# Patient Record
Sex: MALE | Race: WHITE | NOT HISPANIC OR LATINO | Employment: OTHER | URBAN - METROPOLITAN AREA
[De-identification: names, ages, dates, MRNs, and addresses within clinical notes are randomized per-mention and may not be internally consistent; named-entity substitution may affect disease eponyms.]

---

## 2017-01-09 ENCOUNTER — ALLSCRIPTS OFFICE VISIT (OUTPATIENT)
Dept: OTHER | Facility: OTHER | Age: 77
End: 2017-01-09

## 2017-01-09 DIAGNOSIS — E78.2 MIXED HYPERLIPIDEMIA: ICD-10-CM

## 2017-01-09 DIAGNOSIS — I77.810 THORACIC AORTIC ECTASIA (HCC): ICD-10-CM

## 2017-01-09 DIAGNOSIS — Z79.01 LONG TERM CURRENT USE OF ANTICOAGULANT: ICD-10-CM

## 2017-01-17 ENCOUNTER — GENERIC CONVERSION - ENCOUNTER (OUTPATIENT)
Dept: OTHER | Facility: OTHER | Age: 77
End: 2017-01-17

## 2017-01-17 LAB
AMBIG ABBREV CMP14 DEFAULT (HISTORICAL): NORMAL
AMBIG ABBREV LP DEFAULT (HISTORICAL): NORMAL

## 2017-01-18 ENCOUNTER — GENERIC CONVERSION - ENCOUNTER (OUTPATIENT)
Dept: OTHER | Facility: OTHER | Age: 77
End: 2017-01-18

## 2017-01-18 LAB
A/G RATIO (HISTORICAL): 1.9 (ref 1.1–2.5)
ALBUMIN SERPL BCP-MCNC: 4.4 G/DL (ref 3.5–4.8)
ALP SERPL-CCNC: 61 IU/L (ref 39–117)
ALT SERPL W P-5'-P-CCNC: 20 IU/L (ref 0–44)
AST SERPL W P-5'-P-CCNC: 27 IU/L (ref 0–40)
BASOPHILS # BLD AUTO: 0 %
BASOPHILS # BLD AUTO: 0 X10E3/UL (ref 0–0.2)
BILIRUB SERPL-MCNC: 1.4 MG/DL (ref 0–1.2)
BUN SERPL-MCNC: 13 MG/DL (ref 8–27)
BUN/CREA RATIO (HISTORICAL): 13 (ref 10–22)
CALCIUM SERPL-MCNC: 9.6 MG/DL (ref 8.6–10.2)
CHLORIDE SERPL-SCNC: 97 MMOL/L (ref 96–106)
CHOLEST SERPL-MCNC: 162 MG/DL (ref 100–199)
CO2 SERPL-SCNC: 25 MMOL/L (ref 18–29)
CREAT SERPL-MCNC: 1 MG/DL (ref 0.76–1.27)
DEPRECATED RDW RBC AUTO: 13.1 % (ref 12.3–15.4)
EGFR AFRICAN AMERICAN (HISTORICAL): 85 ML/MIN/1.73
EGFR-AMERICAN CALC (HISTORICAL): 73 ML/MIN/1.73
EOSINOPHIL # BLD AUTO: 0.2 X10E3/UL (ref 0–0.4)
EOSINOPHIL # BLD AUTO: 3 %
GLUCOSE SERPL-MCNC: 113 MG/DL (ref 65–99)
HCT VFR BLD AUTO: 44.4 % (ref 37.5–51)
HDLC SERPL-MCNC: 61 MG/DL
HGB BLD-MCNC: 14.8 G/DL (ref 12.6–17.7)
IMM.GRANULOCYTES (CD4/8) (HISTORICAL): 0 %
IMM.GRANULOCYTES (CD4/8) (HISTORICAL): 0 X10E3/UL (ref 0–0.1)
LDLC SERPL CALC-MCNC: 89 MG/DL (ref 0–99)
LYMPHOCYTES # BLD AUTO: 1.4 X10E3/UL (ref 0.7–3.1)
LYMPHOCYTES # BLD AUTO: 28 %
MCH RBC QN AUTO: 33.6 PG (ref 26.6–33)
MCHC RBC AUTO-ENTMCNC: 33.3 G/DL (ref 31.5–35.7)
MCV RBC AUTO: 101 FL (ref 79–97)
MONOCYTES # BLD AUTO: 0.3 X10E3/UL (ref 0.1–0.9)
MONOCYTES (HISTORICAL): 6 %
NEUTROPHILS # BLD AUTO: 3.2 X10E3/UL (ref 1.4–7)
NEUTROPHILS # BLD AUTO: 63 %
PLATELET # BLD AUTO: 177 X10E3/UL (ref 150–379)
POTASSIUM SERPL-SCNC: 4.9 MMOL/L (ref 3.5–5.2)
RBC (HISTORICAL): 4.41 X10E6/UL (ref 4.14–5.8)
SODIUM SERPL-SCNC: 141 MMOL/L (ref 134–144)
TOT. GLOBULIN, SERUM (HISTORICAL): 2.3 G/DL (ref 1.5–4.5)
TOTAL PROTEIN (HISTORICAL): 6.7 G/DL (ref 6–8.5)
TRIGL SERPL-MCNC: 62 MG/DL (ref 0–149)
TSH SERPL DL<=0.05 MIU/L-ACNC: 2 UIU/ML (ref 0.45–4.5)
VLDLC SERPL CALC-MCNC: 12 MG/DL (ref 5–40)
WBC # BLD AUTO: 5.1 X10E3/UL (ref 3.4–10.8)

## 2017-01-20 ENCOUNTER — ALLSCRIPTS OFFICE VISIT (OUTPATIENT)
Dept: OTHER | Facility: OTHER | Age: 77
End: 2017-01-20

## 2017-01-25 ENCOUNTER — HOSPITAL ENCOUNTER (OUTPATIENT)
Dept: CT IMAGING | Facility: HOSPITAL | Age: 77
Discharge: HOME | End: 2017-01-25
Attending: INTERNAL MEDICINE
Payer: MEDICARE

## 2017-01-25 DIAGNOSIS — I77.810 THORACIC AORTIC ECTASIA (HCC): ICD-10-CM

## 2017-01-25 PROCEDURE — 71250 CT THORAX DX C-: CPT

## 2017-01-26 ENCOUNTER — GENERIC CONVERSION - ENCOUNTER (OUTPATIENT)
Dept: OTHER | Facility: OTHER | Age: 77
End: 2017-01-26

## 2017-02-01 ENCOUNTER — GENERIC CONVERSION - ENCOUNTER (OUTPATIENT)
Dept: OTHER | Facility: OTHER | Age: 77
End: 2017-02-01

## 2017-03-13 DIAGNOSIS — I77.810 THORACIC AORTIC ECTASIA (HCC): ICD-10-CM

## 2017-03-13 DIAGNOSIS — I50.22 CHRONIC SYSTOLIC CONGESTIVE HEART FAILURE (HCC): ICD-10-CM

## 2017-03-13 DIAGNOSIS — I42.9 CARDIOMYOPATHY (HCC): ICD-10-CM

## 2017-03-21 ENCOUNTER — HOSPITAL ENCOUNTER (OUTPATIENT)
Dept: NON INVASIVE DIAGNOSTICS | Facility: CLINIC | Age: 77
Discharge: HOME | End: 2017-03-21
Payer: MEDICARE

## 2017-03-21 ENCOUNTER — GENERIC CONVERSION - ENCOUNTER (OUTPATIENT)
Dept: OTHER | Facility: OTHER | Age: 77
End: 2017-03-21

## 2017-03-21 DIAGNOSIS — I50.22 CHRONIC SYSTOLIC CONGESTIVE HEART FAILURE (HCC): ICD-10-CM

## 2017-03-21 DIAGNOSIS — I42.9 CARDIOMYOPATHY (HCC): ICD-10-CM

## 2017-03-21 DIAGNOSIS — I77.810 THORACIC AORTIC ECTASIA (HCC): ICD-10-CM

## 2017-03-21 PROCEDURE — 93306 TTE W/DOPPLER COMPLETE: CPT

## 2017-04-06 ENCOUNTER — GENERIC CONVERSION - ENCOUNTER (OUTPATIENT)
Dept: OTHER | Facility: OTHER | Age: 77
End: 2017-04-06

## 2017-04-18 ENCOUNTER — ALLSCRIPTS OFFICE VISIT (OUTPATIENT)
Dept: OTHER | Facility: OTHER | Age: 77
End: 2017-04-18

## 2017-05-18 ENCOUNTER — ALLSCRIPTS OFFICE VISIT (OUTPATIENT)
Dept: OTHER | Facility: OTHER | Age: 77
End: 2017-05-18

## 2017-06-02 RX ORDER — DOCUSATE SODIUM 100 MG/1
100 CAPSULE, LIQUID FILLED ORAL 2 TIMES DAILY
Status: ON HOLD | COMMUNITY
End: 2017-06-05

## 2017-06-02 RX ORDER — CARVEDILOL 6.25 MG/1
6.25 TABLET ORAL 2 TIMES DAILY WITH MEALS
COMMUNITY
End: 2018-09-19 | Stop reason: SDUPTHER

## 2017-06-02 RX ORDER — RANITIDINE HCL 75 MG
75 TABLET ORAL DAILY
COMMUNITY
End: 2019-10-09

## 2017-06-03 ENCOUNTER — ANESTHESIA EVENT (OUTPATIENT)
Dept: PERIOP | Facility: HOSPITAL | Age: 77
End: 2017-06-03
Payer: MEDICARE

## 2017-06-05 ENCOUNTER — HOSPITAL ENCOUNTER (OUTPATIENT)
Facility: HOSPITAL | Age: 77
Setting detail: OUTPATIENT SURGERY
Discharge: HOME | End: 2017-06-05
Attending: INTERNAL MEDICINE | Admitting: INTERNAL MEDICINE
Payer: MEDICARE

## 2017-06-05 ENCOUNTER — ANESTHESIA (OUTPATIENT)
Dept: PERIOP | Facility: HOSPITAL | Age: 77
End: 2017-06-05
Payer: MEDICARE

## 2017-06-05 ENCOUNTER — GENERIC CONVERSION - ENCOUNTER (OUTPATIENT)
Dept: OTHER | Facility: OTHER | Age: 77
End: 2017-06-05

## 2017-06-05 VITALS
DIASTOLIC BLOOD PRESSURE: 58 MMHG | HEART RATE: 69 BPM | RESPIRATION RATE: 22 BRPM | WEIGHT: 238 LBS | TEMPERATURE: 96.8 F | HEIGHT: 76 IN | BODY MASS INDEX: 28.98 KG/M2 | SYSTOLIC BLOOD PRESSURE: 99 MMHG | OXYGEN SATURATION: 97 %

## 2017-06-05 RX ORDER — SODIUM CHLORIDE, SODIUM LACTATE, POTASSIUM CHLORIDE, CALCIUM CHLORIDE 600; 310; 30; 20 MG/100ML; MG/100ML; MG/100ML; MG/100ML
50 INJECTION, SOLUTION INTRAVENOUS CONTINUOUS
Status: DISCONTINUED | OUTPATIENT
Start: 2017-06-05 | End: 2017-06-05 | Stop reason: HOSPADM

## 2017-06-05 RX ORDER — PROPOFOL 10 MG/ML
INJECTION, EMULSION INTRAVENOUS AS NEEDED
Status: DISCONTINUED | OUTPATIENT
Start: 2017-06-05 | End: 2017-06-05 | Stop reason: SURG

## 2017-06-05 RX ADMIN — PROPOFOL 20 MG: 10 INJECTION, EMULSION INTRAVENOUS at 10:46

## 2017-06-05 RX ADMIN — PROPOFOL 20 MG: 10 INJECTION, EMULSION INTRAVENOUS at 10:42

## 2017-06-05 RX ADMIN — PROPOFOL 120 MG: 10 INJECTION, EMULSION INTRAVENOUS at 10:39

## 2017-06-05 RX ADMIN — SODIUM CHLORIDE, POTASSIUM CHLORIDE, SODIUM LACTATE AND CALCIUM CHLORIDE 50 ML/HR: 600; 310; 30; 20 INJECTION, SOLUTION INTRAVENOUS at 10:20

## 2017-06-05 RX ADMIN — PROPOFOL 20 MG: 10 INJECTION, EMULSION INTRAVENOUS at 10:44

## 2017-07-18 ENCOUNTER — APPOINTMENT (OUTPATIENT)
Dept: LAB | Facility: CLINIC | Age: 77
End: 2017-07-18
Payer: MEDICARE

## 2017-07-18 ENCOUNTER — ALLSCRIPTS OFFICE VISIT (OUTPATIENT)
Dept: OTHER | Facility: OTHER | Age: 77
End: 2017-07-18

## 2017-07-18 DIAGNOSIS — E78.5 HYPERLIPIDEMIA: ICD-10-CM

## 2017-07-18 DIAGNOSIS — I10 ESSENTIAL (PRIMARY) HYPERTENSION: ICD-10-CM

## 2017-07-18 DIAGNOSIS — R31.9 HEMATURIA: ICD-10-CM

## 2017-07-18 LAB
ALBUMIN SERPL BCP-MCNC: 4.1 G/DL (ref 3.5–5)
ALP SERPL-CCNC: 62 U/L (ref 46–116)
ALT SERPL W P-5'-P-CCNC: 24 U/L (ref 12–78)
ANION GAP SERPL CALCULATED.3IONS-SCNC: 5 MMOL/L (ref 4–13)
AST SERPL W P-5'-P-CCNC: 33 U/L (ref 5–45)
BACTERIA UR QL AUTO: ABNORMAL /HPF
BILIRUB SERPL-MCNC: 2.4 MG/DL (ref 0.2–1)
BILIRUB UR QL STRIP: NEGATIVE
BUN SERPL-MCNC: 13 MG/DL (ref 5–25)
CALCIUM SERPL-MCNC: 9.7 MG/DL (ref 8.3–10.1)
CHLORIDE SERPL-SCNC: 105 MMOL/L (ref 100–108)
CLARITY UR: ABNORMAL
CO2 SERPL-SCNC: 29 MMOL/L (ref 21–32)
COLOR UR: YELLOW
CREAT SERPL-MCNC: 0.95 MG/DL (ref 0.6–1.3)
ERYTHROCYTE [DISTWIDTH] IN BLOOD BY AUTOMATED COUNT: 12.9 % (ref 11.6–15.1)
GFR SERPL CREATININE-BSD FRML MDRD: >60 ML/MIN/1.73SQ M
GLUCOSE SERPL-MCNC: 102 MG/DL (ref 65–140)
GLUCOSE UR STRIP-MCNC: NEGATIVE MG/DL
HCT VFR BLD AUTO: 43.9 % (ref 36.5–49.3)
HGB BLD-MCNC: 15.1 G/DL (ref 12–17)
HGB UR QL STRIP.AUTO: ABNORMAL
HYALINE CASTS #/AREA URNS LPF: ABNORMAL /LPF
KETONES UR STRIP-MCNC: NEGATIVE MG/DL
LEUKOCYTE ESTERASE UR QL STRIP: NEGATIVE
MCH RBC QN AUTO: 35.1 PG (ref 26.8–34.3)
MCHC RBC AUTO-ENTMCNC: 34.4 G/DL (ref 31.4–37.4)
MCV RBC AUTO: 102 FL (ref 82–98)
NITRITE UR QL STRIP: NEGATIVE
NON-SQ EPI CELLS URNS QL MICRO: ABNORMAL /HPF
PH UR STRIP.AUTO: 7.5 [PH] (ref 4.5–8)
PLATELET # BLD AUTO: 169 THOUSANDS/UL (ref 149–390)
PMV BLD AUTO: 10.5 FL (ref 8.9–12.7)
POTASSIUM SERPL-SCNC: 4.9 MMOL/L (ref 3.5–5.3)
PROT SERPL-MCNC: 7.6 G/DL (ref 6.4–8.2)
PROT UR STRIP-MCNC: ABNORMAL MG/DL
RBC # BLD AUTO: 4.3 MILLION/UL (ref 3.88–5.62)
RBC #/AREA URNS AUTO: ABNORMAL /HPF
SODIUM SERPL-SCNC: 139 MMOL/L (ref 136–145)
SP GR UR STRIP.AUTO: 1.01 (ref 1–1.03)
UROBILINOGEN UR QL STRIP.AUTO: 1 E.U./DL
WBC # BLD AUTO: 6.14 THOUSAND/UL (ref 4.31–10.16)
WBC #/AREA URNS AUTO: ABNORMAL /HPF

## 2017-07-18 PROCEDURE — 81001 URINALYSIS AUTO W/SCOPE: CPT

## 2017-07-18 PROCEDURE — 36415 COLL VENOUS BLD VENIPUNCTURE: CPT

## 2017-07-18 PROCEDURE — 85027 COMPLETE CBC AUTOMATED: CPT

## 2017-07-18 PROCEDURE — 80053 COMPREHEN METABOLIC PANEL: CPT

## 2017-07-24 ENCOUNTER — HOSPITAL ENCOUNTER (OUTPATIENT)
Dept: ULTRASOUND IMAGING | Facility: HOSPITAL | Age: 77
Discharge: HOME | End: 2017-07-24
Attending: INTERNAL MEDICINE
Payer: MEDICARE

## 2017-07-24 ENCOUNTER — TRANSCRIBE ORDERS (OUTPATIENT)
Dept: ADMINISTRATIVE | Facility: HOSPITAL | Age: 77
End: 2017-07-24

## 2017-07-24 DIAGNOSIS — R31.9 HEMATURIA: ICD-10-CM

## 2017-07-24 PROCEDURE — 76770 US EXAM ABDO BACK WALL COMP: CPT

## 2017-08-01 ENCOUNTER — GENERIC CONVERSION - ENCOUNTER (OUTPATIENT)
Dept: OTHER | Facility: OTHER | Age: 77
End: 2017-08-01

## 2017-08-21 ENCOUNTER — GENERIC CONVERSION - ENCOUNTER (OUTPATIENT)
Dept: OTHER | Facility: OTHER | Age: 77
End: 2017-08-21

## 2017-10-24 ENCOUNTER — ALLSCRIPTS OFFICE VISIT (OUTPATIENT)
Dept: OTHER | Facility: OTHER | Age: 77
End: 2017-10-24

## 2017-10-25 NOTE — PROGRESS NOTES
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)   2  Cardiomyopathy (425 4) (I42 9)   3  Anticoagulant long-term use (V58 61) (Z79 01)   4  Atrial fibrillation (427 31) (I48 91)   5  Chronic systolic heart failure (911 44) (I50 22)    Plan  Atrial fibrillation    · From  Eliquis 5 MG Oral Tablet take 1 tablet by mouth twice daily To Eliquis 5  MG Oral Tablet take 1 tablet by mouth twice a day   Rx By: Shannon Brito; Dispense: 90 Days ; #:180 Tablet; Refill: 3;For: Atrial fibrillation; SONIA = N; Print Rx  Cardiomyopathy    · ECHO COMPLETE WITH CONTRAST IF INDICATED; Status:Hold For - Scheduling;  Requested for:26Mar2018; Perform:[facility]; JPP:[id number];WJDLGJF; For:Cardiomyopathy; Ordered By:Erika Nicole;  Dilation of thoracic aorta    · * CT CHEST WO CONTRAST; Status:Hold For - Scheduling; Requested for:26Mar2018; Perform:[facility]; PMU:[id number];BGULOYH; For:Dilation of thoracic aorta; Ordered By:Erika Nicole; Discussion/Summary  Cardiology Discussion Summary Free Text Note Form [facility]:   Patient with multiple medical problems who seems to be doing reasonably well from cardiac standpoint  Previous studies reviewed with patient  Medications reviewed and possible side effects discussed  concepts of cardiovascular disease , signs and symptoms of heart disease  Dietary and risk factor modification reinforced  All questions answered  Safety measures reviewed  Patient advised to report any problems prompting medical attention  Patient understands the risks and benefits of anticoagulation to prevent thromboembolic risk from atrial fibrillation  Patient report any bleeding issues  Patient does have history of thoracic aortic aneurysm measuring 4 5 cm  Patient will need a repeat follow-up CT of the chest prior to next visit  Patient will also have an echocardiogram to reassess ejection fraction given history of cardiomyopathy prior to next visit   Symptoms to watch out from cardiac standpoint discussed with patient and family  Follow-up in 6 months or earlier as needed  Follow-up with primary care physician  Previous cardiac studies reviewed  Goals and Barriers: The patient has the current Goals: Chronic anticoagulation  The patent has the current Barriers: None  Patient's Capacity to Self-Care: Patient is able to Self-Care  Patient Education: Educational resources provided: Please see discussion summary  Medication SE Review and Pt Understands Tx: Possible side effects of new medications were reviewed with the patient/guardian today  Counseling Documentation With Imm: The patient, patient's family was counseled regarding instructions for management,-- risk factor reductions,-- impressions,-- risks and benefits of treatment options,-- importance of compliance with treatment  Chief Complaint  Chief Complaint Chronic Condition [facility]: Patient is here today for follow up of chronic conditions described in HPI  History of Present Illness  Cardiology [facility] Free Text Note Form [facility]: Patient presents for follow-up visit  Patient has history of nonischemic cardiomyopathy chronic systolic heart failure history of CRT D implantation  Patient also has chronic back pain  Patient has good and bad days  No overall change from previous visit  He states that he has been compliant with all his present medications  He denies any history of bleeding tendencies  Patient is on anticoagulation for atrial fibrillation  No history of ICD discharges  Review of Systems  Cardiology Male ROS:     Cardiac: as noted in HPI  Skin: No complaints of nonhealing sores or skin rash  Genitourinary: as noted in HPI   Psychological: No complaints of feeling depressed, anxiety, panic attacks, or difficulty concentrating  General: lack of energy/fatigue, but-- as noted in HPI,-- no fever-- and-- no frequent infections  Respiratory: shortness of breath, but-- no hemoptysis  HEENT: No complaints of serious problems, hearing problems, nose problems, throat problems, or snoring  Gastrointestinal: No complaints of liver problems, nausea, vomiting, heartburn, constipation, bloody stools, diarrhea, problems swallowing, adbominal pain, or rectal bleeding  Hematologic: No complaints of bleeding disorders, anemia, blood clots, or excessive brusing  Neurological: No complaints of numbness, tingling, dizziness, weakness, seizures, headaches, syncope or fainting, AM fatigue, daytime sleepiness, no witnessed apnea episodes  Musculoskeletal: arthritis-- and-- back pain   ROS Reviewed:   ROS reviewed  Active Problems  Problems    1  Active advance directive (V49 89) (Z78 9)   2  AICD at end of battery life (V53 32) (Z45 02)   3  Anticoagulant long-term use (V58 61) (Z79 01)   4  Atrial fibrillation (427 31) (I48 91)   5  Cardiomyopathy (425 4) (I42 9)   6  Chronic systolic heart failure (061 59) (I50 22)   7  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)   8  Hematuria (599 70) (R31 9)   9  History of colon polyps (V12 72) (Z86 010)   10  Hyperlipidemia (272 4) (E78 5)   11  Hypertension (401 9) (I10)   12  Medicare annual wellness visit, initial (V70 0) (Z00 00)   13  Need for influenza vaccination (V04 81) (Z23)   14  Screening for genitourinary condition (V81 6) (Z13 89)    Past Medical History  Problems    1  Anticoagulant long-term use (V58 61) (Z79 01)   2  History of Cardiac defibrillator in situ (V45 02) (Z95 810)   3  History of Colon polyp (211 3) (K63 5)   4  History of Congestive heart failure (428 0) (I50 9)   5  History of DJD (degenerative joint disease) (715 90) (M19 90)   6  History of atrial fibrillation (V12 59) (Z86 79)   7  Denied: History of depression   8  History of sick sinus syndrome (V12 59) (Z86 79)   9  Denied: History of substance abuse   10  History of Spinal stenosis (724 00) (M48 00)  Active Problems And Past Medical History Reviewed:    The active problems and past medical history were reviewed and updated today  Surgical History  Problems    1  History of Complete Colonoscopy   2  History of Implantable Cardioverter-Defibrillator   3  History of Pacemaker Placement - Transvenous Electrode Biventricular   4  History of Transurethral Resection Of Prostate (TURP)  Surgical History Reviewed: The surgical history was reviewed and updated today  Family History  Mother    1  Family history of CAD (coronary artery disease) (414 00) (I25 10)   2  Denied: Family history of mental disorder   3  Denied: Family history of substance abuse  Family History Reviewed: The family history was reviewed and updated today  Social History  Problems    · Active advance directive (V49 89) (Z78 9)   · Alcohol Use (History)   · Caffeine Use   · Former smoker (I65 46) (U03 965)  Social History Reviewed: The social history was reviewed and updated today  Current Meds   1  Carvedilol 6 25 MG Oral Tablet; TAKE 1 TABLET twice a day with meals; Therapy: 01LPR2470 to (Evaluate:04Sep2018)  Requested for: 35VHN0049; Last   Rx:09Sep2017 Ordered   2  Eliquis 5 MG Oral Tablet; take 1 tablet by mouth twice daily; Therapy: 21HKG2094 to (Evaluate:89Lwb0490)  Requested for: 12Jun2017; Last   Rx:12Jun2017 Ordered   3  Omega-3 Fish Oil CAPS; Therapy: (Kendra Glow) to Recorded   4  Tylenol PM Extra Strength 500-25 MG Oral Tablet; Therapy: 92FXU0145 to Recorded   5  Zantac 75 TABS; Therapy: (Recorded:20Jan2017) to Recorded  Medication List Reviewed: The medication list was reviewed and updated today  Allergies  Medication    1  Penicillins  Non-Medication    2  No Known Food Allergies   3   Seasonal    Vitals  Vital Signs    Recorded: 27JLX1667 11:18AM   Heart Rate 75   Systolic 501   Diastolic 72   Height 6 ft 3 in   Weight 233 lb 8 0 oz   BMI Calculated 29 19   BSA Calculated 2 34   O2 Saturation 98     Physical Exam    Constitutional   General appearance: No acute distress, well appearing and well nourished  -- noted in the upper part of the face  Eyes   Ophthalmoscopic examination: Normal appearing optic disc and posterior segments  Ears, Nose, Mouth, and Throat - Nasal mucosa, septum, and turbinates: Normal, no edema or discharge  -- Oropharynx: Clear, nares are clear, mucous membranes are moist    Neck   Neck and thyroid: Normal, supple, trachea midline, no thyromegaly  Pulmonary   Respiratory effort: No increased work of breathing or signs of respiratory distress  Auscultation of lungs: Clear to auscultation, no rales, no rhonchi, no wheezing, good air movement  Cardiovascular   Auscultation of heart: Normal rate and rhythm, normal S1 and S2, no murmurs  -- Irregular  Carotid pulses: Normal, 2+ bilaterally  -- Carotid bruit  Peripheral vascular exam: Normal pulses throughout, no tenderness, erythema or swelling  Pedal pulses: Normal, 2+ bilaterally  Examination of extremities for edema and/or varicosities: Normal     Chest -   Sternum: Normal     Abdomen   Abdomen: Non-tender and no distention  Liver and spleen: No hepatomegaly or splenomegaly  Musculoskeletal Gait and station: Normal gait  -- Digits and nails: Normal without clubbing or cyanosis  -- Inspection/palpation of joints, bones, and muscles: Normal, ROM normal     Skin - Skin and subcutaneous tissue: Normal without rashes or lesions  Skin is warm and well perfused, normal turgor  Neurologic - Cranial nerves: II - XII intact  -- Nonfocal -- Speech: Normal     Psychiatric - Orientation to person, place, and time: Normal -- Mood and affect: Normal       Results/Data  Diagnostic Studies Reviewed Cardio: I personally reviewed the recording/images in the office today  My interpretation follows  ICD/ Pacer Interpretation ICD interrogation data reviewed  Normal CRT D device  Underlying rhythm is atrial fibrillation  Battery status is good   Lead impedance normal  Health Management  History of colon polyps   COLONOSCOPY; every 5 years; Last 85FTG8279; Next Due: 89PCX8276;  Active    Future Appointments    Date/Time Provider Specialty Site   04/06/2018 10:40 AM [facility]     Signatures   Electronically signed by : YULIANA Vega ; Oct 24 2017  1:32PM EST                       (Author)

## 2018-01-11 ENCOUNTER — GENERIC CONVERSION - ENCOUNTER (OUTPATIENT)
Dept: OTHER | Facility: OTHER | Age: 78
End: 2018-01-11

## 2018-01-11 NOTE — RESULT NOTES
Verified Results  (1) CBC/PLT/DIFF 24QYT9214 09:29AM Erika Nicole     Test Name Result Flag Reference   WBC 5 1 x10E3/uL  3 4-10 8   RBC 4 41 x10E6/uL  4 14-5 80   Hemoglobin 14 8 g/dL  12 6-17 7   Hematocrit 44 4 %  37 5-51 0    fL H 79-97   MCH 33 6 pg H 26 6-33 0   MCHC 33 3 g/dL  31 5-35 7   RDW 13 1 %  12 3-15 4   Platelets 747 J61W6/OU  150-379   Neutrophils 63 %     Lymphs 28 %     Monocytes 6 %     Eos 3 %     Basos 0 %     Neutrophils (Absolute) 3 2 x10E3/uL  1 4-7 0   Lymphs (Absolute) 1 4 x10E3/uL  0 7-3 1   Monocytes(Absolute) 0 3 x10E3/uL  0 1-0 9   Eos (Absolute) 0 2 x10E3/uL  0 0-0 4   Baso (Absolute) 0 0 x10E3/uL  0 0-0 2   Immature Granulocytes 0 %     Immature Grans (Abs) 0 0 x10E3/uL  0 0-0 1     (1) COMPREHENSIVE METABOLIC PANEL 33LST4967 70:29MR Erika Nicole     Test Name Result Flag Reference   Glucose, Serum 113 mg/dL H 65-99   BUN 13 mg/dL  8-27   Creatinine, Serum 1 00 mg/dL  0 76-1 27   eGFR If NonAfricn Am 73 mL/min/1 73  >59   eGFR If Africn Am 85 mL/min/1 73  >59   BUN/Creatinine Ratio 13  10-22   Sodium, Serum 141 mmol/L  134-144   Potassium, Serum 4 9 mmol/L  3 5-5 2   Chloride, Serum 97 mmol/L     Carbon Dioxide, Total 25 mmol/L  18-29   Calcium, Serum 9 6 mg/dL  8 6-10 2   Protein, Total, Serum 6 7 g/dL  6 0-8 5   Albumin, Serum 4 4 g/dL  3 5-4 8   Globulin, Total 2 3 g/dL  1 5-4 5   A/G Ratio 1 9  1 1-2 5   Bilirubin, Total 1 4 mg/dL H 0 0-1 2   Alkaline Phosphatase, S 61 IU/L     AST (SGOT) 27 IU/L  0-40   ALT (SGPT) 20 IU/L  0-44     (LC) Lipid Panel 52QQW2118 09:29AM Erika Nicole     Test Name Result Flag Reference   Cholesterol, Total 162 mg/dL  100-199   Triglycerides 62 mg/dL  0-149   HDL Cholesterol 61 mg/dL  >39   VLDL Cholesterol Curtis 12 mg/dL  5-40   LDL Cholesterol Calc 89 mg/dL  0-99     (1) TSH 19KZS9309 09:29AM Erika Nicole     Test Name Result Flag Reference   TSH 2 000 uIU/mL  0 450-4 500     (LC) Juanita Thompson CMP14 Default 16BOB1772 09:29AM [place], Erika     Test Name Result Flag Reference   Evangelina Guadarrama CMP14 Default Comment     A hand-written panel/profile was received from your office  In  accordance with the [facility] Ambiguous Test Code Policy dated July 1717, we have completed your order by using the closest currently  or formerly recognized AMA panel  We have assigned Comprehensive  Metabolic Panel (14), Test Code #920572 to this request   If this  is not the testing you wished to receive on this specimen, please  contact the [address] [place] [address] Client Inquiry/Technical Services Department  to clarify the test order  We appreciate your business  [facility]) Evangelina Richtermanuela LP Default 44VBI9250 09:29AM [place], Erika     Test Name Result Flag Reference   Juanita Thompson LP Default Comment     A hand-written panel/profile was received from your office  In  accordance with the [facility] Ambiguous Test Code Policy dated July 8940, we have completed your order by using the closest currently  or formerly recognized AMA panel  We have assigned Lipid Panel,  Test Code #500962 to this request  If this is not the testing you  wished to receive on this specimen, please contact the [address] [place] [address]  Client Inquiry/Technical Services Department to clarify the test  order  We appreciate your business

## 2018-01-12 VITALS
DIASTOLIC BLOOD PRESSURE: 64 MMHG | HEIGHT: 76 IN | BODY MASS INDEX: 28.98 KG/M2 | SYSTOLIC BLOOD PRESSURE: 108 MMHG | WEIGHT: 238 LBS

## 2018-01-12 NOTE — RESULT NOTES
Verified Results  ECHO COMPLETE WITH CONTRAST IF INDICATED 2017 09:45AM Kalia Johnson Order Number: [id number]    - Patient Instructions: To schedule this appointment, please contact Central Scheduling at [phone]  For Vincent Chowdhury, please call [phone]  Test Name Result Flag Reference   ECHO COMPLETE WITH CONTRAST IF INDICATED (Report)     [facility], [address] [place] [address]   [phone]     Transthoracic Echocardiogram   2D, M-mode, Doppler, and Color Doppler     Study date: 21-Mar-2017     Patient: Lurdes Donovan   MR number: [id number]   Account number: [de-identified]   : 1940   Age: 77 years   Gender: Male   Status: Outpatient   Location: [facility]   Height: 76 in   Weight: 237 lb   BP: 132/ 84 mmHg     Indications: Cardiomyopathy  Diagnoses: I42 9 - Cardiomyopathy, unspecified     Sonographer: Muhammad RCS   Primary Physician: Sabi Puentes DO   Referring Physician: Jr Bernal MD   Group: [facility]   Report Prepared By: Jr Bernal MD   Interpreting Physician: Jr Bernal MD     SUMMARY     LEFT VENTRICLE:   Ejection fraction was estimated to be 35 %  There was severe diffuse hypokinesis  Concentric hypertrophy was present  RIGHT VENTRICLE:   Estimated peak pressure was at least 35 mmHg  ICD lead noted in RV  LEFT ATRIUM:   The atrium was mildly dilated  MITRAL VALVE:   There was mild to moderate annular calcification  There was moderate regurgitation  AORTIC VALVE:   There was mild regurgitation  TRICUSPID VALVE:   There was mild regurgitation  AORTA:   The root exhibited mild dilatation  4cm  SUMMARY MEASUREMENTS   Unspecified Scan Mode measurements:   Aortic Valve:  HR was 69 {H  B }/min  Left Ventricle:  HR was 70 {H  B }/min       HISTORY: PRIOR HISTORY: Risk factors: hypertension and a family history of coronary artery disease  PRIOR PROCEDURES: ICD implantation  PROCEDURE: The study was performed in the [address] [place] [address]  This was a routine study  The transthoracic approach was used  The study included complete 2D imaging, M-mode, complete spectral Doppler, and color Doppler  Image   quality was adequate  LEFT VENTRICLE: Size was normal  Ejection fraction was estimated to be 35 %  There was severe diffuse hypokinesis  Concentric hypertrophy was present  DOPPLER: There was an increased relative contribution of atrial contraction to   ventricular filling  RIGHT VENTRICLE: The size was normal  Systolic function was normal  Wall thickness was normal  DOPPLER: Estimated peak pressure was at least 35 mmHg  ICD lead noted in RV  LEFT ATRIUM: The atrium was mildly dilated  RIGHT ATRIUM: Size was normal      MITRAL VALVE: There was mild to moderate annular calcification  DOPPLER: There was moderate regurgitation  AORTIC VALVE: The valve was probably trileaflet  Leaflets exhibited mild calcification  DOPPLER: There was no evidence for stenosis  There was mild regurgitation  TRICUSPID VALVE: The valve structure was normal  There was normal leaflet separation  DOPPLER: The transtricuspid velocity was within the normal range  There was no evidence for stenosis  There was mild regurgitation  PULMONIC VALVE: Leaflets exhibited normal thickness, no calcification, and normal cuspal separation  DOPPLER: The transpulmonic velocity was within the normal range  There was no regurgitation  PERICARDIUM: There was no pericardial effusion  The pericardium was normal in appearance  AORTA: The root exhibited mild dilatation  4cm  SYSTEM MEASUREMENT TABLES     Apical four chamber   4 chamber Left Atrium Volume Index; Planimetry; End Systole; Apical four chamber;: 31 67 cm2   Left Ventricular End Diastolic Volume; Method of Disks, Single Plane;  Apical four chamber;: 207 1 ml   Left Ventricular End Systolic Volume; Method of Disks, Single Plane; Apical four chamber;: 107 7 ml   Right Atrium Systolic Area; Planimetry; End Systole; Apical four chamber;: 38 5 cm2   Right Ventricular Internal Diastolic Dimension; End Diastole; Apical four chamber;: 51 mm   TAPSE: 10 5 mm     Unspecified Scan Mode   Aortic Root Diameter; End Systole;: 40 4 mm   Gradient Pressure, Peak; Simplified Bernoulli; Antegrade Flow; Systole;: 3 7 mm[Hg]   Gradient pressure, average; Simplified Bernoulli; Antegrade Flow; Systole;: 2 mm[Hg]   HR: 69 {H  B }/min   Left atrial diameter; End Diastole;: 53 6 mm   Cardiac Output; Teichholz; Systole;: 2 6 L/min   HR: 70 {H  B }/min   Heart rate; Teichholz;: 80 {H  B }/min   Interventricular Septum Diastolic Thickness; Teichholz; End Diastole;: 18 3 mm   Left Ventricle Internal End Diastolic Dimension; Teichholz;: 52 4 mm   Left Ventricle Internal Systolic Dimension; Teichholz; End Systole;: 46 4 mm   Left Ventricle Mass; Mass AVCube with Teichholz; End Diastole;: 471 g   Left Ventricle Posterior Wall Diastolic Thickness; Teichholz; End Diastole;: 18 5 mm   Left Ventricular Ejection Fraction; Teichholz;: 24 7 %   Left Ventricular End Diastolic Volume; Teichholz;: 131 8 ml   Left Ventricular End Systolic Volume; Method of Disks, Biplane;: 95 7 ml   Left Ventricular End Systolic Volume; Teichholz;: 99 3 ml   Left Ventricular Fractional Shortening;: 11 5 %   Stroke volume;  Teichholz; Systole;: 32 5 ml   Mitral Valve Area; Area by Pressure Half-Time; Systole;: 2 53 cm2   Mitral Valve E to A Ratio; Systole;: 3 36   Maximum Tricuspid valve regurgitation pressure gradient; Regurgitant Flow; Systole;: 18 7 mm[Hg]     [facility]     Prepared and electronically signed by     Ramy Watts MD   Signed 21-Mar-2017 15:08:06

## 2018-01-13 VITALS
OXYGEN SATURATION: 95 % | SYSTOLIC BLOOD PRESSURE: 132 MMHG | DIASTOLIC BLOOD PRESSURE: 84 MMHG | BODY MASS INDEX: 28.91 KG/M2 | WEIGHT: 237.5 LBS | HEART RATE: 83 BPM

## 2018-01-13 NOTE — RESULT NOTES
Verified Results  * CT CHEST [address] [id number] 09:50AM Rubi Loving Order Number: [id number]    - Patient Instructions: To schedule this appointment, please contact Central Scheduling at [phone]  Test Name Result Flag Reference   CT CHEST WO CONTRAST (Report)     CT CHEST WITHOUT IV CONTRAST     INDICATION: Ectasia of the aorta     COMPARISON: June 18, 2015     TECHNIQUE: CT examination of the chest was performed without intravenous contrast  Axial, sagittal and coronal reformatted images were submitted for interpretation  Coronal thick section MIP (maximal intensity projection) images were also created  This examination, like all CT scans performed in the [facility], was performed utilizing techniques to minimize radiation dose exposure, including the use of iterative reconstruction and automated exposure control  FINDINGS:     LUNGS: Again noted is a 4 mm right middle lobe lung nodule probably representing granuloma, unchanged from the previous study of June 18, 2015   A left lower lobe lung nodule seen measuring about 3 mm, seen in image 61, was likely also seen on the previous study in image 56     PLEURA: Unremarkable  HEART/GREAT VESSELS: The tubular portion of ascending aorta at the level of the pulmonary artery measures 4 5 cm, stable   The aortic sinus measures 4 5 cm, stable   Atherosclerotic changes are seen within the arch involving the anterior and posterior wall     MEDIASTINUM AND CRUZITO: Unremarkable  CHEST WALL AND LOWER NECK: Broad-based calcification seen in relation to the posterior aspect of the right 7th rib, stable     VISUALIZED STRUCTURES IN THE UPPER ABDOMEN: Unenhanced liver, pancreas, adrenals appear unremarkable   A parapelvic cyst is seen in the left kidney, measuring about 2 1 cm     OSSEOUS STRUCTURES: No acute fracture  No destructive osseous lesion         IMPRESSION:     The ascending aorta remains unchanged with the tubular portion measuring 4 5 cm     A right middle lobe lung nodule measuring 4 mm and the left lower lobe 3 mm lung nodule, nonspecific can BE evaluated for stability with follow-up at one year       ##sigslh##sigslh     ##fuslh12##fuslh12        Workstation performed: [id number]     Signed by:   Laura Bautista MD   1/26/17

## 2018-01-14 VITALS
HEIGHT: 76 IN | OXYGEN SATURATION: 97 % | BODY MASS INDEX: 29.47 KG/M2 | WEIGHT: 242 LBS | HEART RATE: 82 BPM | SYSTOLIC BLOOD PRESSURE: 122 MMHG | DIASTOLIC BLOOD PRESSURE: 82 MMHG

## 2018-01-14 VITALS
WEIGHT: 239 LBS | HEART RATE: 86 BPM | DIASTOLIC BLOOD PRESSURE: 90 MMHG | SYSTOLIC BLOOD PRESSURE: 132 MMHG | BODY MASS INDEX: 29.09 KG/M2 | OXYGEN SATURATION: 97 %

## 2018-01-14 VITALS
WEIGHT: 233.5 LBS | SYSTOLIC BLOOD PRESSURE: 132 MMHG | DIASTOLIC BLOOD PRESSURE: 72 MMHG | HEIGHT: 75 IN | OXYGEN SATURATION: 98 % | HEART RATE: 75 BPM | BODY MASS INDEX: 29.03 KG/M2

## 2018-01-14 VITALS
SYSTOLIC BLOOD PRESSURE: 130 MMHG | OXYGEN SATURATION: 96 % | WEIGHT: 235.13 LBS | HEART RATE: 89 BPM | BODY MASS INDEX: 29.23 KG/M2 | HEIGHT: 75 IN | DIASTOLIC BLOOD PRESSURE: 68 MMHG

## 2018-01-15 NOTE — RESULT NOTES
Verified Results  ECHO COMPLETE WITH CONTRAST IF INDICATED, TTE / TRANSTHORACIC [id number] 03:49PM Olivia Ochoa Order Number: [id number]     Test Name Result Flag Reference   ECHO COMPLETE WITH CONTRAST IF INDICATED (Report)     [facility], [address] [place] [address]   [phone]     Transthoracic Echocardiogram   2D, M-mode, Doppler, and Color Doppler     Study date: 10-Mar-2016     Patient: Gilberto De Los Santos   MR number: [id number]   Account number: [de-identified]   : 1940   Age: 77 years   Gender: Male   Status: Outpatient   Location: [facility]   Height: 76 in   Weight: 241 lb   BP: 120/ 80 mmHg     Indications: Cardiomyopathy  Diagnoses: I42 9 - Cardiomyopathy, unspecified     Sonographer: Blandon RCS   Primary Physician: Margie Mcmillan MD   Referring Physician: Philippe Hernandez MD   Group: [facility] [place]   Interpreting Physician: Philippe Hernandez MD     SUMMARY     LEFT VENTRICLE:   Ejection fraction was estimated to be 35 %  There was severe diffuse hypokinesis  There was moderate to severe concentric hypertrophy  RIGHT VENTRICLE:   Estimated peak pressure was at least 30 mmHg  ICD leads noted in RA and RV  LEFT ATRIUM:   The atrium was moderately dilated  RIGHT ATRIUM:   The atrium was dilated  MITRAL VALVE:   There was mild to moderate regurgitation  TRICUSPID VALVE:   There was mild regurgitation  HISTORY: PRIOR HISTORY: A Fib  Dilated thoracic aorta  Congestive heart   failure  Cardiomyopathy  Risk factors: hypertension, oral hypoglycemic-treated   diabetes, and a family history of coronary artery disease  PRIOR PROCEDURES:   ICD implantation  PROCEDURE: The study was performed in the [address] [place] [address]  This   was a routine study  The transthoracic approach was used   The study included   complete 2D imaging, M-mode, complete spectral Doppler, and color Doppler  The   heart rate was 69 bpm, at the start of the study  Images were obtained from the   parasternal, apical, subcostal, and suprasternal notch acoustic windows  Image   quality was adequate  LEFT VENTRICLE: Size was normal  Ejection fraction was estimated to be 35 %  There was severe diffuse hypokinesis  There was moderate to severe concentric   hypertrophy  RIGHT VENTRICLE: The size was normal  Systolic function was normal  Wall   thickness was normal  DOPPLER: Estimated peak pressure was at least 30 mmHg  ICD leads noted in RA and RV  LEFT ATRIUM: The atrium was moderately dilated  RIGHT ATRIUM: The atrium was dilated  MITRAL VALVE: There was annular calcification  DOPPLER: There was mild to   moderate regurgitation  AORTIC VALVE: The valve was trileaflet  Leaflets exhibited mildly increased   thickness, mild calcification, and normal cuspal separation  DOPPLER:   Transaortic velocity was within the normal range  There was no evidence for   stenosis  There was no regurgitation  TRICUSPID VALVE: The valve structure was normal  There was normal leaflet   separation  DOPPLER: The transtricuspid velocity was within the normal range  There was no evidence for stenosis  There was mild regurgitation  PULMONIC VALVE: Leaflets exhibited normal thickness, no calcification, and   normal cuspal separation  DOPPLER: The transpulmonic velocity was within the   normal range  There was no regurgitation  PERICARDIUM: There was no pericardial effusion  The pericardium was normal in   appearance  AORTA: The root exhibited normal size  SYSTEM MEASUREMENT TABLES     Apical four chamber   4 chamber Left Atrium Volume Index; Planimetry; End Systole; Apical four   chamber;: 31 49 cm2 Left Ventricular End Diastolic Volume; Method of Disks,   Single Plane; Apical four chamber;: 198 9 ml Left Ventricular End Systolic   Volume; Method of Disks, Single Plane;  Apical four chamber;: 135 1 ml Right   Atrium Systolic Area; Planimetry; End Systole; Apical four chamber;: 30 25 cm2   Right Ventricular Internal Diastolic Dimension; End Diastole; Apical four   chamber;: 49 3 mm   TAPSE: 8 4 mm     Unspecified Scan Mode   AR Vmax; Regurgitant Flow; Diastole;: 269 9 cm/s   Aortic Root Diameter; End Systole;: 38 5 mm   Left atrial diameter; End Diastole;: 53 4 mm   Interventricular Septum Diastolic Thickness; Teichholz; End Diastole;: 17 9 mm   Interventricular Septum Systolic Thickness; Teichholz; End Systole;: 21 2 mm   Left Ventricle Internal End Diastolic Dimension; Teichholz;: 53 9 mm   Left Ventricle Internal Systolic Dimension; Teichholz; End Systole;: 48 8 mm   Left Ventricle Posterior Wall Diastolic Thickness; Teichholz; End Diastole;:   16 4 mm Left Ventricle Posterior Wall Systolic Thickness; Teichholz; End   Systole;: 21 8 mm   Left Ventricular Ejection Fraction;  Teichholz;: 20 6 %   Mitral Valve Area; Area by Pressure Half-Time; Systole;: 3 33 cm2   Maximum Tricuspid valve regurgitation pressure gradient; Regurgitant Flow;   Systole;: 29 6 mm[Hg]     [facility]     Prepared and electronically signed by     Isaac Smith MD   Signed 11-Mar-2016 13:32:25

## 2018-01-18 NOTE — MISCELLANEOUS
This is to state that this patient may stop Eliquis for a maximum of 3 days prior to lithotripsy because of concerns for hematuria  Patient to restart anticoagulation the day after the procedure if okay from urological  standpoint  If you have any specific questions, please do not hesitate to contact me in person  Electronically signed by:Erika LAZO    Apr 4 2016  9:00PM EST

## 2018-01-23 ENCOUNTER — ALLSCRIPTS OFFICE VISIT (OUTPATIENT)
Dept: OTHER | Facility: OTHER | Age: 78
End: 2018-01-23

## 2018-01-23 ENCOUNTER — TRANSCRIBE ORDERS (OUTPATIENT)
Dept: LAB | Facility: CLINIC | Age: 78
End: 2018-01-23

## 2018-01-23 ENCOUNTER — APPOINTMENT (OUTPATIENT)
Dept: LAB | Facility: CLINIC | Age: 78
End: 2018-01-23
Payer: MEDICARE

## 2018-01-23 DIAGNOSIS — I42.9 FAMILIAL CARDIOMYOPATHY (HCC): Primary | ICD-10-CM

## 2018-01-23 DIAGNOSIS — I42.9 CARDIOMYOPATHY (HCC): ICD-10-CM

## 2018-01-23 DIAGNOSIS — I42.9 FAMILIAL CARDIOMYOPATHY (HCC): ICD-10-CM

## 2018-01-23 LAB
ALBUMIN SERPL BCP-MCNC: 4 G/DL (ref 3.5–5)
ALP SERPL-CCNC: 73 U/L (ref 46–116)
ALT SERPL W P-5'-P-CCNC: 24 U/L (ref 12–78)
ANION GAP SERPL CALCULATED.3IONS-SCNC: 8 MMOL/L (ref 4–13)
AST SERPL W P-5'-P-CCNC: 28 U/L (ref 5–45)
BASOPHILS # BLD AUTO: 0.02 THOUSANDS/ΜL (ref 0–0.1)
BASOPHILS NFR BLD AUTO: 0 % (ref 0–1)
BILIRUB SERPL-MCNC: 2.56 MG/DL (ref 0.2–1)
BUN SERPL-MCNC: 15 MG/DL (ref 5–25)
CALCIUM SERPL-MCNC: 9.7 MG/DL (ref 8.3–10.1)
CHLORIDE SERPL-SCNC: 105 MMOL/L (ref 100–108)
CO2 SERPL-SCNC: 25 MMOL/L (ref 21–32)
CREAT SERPL-MCNC: 0.95 MG/DL (ref 0.6–1.3)
EOSINOPHIL # BLD AUTO: 0.1 THOUSAND/ΜL (ref 0–0.61)
EOSINOPHIL NFR BLD AUTO: 2 % (ref 0–6)
ERYTHROCYTE [DISTWIDTH] IN BLOOD BY AUTOMATED COUNT: 13.1 % (ref 11.6–15.1)
GFR SERPL CREATININE-BSD FRML MDRD: 77 ML/MIN/1.73SQ M
GLUCOSE SERPL-MCNC: 107 MG/DL (ref 65–140)
HCT VFR BLD AUTO: 43 % (ref 36.5–49.3)
HGB BLD-MCNC: 14.4 G/DL (ref 12–17)
LYMPHOCYTES # BLD AUTO: 1.65 THOUSANDS/ΜL (ref 0.6–4.47)
LYMPHOCYTES NFR BLD AUTO: 25 % (ref 14–44)
MCH RBC QN AUTO: 34.4 PG (ref 26.8–34.3)
MCHC RBC AUTO-ENTMCNC: 33.5 G/DL (ref 31.4–37.4)
MCV RBC AUTO: 103 FL (ref 82–98)
MONOCYTES # BLD AUTO: 0.57 THOUSAND/ΜL (ref 0.17–1.22)
MONOCYTES NFR BLD AUTO: 9 % (ref 4–12)
NEUTROPHILS # BLD AUTO: 4.36 THOUSANDS/ΜL (ref 1.85–7.62)
NEUTS SEG NFR BLD AUTO: 64 % (ref 43–75)
NRBC BLD AUTO-RTO: 0 /100 WBCS
NT-PROBNP SERPL-MCNC: 2276 PG/ML
PLATELET # BLD AUTO: 175 THOUSANDS/UL (ref 149–390)
PMV BLD AUTO: 10.7 FL (ref 8.9–12.7)
POTASSIUM SERPL-SCNC: 4.3 MMOL/L (ref 3.5–5.3)
PROT SERPL-MCNC: 7.6 G/DL (ref 6.4–8.2)
RBC # BLD AUTO: 4.19 MILLION/UL (ref 3.88–5.62)
SODIUM SERPL-SCNC: 138 MMOL/L (ref 136–145)
WBC # BLD AUTO: 6.71 THOUSAND/UL (ref 4.31–10.16)

## 2018-01-23 PROCEDURE — 80053 COMPREHEN METABOLIC PANEL: CPT

## 2018-01-23 PROCEDURE — 36415 COLL VENOUS BLD VENIPUNCTURE: CPT

## 2018-01-23 PROCEDURE — 85025 COMPLETE CBC W/AUTO DIFF WBC: CPT

## 2018-01-23 PROCEDURE — 83880 ASSAY OF NATRIURETIC PEPTIDE: CPT

## 2018-01-24 VITALS
HEART RATE: 90 BPM | DIASTOLIC BLOOD PRESSURE: 80 MMHG | HEIGHT: 75 IN | BODY MASS INDEX: 30.34 KG/M2 | SYSTOLIC BLOOD PRESSURE: 130 MMHG | WEIGHT: 244 LBS | OXYGEN SATURATION: 96 %

## 2018-01-24 NOTE — PROGRESS NOTES
Assessment   Assessed    1  Cardiomyopathy (425 4) (I42 9)   2  Acute on chronic systolic congestive heart failure (428 23,428 0) (I50 23)   3  Atrial fibrillation (427 31) (I48 91)   4  Anticoagulant long-term use (V58 61) (Z79 01)   5  Dyspnea on effort (786 09) (R06 09)    Plan   Cardiomyopathy    · Torsemide 10 MG Oral Tablet; take one tablet by mouth every day   Rx By: Mulugeta Burgos; Dispense: 30 Days ; #:30 Tablet; Refill: 4;For: Cardiomyopathy; SONIA = N; Verified Transmission [id number] Last Updated By: System, [employer]; 1/23/2018 3:40:05 PM   · (1) CBC/PLT/DIFF; Status:Active; Requested RNJ:[id number]; Perform:[facility]; OTC:15IYX1412;JRPWFVZ; For:Cardiomyopathy; Ordered By:Erika Nicole;   · (1) COMPREHENSIVE METABOLIC PANEL; Status:Active; Requested PII:[id number]; Perform:[facility]; QOB:82QIU4339;KSSVPIH; For:Cardiomyopathy; Ordered By:Erika Nicole;   · (Q) B TYPE NATRIURETIC PEPTIDE (BNP); Status:Active; Requested QGV:[id number]; Perform:[facility]; ZKN:66VGL1139;GMAFYTL; For:Cardiomyopathy; Ordered By:Erika Nicole; Discussion/Summary   Cardiology Discussion Summary Free Text Note Form [facility]:    Patient with likely acute on chronic systolic heart failure exacerbation  Patient in the past has been well compensated  However patient does have weight gain shortness of breath as well as lower extremity edema  Importance of salt restriction reinforced  Start torsemide 10 mg daily  We will see and assess response and increase if necessary  Patient has been instructed to check his weight on a daily basis  Check blood work including CBC CMP and BNP  Symptoms to watch out from cardiac standpoint discussed with patient and family  Follow-up in 1 week or earlier as needed  Follow-up with ICD clinic  Patient understands the risks and benefits of anticoagulation to prevent thromboembolic risk from atrial fibrillation  Patient report any bleeding issues  Chief Complaint   Chief Complaint Chronic Condition [facility]: Patient is here today for follow up of chronic conditions described in HPI  History of Present Illness   Cardiology [facility] Free Text Note Form [facility]: Patient presents for follow-up visit  Patient has shortness of breath which is more than usual  Patient also has lower extremity edema  Patient has gained weight  Patient has not been very compliant with his salt restriction  Patient does have history of nonischemic cardiomyopathy status post CRT D  Patient also appears to have an element of orthopnea  No history of ICD discharges  He states that he has been compliant with all his present medications  Review of Systems   Cardiology Male ROS:         Cardiac: as noted in HPI  Skin: No complaints of nonhealing sores or skin rash  Genitourinary: as noted in HPI      Psychological: No complaints of feeling depressed, anxiety, panic attacks, or difficulty concentrating  General: lack of energy/fatigue, but-- as noted in HPI,-- no fever-- and-- no frequent infections  Respiratory: shortness of breath, but-- no hemoptysis  HEENT: No complaints of serious problems, hearing problems, nose problems, throat problems, or snoring  Gastrointestinal: No complaints of liver problems, nausea, vomiting, heartburn, constipation, bloody stools, diarrhea, problems swallowing, adbominal pain, or rectal bleeding  Hematologic: No complaints of bleeding disorders, anemia, blood clots, or excessive brusing  Neurological: No complaints of numbness, tingling, dizziness, weakness, seizures, headaches, syncope or fainting, AM fatigue, daytime sleepiness, no witnessed apnea episodes  Musculoskeletal: arthritis-- and-- back pain    ROS Reviewed:    ROS reviewed  Active Problems   Problems    1  Active advance directive (V49 89) (Z78 9)   2  AICD at end of battery life (V53 32) (Z45 02)   3   Anticoagulant long-term use (V58 61) (Z79 01)   4  Atrial fibrillation (427 31) (I48 91)   5  Cardiomyopathy (425 4) (I42 9)   6  Chronic systolic heart failure (630 86) (I50 22)   7  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)   8  Hematuria (599 70) (R31 9)   9  History of colon polyps (V12 72) (Z86 010)   10  Hyperlipidemia (272 4) (E78 5)   11  Hypertension (401 9) (I10)   12  Medicare annual wellness visit, initial (V70 0) (Z00 00)   13  Need for influenza vaccination (V04 81) (Z23)   14  Otorrhagia of right ear (388 69) (H92 21)   15  Screening for genitourinary condition (V81 6) (Z13 89)   16  Wound, open, ear, canal (872 02) (S01 309A)    Past Medical History   Problems    1  History of Anticoagulant long-term use (V58 61) (Z79 01)   2  History of Cardiac defibrillator in situ (V45 02) (Z95 810)   3  History of Colon polyp (211 3) (K63 5)   4  History of Congestive heart failure (428 0) (I50 9)   5  History of DJD (degenerative joint disease) (715 90) (M19 90)   6  History of atrial fibrillation (V12 59) (Z86 79)   7  Denied: History of depression   8  History of sick sinus syndrome (V12 59) (Z86 79)   9  Denied: History of substance abuse   10  History of Spinal stenosis (724 00) (M48 00)  Active Problems And Past Medical History Reviewed: The active problems and past medical history were reviewed and updated today  Surgical History   Problems    1  History of Complete Colonoscopy   2  History of Implantable Cardioverter-Defibrillator   3  History of Pacemaker Placement - Transvenous Electrode Biventricular   4  History of Transurethral Resection Of Prostate (TURP)  Surgical History Reviewed: The surgical history was reviewed and updated today  Family History   Mother    1  Family history of CAD (coronary artery disease) (414 00) (I25 10)   2  Denied: Family history of mental disorder   3  Denied: Family history of substance abuse  Family History Reviewed: The family history was reviewed and updated today         Social History   Problems    · Active advance directive (V49 89) (Z78 9)   · Alcohol Use (History)   · Caffeine Use   · Former smoker (X71 59) (R08 840)  Social History Reviewed: The social history was reviewed and updated today  Current Meds    1  Carvedilol 6 25 MG Oral Tablet; TAKE 1 TABLET twice a day with meals; Therapy: 56DZN1585 to (Evaluate:04Sep2018)  Requested for: 82AIW3265; Last     Rx:03Vzx4496 Ordered   2  Eliquis 5 MG Oral Tablet; take 1 tablet by mouth twice daily; Therapy: 77VMJ1745 to (Evaluate:08Jun2018)  Requested for: 80IWL0478; Last     Rx:05Mri9413 Ordered   3  Omega-3 Fish Oil CAPS; Therapy: (Verónica Zarate) to Recorded   4  Tylenol PM Extra Strength 500-25 MG Oral Tablet; Therapy: 20WNW2240 to Recorded   5  Zantac 75 TABS; Therapy: (Recorded:20Jan2017) to Recorded  Medication List Reviewed: The medication list was reviewed and updated today  Allergies   Medication    1  Penicillins  Non-Medication    2  No Known Food Allergies   3  Seasonal    Vitals   Vital Signs    Recorded: 75RQQ0382 03:22PM   Heart Rate 85   Systolic 372   Diastolic 84   Height 6 ft 3 in   Weight 245 lb    BMI Calculated 30 62   BSA Calculated 2 4   O2 Saturation 98     Physical Exam        Constitutional      General appearance: No acute distress, well appearing and well nourished  -- noted in the upper part of the face  Eyes      Ophthalmoscopic examination: Normal appearing optic disc and posterior segments  Ears, Nose, Mouth, and Throat - Nasal mucosa, septum, and turbinates: Normal, no edema or discharge  -- Oropharynx: Clear, nares are clear, mucous membranes are moist       Neck      Neck and thyroid: Normal, supple, trachea midline, no thyromegaly  Pulmonary      Respiratory effort: No increased work of breathing or signs of respiratory distress  Auscultation of lungs: Abnormal  -- Decreased breath sounds        Cardiovascular      Auscultation of heart: Normal rate and rhythm, normal S1 and S2, no murmurs  -- Irregular  Carotid pulses: Normal, 2+ bilaterally  -- Carotid bruit  Peripheral vascular exam: Normal pulses throughout, no tenderness, erythema or swelling  Pedal pulses: Normal, 2+ bilaterally  Examination of extremities for edema and/or varicosities: Abnormal  -- 1+ edema  Chest -      Sternum: Normal        Abdomen      Abdomen: Non-tender and no distention  Liver and spleen: No hepatomegaly or splenomegaly  Musculoskeletal Gait and station: Normal gait  -- Digits and nails: Normal without clubbing or cyanosis  -- Inspection/palpation of joints, bones, and muscles: Normal, ROM normal        Skin - Skin and subcutaneous tissue: Normal without rashes or lesions  Skin is warm and well perfused, normal turgor  Neurologic - Cranial nerves: II - XII intact  -- Nonfocal -- Speech: Normal        Psychiatric - Orientation to person, place, and time: Normal -- Mood and affect: Normal       Health Management   History of colon polyps   COLONOSCOPY; every 5 years; Last 54RLG9543; Next Due: 17QJI2261;  Active    Future Appointments      Date/Time Provider Specialty Site   04/06/2018 10:40 AM Cardiology, Pacemaker Clin [facility]  [address]     Signatures    Electronically signed by : YULIANA Duff ; Jan 23 2018  4:30PM EST                       (Author)

## 2018-02-02 ENCOUNTER — OFFICE VISIT (OUTPATIENT)
Dept: CARDIOLOGY CLINIC | Facility: CLINIC | Age: 78
End: 2018-02-02
Payer: MEDICARE

## 2018-02-02 VITALS
HEIGHT: 76 IN | BODY MASS INDEX: 29.01 KG/M2 | DIASTOLIC BLOOD PRESSURE: 84 MMHG | SYSTOLIC BLOOD PRESSURE: 118 MMHG | WEIGHT: 238.2 LBS | HEART RATE: 78 BPM | OXYGEN SATURATION: 98 %

## 2018-02-02 DIAGNOSIS — I50.22 CHRONIC SYSTOLIC HF (HEART FAILURE) (HCC): ICD-10-CM

## 2018-02-02 DIAGNOSIS — I48.20 CHRONIC ATRIAL FIBRILLATION (HCC): ICD-10-CM

## 2018-02-02 DIAGNOSIS — I42.0 DILATED CARDIOMYOPATHY (HCC): Primary | ICD-10-CM

## 2018-02-02 DIAGNOSIS — Z79.01 CHRONIC ANTICOAGULATION: ICD-10-CM

## 2018-02-02 PROCEDURE — 99214 OFFICE O/P EST MOD 30 MIN: CPT | Performed by: INTERNAL MEDICINE

## 2018-02-02 RX ORDER — TORSEMIDE 10 MG/1
10 TABLET ORAL DAILY
COMMUNITY
End: 2018-02-20 | Stop reason: SDUPTHER

## 2018-02-02 NOTE — PROGRESS NOTES
Cardiology Follow Up    Sabrina Tate  1940  [id number]  [address] [place] [id number]    1  Dilated cardiomyopathy ([facility] [address] )     2  Chronic atrial fibrillation (HCC)     3  Chronic systolic HF (heart failure) ([facility])  Basic metabolic panel    NT-BNP PRO   4  Chronic anticoagulation         Interval History:  Patient presents for follow-up  Patient has history of nonischemic cardiomyopathy atrial fibrillation chronic systolic heart failure  Patient recently had acute on chronic systolic heart failure exacerbation  Patient was placed on diuretics  He is doing much better  He is down 6 lb  He states that his breathing is improved  No leg edema  No history of ICD discharges  He states that he has been compliant with all his present medications  Patient Active Problem List   Diagnosis    Dilated cardiomyopathy ([facility] )    Chronic systolic HF (heart failure) ([facility])    Chronic atrial fibrillation ([facility])    Chronic anticoagulation     Past Medical History:   Diagnosis Date    Bronchitis     Cardiomyopathy ([facility] )     DJD (degenerative joint disease)     Hyperlipidemia     Irregular heart beat     A  Fib    Shortness of breath     Sick sinus syndrome ([facility])     Spinal stenosis      Social History     Social History    Marital status: /Civil Union     Spouse name: N/A    Number of children: N/A    Years of education: N/A     Occupational History    Not on file  Social History Main Topics    Smoking status: Former Smoker    Smokeless tobacco: Never Used    Alcohol use 1 2 oz/week     2 Glasses of wine per week    Drug use: No    Sexual activity: Not on file     Other Topics Concern    Not on file     Social History Narrative    No narrative on file      No family history on file    Past Surgical History:   Procedure Laterality Date    CARDIAC DEFIBRILLATOR PLACEMENT  INSERT / REPLACE / REMOVE PACEMAKER      ND COLONOSCOPY FLX DX W/COLLJ SPEC WHEN PFRMD N/A 6/5/2017    Procedure: COLONOSCOPY;  Surgeon: Sheryl Dobbs MD;  Location: [facility];   Service: Gastroenterology    TRANSURETHRAL RESECTION OF PROSTATE         Current Outpatient Prescriptions:     apixaban (ELIQUIS) 5 mg, Take 5 mg by mouth 2 (two) times a day, Disp: , Rfl:     carvedilol (COREG) 6 25 mg tablet, Take 6 25 mg by mouth 2 (two) times a day with meals, Disp: , Rfl:     Omega-3 Fatty Acids (OMEGA-3 FISH OIL PO), Take by mouth, Disp: , Rfl:     ranitidine (ZANTAC) 75 MG tablet, Take 75 mg by mouth daily  , Disp: , Rfl:     torsemide (DEMADEX) 10 mg tablet, Take 10 mg by mouth daily, Disp: , Rfl:   Allergies   Allergen Reactions    Penicillin G Benzathine        Labs:  Appointment on 01/23/2018   Component Date Value    WBC 01/23/2018 6 71     RBC 01/23/2018 4 19     Hemoglobin 01/23/2018 14 4     Hematocrit 01/23/2018 43 0     MCV 01/23/2018 103*    MCH 01/23/2018 34 4*    MCHC 01/23/2018 33 5     RDW 01/23/2018 13 1     MPV 01/23/2018 10 7     Platelets 82/62/9230 175     nRBC 01/23/2018 0     Neutrophils Relative 01/23/2018 64     Lymphocytes Relative 01/23/2018 25     Monocytes Relative 01/23/2018 9     Eosinophils Relative 01/23/2018 2     Basophils Relative 01/23/2018 0     Neutrophils Absolute 01/23/2018 4 36     Lymphocytes Absolute 01/23/2018 1 65     Monocytes Absolute 01/23/2018 0 57     Eosinophils Absolute 01/23/2018 0 10     Basophils Absolute 01/23/2018 0 02     Sodium 01/23/2018 138     Potassium 01/23/2018 4 3     Chloride 01/23/2018 105     CO2 01/23/2018 25     Anion Gap 01/23/2018 8     BUN 01/23/2018 15     Creatinine 01/23/2018 0 95     Glucose 01/23/2018 107     Calcium 01/23/2018 9 7     AST 01/23/2018 28     ALT 01/23/2018 24     Alkaline Phosphatase 01/23/2018 73     Total Protein 01/23/2018 7 6     Albumin 01/23/2018 4 0     Total Bilirubin 01/23/2018 2 56*    eGFR 01/23/2018 77     NT-proBNP 01/23/2018 2276*     Imaging: No results found  Review of Systems:  Review of Systems   REVIEW OF SYSTEMS:  Constitutional:  Denies fever or chills   Eyes:  Denies change in visual acuity   HENT:  Denies nasal congestion or sore throat   Respiratory:  Denies cough or shortness of breath   Cardiovascular:  Denies chest pain or edema   GI:  Denies abdominal pain, nausea, vomiting, bloody stools or diarrhea   :  Denies dysuria, frequency, difficulty in micturition and nocturia  Musculoskeletal:  Chronic back pain  Neurologic:  Denies headache, focal weakness or sensory changes   Endocrine:  Denies polyuria or polydipsia   Lymphatic:  Denies swollen glands   Psychiatric:  Denies depression or anxiety     Physical Exam:  Physical Exam   PHYSICAL EXAM:  General:  Patient is not in acute distress   Head: Normocephalic, Atraumatic  HEENT:  Both pupils normal-size atraumatic, normocephalic, nonicteric  Neck:  JVP not raised  Trachea central  No carotid bruit  Respiratory:  normal breath sounds no crackles  no rhonchi  Cardiovascular:  Irregularly irregular  GI:  Abdomen soft nontender  No organomegaly  Lymphatic:  No cervical or inguinal lymphadenopathy  Neurologic:  Patient is awake alert, oriented   Grossly nonfocal  Extremities trace edema    Discussion/Summary:     Patient with multiple medical problems who seems to be doing reasonably well from cardiac standpoint  Previous studies reviewed with patient  Medications reviewed and possible side effects discussed  concepts of cardiovascular disease , signs and symptoms of heart disease  Dietary and risk factor modification reinforced  All questions answered  Safety measures reviewed  Patient advised to report any problems prompting medical attention  Patient overall feeling much better  At this time will introduce Entresto 24/26 mg twice a day, samples followed by prescription    Side effects explained  Patient's wife instructed to check blood pressures at home  Report any issues with hypotension or dizziness  Follow-up blood work including BMP and BNP  Follow-up in 1 month or earlier as needed  Continue to follow-up with ICD clinic  Patient will continue rate control on anticoagulation for atrial fibrillation  Plan of care discussed with patient and family they are agreeable with the plan

## 2018-02-12 ENCOUNTER — TELEPHONE (OUTPATIENT)
Dept: CARDIOLOGY CLINIC | Facility: CLINIC | Age: 78
End: 2018-02-12

## 2018-02-12 DIAGNOSIS — I50.22 CHRONIC SYSTOLIC HF (HEART FAILURE) (HCC): Primary | ICD-10-CM

## 2018-02-12 NOTE — TELEPHONE ENCOUNTER
2/3/18: 119/69, 116/71,96/58 Pulse 91  2/4/18: 126/81 P83, 118/78 P79, 124/76 P81  2/5/18: 120/84 P84, 137/83   2/6/18:130/74 P91, 124/80 P86  2/7/18 120/83 P83, 115/80 P85  2/8/18: 116/77 P89, 132/78 P88  2/9/18: 114/75 P75, 119/72 P90  2/10/18: 118/77 P84, 135/77 P80  2/11/18:113/76 P80, 128/76 P88  2/12/18: 115/78 P87  He was given samples of Entresto 24mg/26mg BID, send rx?

## 2018-02-14 ENCOUNTER — TELEPHONE (OUTPATIENT)
Dept: CARDIOLOGY CLINIC | Facility: CLINIC | Age: 78
End: 2018-02-14

## 2018-02-14 NOTE — TELEPHONE ENCOUNTER
Spoke with rep at [facility] [employer] to initiate the prior auth, will have a response in 24-48hrs   [facility]#[id number]

## 2018-02-14 NOTE — TELEPHONE ENCOUNTER
Spoke with pts wife to let her know we have the entresto 24/26mg tabs here in the [place] office ready to , she states she'll pick them up today   Told her i'll call to initiate the prior auth later today, she states her understanding-MS

## 2018-02-18 LAB
BUN SERPL-MCNC: 22 MG/DL (ref 8–27)
BUN/CREAT SERPL: 18 (ref 10–24)
CALCIUM SERPL-MCNC: 9.7 MG/DL (ref 8.6–10.2)
CHLORIDE SERPL-SCNC: 95 MMOL/L (ref 96–106)
CO2 SERPL-SCNC: 25 MMOL/L (ref 18–29)
CREAT SERPL-MCNC: 1.24 MG/DL (ref 0.76–1.27)
GLUCOSE SERPL-MCNC: 110 MG/DL (ref 65–99)
NT-PROBNP SERPL-MCNC: 814 PG/ML (ref 0–486)
POTASSIUM SERPL-SCNC: 5.3 MMOL/L (ref 3.5–5.2)
SL AMB EGFR AFRICAN AMERICAN: 65 ML/MIN/1.73
SL AMB EGFR NON AFRICAN AMERICAN: 56 ML/MIN/1.73
SODIUM SERPL-SCNC: 137 MMOL/L (ref 134–144)

## 2018-02-20 ENCOUNTER — TELEPHONE (OUTPATIENT)
Dept: CARDIOLOGY CLINIC | Facility: CLINIC | Age: 78
End: 2018-02-20

## 2018-02-20 DIAGNOSIS — I10 ESSENTIAL HYPERTENSION: Primary | ICD-10-CM

## 2018-02-20 RX ORDER — TORSEMIDE 10 MG/1
TABLET ORAL
Qty: 30 TABLET | Refills: 6 | Status: SHIPPED | OUTPATIENT
Start: 2018-02-20 | End: 2018-09-10 | Stop reason: SDUPTHER

## 2018-02-20 NOTE — TELEPHONE ENCOUNTER
Spoke with pts wife and pt on speaker phone they both stated their understanding  She wants to know if the entresto would have an effect on the aortic aneurysm? Also should he continue taking the torsemide?  If so he needs a refill at [facility], please [id number]

## 2018-02-20 NOTE — TELEPHONE ENCOUNTER
----- Message from Jasmeet Perry MD sent at 2/19/2018  6:57 PM EST -----  Call BW overall OK  Pro BNP level has come down which is good

## 2018-02-20 NOTE — TELEPHONE ENCOUNTER
Brooklyn Thomas will not affect the aneurysm   Decrease Torsemide to every other day and keep a close watch on weight, edema and symptoms of shortness of breath

## 2018-03-19 ENCOUNTER — OFFICE VISIT (OUTPATIENT)
Dept: CARDIOLOGY CLINIC | Facility: CLINIC | Age: 78
End: 2018-03-19
Payer: MEDICARE

## 2018-03-19 VITALS
BODY MASS INDEX: 29.4 KG/M2 | DIASTOLIC BLOOD PRESSURE: 82 MMHG | HEIGHT: 76 IN | OXYGEN SATURATION: 98 % | HEART RATE: 83 BPM | SYSTOLIC BLOOD PRESSURE: 120 MMHG | WEIGHT: 241.4 LBS

## 2018-03-19 DIAGNOSIS — Z79.01 CHRONIC ANTICOAGULATION: ICD-10-CM

## 2018-03-19 DIAGNOSIS — I48.20 CHRONIC ATRIAL FIBRILLATION (HCC): ICD-10-CM

## 2018-03-19 DIAGNOSIS — I50.22 CHRONIC SYSTOLIC HF (HEART FAILURE) (HCC): ICD-10-CM

## 2018-03-19 DIAGNOSIS — I42.0 DILATED CARDIOMYOPATHY (HCC): Primary | ICD-10-CM

## 2018-03-19 PROCEDURE — 99214 OFFICE O/P EST MOD 30 MIN: CPT | Performed by: INTERNAL MEDICINE

## 2018-03-19 NOTE — PROGRESS NOTES
[facility] [place]  [address]  [facility]  [id number]  Cardiology Follow Up    Mir Lynch  1940  [id number]      1  Dilated cardiomyopathy ([facility] [address] )     2  Chronic systolic HF (heart failure) ([facility] [address] )     3  Chronic atrial fibrillation ([facility] )     4  Chronic anticoagulation         Chief Complaint   Patient presents with    Follow-up       Interval History:  Patient presents for follow-up visit  Patient denies any chest pain  Shortness of breath has improved  No history of leg edema orthopnea PND  No history of presyncope syncope  He states that he has been compliant with all his present medications  No history of ICD discharges  Patient is bothered with back pain which limits his activity  Patient Active Problem List   Diagnosis    Dilated cardiomyopathy ([facility] [address] )    Chronic systolic HF (heart failure) (HCC)    Chronic atrial fibrillation (HCC)    Chronic anticoagulation     Past Medical History:   Diagnosis Date    Bronchitis     [facility]     DJD (degenerative joint disease)     Hyperlipidemia     Irregular heart beat     A  Fib    Shortness of breath     Sick sinus syndrome (HCC)     Spinal stenosis      Social History     Social History    Marital status: /Civil Union     Spouse name: N/A    Number of children: N/A    Years of education: N/A     Occupational History    Not on file  Social History Main Topics    Smoking status: Former Smoker    Smokeless tobacco: Never Used    Alcohol use 1 2 oz/week     2 Glasses of wine per week    Drug use: No    Sexual activity: Not on file     Other Topics Concern    Not on file     Social History Narrative    No narrative on file      No family history on file    Past Surgical History:   Procedure Laterality Date    CARDIAC DEFIBRILLATOR PLACEMENT      INSERT / REPLACE / REMOVE PACEMAKER      IA COLONOSCOPY FLX DX W/COLLJ SPEC WHEN PFRMD N/A 6/5/2017    Procedure: COLONOSCOPY;  Surgeon: Alicia Santa MD;  Location: [facility];   Service: Gastroenterology    TRANSURETHRAL RESECTION OF PROSTATE         Current Outpatient Prescriptions:     apixaban (ELIQUIS) 5 mg, Take 5 mg by mouth 2 (two) times a day, Disp: , Rfl:     carvedilol (COREG) 6 25 mg tablet, Take 6 25 mg by mouth 2 (two) times a day with meals, Disp: , Rfl:     Omega-3 Fatty Acids (OMEGA-3 FISH OIL PO), Take by mouth, Disp: , Rfl:     ranitidine (ZANTAC) 75 MG tablet, Take 75 mg by mouth daily  , Disp: , Rfl:     sacubitril-valsartan (ENTRESTO) 24-26 MG TABS, Take 1 tablet by mouth 2 (two) times a day, Disp: 180 tablet, Rfl: 3    torsemide (DEMADEX) 10 mg tablet, Take 1 tablet every other day, Disp: 30 tablet, Rfl: 6  Allergies   Allergen Reactions    Other     Penicillin G Benzathine     Penicillins        Labs:  Orders Only on 02/16/2018   Component Date Value    SL AMB GLUCOSE 02/16/2018 110*    BUN 02/16/2018 22     Creatinine, Serum 02/16/2018 1 24     eGFR Non  02/16/2018 56*    SL AMB EGFR  AMER* 02/16/2018 65     SL AMB BUN/CREATININE RA* 02/16/2018 18     SL AMB SODIUM 02/16/2018 137     SL AMB POTASSIUM 02/16/2018 5 3*    SL AMB CHLORIDE 02/16/2018 95*    SL AMB CARBON DIOXIDE 02/16/2018 25     CALCIUM 02/16/2018 9 7     proBNP 02/16/2018 814*   Appointment on 01/23/2018   Component Date Value    WBC 01/23/2018 6 71     RBC 01/23/2018 4 19     Hemoglobin 01/23/2018 14 4     Hematocrit 01/23/2018 43 0     MCV 01/23/2018 103*    MCH 01/23/2018 34 4*    MCHC 01/23/2018 33 5     RDW 01/23/2018 13 1     MPV 01/23/2018 10 7     Platelets 23/43/8093 175     nRBC 01/23/2018 0     Neutrophils Relative 01/23/2018 64     Lymphocytes Relative 01/23/2018 25     Monocytes Relative 01/23/2018 9     Eosinophils Relative 01/23/2018 2     Basophils Relative 01/23/2018 0     Neutrophils Absolute 01/23/2018 4 36     Lymphocytes Absolute 01/23/2018 1 65     Monocytes Absolute 01/23/2018 0 57     Eosinophils Absolute 01/23/2018 0 10     Basophils Absolute 01/23/2018 0 02     Sodium 01/23/2018 138     Potassium 01/23/2018 4 3     Chloride 01/23/2018 105     CO2 01/23/2018 25     Anion Gap 01/23/2018 8     BUN 01/23/2018 15     Creatinine 01/23/2018 0 95     Glucose 01/23/2018 107     Calcium 01/23/2018 9 7     AST 01/23/2018 28     ALT 01/23/2018 24     Alkaline Phosphatase 01/23/2018 73     Total Protein 01/23/2018 7 6     Albumin 01/23/2018 4 0     Total Bilirubin 01/23/2018 2 56*    eGFR 01/23/2018 77     NT-proBNP 01/23/2018 2276*     Imaging: No results found  Review of Systems:  Review of Systems   REVIEW OF SYSTEMS:  Constitutional:  Denies fever or chills   Eyes:  Denies change in visual acuity   HENT:  Denies nasal congestion or sore throat   Respiratory:  Denies cough or shortness of breath   Cardiovascular:  Denies chest pain or edema   GI:  Denies abdominal pain, nausea, vomiting, bloody stools or diarrhea   :  Denies dysuria, frequency, difficulty in micturition and nocturia  Musculoskeletal:  Chronic  back pain  Neurologic:  Denies headache, focal weakness or sensory changes   Endocrine:  Denies polyuria or polydipsia   Lymphatic:  Denies swollen glands   Psychiatric:  Denies depression or anxiety     Physical Exam:    /82   Pulse 83   Ht 6' 4" (1 93 m)   Wt 109 kg (241 lb 6 4 oz)   SpO2 98%   BMI 29 38 kg/m²     Physical Exam   PHYSICAL EXAM:  General:  Patient is not in acute distress   Head: Normocephalic, Atraumatic  HEENT:  Both pupils normal-size atraumatic, normocephalic, nonicteric  Neck:  JVP not raised  Trachea central  No carotid bruit  Respiratory:  normal breath sounds no crackles  no rhonchi  Cardiovascular:  Irregularly irregular  GI:  Abdomen soft nontender  No organomegaly  Lymphatic:  No cervical or inguinal lymphadenopathy  Neurologic:  Patient is awake alert, oriented    Grossly nonfocal    Discussion/Summary:  Patient with multiple medical problems who seems to be doing reasonably well from cardiac standpoint  Previous studies reviewed with patient  Medications reviewed and possible side effects discussed  concepts of cardiovascular disease , signs and symptoms of heart disease  Dietary and risk factor modification reinforced  All questions answered  Safety measures reviewed  Patient advised to report any problems prompting medical attention  Importance of salt restriction reinforced with the patient  Patient understands the risks and benefits of anticoagulation to prevent thromboembolic risk from atrial fibrillation  Results of recent ICD interrogation data also reviewed  Medications reviewed and refilled  Follow-up in 4 to 5 months or earlier as needed  Follow-up with primary care physician

## 2018-03-26 ENCOUNTER — HOSPITAL ENCOUNTER (OUTPATIENT)
Dept: CT IMAGING | Facility: HOSPITAL | Age: 78
Discharge: HOME | End: 2018-03-26
Attending: INTERNAL MEDICINE
Payer: MEDICARE

## 2018-03-26 DIAGNOSIS — I77.810 THORACIC AORTIC ECTASIA (HCC): ICD-10-CM

## 2018-03-26 DIAGNOSIS — I42.9 CARDIOMYOPATHY (HCC): ICD-10-CM

## 2018-03-26 PROCEDURE — 71250 CT THORAX DX C-: CPT

## 2018-04-02 ENCOUNTER — TELEPHONE (OUTPATIENT)
Dept: CARDIOLOGY CLINIC | Facility: CLINIC | Age: 78
End: 2018-04-02

## 2018-04-02 NOTE — TELEPHONE ENCOUNTER
----- Message from Sheila Cox MD sent at 4/1/2018  3:40 PM EDT -----  CT chest shows stable thoracic aortic aneurysm 4 5 cm  ( the final impression says 5 5cm  I think it is a typo  I have contacted radiology regarding this)

## 2018-04-04 ENCOUNTER — HOSPITAL ENCOUNTER (OUTPATIENT)
Dept: NON INVASIVE DIAGNOSTICS | Facility: CLINIC | Age: 78
Discharge: HOME | End: 2018-04-04
Payer: MEDICARE

## 2018-04-04 DIAGNOSIS — I42.9 CARDIOMYOPATHY (HCC): ICD-10-CM

## 2018-04-04 PROCEDURE — 93306 TTE W/DOPPLER COMPLETE: CPT

## 2018-04-05 PROCEDURE — 93306 TTE W/DOPPLER COMPLETE: CPT | Performed by: INTERNAL MEDICINE

## 2018-04-06 ENCOUNTER — TELEPHONE (OUTPATIENT)
Dept: CARDIOLOGY CLINIC | Facility: CLINIC | Age: 78
End: 2018-04-06

## 2018-04-06 NOTE — TELEPHONE ENCOUNTER
----- Message from Candido Primrose, MD sent at 4/5/2018  6:30 PM EDT -----  Echo EF 30%  Mild MR   Overall no major changes from previous echocardiogram

## 2018-04-12 RX ORDER — ACETAMINOPHEN,DIPHENHYDRAMINE HCL 500; 25 MG/1; MG/1
TABLET, FILM COATED ORAL 2 TIMES DAILY PRN
COMMUNITY
Start: 2017-07-18 | End: 2020-09-26

## 2018-04-13 ENCOUNTER — OFFICE VISIT (OUTPATIENT)
Dept: INTERNAL MEDICINE CLINIC | Facility: CLINIC | Age: 78
End: 2018-04-13
Payer: MEDICARE

## 2018-04-13 VITALS
BODY MASS INDEX: 29.06 KG/M2 | HEART RATE: 89 BPM | WEIGHT: 238.6 LBS | HEIGHT: 76 IN | OXYGEN SATURATION: 97 % | SYSTOLIC BLOOD PRESSURE: 128 MMHG | DIASTOLIC BLOOD PRESSURE: 82 MMHG

## 2018-04-13 DIAGNOSIS — L60.8 DISCOLORATION AND THICKENING OF NAILS BOTH FEET: ICD-10-CM

## 2018-04-13 DIAGNOSIS — I77.810 DILATION OF THORACIC AORTA (HCC): Chronic | ICD-10-CM

## 2018-04-13 DIAGNOSIS — I10 ESSENTIAL HYPERTENSION: Chronic | ICD-10-CM

## 2018-04-13 DIAGNOSIS — I42.0 DILATED CARDIOMYOPATHY (HCC): Chronic | ICD-10-CM

## 2018-04-13 DIAGNOSIS — I73.9 INTERMITTENT CLAUDICATION (HCC): Primary | ICD-10-CM

## 2018-04-13 PROBLEM — E78.5 HYPERLIPIDEMIA: Chronic | Status: ACTIVE | Noted: 2017-07-18

## 2018-04-13 PROBLEM — I50.22 CHRONIC SYSTOLIC HF (HEART FAILURE) (HCC): Chronic | Status: ACTIVE | Noted: 2018-02-02

## 2018-04-13 PROBLEM — E78.5 HYPERLIPIDEMIA: Status: ACTIVE | Noted: 2017-07-18

## 2018-04-13 PROBLEM — Z79.01 CHRONIC ANTICOAGULATION: Chronic | Status: ACTIVE | Noted: 2018-02-02

## 2018-04-13 PROBLEM — I48.20 CHRONIC ATRIAL FIBRILLATION (HCC): Chronic | Status: ACTIVE | Noted: 2018-02-02

## 2018-04-13 PROCEDURE — 99214 OFFICE O/P EST MOD 30 MIN: CPT | Performed by: INTERNAL MEDICINE

## 2018-04-13 NOTE — PROGRESS NOTES
INTERNAL MEDICINE FOLLOW-UP OFFICE VISIT  [facility] [address] [place] [address]    NAME: Lori Almanza  AGE: 69 y o  SEX: male  : 1940     DATE: 2018     Assessment and Plan:     1  Intermittent claudication ([address] )    Suspect patient's claudication may be more from neurogenic claudication from low back  Though he does have several vascular risk factors  Pulses were weak, but could be low flow state with underlying cardiomyopathy  Will check arterial duplex  - VAS lower limb arterial duplex, complete bilateral; Future    2  Discoloration and thickening of nails both feet    Evaluate for underlying PAD  3  Essential hypertension    Blood pressure well controlled  4  Dilation of thoracic aorta (HCC)    Stable on CT chest at 4 5 cm     5  Dilated cardiomyopathy (HCC)    ECHO showed severe diffuse hypokinesis and EF 30%  S/p ICD  Continue current medical management  Recently added entresto  Chief Complaint:     Chief Complaint   Patient presents with    discoloration     (L) foot discoloration; progessing      History of Present Illness:     Patient presents for follow-up  Recently seen by cardiology and started on Entresto  Recent ECHO showed severe diffuse hypokinesis with EF 30%  CT chest showed stable thoracic aortic dilation at 4 5 cm  His wife was concerned looking at his feet  Noted blue/purple discoloration of his toes as well as some mild red discoloration of left shin  Patient has underlying spinal stenosis  Admits to claudication after he walks a certain distance  No history of PAD  Previous lipid panel showed excellent lipid control  He takes eliquis for history of Afib  He does not take a statin  Denies any CP, SOB  Has noticed symptom improvement in regards to SOB on exertion level with entresto  Wife was concerned about his circulation       The following portions of the patient's history were reviewed and updated as appropriate: allergies, current medications, past family history, past medical history, past social history, past surgical history and problem list      Review of Systems:     Review of Systems   Constitutional: Negative for chills, diaphoresis, fatigue and fever  Respiratory: Positive for shortness of breath (with exertion)  Negative for cough and wheezing  Cardiovascular: Negative for chest pain, palpitations and leg swelling  Claudication   Gastrointestinal: Negative for abdominal distention, constipation, diarrhea, nausea, rectal pain and vomiting  Musculoskeletal: Positive for back pain and gait problem  Skin: Positive for color change  Negative for rash  Hematological: Bruises/bleeds easily  Problem List:     Patient Active Problem List   Diagnosis    Dilated cardiomyopathy ([facility] [address] )    Chronic systolic HF (heart failure) (HCC)    Chronic atrial fibrillation (HCC)    Chronic anticoagulation    Dilation of thoracic aorta (HCC)    Hyperlipidemia    Essential hypertension      Objective:     /82 (BP Location: Left arm, Patient Position: Sitting, Cuff Size: Standard)   Pulse 89   Ht 6' 3 5" (1 918 m)   Wt 108 kg (238 lb 9 6 oz)   SpO2 97%   BMI 29 43 kg/m²     Physical Exam   Constitutional: He is oriented to person, place, and time  He appears well-developed and well-nourished  No distress  Cardiovascular: Normal rate and normal heart sounds  An irregularly irregular rhythm present  Pulses:       Dorsalis pedis pulses are 1+ on the right side, and 1+ on the left side  Posterior tibial pulses are 1+ on the right side, and 1+ on the left side  Pulmonary/Chest: Effort normal and breath sounds normal  No respiratory distress  He has no wheezes  He has no rales  Abdominal: Soft  Bowel sounds are normal  He exhibits no distension  There is no tenderness  Musculoskeletal: He exhibits no edema  Neurological: He is alert and oriented to person, place, and time     Skin: Skin is warm and dry  He is not diaphoretic  Blue/purple discoloration of toes   Psychiatric: He has a normal mood and affect  His behavior is normal      Pertinent Laboratory/Diagnostic Studies:    Laboratory Results: I have personally reviewed the pertinent laboratory results/reports   Radiology/Other Diagnostic Testing Results: I have personally reviewed pertinent reports  Current Medications:     Current Outpatient Prescriptions   Medication Sig Dispense Refill    apixaban (ELIQUIS) 5 mg Take 5 mg by mouth 2 (two) times a day      carvedilol (COREG) 6 25 mg tablet Take 6 25 mg by mouth 2 (two) times a day with meals      diphenhydrAMINE-acetaminophen (TYLENOL PM EXTRA STRENGTH)  MG TABS Take by mouth 2 (two) times a day as needed        Omega-3 Fatty Acids (OMEGA-3 FISH OIL PO) Take by mouth      ranitidine (ZANTAC) 75 MG tablet Take 75 mg by mouth daily        sacubitril-valsartan (ENTRESTO) 24-26 MG TABS Take 1 tablet by mouth 2 (two) times a day 180 tablet 3    torsemide (DEMADEX) 10 mg tablet Take 1 tablet every other day 30 tablet 6     No current facility-administered medications for this visit          Wilfrido Pak DO  [facility]

## 2018-04-13 NOTE — PATIENT INSTRUCTIONS
Peripheral Artery Disease   AMBULATORY CARE:   Peripheral artery disease (PAD)  is narrow, weak, or blocked arteries  It may affect any arteries outside of your heart and brain  PAD is usually the result of a buildup of fat and cholesterol, also called plaque, along your artery walls  Inflammation, a blood clot, or abnormal cell growth could also block your arteries  PAD prevents normal blood flow to your legs and arms  You are at risk of an amputation if poor blood flow keeps wounds from healing or causes gangrene (tissue death)  Without treatment, PAD can also cause a heart attack or stroke  Common symptoms include:  Mild PAD usually does not cause symptoms  As the disease worsens over time, you may have the following:  · Pain or cramps in your leg or hip while you walk     · A numb, weak, or heavy feeling in your legs     · Dry, scaly, red, or pale skin on your legs     · Thick or brittle nails, or hair loss on your arms and legs     · Foot sores that will not heal     · Burning or aching in your feet and toes while resting (this may be worse when you lie down)  Call 911 for the following:   · You have any of the following signs of a heart attack:      ¨ Squeezing, pressure, or pain in your chest that lasts longer than 5 minutes or returns    ¨ Discomfort or pain in your back, neck, jaw, stomach, or arm     ¨ Trouble breathing    ¨ Nausea or vomiting    ¨ Lightheadedness or a sudden cold sweat, especially with chest pain or trouble breathing    · You have any of the following signs of a stroke:      ¨ Numbness or drooping on one side of your face     ¨ Weakness in an arm or leg    ¨ Confusion or difficulty speaking    ¨ Dizziness, a severe headache, or vision loss  Seek care immediately if:   · You have sores or wounds that will not heal      · You notice black or discolored skin on your arm or leg  · Your skin is cool to the touch    Contact your healthcare provider if:   · You have leg pain when you walk 1/8 mile (200 meters) or less, even with treatment  · Your legs are red, dry, or pale, even with treatment  · You have questions or concerns about your condition or care  Treatment for PAD  can help reduce your risk of a heart attack, stroke, or amputation  You may need more than one of the following:  · Medicines  may be given to prevent blood clots and reduce the risk of a heart attack or stroke  You may be given medicine to help prevent your PAD from getting worse  · A supervised exercise program  helps you stay active in normal daily activities and may prevent disability  Healthcare providers will help you safely walk or do strength training exercises 3 times a week for 30 to 60 minutes  You will do this for several months, then transition to walking on your own  · Angioplasty  is a procedure to open your artery so blood can flow through normally  A thin tube called a catheter is used to insert a small balloon into your artery  The balloon is inflated to open your blocked artery, and then removed  A tube called a stent may be placed in your artery to hold it open  · Bypass surgery  is used to make a new connection to your artery with a vein from another part of your body, or an artificial graft  The vein or graft is attached to your artery above and below your blockage  This allows blood to flow around the blocked portion of your artery  Manage and prevent PAD:   · Walk for 30 to 60 minutes at least 4 times a week  Your healthcare provider may also refer you to an supervised exercise program  The program helps increase how far you can walk without pain  It also helps you stay active in normal daily activities and may prevent disability caused by PAD  · Do not smoke  Nicotine and other chemicals in cigarettes and cigars can worsen PAD  They can also increase your risk for a heart attack or stroke  Ask your healthcare provider for information if you currently smoke and need help to quit  E-cigarettes or smokeless tobacco still contain nicotine  Talk to your healthcare provider before you use these products  · Manage other health conditions  Take your medicines as directed and follow your healthcare provider's instructions if you have high blood pressure or high cholesterol  Perform foot care and check your blood sugar levels as directed if you have diabetes  · Eat heart healthy foods  Eat whole grains, fruits, and vegetables every day  Limit salt and high-fat foods  Ask your healthcare provider for more information on a heart healthy diet  Ask if you need to lose weight  Your healthcare provider can help you create a healthy weight-loss plan  Follow up with your healthcare provider as directed:  Write down your questions so you remember to ask them during your visits  © 2017 [facility] Information is for End User's use only and may not be sold, redistributed or otherwise used for commercial purposes  All illustrations and images included in CareNotes® are the copyrighted property of [employer] M [employer]  or Jacob Krause  The above information is an  only  It is not intended as medical advice for individual conditions or treatments  Talk to your doctor, nurse or pharmacist before following any medical regimen to see if it is safe and effective for you

## 2018-04-25 ENCOUNTER — OFFICE VISIT (OUTPATIENT)
Dept: CARDIOLOGY CLINIC | Facility: CLINIC | Age: 78
End: 2018-04-25
Payer: MEDICARE

## 2018-04-25 VITALS
HEIGHT: 76 IN | BODY MASS INDEX: 29.3 KG/M2 | HEART RATE: 75 BPM | WEIGHT: 240.6 LBS | SYSTOLIC BLOOD PRESSURE: 116 MMHG | DIASTOLIC BLOOD PRESSURE: 68 MMHG | OXYGEN SATURATION: 97 %

## 2018-04-25 DIAGNOSIS — I50.22 CHRONIC SYSTOLIC HF (HEART FAILURE) (HCC): Chronic | ICD-10-CM

## 2018-04-25 DIAGNOSIS — I10 ESSENTIAL HYPERTENSION: Chronic | ICD-10-CM

## 2018-04-25 DIAGNOSIS — I42.0 DILATED CARDIOMYOPATHY (HCC): Primary | Chronic | ICD-10-CM

## 2018-04-25 DIAGNOSIS — I77.810 DILATION OF THORACIC AORTA (HCC): Chronic | ICD-10-CM

## 2018-04-25 DIAGNOSIS — I48.20 CHRONIC ATRIAL FIBRILLATION (HCC): Chronic | ICD-10-CM

## 2018-04-25 PROCEDURE — 99214 OFFICE O/P EST MOD 30 MIN: CPT | Performed by: INTERNAL MEDICINE

## 2018-04-25 NOTE — PROGRESS NOTES
[facility] [place]  [address] [facility]  [address]  Cardiology Follow Up    Betty Dupree  1940  [id number]      1  Dilated cardiomyopathy ([address] )     2  Chronic systolic HF (heart failure) ([address] )     3  Chronic atrial fibrillation ([address] )     4  Dilation of thoracic aorta ([address] )     5  Essential hypertension         Chief Complaint   Patient presents with    Follow-up     1 month       Interval History:  Patient presents for follow-up visit  Patient denies any history of chest pain  No shortness of breath out of the ordinary  No history of leg edema orthopnea PND  No history of  presyncope syncope  No history of ICD discharges  Patient's main issues his back pain which is chronic  Patient is on anticoagulation for atrial fibrillation  No bleeding issues  Patient Active Problem List   Diagnosis    Dilated cardiomyopathy (HCC)    Chronic systolic HF (heart failure) (HCC)    Chronic atrial fibrillation (HCC)    Chronic anticoagulation    Dilation of thoracic aorta (HCC)    Hyperlipidemia    Essential hypertension     Past Medical History:   Diagnosis Date    Anticoagulant long-term use     Atrial fibrillation ([address] )     Cardiac defibrillator in situ     Cardiomyopathy ([address] )     Colon polyp     Congestive heart failure (CHF) (HCC)     DJD (degenerative joint disease)     Hyperlipidemia     Shortness of breath     Sick sinus syndrome ([address] )     Spinal stenosis      Social History     Social History    Marital status: /Civil Union     Spouse name: N/A    Number of children: N/A    Years of education: N/A     Occupational History    Not on file       Social History Main Topics    Smoking status: Former Smoker     Types: Cigarettes, Cigars     Quit date: 4/13/1968    Smokeless tobacco: Never Used    Alcohol use 1 2 oz/week     2 Glasses of wine per week    Drug use: No    Sexual activity: No     Other Topics Concern    Not on file     Social History Narrative    Active advance directive    Caffeine use          Family History   Problem Relation Age of Onset    Coronary artery disease Mother      Past Surgical History:   Procedure Laterality Date    CARDIAC DEFIBRILLATOR PLACEMENT      INSERT / REPLACE / REMOVE PACEMAKER      [facility]CTOMY      in [facility] had this    AL COLONOSCOPY FLX DX W/COLLJ SPEC WHEN PFRMD N/A 6/5/2017    Procedure: COLONOSCOPY;  Surgeon: George Hinds MD;  Location: [facility]; Service: Gastroenterology    TONSILLECTOMY      TRANSURETHRAL RESECTION OF PROSTATE         Current Outpatient Prescriptions:     apixaban (ELIQUIS) 5 mg, Take 5 mg by mouth 2 (two) times a day, Disp: , Rfl:     carvedilol (COREG) 6 25 mg tablet, Take 6 25 mg by mouth 2 (two) times a day with meals, Disp: , Rfl:     diphenhydrAMINE-acetaminophen (TYLENOL PM EXTRA STRENGTH)  MG TABS, Take by mouth 2 (two) times a day as needed  , Disp: , Rfl:     Omega-3 Fatty Acids (OMEGA-3 FISH OIL PO), Take by mouth, Disp: , Rfl:     ranitidine (ZANTAC) 75 MG tablet, Take 75 mg by mouth daily  , Disp: , Rfl:     sacubitril-valsartan (ENTRESTO) 24-26 MG TABS, Take 1 tablet by mouth 2 (two) times a day, Disp: 180 tablet, Rfl: 3    torsemide (DEMADEX) 10 mg tablet, Take 1 tablet every other day, Disp: 30 tablet, Rfl: 6  Allergies   Allergen Reactions    Other Sneezing     Seasonal; pollen; reactions; congestion    Penicillin G Benzathine Rash    Penicillins Rash       Labs:  No visits with results within 2 Month(s) from this visit     Latest known visit with results is:   Orders Only on 02/16/2018   Component Date Value    SL AMB GLUCOSE 02/16/2018 110*    BUN 02/16/2018 22     Creatinine, Serum 02/16/2018 1 24     eGFR Non  02/16/2018 56*    SL AMB EGFR  AMER* 02/16/2018 65     SL AMB BUN/CREATININE RA* 02/16/2018 18     SL AMB SODIUM 02/16/2018 137     SL AMB POTASSIUM 02/16/2018 5 3*    SL AMB CHLORIDE 02/16/2018 95*    SL AMB CARBON DIOXIDE 02/16/2018 25     CALCIUM 02/16/2018 9 7     proBNP 02/16/2018 814*     Imaging: Ct Chest Wo Contrast    Addendum Date: 4/2/2018 Addendum:   ADDENDUM: The impression should read   stable ascending thoracic aortic aneurysm measuring 4 5 cm in diameter, not 4 5 cm  Result Date: 4/2/2018  Narrative: CT CHEST WITHOUT IV CONTRAST INDICATION:   I77 [id number]: Thoracic aortic ectasia  Follow-up  COMPARISON: CT chest dated January 27, 2017  CT chest dated June 18, 2015  TECHNIQUE: CT examination of the chest was performed without intravenous contrast   Axial, sagittal, and coronal 2D reformatted images were created from the source data and submitted for interpretation  Radiation dose length product (DLP) for this visit:  364 2 mGy-cm   This examination, like all CT scans performed in the [facility], was performed utilizing techniques to minimize radiation dose exposure, including the use of iterative reconstruction and automated exposure control  FINDINGS: LUNGS:  There is a stable 4 mm calcified right middle lobe pulmonary nodule (series 3, image 47)  A 3 mm left lower lobe pulmonary nodule (series 3, image 58)is stable  A 2 mm calcified left lower lobe pulmonary nodule (series 3, image 60) is stable  There is no infiltrate or pleural effusion  There is no tracheal or endobronchial lesion  PLEURA:  Unremarkable  HEART/GREAT VESSELS:  The heart is mildly enlarged and there is no pericardial effusion  Again noted is aneurysmal dilatation of the tubular portion of the ascending thoracic aorta at the level of the pulmonary arteries measuring 4 5 cm in diameter  The aortic sinus again measures 4 5 cm in diameter  There is mild atherosclerotic calcification of the thoracic aorta  MEDIASTINUM AND CRUZITO:  Unremarkable  CHEST WALL AND LOWER NECK:   There is a left anterior wall cardiac pacer with the lead tips which appear to be in appropriate position    VISUALIZED STRUCTURES IN THE UPPER ABDOMEN:  The gallbladder is surgically absent  There is a 4 mm nonobstructing calculus at the left renal upper pole  A left renal upper pole cyst measures 1 6 cm  OSSEOUS STRUCTURES:  No acute fracture or destructive osseous lesion  Impression: Stable ascending thoracic aortic aneurysm measuring 5 5 cm in diameter  Stable 4 mm right middle lobe and 3 mm left lower lobe pulmonary nodules dating back to a CT chest performed on June 18, 2015  These pulmonary nodules have been stable for greater than 2 years and therefore no further follow-up of these pulmonary nodules is required  No new pulmonary nodules  Stable cardiomegaly  Workstation performed: [id number]       Review of Systems:  Review of Systems   REVIEW OF SYSTEMS:  Constitutional:  Denies fever or chills   Eyes:  Denies change in visual acuity   HENT:  Denies nasal congestion or sore throat   Respiratory:   shortness of breath   Cardiovascular:  Denies chest pain or edema   GI:  Denies abdominal pain, nausea, vomiting, bloody stools or diarrhea   :  Denies dysuria, frequency, difficulty in micturition and nocturia  Musculoskeletal:  Chronic back pain   Neurologic:  Denies headache, focal weakness or sensory changes   Endocrine:  Denies polyuria or polydipsia   Lymphatic:  Denies swollen glands   Psychiatric:  Denies depression or anxiety     Physical Exam:    /68   Pulse 75   Ht 6' 3 5" (1 918 m)   Wt 109 kg (240 lb 9 6 oz)   SpO2 97%   BMI 29 68 kg/m²     Physical Exam   PHYSICAL EXAM:  General:  Patient is not in acute distress   Head: Normocephalic, Atraumatic  HEENT:  Both pupils normal-size atraumatic, normocephalic, nonicteric  Neck:  JVP not raised  Trachea central  No carotid bruit  Respiratory:  Decreased breath sounds bilaterally  Cardiovascular:  Irregularly irregular  GI:  Abdomen soft nontender  No organomegaly     Lymphatic:  No cervical or inguinal lymphadenopathy  Neurologic:  Patient is awake alert, oriented   Grossly nonfocal    Discussion/Summary:  Patient with multiple medical problems who seems to be doing reasonably well from cardiac standpoint  Previous studies reviewed with patient  Medications reviewed and possible side effects discussed  concepts of cardiovascular disease , signs and symptoms of heart disease  Dietary and risk factor modification reinforced  All questions answered  Safety measures reviewed  Patient advised to report any problems prompting medical attention  CT of the chest showed stable thoracic aortic aneurysm measuring 4 5 cm  Echocardiogram showed ejection fraction of 30% which is overall no major change from previous echocardiograms  Importance of salt restriction reinforced  Patient has done well with optimization of medical therapy  Continue to follow-up with ICD clinic  Patient and family had a few questions which were answered  Follow-up in a few months or earlier as needed  Follow-up with primary care physician

## 2018-05-09 ENCOUNTER — HOSPITAL ENCOUNTER (OUTPATIENT)
Dept: NON INVASIVE DIAGNOSTICS | Facility: CLINIC | Age: 78
Discharge: HOME | End: 2018-05-09

## 2018-05-09 DIAGNOSIS — I73.9 INTERMITTENT CLAUDICATION (HCC): ICD-10-CM

## 2018-05-18 ENCOUNTER — TELEPHONE (OUTPATIENT)
Dept: CARDIOLOGY CLINIC | Facility: CLINIC | Age: 78
End: 2018-05-18

## 2018-05-18 NOTE — TELEPHONE ENCOUNTER
Melinda ellis wife called and would like a call back  PT's wife thinks the medication patient is on ENTRESTO is causing patients blood pressure to go down    Please call back [phone]

## 2018-05-18 NOTE — TELEPHONE ENCOUNTER
S/w pts wife, when first starting Entresto pts Bps were in the normal range so they stopped checking his bp  The past couple of days pt has not been feeling well and wife decised to check and his bps from yesterday were   Morning- 95/70  Night-85/52  Today pt did not take Entresto and his BP from this morning was 107/67 and at [facility] it was 104/68  Pt states he is starting to feel better and does not want to come to the office  Pt just wants to know if he should stop med and any further instructions  Please advise

## 2018-06-01 ENCOUNTER — IN-CLINIC DEVICE VISIT (OUTPATIENT)
Dept: CARDIOLOGY CLINIC | Facility: CLINIC | Age: 78
End: 2018-06-01
Payer: MEDICARE

## 2018-06-01 DIAGNOSIS — I50.22 CHRONIC SYSTOLIC HEART FAILURE (HCC): ICD-10-CM

## 2018-06-01 DIAGNOSIS — I42.9 CARDIOMYOPATHY, UNSPECIFIED TYPE (HCC): ICD-10-CM

## 2018-06-01 DIAGNOSIS — Z95.810 PRESENCE OF AUTOMATIC CARDIOVERTER/DEFIBRILLATOR (AICD): Primary | ICD-10-CM

## 2018-06-01 PROCEDURE — 93290 INTERROG DEV EVAL ICPMS IP: CPT | Performed by: INTERNAL MEDICINE

## 2018-06-01 PROCEDURE — 93284 PRGRMG EVAL IMPLANTABLE DFB: CPT | Performed by: INTERNAL MEDICINE

## 2018-06-02 PROBLEM — I42.9 CARDIOMYOPATHY (HCC): Chronic | Status: ACTIVE | Noted: 2018-02-02

## 2018-06-21 DIAGNOSIS — I48.91 ATRIAL FIBRILLATION, UNSPECIFIED TYPE (HCC): Primary | ICD-10-CM

## 2018-08-08 ENCOUNTER — TELEPHONE (OUTPATIENT)
Dept: GASTROENTEROLOGY | Facility: CLINIC | Age: 78
End: 2018-08-08

## 2018-08-08 NOTE — TELEPHONE ENCOUNTER
I spoke to patient's wife who is reporting patient has abdominal  rumbling", intermittent abdominal pain (she could not classify) and difficulty passing "hard" stool despite taking fiber supplement, adding fiber to diet, taking yogurt with probiotic and miralax daily  Denies rectal bleeding  I suggested he increase his miralax to twice daily as needed to relieve symptoms of constipation but she said his abdominal pain improves when he doesn't take miralax at all although it worsens his constipation  He also has hx of spinal stenosis and she said he was told he had hemorrhoids (no documentation)  Last colonoscopy 6/17 showed moderately severe diverticulosis in descending and sigmoid  His last office visit was May 2017  She said you know him well and to let you know he probably would not be willing to schedule an office visit  She would like your input, thank you

## 2018-08-08 NOTE — TELEPHONE ENCOUNTER
Pt wife called, ptn is taking miralax everyday and ptn is getting gas, stomach rumbling, and discomfort  He also feel like hes not being completely cleaned out and cant push when making a bowl movement due to hemorrhoids   Please advise

## 2018-08-28 ENCOUNTER — TELEPHONE (OUTPATIENT)
Dept: CARDIOLOGY CLINIC | Facility: CLINIC | Age: 78
End: 2018-08-28

## 2018-08-28 ENCOUNTER — OFFICE VISIT (OUTPATIENT)
Dept: CARDIOLOGY CLINIC | Facility: CLINIC | Age: 78
End: 2018-08-28
Payer: MEDICARE

## 2018-08-28 VITALS
DIASTOLIC BLOOD PRESSURE: 84 MMHG | SYSTOLIC BLOOD PRESSURE: 128 MMHG | OXYGEN SATURATION: 95 % | WEIGHT: 237 LBS | HEIGHT: 76 IN | HEART RATE: 81 BPM | BODY MASS INDEX: 28.86 KG/M2

## 2018-08-28 DIAGNOSIS — Z79.01 CHRONIC ANTICOAGULATION: Chronic | ICD-10-CM

## 2018-08-28 DIAGNOSIS — I42.0 DILATED CARDIOMYOPATHY (HCC): Primary | Chronic | ICD-10-CM

## 2018-08-28 DIAGNOSIS — I48.20 CHRONIC ATRIAL FIBRILLATION (HCC): Chronic | ICD-10-CM

## 2018-08-28 DIAGNOSIS — E78.2 COMBINED HYPERLIPIDEMIA: ICD-10-CM

## 2018-08-28 DIAGNOSIS — I10 ESSENTIAL HYPERTENSION: Chronic | ICD-10-CM

## 2018-08-28 DIAGNOSIS — I50.22 CHRONIC SYSTOLIC HEART FAILURE (HCC): Chronic | ICD-10-CM

## 2018-08-28 DIAGNOSIS — I77.810 DILATION OF THORACIC AORTA (HCC): Chronic | ICD-10-CM

## 2018-08-28 PROCEDURE — 99214 OFFICE O/P EST MOD 30 MIN: CPT | Performed by: INTERNAL MEDICINE

## 2018-08-28 NOTE — PROGRESS NOTES
[facility] [place]  [address] [facility]  [address]  Cardiology Follow Up    [facility]  1940  [id number]      1  Dilated cardiomyopathy ([address] )     2  Chronic systolic heart failure ([address] )     3  Chronic atrial fibrillation ([address] )     4  Dilation of thoracic aorta ([facility] [address] [facility] )     5  Essential hypertension     6  Chronic anticoagulation         Chief Complaint   Patient presents with    Follow-up       Interval History:   Patient presents for follow-up visit  Patient does have history of nonischemic cardiomyopathy and chronic systolic heart failure  Patient's weight was up and he was more short of breath and patient got better with the some re-initiation of diuretics  Patient was not taking Entresto  because of low blood pressure  Patient is willing to give it another try  Patient Active Problem List   Diagnosis    Cardiomyopathy ([facility] [address] )    Chronic systolic heart failure (HCC)    Chronic atrial fibrillation (HCC)    Chronic anticoagulation    Dilation of thoracic aorta (HCC)    Hyperlipidemia    Essential hypertension    Presence of automatic cardioverter/defibrillator (AICD)     Past Medical History:   Diagnosis Date    Anticoagulant long-term use     Atrial fibrillation ([facility] )     Cardiac defibrillator in situ     Cardiomyopathy ([facility] )     Colon polyp     Congestive heart failure (CHF) ([facility] )     DJD (degenerative joint disease)     Hyperlipidemia     Shortness of breath     Sick sinus syndrome ([facility] )     Spinal stenosis      Social History     Social History    Marital status: /Civil Union     Spouse name: N/A    Number of children: N/A    Years of education: N/A     Occupational History    Not on file       Social History Main Topics    Smoking status: Former Smoker     Types: Cigarettes, Cigars     Quit date: 4/13/1968    Smokeless tobacco: Never Used    Alcohol use 1 2 oz/week     2 Glasses of wine per week    Drug use: No    Sexual activity: No Other Topics Concern    Not on file     Social History Narrative    Active advance directive    Caffeine use          Family History   Problem Relation Age of Onset    Coronary artery disease Mother      Past Surgical History:   Procedure Laterality Date    CARDIAC DEFIBRILLATOR PLACEMENT      INSERT / REPLACE / REMOVE PACEMAKER      [facility]      in [facility] had this    MS COLONOSCOPY FLX DX W/COLLJ SPEC WHEN PFRMD N/A 6/5/2017    Procedure: COLONOSCOPY;  Surgeon: Denice Gonzalez MD;  Location: [facility]; Service: Gastroenterology    TONSILLECTOMY      TRANSURETHRAL RESECTION OF PROSTATE         Current Outpatient Prescriptions:     apixaban (ELIQUIS) 5 mg, Take 1 tablet (5 mg total) by mouth 2 (two) times a day, Disp: 180 tablet, Rfl: 3    carvedilol (COREG) 6 25 mg tablet, Take 6 25 mg by mouth 2 (two) times a day with meals, Disp: , Rfl:     diphenhydrAMINE-acetaminophen (TYLENOL PM EXTRA STRENGTH)  MG TABS, Take by mouth 2 (two) times a day as needed  , Disp: , Rfl:     Omega-3 Fatty Acids (OMEGA-3 FISH OIL PO), Take by mouth, Disp: , Rfl:     ranitidine (ZANTAC) 75 MG tablet, Take 75 mg by mouth daily  , Disp: , Rfl:     torsemide (DEMADEX) 10 mg tablet, Take 1 tablet every other day, Disp: 30 tablet, Rfl: 6    sacubitril-valsartan (ENTRESTO) 24-26 MG TABS, Take 1 tablet by mouth 2 (two) times a day (Patient not taking: Reported on 8/28/2018 ), Disp: 180 tablet, Rfl: 3  Allergies   Allergen Reactions    Other Sneezing     Seasonal; pollen; reactions; congestion    Penicillin G Benzathine Rash    Penicillins Rash       Labs:  No visits with results within 2 Month(s) from this visit     Latest known visit with results is:   Orders Only on 02/16/2018   Component Date Value    Glucose 02/16/2018 110*    BUN 02/16/2018 22     Creatinine 02/16/2018 1 24     eGFR Non  02/16/2018 56*    SL AMB EGFR  AMER* 02/16/2018 65     SL AMB BUN/CREATININE RA* 02/16/2018 18     Sodium 02/16/2018 137     SL AMB POTASSIUM 02/16/2018 5 3*    Chloride 02/16/2018 95*    SL AMB CARBON DIOXIDE 02/16/2018 25     CALCIUM 02/16/2018 9 7     proBNP 02/16/2018 814*     Imaging: No results found  Review of Systems:  Review of Systems   REVIEW OF SYSTEMS:  Constitutional:  Denies fever or chills   Eyes:  Denies change in visual acuity   HENT:  Denies nasal congestion or sore throat   Respiratory:shortness of breath   Cardiovascular: edema   GI:  Denies abdominal pain, nausea, vomiting, bloody stools or diarrhea   :  Denies dysuria, frequency, difficulty in micturition and nocturia  Musculoskeletal:  Chronic back pain  Neurologic:  Denies headache, focal weakness or sensory changes   Endocrine:  Denies polyuria or polydipsia   Lymphatic:  Denies swollen glands   Psychiatric:  Denies depression or anxiety     Physical Exam:    /84   Pulse 81   Ht 6' 3 5" (1 918 m)   Wt 108 kg (237 lb)   SpO2 95%   BMI 29 23 kg/m²     Physical Exam   PHYSICAL EXAM:  General:  Patient is not in acute distress   Head: Normocephalic, Atraumatic  HEENT:  Both pupils normal-size atraumatic, normocephalic, nonicteric  Neck:  JVP not raised  Trachea central  No carotid bruit  Respiratory:   Decreased breath sounds  Cardiovascular:   Irregularly irregular  GI:  Abdomen soft nontender  No organomegaly  Lymphatic:  No cervical or inguinal lymphadenopathy  Neurologic:  Patient is awake alert, oriented   Grossly nonfocal  Extremities reveal trace to 1+ edema    Discussion/Summary:   Patient with multiple medical problems who seems to be doing reasonably well from cardiac standpoint  Previous studies reviewed with patient  Medications reviewed and possible side effects discussed  concepts of cardiovascular disease , signs and symptoms of heart disease  Dietary and risk factor modification reinforced  All questions answered  Safety measures reviewed   Patient advised to report any problems prompting medical attention  Importance of salt restriction reinforced  Diuretics as needed  Patient will give it another try with re-initiation of Entresto  Patient understands the risks and benefits of anticoagulation to prevent thromboembolic risk from atrial fibrillation  Patient report any bleeding issues  Results of ICD interrogation data reviewed  Patient and family had a few questions which were answered  Follow-up in a few months or earlier as needed

## 2018-08-28 NOTE — TELEPHONE ENCOUNTER
Patients wife called & would like a copy of all blood work orders that were given on today's appt to be mailed to patient   Thank you

## 2018-09-07 ENCOUNTER — REMOTE DEVICE CLINIC VISIT (OUTPATIENT)
Dept: CARDIOLOGY CLINIC | Facility: CLINIC | Age: 78
End: 2018-09-07
Payer: MEDICARE

## 2018-09-07 DIAGNOSIS — I50.22 CHRONIC SYSTOLIC HEART FAILURE (HCC): ICD-10-CM

## 2018-09-07 DIAGNOSIS — Z95.810 PRESENCE OF CARDIAC DEFIBRILLATOR: Primary | ICD-10-CM

## 2018-09-07 PROCEDURE — 93295 DEV INTERROG REMOTE 1/2/MLT: CPT | Performed by: INTERNAL MEDICINE

## 2018-09-07 PROCEDURE — 93296 REM INTERROG EVL PM/IDS: CPT | Performed by: INTERNAL MEDICINE

## 2018-09-07 PROCEDURE — 93297 REM INTERROG DEV EVAL ICPMS: CPT | Performed by: INTERNAL MEDICINE

## 2018-09-10 DIAGNOSIS — I10 ESSENTIAL HYPERTENSION: ICD-10-CM

## 2018-09-10 RX ORDER — TORSEMIDE 10 MG/1
TABLET ORAL
Qty: 30 TABLET | Refills: 3 | Status: SHIPPED | OUTPATIENT
Start: 2018-09-10 | End: 2019-05-03 | Stop reason: SDUPTHER

## 2018-09-10 NOTE — TELEPHONE ENCOUNTER
PT NEEDS REFILL ON TORSEMIDE 10MG (30 DAY SUPPLY) SENT TO [facility] ON [address] IN [place] [id number]

## 2018-09-19 DIAGNOSIS — I42.9 CARDIOMYOPATHY, UNSPECIFIED TYPE (HCC): Primary | ICD-10-CM

## 2018-09-19 RX ORDER — CARVEDILOL 6.25 MG/1
6.25 TABLET ORAL 2 TIMES DAILY WITH MEALS
Qty: 180 TABLET | Refills: 3 | Status: SHIPPED | OUTPATIENT
Start: 2018-09-19 | End: 2018-09-25 | Stop reason: SDUPTHER

## 2018-09-24 ENCOUNTER — TELEPHONE (OUTPATIENT)
Dept: INTERNAL MEDICINE CLINIC | Facility: CLINIC | Age: 78
End: 2018-09-24

## 2018-09-25 ENCOUNTER — TELEPHONE (OUTPATIENT)
Dept: CARDIOLOGY CLINIC | Facility: CLINIC | Age: 78
End: 2018-09-25

## 2018-09-25 DIAGNOSIS — I42.9 CARDIOMYOPATHY, UNSPECIFIED TYPE (HCC): ICD-10-CM

## 2018-09-25 LAB
ALBUMIN SERPL-MCNC: 4.4 G/DL (ref 3.5–4.8)
ALBUMIN/GLOB SERPL: 1.8 {RATIO} (ref 1.2–2.2)
ALP SERPL-CCNC: 53 IU/L (ref 39–117)
ALT SERPL-CCNC: 18 IU/L (ref 0–44)
AST SERPL-CCNC: 25 IU/L (ref 0–40)
BASOPHILS # BLD AUTO: 0 X10E3/UL (ref 0–0.2)
BASOPHILS NFR BLD AUTO: 0 %
BILIRUB SERPL-MCNC: 1.8 MG/DL (ref 0–1.2)
BUN SERPL-MCNC: 16 MG/DL (ref 8–27)
BUN/CREAT SERPL: 18 (ref 10–24)
CALCIUM SERPL-MCNC: 9.8 MG/DL (ref 8.6–10.2)
CHLORIDE SERPL-SCNC: 95 MMOL/L (ref 96–106)
CHOLEST SERPL-MCNC: 167 MG/DL (ref 100–199)
CO2 SERPL-SCNC: 26 MMOL/L (ref 20–29)
CREAT SERPL-MCNC: 0.9 MG/DL (ref 0.76–1.27)
EOSINOPHIL # BLD AUTO: 0.2 X10E3/UL (ref 0–0.4)
EOSINOPHIL NFR BLD AUTO: 3 %
ERYTHROCYTE [DISTWIDTH] IN BLOOD BY AUTOMATED COUNT: 13.3 % (ref 12.3–15.4)
GLOBULIN SER-MCNC: 2.4 G/DL (ref 1.5–4.5)
GLUCOSE SERPL-MCNC: 112 MG/DL (ref 65–99)
HCT VFR BLD AUTO: 43.8 % (ref 37.5–51)
HDLC SERPL-MCNC: 63 MG/DL
HGB BLD-MCNC: 14.7 G/DL (ref 13–17.7)
IMM GRANULOCYTES # BLD: 0 X10E3/UL (ref 0–0.1)
IMM GRANULOCYTES NFR BLD: 0 %
LDLC SERPL CALC-MCNC: 86 MG/DL (ref 0–99)
LYMPHOCYTES # BLD AUTO: 1.5 X10E3/UL (ref 0.7–3.1)
LYMPHOCYTES NFR BLD AUTO: 29 %
MCH RBC QN AUTO: 34.6 PG (ref 26.6–33)
MCHC RBC AUTO-ENTMCNC: 33.6 G/DL (ref 31.5–35.7)
MCV RBC AUTO: 103 FL (ref 79–97)
MONOCYTES # BLD AUTO: 0.3 X10E3/UL (ref 0.1–0.9)
MONOCYTES NFR BLD AUTO: 7 %
NEUTROPHILS # BLD AUTO: 3.2 X10E3/UL (ref 1.4–7)
NEUTROPHILS NFR BLD AUTO: 61 %
PLATELET # BLD AUTO: 162 X10E3/UL (ref 150–379)
POTASSIUM SERPL-SCNC: 4.9 MMOL/L (ref 3.5–5.2)
PROT SERPL-MCNC: 6.8 G/DL (ref 6–8.5)
RBC # BLD AUTO: 4.25 X10E6/UL (ref 4.14–5.8)
SL AMB EGFR AFRICAN AMERICAN: 95 ML/MIN/1.73
SL AMB EGFR NON AFRICAN AMERICAN: 82 ML/MIN/1.73
SL AMB VLDL CHOLESTEROL CALC: 18 MG/DL (ref 5–40)
SODIUM SERPL-SCNC: 136 MMOL/L (ref 134–144)
TRIGL SERPL-MCNC: 91 MG/DL (ref 0–149)
WBC # BLD AUTO: 5.2 X10E3/UL (ref 3.4–10.8)

## 2018-09-25 RX ORDER — CARVEDILOL 6.25 MG/1
6.25 TABLET ORAL 2 TIMES DAILY WITH MEALS
Qty: 180 TABLET | Refills: 3 | Status: SHIPPED | OUTPATIENT
Start: 2018-09-25 | End: 2019-12-18 | Stop reason: SDUPTHER

## 2018-09-26 ENCOUNTER — TELEPHONE (OUTPATIENT)
Dept: CARDIOLOGY CLINIC | Facility: CLINIC | Age: 78
End: 2018-09-26

## 2018-09-26 NOTE — TELEPHONE ENCOUNTER
Patients wife called & said she would like to know if patient is benefiting anything from being on Entresto  Patients wife also said  pt's bp medications keep decreasing & increasing  Can patient go on and off with this medication or does patient have to continue taking this medication once a day  Patient's wife would like a call back

## 2018-10-02 ENCOUNTER — CLINICAL SUPPORT (OUTPATIENT)
Dept: INTERNAL MEDICINE CLINIC | Facility: CLINIC | Age: 78
End: 2018-10-02
Payer: MEDICARE

## 2018-10-02 DIAGNOSIS — Z23 NEED FOR INFLUENZA VACCINATION: Primary | ICD-10-CM

## 2018-10-02 PROCEDURE — 90662 IIV NO PRSV INCREASED AG IM: CPT

## 2018-10-02 PROCEDURE — G0008 ADMIN INFLUENZA VIRUS VAC: HCPCS

## 2018-11-14 ENCOUNTER — TELEPHONE (OUTPATIENT)
Dept: CARDIOLOGY CLINIC | Facility: CLINIC | Age: 78
End: 2018-11-14

## 2018-11-14 NOTE — TELEPHONE ENCOUNTER
Pts wife called and said that for the past 2-3 weeks pts pulse has been in the 80s & today it went into the 90's  Before pt started taking Entresto it was always in 70's  Pt takes Entresto every morning at 7:30am  Pts wife would like to know if she should continue to monitor it & let us know or if pts needs appt to have meds adjusted   Please advise

## 2018-11-14 NOTE — TELEPHONE ENCOUNTER
Entresto should not affect the pulse rate  As long as the pulse is less than 90, it should be fine  Coreg does have an affect on the pulse  Also to monitor blood pressures  To let us know if the pulse is goes more than 100 persistently

## 2018-12-05 ENCOUNTER — REMOTE DEVICE CLINIC VISIT (OUTPATIENT)
Dept: CARDIOLOGY CLINIC | Facility: CLINIC | Age: 78
End: 2018-12-05
Payer: MEDICARE

## 2018-12-05 DIAGNOSIS — I42.0 DILATED CARDIOMYOPATHY (HCC): ICD-10-CM

## 2018-12-05 DIAGNOSIS — Z95.810 PRESENCE OF IMPLANTABLE CARDIOVERTER-DEFIBRILLATOR (ICD): ICD-10-CM

## 2018-12-05 DIAGNOSIS — I48.21 PERMANENT ATRIAL FIBRILLATION (HCC): ICD-10-CM

## 2018-12-05 DIAGNOSIS — I50.22 CHRONIC SYSTOLIC CONGESTIVE HEART FAILURE (HCC): Primary | ICD-10-CM

## 2018-12-05 PROCEDURE — 93295 DEV INTERROG REMOTE 1/2/MLT: CPT | Performed by: INTERNAL MEDICINE

## 2018-12-05 PROCEDURE — 93296 REM INTERROG EVL PM/IDS: CPT | Performed by: INTERNAL MEDICINE

## 2018-12-05 NOTE — PROGRESS NOTES
MDT CRT-D (VVIR 70)  CARELINK TRANSMISSION:  BATTERY VOLTAGE ADEQUATE (2 4 YR)   BP 98 3% (VSRP 1 7%)   ALL AVAILABLE LEAD PARAMETERS WITHIN NORMAL LIMITS   4 VT-NS EPISODES WITH EGMS SHOWING PROBABLE NSVT (8 @ 194 BPM, 6 @ 207 BPM, 8 @ 212 BPM, 6 @ 194 BPM)    18 V SENSE EPISODES (6 SEC /D) WITH MARKERS ONLY SHOWING NSVT VS RVR WITH V -140 BPM   OPTI-VOL WITHIN NORMAL LIMITS   NORMAL DEVICE FUNCTION   RG

## 2019-02-22 ENCOUNTER — OFFICE VISIT (OUTPATIENT)
Dept: CARDIOLOGY CLINIC | Facility: CLINIC | Age: 79
End: 2019-02-22
Payer: MEDICARE

## 2019-02-22 VITALS
DIASTOLIC BLOOD PRESSURE: 78 MMHG | HEIGHT: 76 IN | WEIGHT: 241 LBS | OXYGEN SATURATION: 97 % | SYSTOLIC BLOOD PRESSURE: 126 MMHG | HEART RATE: 74 BPM | BODY MASS INDEX: 29.35 KG/M2

## 2019-02-22 DIAGNOSIS — I42.9 CARDIOMYOPATHY, UNSPECIFIED TYPE (HCC): Primary | ICD-10-CM

## 2019-02-22 DIAGNOSIS — I77.810 DILATION OF THORACIC AORTA (HCC): ICD-10-CM

## 2019-02-22 DIAGNOSIS — I50.22 CHRONIC SYSTOLIC CONGESTIVE HEART FAILURE (HCC): ICD-10-CM

## 2019-02-22 DIAGNOSIS — Z79.01 CHRONIC ANTICOAGULATION: ICD-10-CM

## 2019-02-22 DIAGNOSIS — I50.22 CHRONIC SYSTOLIC HF (HEART FAILURE) (HCC): ICD-10-CM

## 2019-02-22 DIAGNOSIS — I48.20 CHRONIC ATRIAL FIBRILLATION (HCC): ICD-10-CM

## 2019-02-22 PROCEDURE — 99214 OFFICE O/P EST MOD 30 MIN: CPT | Performed by: INTERNAL MEDICINE

## 2019-02-22 NOTE — PROGRESS NOTES
[facility] [place]  [address]  [address]  Cardiology Follow Up    Aneudy Puckett  1940  [id number]      1  Cardiomyopathy, unspecified type ([address] )  Echo complete with contrast if indicated   2  Chronic systolic congestive heart failure ([address] )     3  Chronic atrial fibrillation ([address] )     4  Chronic anticoagulation     5  Dilation of thoracic aorta (HCC)  CT chest without contrast   6  Chronic systolic HF (heart failure) (HCC)             Interval History:  Patient presents for routine follow-up visit  Patient does have history of nonischemic cardiomyopathy and chronic systolic heart failure as well as atrial fibrillation status post CRT D  Patient is on anticoagulation  Patient also has history of thoracic aortic aneurysm measuring 4 5 cm  Patient does have shortness of breath with exertion which is stable  No weight gain  Patient is bothered with chronic back pain  No history of bleeding issues  No history of ICD discharges  He states that he has been compliant with all his present medications        Patient Active Problem List   Diagnosis    Cardiomyopathy ([address] )    Chronic systolic heart failure (HCC)    Chronic atrial fibrillation (HCC)    Chronic anticoagulation    Dilation of thoracic aorta (HCC)    Hyperlipidemia    Essential hypertension    Presence of automatic cardioverter/defibrillator (AICD)     Past Medical History:   Diagnosis Date    Anticoagulant long-term use     Atrial fibrillation ([address] )     Cardiac defibrillator in situ     Cardiomyopathy ([address] )     Colon polyp     Congestive heart failure (CHF) ([address] )     DJD (degenerative joint disease)     Hyperlipidemia     Shortness of breath     Sick sinus syndrome ([address] )     Spinal stenosis      Social History     Socioeconomic History    Marital status: /Civil Union     Spouse name: Not on file    Number of children: Not on file    Years of education: Not on file   [facility] Highest education level: Not on file   Occupational History    Not on file   Social Needs    Financial resource strain: Not on file    Food insecurity:     Worry: Not on file     Inability: Not on file    Transportation needs:     Medical: Not on file     Non-medical: Not on file   Tobacco Use    Smoking status: Former Smoker     Types: Cigarettes, Cigars     Last attempt to quit: 1968     Years since quittin 8    Smokeless tobacco: Never Used   Substance and Sexual Activity    Alcohol use: Yes     Alcohol/week: 1 2 oz     Types: 2 Glasses of wine per week    Drug use: No    Sexual activity: Never   Lifestyle    Physical activity:     Days per week: Not on file     Minutes per session: Not on file    Stress: Not on file   Relationships    Social connections:     Talks on phone: Not on file     Gets together: Not on file     Attends [employer] service: Not on file     Active member of club or organization: Not on file     Attends meetings of clubs or organizations: Not on file     Relationship status: Not on file    Intimate partner violence:     Fear of current or ex partner: Not on file     Emotionally abused: Not on file     Physically abused: Not on file     Forced sexual activity: Not on file   Other Topics Concern    Not on file   Social History Narrative    Active advance directive    Caffeine use      Family History   Problem Relation Age of Onset    Coronary artery disease Mother      Past Surgical History:   Procedure Laterality Date    CARDIAC DEFIBRILLATOR PLACEMENT      Leo Young / Jaylen Haus / Ru Host      in highschool had this    ID COLONOSCOPY FLX DX W/COLLJ SPEC WHEN PFRMD N/A 2017    Procedure: COLONOSCOPY;  Surgeon: Destiny Jimenez MD;  Location: [facility];   Service: Gastroenterology    TONSILLECTOMY      TRANSURETHRAL RESECTION OF PROSTATE         Current Outpatient Medications:     apixaban (ELIQUIS) 5 mg, Take 1 tablet (5 mg total) by mouth 2 (two) times a day, Disp: 180 tablet, Rfl: 3    carvedilol (COREG) 6 25 mg tablet, Take 1 tablet (6 25 mg total) by mouth 2 (two) times a day with meals, Disp: 180 tablet, Rfl: 3    diphenhydrAMINE-acetaminophen (TYLENOL PM EXTRA STRENGTH)  MG TABS, Take by mouth 2 (two) times a day as needed  , Disp: , Rfl:     Omega-3 Fatty Acids (OMEGA-3 FISH OIL PO), Take by mouth daily , Disp: , Rfl:     ranitidine (ZANTAC) 75 MG tablet, Take 75 mg by mouth daily  , Disp: , Rfl:     sacubitril-valsartan (ENTRESTO) 24-26 MG TABS, Take 1 tablet by mouth 2 (two) times a day (Patient taking differently: Take 1 tablet by mouth daily ), Disp: 180 tablet, Rfl: 3    torsemide (DEMADEX) 10 mg tablet, Take 1 tablet every other day, Disp: 30 tablet, Rfl: 3  Allergies   Allergen Reactions    Other Sneezing     Seasonal; pollen; reactions; congestion    Penicillin G Benzathine Rash    Penicillins Rash       Labs:  No visits with results within 2 Month(s) from this visit     Latest known visit with results is:   Orders Only on 09/24/2018   Component Date Value    White Blood Cell Count 09/24/2018 5 2     Red Blood Cell Count 09/24/2018 4 25     Hemoglobin 09/24/2018 14 7     HCT 09/24/2018 43 8     MCV 09/24/2018 103*    MCH 09/24/2018 34 6*    MCHC 09/24/2018 33 6     RDW 09/24/2018 13 3     Platelet Count 91/95/3643 162     Neutrophils 09/24/2018 61     Lymphocytes 09/24/2018 29     Monocytes 09/24/2018 7     Eosinophils 09/24/2018 3     Basophils PCT 09/24/2018 0     Neutrophils (Absolute) 09/24/2018 3 2     Lymphocytes (Absolute) 09/24/2018 1 5     Monocytes (Absolute) 09/24/2018 0 3     Eosinophils (Absolute) 09/24/2018 0 2     Basophils ABS 09/24/2018 0 0     Immature Granulocytes 09/24/2018 0     Immature Granulocytes (A* 09/24/2018 0 0     Glucose, Random 09/24/2018 112*    BUN 09/24/2018 16     Creatinine 09/24/2018 0 90     eGFR Non  09/24/2018 82     eGFR  American 09/24/2018 95     SL AMB BUN/CREATININE RA* 09/24/2018 18     Sodium 09/24/2018 136     Potassium 09/24/2018 4 9     Chloride 09/24/2018 95*    CO2 09/24/2018 26     CALCIUM 09/24/2018 9 8     Protein, Total 09/24/2018 6 8     Albumin 09/24/2018 4 4     Globulin, Total 09/24/2018 2 4     Albumin/Globulin Ratio 09/24/2018 1 8     TOTAL BILIRUBIN 09/24/2018 1 8*    Alk Phos Isoenzymes 09/24/2018 53     AST 09/24/2018 25     ALT 09/24/2018 18     Cholesterol, Total 09/24/2018 167     Triglycerides 09/24/2018 91     HDL 09/24/2018 63     VLDL Cholesterol Calcula* 09/24/2018 18     LDL Direct 09/24/2018 86      Imaging: No results found  Review of Systems:  Review of Systems   REVIEW OF SYSTEMS:  Constitutional:  Denies fever or chills   Eyes:  Denies change in visual acuity   HENT:  Denies nasal congestion or sore throat   Respiratory:  Denies cough or shortness of breath   Cardiovascular:  Denies chest pain or edema   GI:  Denies abdominal pain, nausea, vomiting, bloody stools or diarrhea   :  Denies dysuria, frequency, difficulty in micturition and nocturia  Musculoskeletal:  Back pain  Neurological: denies headache, focal weakness or sensory changes   Endocrine:  Denies polyuria or polydipsia   Lymphatic:  Denies swollen glands   Psychiatric:  Denies depression or anxiety     Physical Exam:    /78   Pulse 74   Ht 6' 3 5" (1 918 m)   Wt 109 kg (241 lb)   SpO2 97%   BMI 29 73 kg/m²     Physical Exam   PHYSICAL EXAM:  General:  Patient is not in acute distress   Head: Normocephalic, Atraumatic  HEENT:  Both pupils normal-size atraumatic, normocephalic, nonicteric  Neck:  JVP not raised  Trachea central  No carotid bruit  Respiratory:  normal breath sounds no crackles  no rhonchi  Cardiovascular:  Irregularly irregular  GI:  Abdomen soft nontender  No organomegaly  Lymphatic:  No cervical or inguinal lymphadenopathy  Neurologic:  Patient is awake alert, oriented    Grossly nonfocal    Discussion/Summary:  Patient with multiple medical problems who seems to be doing reasonably well from cardiac standpoint  Previous studies reviewed with patient  Medications reviewed and possible side effects discussed  concepts of cardiovascular disease , signs and symptoms of heart disease  Dietary and risk factor modification reinforced  All questions answered  Safety measures reviewed  Patient advised to report any problems prompting medical attention  Patient will be scheduled for CT of the chest to reassess thoracic aortic aneurysm  Previous measurement was 4 5 cm  Echocardiogram will be done to assess ejection fraction and look for evidence of aortic regurgitation with history of thoracic aortic aneurysm  Patient understands the risks and benefits of anticoagulation to prevent thromboembolic risk from atrial fibrillation  Patient does understand instructions to avoid NSAIDs  Medications reviewed and refilled  Follow-up in a few months or earlier as needed  Follow-up with primary care physician  Follow up with device clinic

## 2019-03-06 ENCOUNTER — REMOTE DEVICE CLINIC VISIT (OUTPATIENT)
Dept: CARDIOLOGY CLINIC | Facility: CLINIC | Age: 79
End: 2019-03-06
Payer: MEDICARE

## 2019-03-06 DIAGNOSIS — I48.21 PERMANENT ATRIAL FIBRILLATION (HCC): Primary | ICD-10-CM

## 2019-03-06 DIAGNOSIS — I42.0 DILATED CARDIOMYOPATHY (HCC): ICD-10-CM

## 2019-03-06 DIAGNOSIS — I50.22 CHRONIC SYSTOLIC CONGESTIVE HEART FAILURE (HCC): ICD-10-CM

## 2019-03-06 DIAGNOSIS — Z95.810 PRESENCE OF IMPLANTABLE CARDIOVERTER-DEFIBRILLATOR (ICD): ICD-10-CM

## 2019-03-06 PROCEDURE — 93295 DEV INTERROG REMOTE 1/2/MLT: CPT | Performed by: INTERNAL MEDICINE

## 2019-03-06 PROCEDURE — 93296 REM INTERROG EVL PM/IDS: CPT | Performed by: INTERNAL MEDICINE

## 2019-03-06 NOTE — PROGRESS NOTES
MDT CRT-D ([address])  CARELINK TRANSMISSION:  BATTERY VOLTAGE ADEQUATE (2 2 YR)    BP 97 9% (VSRP 2 0%)   ALL AVAILABLE LEAD PARAMETERS WITHIN NORMAL LIMITS   7 VT-NS EPISODES SINCE 12/5/18 WITH EGMS SHOWING RVR VS NSVT (12 @ 194 BPM, 6 @ 179 BPM, 10 @ 188 BPM, 19 @ 176 BPM, 18 @ 182 BPM + 9 @ 170 BPM)   HX OF PAF & ON ELIQUIS   119 V SENSE EPISODES WITH MARKERS SHOWING PROBABLE RVR   OPTI-VOL WITHIN NORMAL LIMITS   NORMAL DEVICE FUNCTION   RG

## 2019-03-24 DIAGNOSIS — I50.22 CHRONIC SYSTOLIC HF (HEART FAILURE) (HCC): ICD-10-CM

## 2019-03-24 RX ORDER — SACUBITRIL AND VALSARTAN 24; 26 MG/1; MG/1
TABLET, FILM COATED ORAL
Qty: 180 TABLET | Refills: 2 | Status: SHIPPED | OUTPATIENT
Start: 2019-03-24 | End: 2019-03-25 | Stop reason: SDUPTHER

## 2019-03-25 DIAGNOSIS — I50.22 CHRONIC SYSTOLIC HF (HEART FAILURE) (HCC): ICD-10-CM

## 2019-04-08 ENCOUNTER — HOSPITAL ENCOUNTER (OUTPATIENT)
Dept: NON INVASIVE DIAGNOSTICS | Facility: CLINIC | Age: 79
Discharge: HOME | End: 2019-04-08
Payer: MEDICARE

## 2019-04-08 DIAGNOSIS — I42.9 CARDIOMYOPATHY, UNSPECIFIED TYPE (HCC): ICD-10-CM

## 2019-04-08 PROCEDURE — 93306 TTE W/DOPPLER COMPLETE: CPT

## 2019-04-10 PROCEDURE — 93306 TTE W/DOPPLER COMPLETE: CPT | Performed by: INTERNAL MEDICINE

## 2019-04-16 ENCOUNTER — HOSPITAL ENCOUNTER (OUTPATIENT)
Dept: CT IMAGING | Facility: CLINIC | Age: 79
Discharge: HOME | End: 2019-04-16
Payer: MEDICARE

## 2019-04-16 DIAGNOSIS — I77.810 DILATION OF THORACIC AORTA (HCC): ICD-10-CM

## 2019-04-16 PROCEDURE — 71250 CT THORAX DX C-: CPT

## 2019-05-03 DIAGNOSIS — I10 ESSENTIAL HYPERTENSION: ICD-10-CM

## 2019-05-03 RX ORDER — TORSEMIDE 10 MG/1
10 TABLET ORAL EVERY OTHER DAY
Qty: 45 TABLET | Refills: 3 | Status: SHIPPED | OUTPATIENT
Start: 2019-05-03 | End: 2020-03-23

## 2019-05-08 ENCOUNTER — TELEPHONE (OUTPATIENT)
Dept: INTERNAL MEDICINE CLINIC | Facility: CLINIC | Age: 79
End: 2019-05-08

## 2019-05-08 DIAGNOSIS — M48.062 SPINAL STENOSIS OF LUMBAR REGION WITH NEUROGENIC CLAUDICATION: Primary | ICD-10-CM

## 2019-05-08 DIAGNOSIS — G89.4 CHRONIC PAIN SYNDROME: ICD-10-CM

## 2019-05-20 ENCOUNTER — TELEPHONE (OUTPATIENT)
Dept: CARDIOLOGY CLINIC | Facility: CLINIC | Age: 79
End: 2019-05-20

## 2019-05-20 DIAGNOSIS — M54.50 ACUTE BILATERAL LOW BACK PAIN WITHOUT SCIATICA: Primary | ICD-10-CM

## 2019-05-21 RX ORDER — PREDNISONE 10 MG/1
TABLET ORAL
Qty: 30 TABLET | Refills: 0 | Status: SHIPPED | OUTPATIENT
Start: 2019-05-21 | End: 2019-05-31

## 2019-05-27 ENCOUNTER — TELEPHONE (OUTPATIENT)
Dept: OTHER | Facility: OTHER | Age: 79
End: 2019-05-27

## 2019-05-28 ENCOUNTER — TELEPHONE (OUTPATIENT)
Dept: PAIN MEDICINE | Facility: CLINIC | Age: 79
End: 2019-05-28

## 2019-05-28 ENCOUNTER — OFFICE VISIT (OUTPATIENT)
Dept: INTERNAL MEDICINE CLINIC | Facility: CLINIC | Age: 79
End: 2019-05-28
Payer: MEDICARE

## 2019-05-28 VITALS
DIASTOLIC BLOOD PRESSURE: 80 MMHG | WEIGHT: 230.4 LBS | HEART RATE: 80 BPM | SYSTOLIC BLOOD PRESSURE: 122 MMHG | TEMPERATURE: 96.2 F | BODY MASS INDEX: 28.42 KG/M2 | OXYGEN SATURATION: 95 %

## 2019-05-28 DIAGNOSIS — I42.0 DILATED CARDIOMYOPATHY (HCC): Chronic | ICD-10-CM

## 2019-05-28 DIAGNOSIS — I50.22 CHRONIC SYSTOLIC HEART FAILURE (HCC): Chronic | ICD-10-CM

## 2019-05-28 DIAGNOSIS — M17.0 PRIMARY OSTEOARTHRITIS OF BOTH KNEES: ICD-10-CM

## 2019-05-28 DIAGNOSIS — M48.062 SPINAL STENOSIS OF LUMBAR REGION WITH NEUROGENIC CLAUDICATION: Primary | ICD-10-CM

## 2019-05-28 DIAGNOSIS — I10 ESSENTIAL HYPERTENSION: Chronic | ICD-10-CM

## 2019-05-28 PROCEDURE — 99214 OFFICE O/P EST MOD 30 MIN: CPT | Performed by: INTERNAL MEDICINE

## 2019-05-28 RX ORDER — ACETAMINOPHEN AND CODEINE PHOSPHATE 300; 30 MG/1; MG/1
1 TABLET ORAL EVERY 8 HOURS PRN
Qty: 45 TABLET | Refills: 0 | Status: SHIPPED | OUTPATIENT
Start: 2019-05-28 | End: 2020-09-26

## 2019-05-31 DIAGNOSIS — I50.22 CHRONIC SYSTOLIC HF (HEART FAILURE) (HCC): ICD-10-CM

## 2019-06-18 ENCOUNTER — TELEPHONE (OUTPATIENT)
Dept: INTERNAL MEDICINE CLINIC | Facility: CLINIC | Age: 79
End: 2019-06-18

## 2019-06-28 ENCOUNTER — IN-CLINIC DEVICE VISIT (OUTPATIENT)
Dept: CARDIOLOGY CLINIC | Facility: CLINIC | Age: 79
End: 2019-06-28
Payer: MEDICARE

## 2019-06-28 DIAGNOSIS — Z95.810 PRESENCE OF IMPLANTABLE CARDIOVERTER-DEFIBRILLATOR (ICD): ICD-10-CM

## 2019-06-28 DIAGNOSIS — I50.22 CHRONIC SYSTOLIC CONGESTIVE HEART FAILURE (HCC): ICD-10-CM

## 2019-06-28 DIAGNOSIS — I42.0 DILATED CARDIOMYOPATHY (HCC): ICD-10-CM

## 2019-06-28 DIAGNOSIS — I48.20 CHRONIC ATRIAL FIBRILLATION (HCC): Primary | ICD-10-CM

## 2019-06-28 PROCEDURE — 93284 PRGRMG EVAL IMPLANTABLE DFB: CPT | Performed by: INTERNAL MEDICINE

## 2019-07-08 ENCOUNTER — TELEPHONE (OUTPATIENT)
Dept: CARDIOLOGY CLINIC | Facility: CLINIC | Age: 79
End: 2019-07-08

## 2019-07-08 DIAGNOSIS — I48.91 ATRIAL FIBRILLATION, UNSPECIFIED TYPE (HCC): ICD-10-CM

## 2019-07-08 NOTE — TELEPHONE ENCOUNTER
Pt's wife called and stated that if pt's medication does not reach the pharmacy today  Pt's wife would like to come in and  a box of samples  APIXABAN 5 MG  Please give pt a call back

## 2019-07-08 NOTE — TELEPHONE ENCOUNTER
PT NEEDS A NEW SCRIPT FOR APIXABAN 5MG (30 DAY SUPPLY) TO [facility] [address] [facility] [place] [address]

## 2019-07-08 NOTE — TELEPHONE ENCOUNTER
Lmom  If med does not reach the pharmacy pt can call our office and I will have samples ready for them

## 2019-08-28 ENCOUNTER — TELEPHONE (OUTPATIENT)
Dept: CARDIOLOGY CLINIC | Facility: CLINIC | Age: 79
End: 2019-08-28

## 2019-08-28 DIAGNOSIS — E78.2 COMBINED HYPERLIPIDEMIA: Primary | ICD-10-CM

## 2019-08-28 DIAGNOSIS — I50.22 CHRONIC SYSTOLIC CONGESTIVE HEART FAILURE (HCC): ICD-10-CM

## 2019-08-28 NOTE — TELEPHONE ENCOUNTER
Patient started complaining of left ear pain yesterday-symptoms improved. Did put drops that were prescribed before. Yellow drainage noted this morning. Patient has tubes. Patient went to  and when was picked up, whole outside of ear was red.  No fev Please order CBC CMP and lipid profile    Thank you

## 2019-08-30 ENCOUNTER — TELEPHONE (OUTPATIENT)
Dept: CARDIOLOGY CLINIC | Facility: CLINIC | Age: 79
End: 2019-08-30

## 2019-08-30 DIAGNOSIS — I50.22 CHRONIC SYSTOLIC CONGESTIVE HEART FAILURE (HCC): Primary | ICD-10-CM

## 2019-08-30 DIAGNOSIS — I48.21 PERMANENT ATRIAL FIBRILLATION (HCC): ICD-10-CM

## 2019-08-30 LAB
ALBUMIN SERPL-MCNC: 4.4 G/DL (ref 3.5–4.8)
ALBUMIN/GLOB SERPL: 1.8 {RATIO} (ref 1.2–2.2)
ALP SERPL-CCNC: 61 IU/L (ref 39–117)
ALT SERPL-CCNC: 41 IU/L (ref 0–44)
AST SERPL-CCNC: 39 IU/L (ref 0–40)
BASOPHILS # BLD AUTO: 0 X10E3/UL (ref 0–0.2)
BASOPHILS NFR BLD AUTO: 0 %
BILIRUB SERPL-MCNC: 1.3 MG/DL (ref 0–1.2)
BUN SERPL-MCNC: 15 MG/DL (ref 8–27)
BUN/CREAT SERPL: 12 (ref 10–24)
CALCIUM SERPL-MCNC: 10 MG/DL (ref 8.6–10.2)
CHLORIDE SERPL-SCNC: 100 MMOL/L (ref 96–106)
CHOLEST SERPL-MCNC: 147 MG/DL (ref 100–199)
CO2 SERPL-SCNC: 27 MMOL/L (ref 20–29)
CREAT SERPL-MCNC: 1.26 MG/DL (ref 0.76–1.27)
EOSINOPHIL # BLD AUTO: 0.1 X10E3/UL (ref 0–0.4)
EOSINOPHIL NFR BLD AUTO: 2 %
ERYTHROCYTE [DISTWIDTH] IN BLOOD BY AUTOMATED COUNT: 12.9 % (ref 12.3–15.4)
GLOBULIN SER-MCNC: 2.5 G/DL (ref 1.5–4.5)
GLUCOSE SERPL-MCNC: 105 MG/DL (ref 65–99)
HCT VFR BLD AUTO: 43.8 % (ref 37.5–51)
HDLC SERPL-MCNC: 59 MG/DL
HGB BLD-MCNC: 14.6 G/DL (ref 13–17.7)
IMM GRANULOCYTES # BLD: 0 X10E3/UL (ref 0–0.1)
IMM GRANULOCYTES NFR BLD: 0 %
LDLC SERPL CALC-MCNC: 74 MG/DL (ref 0–99)
LYMPHOCYTES # BLD AUTO: 1.5 X10E3/UL (ref 0.7–3.1)
LYMPHOCYTES NFR BLD AUTO: 26 %
MCH RBC QN AUTO: 34.4 PG (ref 26.6–33)
MCHC RBC AUTO-ENTMCNC: 33.3 G/DL (ref 31.5–35.7)
MCV RBC AUTO: 103 FL (ref 79–97)
MONOCYTES # BLD AUTO: 0.4 X10E3/UL (ref 0.1–0.9)
MONOCYTES NFR BLD AUTO: 7 %
NEUTROPHILS # BLD AUTO: 3.6 X10E3/UL (ref 1.4–7)
NEUTROPHILS NFR BLD AUTO: 65 %
PLATELET # BLD AUTO: 179 X10E3/UL (ref 150–450)
POTASSIUM SERPL-SCNC: 5.4 MMOL/L (ref 3.5–5.2)
PROT SERPL-MCNC: 6.9 G/DL (ref 6–8.5)
RBC # BLD AUTO: 4.25 X10E6/UL (ref 4.14–5.8)
SL AMB EGFR AFRICAN AMERICAN: 63 ML/MIN/1.73
SL AMB EGFR NON AFRICAN AMERICAN: 54 ML/MIN/1.73
SL AMB VLDL CHOLESTEROL CALC: 14 MG/DL (ref 5–40)
SODIUM SERPL-SCNC: 141 MMOL/L (ref 134–144)
TRIGL SERPL-MCNC: 72 MG/DL (ref 0–149)
WBC # BLD AUTO: 5.6 X10E3/UL (ref 3.4–10.8)

## 2019-08-30 NOTE — TELEPHONE ENCOUNTER
----- Message from Lizette Russo MD sent at 8/30/2019  2:36 PM EDT -----  Please call blood work overall good except mildly elevated potassium at 5 4  Low-potassium diet  Repeat serum potassium in 2 weeks

## 2019-09-06 ENCOUNTER — TELEPHONE (OUTPATIENT)
Dept: CARDIOLOGY CLINIC | Facility: CLINIC | Age: 79
End: 2019-09-06

## 2019-09-06 NOTE — TELEPHONE ENCOUNTER
Please send order for potassium to [facility]  Pt wife said pt is going on Friday 09/13/19   Thank you

## 2019-09-12 NOTE — TELEPHONE ENCOUNTER
Pt's wife called and stated pt needs an order faxed to [facility] for pt to have his potassium checked   Please fax order to [phone]

## 2019-09-14 LAB — POTASSIUM SERPL-SCNC: 4.8 MMOL/L (ref 3.5–5.2)

## 2019-09-16 ENCOUNTER — TELEPHONE (OUTPATIENT)
Dept: CARDIOLOGY CLINIC | Facility: CLINIC | Age: 79
End: 2019-09-16

## 2019-09-16 NOTE — TELEPHONE ENCOUNTER
Last blood work was overall okay except minimally elevated potassium at 5 4  To continue low-potassium diet  Repeat BMP in 3 weeks

## 2019-09-16 NOTE — TELEPHONE ENCOUNTER
Spoke with patients wife, advised that repeat potassium from 9/13 was 4 8  Advised per Dr Demetrius Pierce now in normal range     Verbally understands

## 2019-09-17 ENCOUNTER — TRANSCRIBE ORDERS (OUTPATIENT)
Dept: LAB | Facility: CLINIC | Age: 79
End: 2019-09-17

## 2019-09-17 ENCOUNTER — APPOINTMENT (OUTPATIENT)
Dept: LAB | Facility: CLINIC | Age: 79
End: 2019-09-17
Payer: MEDICARE

## 2019-09-17 ENCOUNTER — CLINICAL SUPPORT (OUTPATIENT)
Dept: INTERNAL MEDICINE CLINIC | Facility: CLINIC | Age: 79
End: 2019-09-17
Payer: MEDICARE

## 2019-09-17 DIAGNOSIS — R39.82 CHRONIC BLADDER PAIN: ICD-10-CM

## 2019-09-17 DIAGNOSIS — Z23 NEED FOR INFLUENZA VACCINATION: Primary | ICD-10-CM

## 2019-09-17 DIAGNOSIS — R39.82 CHRONIC BLADDER PAIN: Primary | ICD-10-CM

## 2019-09-17 LAB
BACTERIA UR QL AUTO: ABNORMAL /HPF
BILIRUB UR QL STRIP: NEGATIVE
CLARITY UR: CLEAR
COLOR UR: YELLOW
GLUCOSE UR STRIP-MCNC: NEGATIVE MG/DL
HGB UR QL STRIP.AUTO: NEGATIVE
HYALINE CASTS #/AREA URNS LPF: ABNORMAL /LPF
KETONES UR STRIP-MCNC: NEGATIVE MG/DL
LEUKOCYTE ESTERASE UR QL STRIP: ABNORMAL
NITRITE UR QL STRIP: NEGATIVE
NON-SQ EPI CELLS URNS QL MICRO: ABNORMAL /HPF
PH UR STRIP.AUTO: 7 [PH]
PROT UR STRIP-MCNC: NEGATIVE MG/DL
RBC #/AREA URNS AUTO: ABNORMAL /HPF
SP GR UR STRIP.AUTO: 1.01 (ref 1–1.03)
UROBILINOGEN UR QL STRIP.AUTO: 0.2 E.U./DL
WBC #/AREA URNS AUTO: ABNORMAL /HPF

## 2019-09-17 PROCEDURE — G0008 ADMIN INFLUENZA VIRUS VAC: HCPCS

## 2019-09-17 PROCEDURE — 90662 IIV NO PRSV INCREASED AG IM: CPT

## 2019-09-17 PROCEDURE — 87186 SC STD MICRODIL/AGAR DIL: CPT

## 2019-09-17 PROCEDURE — 87086 URINE CULTURE/COLONY COUNT: CPT

## 2019-09-17 PROCEDURE — 81001 URINALYSIS AUTO W/SCOPE: CPT | Performed by: UROLOGY

## 2019-09-17 PROCEDURE — 87077 CULTURE AEROBIC IDENTIFY: CPT

## 2019-09-19 LAB — BACTERIA UR CULT: ABNORMAL

## 2019-10-02 ENCOUNTER — REMOTE DEVICE CLINIC VISIT (OUTPATIENT)
Dept: CARDIOLOGY CLINIC | Facility: CLINIC | Age: 79
End: 2019-10-02
Payer: MEDICARE

## 2019-10-02 DIAGNOSIS — Z95.810 PRESENCE OF IMPLANTABLE CARDIOVERTER-DEFIBRILLATOR (ICD): Primary | ICD-10-CM

## 2019-10-02 PROCEDURE — 93295 DEV INTERROG REMOTE 1/2/MLT: CPT | Performed by: INTERNAL MEDICINE

## 2019-10-02 PROCEDURE — 93296 REM INTERROG EVL PM/IDS: CPT | Performed by: INTERNAL MEDICINE

## 2019-10-02 NOTE — PROGRESS NOTES
MDT CRT-D ([address])  CARELINK TRANSMISSION:  BATTERY VOLTAGE ADEQUATE (1 8 YR)    AP 0% BP 97 7% (VSRP 2 2%)   ALL AVAILABLE LEAD PARAMETERS WITHIN NORMAL LIMITS   4 VT-NS EPISODES WITH EGMS SHOWING NSVT VS RVR (9 @ 188 BPM, 12 @ 178 BPM, 10 @ 192 BPM, 10 @ 190 BPM)   129 V SENSE EPISODES (11 S/D) WITH MARKERS SHOWING FUSION/RVR   OPTI-VOL WITHIN NORMAL LIMITS   NORMAL DEVICE FUNCTION   RG

## 2019-10-09 ENCOUNTER — OFFICE VISIT (OUTPATIENT)
Dept: CARDIOLOGY CLINIC | Facility: CLINIC | Age: 79
End: 2019-10-09
Payer: MEDICARE

## 2019-10-09 VITALS
WEIGHT: 228 LBS | HEIGHT: 77 IN | HEART RATE: 82 BPM | SYSTOLIC BLOOD PRESSURE: 126 MMHG | DIASTOLIC BLOOD PRESSURE: 76 MMHG | BODY MASS INDEX: 26.92 KG/M2 | OXYGEN SATURATION: 97 %

## 2019-10-09 DIAGNOSIS — I50.22 CHRONIC SYSTOLIC CONGESTIVE HEART FAILURE (HCC): ICD-10-CM

## 2019-10-09 DIAGNOSIS — I42.0 DILATED CARDIOMYOPATHY (HCC): Primary | ICD-10-CM

## 2019-10-09 DIAGNOSIS — Z79.01 CHRONIC ANTICOAGULATION: ICD-10-CM

## 2019-10-09 DIAGNOSIS — I48.20 CHRONIC ATRIAL FIBRILLATION (HCC): ICD-10-CM

## 2019-10-09 DIAGNOSIS — M85.80 OSTEOPENIA, UNSPECIFIED LOCATION: ICD-10-CM

## 2019-10-09 PROCEDURE — 99214 OFFICE O/P EST MOD 30 MIN: CPT | Performed by: INTERNAL MEDICINE

## 2019-10-09 NOTE — PROGRESS NOTES
[facility] [address] [address]  Cardiology Follow Up    Alex Nugent  1940  [id number]      1  Dilated cardiomyopathy ([address] )     2  Chronic atrial fibrillation     3  Chronic systolic congestive heart failure ([address] )     4  Chronic anticoagulation         Chief Complaint   Patient presents with    Follow-up     6 month     Cardiomyopathy       Interval History:   Patient presents for follow-up visit  Patient denies any chest pain  Shortness of breath is stable  No history of leg edema orthopnea PND  No history of ICD discharges  Chronic back pain still bothers him  No bleeding issues  He states that he has been compliant with all his present medications      Patient Active Problem List   Diagnosis    Cardiomyopathy ([address] )    Chronic systolic heart failure (HCC)    Chronic atrial fibrillation    Chronic anticoagulation    Dilation of thoracic aorta (HCC)    Hyperlipidemia    Essential hypertension    Presence of automatic cardioverter/defibrillator (AICD)    Spinal stenosis of lumbar region with neurogenic claudication     Past Medical History:   Diagnosis Date    Anticoagulant long-term use     Atrial fibrillation ([address] )     Cardiac defibrillator in situ     Cardiomy[facility]     Colon polyp     Congestive heart failure (CHF) (HCC)     DJD (degenerative joint disease)     HL (hearing loss)     Hyperlipidemia     Shortness of breath     Sick sinus syndrome ([address] )     Spinal stenosis      Social History     Socioeconomic History    Marital status: /Civil Union     Spouse name: Not on file    Number of children: Not on file    Years of education: Not on file    Highest education level: Not on file   Occupational History    Not on file   Social Needs    Financial resource strain: Not on file    Food insecurity:     Worry: Not on file     Inability: Not on file    Transportation needs:     Medical: Not on file Non-medical: Not on file   Tobacco Use    Smoking status: Former Smoker     Packs/day: 0 50     Years: 3 00     Pack years: 1 50     Types: Cigarettes, Cigars     Last attempt to quit: 1968     Years since quittin 5    Smokeless tobacco: Never Used   Substance and Sexual Activity    Alcohol use: Yes     Alcohol/week: 2 0 standard drinks     Types: 2 Glasses of wine per week     Frequency: 4 or more times a week     Drinks per session: 1 or 2     Binge frequency: Never    Drug use: No    Sexual activity: Never   Lifestyle    Physical activity:     Days per week: 0 days     Minutes per session: 0 min    Stress: To some extent   Relationships    Social connections:     Talks on phone: Not on file     Gets together: Not on file     Attends [employer] service: Not on file     Active member of club or organization: Not on file     Attends meetings of clubs or organizations: Not on file     Relationship status: Not on file    Intimate partner violence:     Fear of current or ex partner: Not on file     Emotionally abused: Not on file     Physically abused: Not on file     Forced sexual activity: Not on file   Other Topics Concern    Not on file   Social History Narrative    Active advance directive    Caffeine use      Family History   Problem Relation Age of Onset    Coronary artery disease Mother     Hypertension Son      Past Surgical History:   Procedure Laterality Date    CARDIAC DEFIBRILLATOR PLACEMENT      Delorisoschaparrita Lomax / Jossy Christensen / Eduardo Christopher      in highschool had this    PA COLONOSCOPY FLX DX W/COLLJ SPEC WHEN PFRMD N/A 2017    Procedure: COLONOSCOPY;  Surgeon: Lino Daugherty MD;  Location: [facility];   Service: Gastroenterology    TONSILLECTOMY      TRANSURETHRAL RESECTION OF PROSTATE         Current Outpatient Medications:     acetaminophen-codeine (TYLENOL #3) 300-30 mg per tablet, Take 1 tablet by mouth every 8 (eight) hours as needed for moderate pain, Disp: 45 tablet, Rfl: 0    apixaban (ELIQUIS) 5 mg, Take 1 tablet (5 mg total) by mouth 2 (two) times a day, Disp: 180 tablet, Rfl: 3    carvedilol (COREG) 6 25 mg tablet, Take 1 tablet (6 25 mg total) by mouth 2 (two) times a day with meals, Disp: 180 tablet, Rfl: 3    diphenhydrAMINE-acetaminophen (TYLENOL PM EXTRA STRENGTH)  MG TABS, Take by mouth 2 (two) times a day as needed  , Disp: , Rfl:     sacubitril-valsartan (ENTRESTO) 24-26 MG TABS, Take 1 tablet by mouth daily, Disp: 90 tablet, Rfl: 3    torsemide (DEMADEX) 10 mg tablet, Take 1 tablet (10 mg total) by mouth every other day, Disp: 45 tablet, Rfl: 3  Allergies   Allergen Reactions    Other Sneezing     Seasonal; pollen; reactions; congestion    Penicillin G Benzathine Rash    Penicillins Rash       Labs:  Appointment on 09/17/2019   Component Date Value    Urine Culture 09/17/2019 50,000-59,000 cfu/ml Enterococcus faecalis*   Transcribe Orders on 09/17/2019   Component Date Value    Clarity, UA 09/17/2019 Clear     Color, UA 09/17/2019 Yellow     Specific Gravity, UA 09/17/2019 1 006     pH, UA 09/17/2019 7 0     Glucose, UA 09/17/2019 Negative     Ketones, UA 09/17/2019 Negative     Blood, UA 09/17/2019 Negative     Protein, UA 09/17/2019 Negative     Nitrite, UA 09/17/2019 Negative     Bilirubin, UA 09/17/2019 Negative     Urobilinogen, UA 09/17/2019 0 2     Leukocytes, UA 09/17/2019 Moderate*    WBC, UA 09/17/2019 10-20*    RBC, UA 09/17/2019 None Seen     Hyaline Casts, UA 09/17/2019 None Seen     Bacteria, UA 09/17/2019 None Seen     Epithelial Cells 09/17/2019 None Seen    Orders Only on 09/13/2019   Component Date Value    Potassium 09/13/2019 4 8    Orders Only on 08/29/2019   Component Date Value    White Blood Cell Count 08/29/2019 5 6     Red Blood Cell Count 08/29/2019 4 25     Hemoglobin 08/29/2019 14 6     HCT 08/29/2019 43 8     MCV 08/29/2019 103*    MCH 08/29/2019 34 4*    MCHC 08/29/2019 33 3     RDW 08/29/2019 12 9     Platelet Count 48/53/3207 179     Neutrophils 08/29/2019 65     Lymphocytes 08/29/2019 26     Monocytes 08/29/2019 7     Eosinophils 08/29/2019 2     Basophils PCT 08/29/2019 0     Neutrophils (Absolute) 08/29/2019 3 6     Lymphocytes (Absolute) 08/29/2019 1 5     Monocytes (Absolute) 08/29/2019 0 4     Eosinophils (Absolute) 08/29/2019 0 1     Basophils ABS 08/29/2019 0 0     Immature Granulocytes 08/29/2019 0     Immature Granulocytes (A* 08/29/2019 0 0     Glucose, Random 08/29/2019 105*    BUN 08/29/2019 15     Creatinine 08/29/2019 1 26     eGFR Non African American 08/29/2019 54*    eGFR  08/29/2019 63     SL AMB BUN/CREATININE RA* 08/29/2019 12     Sodium 08/29/2019 141     Potassium 08/29/2019 5 4*    Chloride 08/29/2019 100     CO2 08/29/2019 27     CALCIUM 08/29/2019 10 0     Protein, Total 08/29/2019 6 9     Albumin 08/29/2019 4 4     Globulin, Total 08/29/2019 2 5     Albumin/Globulin Ratio 08/29/2019 1 8     TOTAL BILIRUBIN 08/29/2019 1 3*    Alk Phos Isoenzymes 08/29/2019 61     AST 08/29/2019 39     ALT 08/29/2019 41     Cholesterol, Total 08/29/2019 147     Triglycerides 08/29/2019 72     HDL 08/29/2019 59     VLDL Cholesterol Calcula* 08/29/2019 14     LDL Direct 08/29/2019 74      Imaging: No results found      Review of Systems:  Review of Systems     REVIEW OF SYSTEMS:  Constitutional:  Denies fever or chills   Eyes:  Denies change in visual acuity   HENT:  Denies nasal congestion or sore throat   Respiratory: shortness of breath   Cardiovascular:  Denies chest pain or edema   GI:  Denies abdominal pain, nausea, vomiting, bloody stools or diarrhea   :  Denies dysuria, frequency, difficulty in micturition and nocturia  Musculoskeletal:    Chronic back pain   Neurologic:  Denies headache, focal weakness or sensory changes   Endocrine:  Denies polyuria or polydipsia   Lymphatic:  Denies swollen glands   Psychiatric:  Denies depression or anxiety     Physical Exam:    /76   Pulse 82   Ht 6' 5" (1 956 m)   Wt 103 kg (228 lb)   SpO2 97%   BMI 27 04 kg/m²     Physical Exam    PHYSICAL EXAM:  General:  Patient is not in acute distress   Head: Normocephalic, Atraumatic  HEENT:  Both pupils normal-size atraumatic, normocephalic, nonicteric  Neck:  JVP not raised  Trachea central  No carotid bruit  Respiratory:  normal breath sounds no crackles  no rhonchi  Cardiovascular:   Irregularly irregular  GI:  Abdomen soft nontender  No organomegaly  Lymphatic:  No cervical or inguinal lymphadenopathy  Neurologic:  Patient is awake alert, oriented   Grossly nonfocal   extremities reveal no edema    Discussion/Summary:  Patient with multiple medical problems who seems to be doing reasonably well from cardiac standpoint  Previous studies reviewed with patient  Medications reviewed and possible side effects discussed  concepts of cardiovascular disease , signs and symptoms of heart disease  Dietary and risk factor modification reinforced  All questions answered  Safety measures reviewed  Patient advised to report any problems prompting medical attention  Recent ICD interrogation data reviewed  Approximate battery life 2 years  Patient understands the risks and benefits of anticoagulation to prevent thromboembolic risk  Patient to report any bleeding issues  Importance of salt restriction and compliance with medications reviewed  Patient does have history of thoracic aortic aneurysm and had recent imaging studies which were stable  Follow-up with primary care physician  Patient and family had a few questions which were answered

## 2019-10-10 LAB — 25(OH)D3+25(OH)D2 SERPL-MCNC: 15 NG/ML (ref 30–100)

## 2019-10-14 ENCOUNTER — TELEPHONE (OUTPATIENT)
Dept: CARDIOLOGY CLINIC | Facility: CLINIC | Age: 79
End: 2019-10-14

## 2019-10-14 NOTE — TELEPHONE ENCOUNTER
Pt's wife called and stated that Dr Ama Pickett wanted pt to start taking vitamin D  Pt's wife would like to know how much vitiamn D should pt take  Please CB with instructions

## 2019-12-18 DIAGNOSIS — I42.9 CARDIOMYOPATHY, UNSPECIFIED TYPE (HCC): ICD-10-CM

## 2019-12-18 RX ORDER — CARVEDILOL 6.25 MG/1
6.25 TABLET ORAL 2 TIMES DAILY WITH MEALS
Qty: 180 TABLET | Refills: 3 | Status: SHIPPED | OUTPATIENT
Start: 2019-12-18 | End: 2020-04-10 | Stop reason: SDUPTHER

## 2019-12-30 ENCOUNTER — TELEPHONE (OUTPATIENT)
Dept: CARDIOLOGY CLINIC | Facility: CLINIC | Age: 79
End: 2019-12-30

## 2019-12-30 NOTE — TELEPHONE ENCOUNTER
Pt is having some issues with her medication she is asking for eliquis 5mg   Please call when ready pt needs 2 weeks supply call when ready

## 2020-01-08 ENCOUNTER — REMOTE DEVICE CLINIC VISIT (OUTPATIENT)
Dept: CARDIOLOGY CLINIC | Facility: CLINIC | Age: 80
End: 2020-01-08
Payer: MEDICARE

## 2020-01-08 DIAGNOSIS — Z95.810 PRESENCE OF IMPLANTABLE CARDIOVERTER-DEFIBRILLATOR (ICD): Primary | ICD-10-CM

## 2020-01-08 PROCEDURE — 93296 REM INTERROG EVL PM/IDS: CPT | Performed by: INTERNAL MEDICINE

## 2020-01-08 PROCEDURE — 93295 DEV INTERROG REMOTE 1/2/MLT: CPT | Performed by: INTERNAL MEDICINE

## 2020-01-08 NOTE — PROGRESS NOTES
MDT CRT-D ([address])  CARELINK TRANSMISSION:  BATTERY VOLTAGE ADEQUATE (21 M)   BP 96 9% (VSRP 3 0%)   ALL AVAILABLE LEAD PARAMETERS WITHIN NORMAL LIMITS   5 VT-NS EPISODES FOR NSVT VS RVR (10 @ 185 BPM, 8 @ 188 BPM, 11 @ 203 BPM, 5 @ 171 BPM, 13 @ 186 BPM)    HX CHRONIC AF & ON ELIQUIS AND CARVEDILOL   TASK TO DR HAYWOOD FOR HR > 200 BPM   OPTI-VOL WITHIN NORMAL LIMITS   NORMAL DEVICE FUNCTION   RG

## 2020-01-13 ENCOUNTER — TELEPHONE (OUTPATIENT)
Dept: CARDIOLOGY CLINIC | Facility: CLINIC | Age: 80
End: 2020-01-13

## 2020-01-13 DIAGNOSIS — I50.22 CHRONIC SYSTOLIC HF (HEART FAILURE) (HCC): ICD-10-CM

## 2020-01-13 NOTE — TELEPHONE ENCOUNTER
Pt spouse called requesting a written rx for entresto due to cost  Caller stating she will go to different pharmacies to see who have the cheaper cost

## 2020-01-13 NOTE — TELEPHONE ENCOUNTER
Spoke with wife, advised Dr No Longoria will be in the office Friday to sign the paper script for her and samples have been set at the front office for her to

## 2020-01-14 ENCOUNTER — OFFICE VISIT (OUTPATIENT)
Dept: INTERNAL MEDICINE CLINIC | Facility: CLINIC | Age: 80
End: 2020-01-14
Payer: MEDICARE

## 2020-01-14 VITALS
WEIGHT: 232 LBS | HEART RATE: 80 BPM | SYSTOLIC BLOOD PRESSURE: 114 MMHG | BODY MASS INDEX: 27.51 KG/M2 | DIASTOLIC BLOOD PRESSURE: 78 MMHG | TEMPERATURE: 96.2 F

## 2020-01-14 DIAGNOSIS — Z23 ENCOUNTER FOR IMMUNIZATION: ICD-10-CM

## 2020-01-14 DIAGNOSIS — M48.062 SPINAL STENOSIS OF LUMBAR REGION WITH NEUROGENIC CLAUDICATION: Primary | ICD-10-CM

## 2020-01-14 DIAGNOSIS — I73.9 INTERMITTENT CLAUDICATION (HCC): ICD-10-CM

## 2020-01-14 DIAGNOSIS — I48.20 CHRONIC ATRIAL FIBRILLATION (HCC): Chronic | ICD-10-CM

## 2020-01-14 PROCEDURE — 90670 PCV13 VACCINE IM: CPT | Performed by: INTERNAL MEDICINE

## 2020-01-14 PROCEDURE — 99214 OFFICE O/P EST MOD 30 MIN: CPT | Performed by: INTERNAL MEDICINE

## 2020-01-14 PROCEDURE — G0009 ADMIN PNEUMOCOCCAL VACCINE: HCPCS | Performed by: INTERNAL MEDICINE

## 2020-01-14 RX ORDER — MELATONIN
1000 DAILY
COMMUNITY
End: 2020-09-26

## 2020-01-14 RX ORDER — GABAPENTIN 300 MG/1
300 CAPSULE ORAL
Qty: 30 CAPSULE | Refills: 0 | Status: SHIPPED | OUTPATIENT
Start: 2020-01-14 | End: 2020-01-20 | Stop reason: SDUPTHER

## 2020-01-14 NOTE — PROGRESS NOTES
INTERNAL MEDICINE FOLLOW-UP OFFICE VISIT  [facility]    NAME: Fiordaliza Salter  AGE: 67 y o  SEX: male  : 1940     DATE: 2020     Assessment and Plan:     1  Spinal stenosis of lumbar region with neurogenic claudication    Discussed medication options - risks/benefits  Will start patient on gabapentin  Start at bedtime and can titrate up as tolerated  Follow-up with orthopedics  - gabapentin (NEURONTIN) 300 mg capsule; Take 1 capsule (300 mg total) by mouth daily at bedtime  Dispense: 30 capsule; Refill: 0    2  Intermittent claudication (HCC)    Further evaluation recommend to evaluate for peripheral artery disease     - VAS lower limb arterial duplex, complete bilateral; Future    3  Chronic atrial fibrillation    [id number] score is 3 which corresponds to 3 2% annual risk of stroke  Continue anticoagulation as prescribed  4  Encounter for immunization    Prevnar vaccine given in office today  - PNEUMOCOCCAL CONJUGATE VACCINE 13-VALENT GREATER THAN 6 MONTHS    BMI Counseling: Body mass index is 27 51 kg/m²  The BMI is above normal  Nutrition recommendations include encouraging healthy choices of fruits and vegetables, moderation in carbohydrate intake and increasing intake of lean protein  Chief Complaint:     Chief Complaint   Patient presents with    Follow-up     leg issue        History of Present Illness:     Has chronic lower back problems - spinal stenosis with claudication  Pains in his legs go through periods where they are good and then other times pain is really bad and hasn't been taking any medication for it  Worried that he also might have circulation problems  Legs are always cold  Gets cramps in the back of the legs at times  Follows with cardiology due to Afib/cardiomyopathy/chronic systolic CHF  No recent falls  No increased shortness of breath  Sees [facility] for his back       Review of Systems: Review of Systems   Constitutional: Negative for activity change, appetite change and fatigue  Respiratory: Negative for apnea, cough, chest tightness, shortness of breath and wheezing  Cardiovascular: Negative for chest pain, palpitations and leg swelling  Gastrointestinal: Negative for abdominal distention, abdominal pain, blood in stool, constipation, diarrhea, nausea and vomiting  Musculoskeletal: Positive for back pain  Negative for arthralgias, gait problem, joint swelling and myalgias  Skin: Positive for color change  Negative for rash and wound  Neurological: Positive for numbness  Negative for dizziness, weakness, light-headedness and headaches  Psychiatric/Behavioral: Negative for behavioral problems, confusion, hallucinations, sleep disturbance and suicidal ideas  The patient is not nervous/anxious  Problem List:     Patient Active Problem List   Diagnosis    Cardiomyopathy ([facility] [address] )    Chronic systolic heart failure (HCC)    Chronic atrial fibrillation    Chronic anticoagulation    Dilation of thoracic aorta (HCC)    Hyperlipidemia    Essential hypertension    Presence of automatic cardioverter/defibrillator (AICD)    Spinal stenosis of lumbar region with neurogenic claudication      Objective:     /78 (BP Location: Left arm, Patient Position: Sitting, Cuff Size: Standard)   Pulse 80   Temp (!) 96 2 °F (35 7 °C) (Tympanic) Comment: NO NSAIDS  Wt 105 kg (232 lb) Comment: w/ shoes denied off  BMI 27 51 kg/m²     Physical Exam   Constitutional: He is oriented to person, place, and time  He appears well-developed and well-nourished  No distress  Eyes: Conjunctivae are normal  Right eye exhibits no discharge  Left eye exhibits no discharge  No scleral icterus  Cardiovascular: Normal rate, regular rhythm and normal heart sounds  No murmur heard  Decreased pedal pulses   Pulmonary/Chest: Effort normal and breath sounds normal  No respiratory distress   He has no wheezes  He has no rales  He exhibits no tenderness  Abdominal: Soft  Bowel sounds are normal  He exhibits no distension  There is no tenderness  Musculoskeletal: He exhibits no edema  Neurological: He is alert and oriented to person, place, and time  Skin: Skin is warm and dry  He is not diaphoretic  Psychiatric: He has a normal mood and affect  His behavior is normal    Vitals reviewed      Mike Guallpa DO  [facility] [address] [place] [address]

## 2020-01-17 ENCOUNTER — TELEPHONE (OUTPATIENT)
Dept: CARDIOLOGY CLINIC | Facility: CLINIC | Age: 80
End: 2020-01-17

## 2020-01-17 NOTE — TELEPHONE ENCOUNTER
Spoke with the pt wife she verbally understood slow step up dosages for the pt gabapentin medication due to his cardiac conditions

## 2020-01-17 NOTE — TELEPHONE ENCOUNTER
pts wife called and stated that pt just saw Dr Susan Lin and would like pt to start taking Gabapentin because he has spinal stenosis and has painful nerves  pts wife just wants to check with Dr Chico Lopes that this is ok to take with cardiac issues/meds   Bowers advise

## 2020-01-17 NOTE — TELEPHONE ENCOUNTER
It is fine to try gabapentin  However, given his sensitivity to medications in general and his cardiac status with relatively low blood pressures, would start with 100 mg at night for a week, increase to 200 mg after 1 week, and subsequently to 300 mg after another week as slow step-up dosages

## 2020-01-20 DIAGNOSIS — M48.062 SPINAL STENOSIS OF LUMBAR REGION WITH NEUROGENIC CLAUDICATION: ICD-10-CM

## 2020-01-20 RX ORDER — GABAPENTIN 100 MG/1
CAPSULE ORAL
Qty: 30 CAPSULE | Refills: 3 | Status: SHIPPED | OUTPATIENT
Start: 2020-01-20 | End: 2020-03-13 | Stop reason: SDUPTHER

## 2020-01-20 NOTE — TELEPHONE ENCOUNTER
Patient wife called the patient saw the cardiologist Dr Brittney Espinoza he stated the medication gabapentin (NEURONTIN) 300 mg capsule he should not be taking the 300 mg  He wanted the patient to take 100 mg for 1 week and see how he feels on it and if it goes well the next week raise it to 200 mg  Guille Osborne wanted to know could Dr Jonathan Thomson send gabapentin (NEURONTIN) 100 mg capsule to his pharmacy [facility] on [phone]      Guille Osborne [phone]

## 2020-02-05 NOTE — TELEPHONE ENCOUNTER
Pt's wife dropped off pt's bp readings & is concerned about pt being on the Gabapentin & she would like a call back; gave readings to Erika olivo she is on in-basket

## 2020-02-05 NOTE — TELEPHONE ENCOUNTER
I reviewed the blood pressure and pulse readings  Patient should stay on gabapentin 200 mg at night as long as he is doing fine  I would not recommend a further increase at this time  Also give it at least another 3 to 4 weeks to know whether gabapentin is helping or not

## 2020-02-20 ENCOUNTER — TELEPHONE (OUTPATIENT)
Dept: CARDIOLOGY CLINIC | Facility: CLINIC | Age: 80
End: 2020-02-20

## 2020-02-20 NOTE — TELEPHONE ENCOUNTER
Patient to continue present medications except to skip Entresto of the blood pressure systolic is less than 95 mm Hg

## 2020-02-27 ENCOUNTER — TELEPHONE (OUTPATIENT)
Dept: CARDIOLOGY CLINIC | Facility: CLINIC | Age: 80
End: 2020-02-27

## 2020-02-27 NOTE — TELEPHONE ENCOUNTER
Pt's wife called and would like to know if pt is suppose to continue with 200 MG of Gabapentin or is it suppose to be increased   Elvia Ashley would like a cb at [phone]

## 2020-03-10 ENCOUNTER — TELEPHONE (OUTPATIENT)
Dept: CARDIOLOGY CLINIC | Facility: CLINIC | Age: 80
End: 2020-03-10

## 2020-03-10 NOTE — TELEPHONE ENCOUNTER
Pt spouse called requesting advise for 5lbs weight gain (pt spouse states probably from eat lots of desserts), slight edema, persistent cough and itchy nose and eyes   xfer to Branden

## 2020-03-10 NOTE — TELEPHONE ENCOUNTER
S/w wife, states that pt had a 5 lb weight gain over a couple of weeks (they have been eating a lot of deserts lately), pt has had a non productive cough for 3 days, itchy eyes, afebrile, bilateral trace pedal edema and pt is on Torsemide 10 mg every other day   Please advise    [phone]

## 2020-03-11 ENCOUNTER — TELEPHONE (OUTPATIENT)
Dept: INTERNAL MEDICINE CLINIC | Facility: CLINIC | Age: 80
End: 2020-03-11

## 2020-03-11 ENCOUNTER — TELEPHONE (OUTPATIENT)
Dept: GASTROENTEROLOGY | Facility: CLINIC | Age: 80
End: 2020-03-11

## 2020-03-11 DIAGNOSIS — R05.9 COUGH: Primary | ICD-10-CM

## 2020-03-11 DIAGNOSIS — I50.22 CHRONIC SYSTOLIC HEART FAILURE (HCC): Chronic | ICD-10-CM

## 2020-03-11 NOTE — TELEPHONE ENCOUNTER
PT'S WIFE (RICKY)  CALLED STATING THAT HER  IS HAVING TROUBLE WITH HIS ACID REFLUX AND WOULD LIKE TO SPEAK WITH YOU DIRECTLY  BEST CALL BACK NUMBER -[phone]

## 2020-03-11 NOTE — TELEPHONE ENCOUNTER
Patient was coughing for several days and then had heartburn  I told her that this may be cardiac or pulmonary and that should be ruled out 1st   I advised strongly that he go to the emergency room  He could just take some Pepcid the for the reflux cardiovascular and pulmonary issues need to be ruled out 1st and he needs to go to the ER  Cane

## 2020-03-11 NOTE — TELEPHONE ENCOUNTER
Please call the patient and ask him to increase his torsemide to 20 mg daily for 3 days, then drop down to 10 mg QOD      [address]

## 2020-03-11 NOTE — TELEPHONE ENCOUNTER
[facility] AND ORDER FAXED -[phone] AND WIFE NOTIFIED

## 2020-03-11 NOTE — TELEPHONE ENCOUNTER
Jr Mantilla is asking if they mobile x-ray service can be used for her  Lina Pale  He has a lot of coughing and has heart problems  Dr Naveen Deleon is not in the office and it was suggested to ask the PCP to order an x-ray of the chest so that this may not go into CHF  Please call Guille Osborne back at [phone] to advise what to do

## 2020-03-13 DIAGNOSIS — M48.062 SPINAL STENOSIS OF LUMBAR REGION WITH NEUROGENIC CLAUDICATION: ICD-10-CM

## 2020-03-13 RX ORDER — GABAPENTIN 100 MG/1
200 CAPSULE ORAL DAILY
Qty: 60 CAPSULE | Refills: 5 | Status: SHIPPED | OUTPATIENT
Start: 2020-03-13 | End: 2020-09-21 | Stop reason: SDUPTHER

## 2020-03-13 NOTE — TELEPHONE ENCOUNTER
S/w pt's wife, she is upset that Dr Lonnie Fajardo is not in the office and confused about chest x ray results  Advised to call PCP since they ordered it  Wife stated that pt usually weighs around 238 lbs, he was down to 236 lbs and is now 230 lbs with the increase of the diuretic(Torsemide 20 mg daily for 3 days, today is Day 3) and wife states that pt has not been eating much and he is still coughing    Please advise

## 2020-03-13 NOTE — TELEPHONE ENCOUNTER
Patient's wife states Burke Alarcon needs a script sent in for Gabapetin 200 mg to [facility]  She states she did not get the results from the mobile x-ray but Burke Alarcon still has burning in his chest and is very weak

## 2020-03-15 DIAGNOSIS — R05.9 COUGH: ICD-10-CM

## 2020-03-15 DIAGNOSIS — I50.22 CHRONIC SYSTOLIC HF (HEART FAILURE) (HCC): Primary | ICD-10-CM

## 2020-03-15 NOTE — TELEPHONE ENCOUNTER
I talked to patient and wife  Discussed the results of chest x-ray which was done outside  Patient should have blood work which has been ordered in the system, CBC CMP and BNP  Thank you  Patient's wife may want the lab orders faxed to a specific lab

## 2020-03-16 ENCOUNTER — TELEPHONE (OUTPATIENT)
Dept: INTERNAL MEDICINE CLINIC | Facility: CLINIC | Age: 80
End: 2020-03-16

## 2020-03-16 ENCOUNTER — TELEPHONE (OUTPATIENT)
Dept: CARDIOLOGY CLINIC | Facility: CLINIC | Age: 80
End: 2020-03-16

## 2020-03-16 NOTE — TELEPHONE ENCOUNTER
Pt's wife called & said that there is some bw that Dr Ama Pickett ordered for pt to get done & pt's wife wants a VitaminD check to also be inc; fax the bw orders to [facility] at [phone]

## 2020-03-17 LAB
ALBUMIN SERPL-MCNC: 4.3 G/DL (ref 3.7–4.7)
ALBUMIN/GLOB SERPL: 1.7 {RATIO} (ref 1.2–2.2)
ALP SERPL-CCNC: 60 IU/L (ref 39–117)
ALT SERPL-CCNC: 19 IU/L (ref 0–44)
AST SERPL-CCNC: 30 IU/L (ref 0–40)
BASOPHILS # BLD AUTO: 0 X10E3/UL (ref 0–0.2)
BASOPHILS NFR BLD AUTO: 0 %
BILIRUB SERPL-MCNC: 1.5 MG/DL (ref 0–1.2)
BUN SERPL-MCNC: 17 MG/DL (ref 8–27)
BUN/CREAT SERPL: 18 (ref 10–24)
CALCIUM SERPL-MCNC: 9.5 MG/DL (ref 8.6–10.2)
CHLORIDE SERPL-SCNC: 98 MMOL/L (ref 96–106)
CO2 SERPL-SCNC: 22 MMOL/L (ref 20–29)
CREAT SERPL-MCNC: 0.92 MG/DL (ref 0.76–1.27)
EOSINOPHIL # BLD AUTO: 0.2 X10E3/UL (ref 0–0.4)
EOSINOPHIL NFR BLD AUTO: 3 %
ERYTHROCYTE [DISTWIDTH] IN BLOOD BY AUTOMATED COUNT: 12.6 % (ref 11.6–15.4)
GLOBULIN SER-MCNC: 2.6 G/DL (ref 1.5–4.5)
GLUCOSE SERPL-MCNC: 116 MG/DL (ref 65–99)
HCT VFR BLD AUTO: 43.4 % (ref 37.5–51)
HGB BLD-MCNC: 14.7 G/DL (ref 13–17.7)
IMM GRANULOCYTES # BLD: 0 X10E3/UL (ref 0–0.1)
IMM GRANULOCYTES NFR BLD: 0 %
LYMPHOCYTES # BLD AUTO: 1.9 X10E3/UL (ref 0.7–3.1)
LYMPHOCYTES NFR BLD AUTO: 34 %
MCH RBC QN AUTO: 32.7 PG (ref 26.6–33)
MCHC RBC AUTO-ENTMCNC: 33.9 G/DL (ref 31.5–35.7)
MCV RBC AUTO: 97 FL (ref 79–97)
MONOCYTES # BLD AUTO: 0.6 X10E3/UL (ref 0.1–0.9)
MONOCYTES NFR BLD AUTO: 10 %
NEUTROPHILS # BLD AUTO: 2.8 X10E3/UL (ref 1.4–7)
NEUTROPHILS NFR BLD AUTO: 53 %
NT-PROBNP SERPL-MCNC: 2838 PG/ML (ref 0–486)
PLATELET # BLD AUTO: 176 X10E3/UL (ref 150–450)
POTASSIUM SERPL-SCNC: 4.5 MMOL/L (ref 3.5–5.2)
PROT SERPL-MCNC: 6.9 G/DL (ref 6–8.5)
RBC # BLD AUTO: 4.49 X10E6/UL (ref 4.14–5.8)
SL AMB EGFR AFRICAN AMERICAN: 92 ML/MIN/1.73
SL AMB EGFR NON AFRICAN AMERICAN: 79 ML/MIN/1.73
SODIUM SERPL-SCNC: 136 MMOL/L (ref 134–144)
WBC # BLD AUTO: 5.5 X10E3/UL (ref 3.4–10.8)

## 2020-03-18 ENCOUNTER — TELEPHONE (OUTPATIENT)
Dept: CARDIOLOGY CLINIC | Facility: CLINIC | Age: 80
End: 2020-03-18

## 2020-03-18 NOTE — TELEPHONE ENCOUNTER
----- Message from Michelle Suazo MD sent at 3/18/2020 11:00 AM EDT -----  Please call BW overall good except BNP elevated ( but as expected for the patient)

## 2020-03-19 ENCOUNTER — TELEPHONE (OUTPATIENT)
Dept: INTERNAL MEDICINE CLINIC | Facility: CLINIC | Age: 80
End: 2020-03-19

## 2020-03-19 DIAGNOSIS — R05.9 COUGH: Primary | ICD-10-CM

## 2020-03-19 RX ORDER — BENZONATATE 100 MG/1
100 CAPSULE ORAL 3 TIMES DAILY PRN
Qty: 60 CAPSULE | Refills: 1 | Status: SHIPPED | OUTPATIENT
Start: 2020-03-19 | End: 2020-09-26

## 2020-03-19 RX ORDER — AZITHROMYCIN 250 MG/1
TABLET, FILM COATED ORAL
Qty: 6 TABLET | Refills: 0 | Status: SHIPPED | OUTPATIENT
Start: 2020-03-19 | End: 2020-03-24

## 2020-03-19 NOTE — TELEPHONE ENCOUNTER
CALLED WIFE AND WAS NOTIFIED AND STATED HE IS EATING LESS AND WEIGHT TODAY  AND BACK TO TORSEMIDE M,W,F REG   DOSE AND WOULD LIKE SOMETHING FOR  THE COUGH AT NIGHT

## 2020-03-19 NOTE — TELEPHONE ENCOUNTER
Pt has had a productive cough for a week, now coughing up pink sputum  No fever/muscle aches  Spouse concerned because he is a cardiac pt and on eliquis  States he does have some shortness of breath but mostly because of his CHF  He had a cxr on 3/11, should he have another? Should he be on meds?     Spouse says they use [facility] [phone]    [phone]

## 2020-03-23 DIAGNOSIS — I10 ESSENTIAL HYPERTENSION: ICD-10-CM

## 2020-03-23 RX ORDER — TORSEMIDE 10 MG/1
TABLET ORAL
Qty: 45 TABLET | Refills: 0 | Status: SHIPPED | OUTPATIENT
Start: 2020-03-23 | End: 2020-07-05

## 2020-04-07 ENCOUNTER — TELEMEDICINE (OUTPATIENT)
Dept: CARDIOLOGY CLINIC | Facility: CLINIC | Age: 80
End: 2020-04-07
Payer: MEDICARE

## 2020-04-07 VITALS
HEART RATE: 79 BPM | SYSTOLIC BLOOD PRESSURE: 103 MMHG | WEIGHT: 224 LBS | BODY MASS INDEX: 26.45 KG/M2 | DIASTOLIC BLOOD PRESSURE: 54 MMHG | HEIGHT: 77 IN

## 2020-04-07 DIAGNOSIS — I48.91 ATRIAL FIBRILLATION, UNSPECIFIED TYPE (HCC): ICD-10-CM

## 2020-04-07 DIAGNOSIS — I50.22 CHRONIC SYSTOLIC HF (HEART FAILURE) (HCC): ICD-10-CM

## 2020-04-07 DIAGNOSIS — Z79.01 CHRONIC ANTICOAGULATION: ICD-10-CM

## 2020-04-07 DIAGNOSIS — I48.20 CHRONIC ATRIAL FIBRILLATION (HCC): ICD-10-CM

## 2020-04-07 DIAGNOSIS — I42.0 DILATED CARDIOMYOPATHY (HCC): Primary | ICD-10-CM

## 2020-04-07 PROCEDURE — 99214 OFFICE O/P EST MOD 30 MIN: CPT | Performed by: INTERNAL MEDICINE

## 2020-04-08 ENCOUNTER — REMOTE DEVICE CLINIC VISIT (OUTPATIENT)
Dept: CARDIOLOGY CLINIC | Facility: CLINIC | Age: 80
End: 2020-04-08
Payer: MEDICARE

## 2020-04-08 DIAGNOSIS — Z95.810 PRESENCE OF IMPLANTABLE CARDIOVERTER-DEFIBRILLATOR (ICD): Primary | ICD-10-CM

## 2020-04-08 PROCEDURE — 93295 DEV INTERROG REMOTE 1/2/MLT: CPT | Performed by: INTERNAL MEDICINE

## 2020-04-08 PROCEDURE — 93296 REM INTERROG EVL PM/IDS: CPT | Performed by: INTERNAL MEDICINE

## 2020-04-10 DIAGNOSIS — I42.9 CARDIOMYOPATHY, UNSPECIFIED TYPE (HCC): ICD-10-CM

## 2020-04-10 RX ORDER — CARVEDILOL 6.25 MG/1
9.37 TABLET ORAL 2 TIMES DAILY WITH MEALS
Qty: 270 TABLET | Refills: 3 | Status: SHIPPED | OUTPATIENT
Start: 2020-04-10 | End: 2020-04-13 | Stop reason: SDUPTHER

## 2020-04-13 DIAGNOSIS — I42.9 CARDIOMYOPATHY, UNSPECIFIED TYPE (HCC): ICD-10-CM

## 2020-04-13 DIAGNOSIS — I42.0 DILATED CARDIOMYOPATHY (HCC): Primary | ICD-10-CM

## 2020-04-13 RX ORDER — CARVEDILOL 6.25 MG/1
TABLET ORAL
Qty: 90 TABLET | Refills: 3 | Status: CANCELLED
Start: 2020-04-13

## 2020-04-13 RX ORDER — CARVEDILOL 6.25 MG/1
6.25 TABLET ORAL 2 TIMES DAILY WITH MEALS
Qty: 180 TABLET | Refills: 3
Start: 2020-04-13 | End: 2020-09-26

## 2020-04-13 RX ORDER — CARVEDILOL 3.12 MG/1
3.12 TABLET ORAL 2 TIMES DAILY WITH MEALS
Qty: 180 TABLET | Refills: 3 | Status: SHIPPED | OUTPATIENT
Start: 2020-04-13 | End: 2020-11-18 | Stop reason: HOSPADM

## 2020-04-13 RX ORDER — CARVEDILOL 3.12 MG/1
TABLET ORAL
Qty: 90 TABLET | Refills: 3 | Status: SHIPPED | OUTPATIENT
Start: 2020-04-13 | End: 2020-10-05

## 2020-05-27 ENCOUNTER — TRANSCRIBE ORDERS (OUTPATIENT)
Dept: ADMINISTRATIVE | Facility: HOSPITAL | Age: 80
End: 2020-05-27

## 2020-05-27 DIAGNOSIS — G62.9 PERIPHERAL NERVE DISORDER: Primary | ICD-10-CM

## 2020-06-01 ENCOUNTER — TELEPHONE (OUTPATIENT)
Dept: CARDIOLOGY CLINIC | Facility: CLINIC | Age: 80
End: 2020-06-01

## 2020-06-09 ENCOUNTER — HOSPITAL ENCOUNTER (OUTPATIENT)
Dept: CT IMAGING | Facility: HOSPITAL | Age: 80
Discharge: HOME | End: 2020-06-09
Payer: MEDICARE

## 2020-06-09 ENCOUNTER — HOSPITAL ENCOUNTER (OUTPATIENT)
Dept: VASCULAR ULTRASOUND | Facility: HOSPITAL | Age: 80
Discharge: HOME | End: 2020-06-09
Payer: MEDICARE

## 2020-06-09 DIAGNOSIS — G62.9 PERIPHERAL NERVE DISORDER: ICD-10-CM

## 2020-06-09 PROCEDURE — 93923 UPR/LXTR ART STDY 3+ LVLS: CPT | Performed by: SURGERY

## 2020-06-09 PROCEDURE — 93923 UPR/LXTR ART STDY 3+ LVLS: CPT

## 2020-06-09 PROCEDURE — 73700 CT LOWER EXTREMITY W/O DYE: CPT

## 2020-07-02 ENCOUNTER — IN-CLINIC DEVICE VISIT (OUTPATIENT)
Dept: CARDIOLOGY CLINIC | Facility: CLINIC | Age: 80
End: 2020-07-02
Payer: MEDICARE

## 2020-07-02 DIAGNOSIS — Z95.810 PRESENCE OF IMPLANTABLE CARDIOVERTER-DEFIBRILLATOR (ICD): Primary | ICD-10-CM

## 2020-07-02 PROCEDURE — 93284 PRGRMG EVAL IMPLANTABLE DFB: CPT | Performed by: INTERNAL MEDICINE

## 2020-07-02 NOTE — PROGRESS NOTES
MDT CRT-D (VVIR 70)  DEVICE INTERROGATED IN THE [place] OFFICE:  BATTERY VOLTAGE ADEQUATE (1 8 YR)   BP 97 3% (VSRP 2 6%)   ALL LEAD PARAMETERS WITHIN NORMAL LIMITS   SINCE 4/8/20; 4 VT-NS EPISODES WITH EGMS SHOWING RVR VS NSVT (9 @ 180 BPM+8 @ 190 BPM, 8 @ 174 BPM, 6 @ 188 BPM, 12 @ 207 BPM)    TASK TO     740 V SENSE EPISODES WITH MARKERS SHOWING PROBABLE RVR VS NSVT WITH -171 BPM   OPTI-VOL WITHIN NORMAL LIMITS   NO PROGRAMMING CHANGES MADE TO DEVICE PARAMETERS   NORMAL DEVICE FUNCTION   RG

## 2020-07-05 DIAGNOSIS — I10 ESSENTIAL HYPERTENSION: ICD-10-CM

## 2020-07-05 RX ORDER — TORSEMIDE 10 MG/1
TABLET ORAL
Qty: 45 TABLET | Refills: 0 | Status: SHIPPED | OUTPATIENT
Start: 2020-07-05 | End: 2020-10-05

## 2020-07-10 ENCOUNTER — TELEPHONE (OUTPATIENT)
Dept: CARDIOLOGY CLINIC | Facility: CLINIC | Age: 80
End: 2020-07-10

## 2020-07-10 NOTE — TELEPHONE ENCOUNTER
Patient wife called   Was wondering if a report came through b/c last night patient woke up between 3-4am He was SOB, had a headache, felt dizzy, had diahrea  She said then he quieted down and went back to sleep  Was wondering if it was the Melatonin he takes before bed  Checked with Lizabeth Morris   No report    Call transfer to [facility]

## 2020-07-10 NOTE — TELEPHONE ENCOUNTER
S/w patient's wife, between 3 am & 4 am patient woke up feeling very SOB and stated he could barely breathe  Patient also experienced a headache, dizziness, nausea, and excessive sweating  Per wife patient also wet his pants during episode and episode lasted for about 5-10 minutes, patient stated that it lasted for 15 minutes  Wife also stated that patient's vitals were normal for him  10 minutes after episode, patient had diarrhea and after was able to go back to sleep after sitting for 10-15 minutes  Patient woke up again and stated he felt out of focus then went back to resting, which he was still doing while on the phone with wife  Denied any other symptoms  Advised wife that if patient's symptoms come back and or worsen patient should report to the ED  Wife verbally understood  Wife thought a pacemaker report was sent in during episode and was informed no report was received and verbally understood

## 2020-07-10 NOTE — TELEPHONE ENCOUNTER
I do not see any ICD transmission in the last 48 hours  If the symptoms what patient had is unrelated to  arrhythmia, we do not necessarily get the ICD transmission  Agree that if he is not feeling good, he should be seen in the emergency room

## 2020-07-31 ENCOUNTER — TELEPHONE (OUTPATIENT)
Dept: CARDIOLOGY CLINIC | Facility: CLINIC | Age: 80
End: 2020-07-31

## 2020-07-31 NOTE — TELEPHONE ENCOUNTER
Spoke with wife and advised her of the likely cause of the bruising  Wife verbally understood  She is going to try to exchange the BP machine for one that doesn't have metal in the cuff

## 2020-07-31 NOTE — TELEPHONE ENCOUNTER
The marks are bruising likely secondary to the metal in the BP cuff  They should resolve over a period of time  May be they  can get a blood pressure machine without the metal cuff

## 2020-07-31 NOTE — TELEPHONE ENCOUNTER
Patient's wife called  They have anew  home blood pressure machine  The cuff has a piece of metal in it  and when he took his BP, it left a black and blue adan on his arm in the area of the cuff  Wife has noticed he has a few of these marks recently that aren't from the BP cuff  Pt takes Eliquis 5 mg, two times a day      Wife would like to know what she can do about the marks

## 2020-08-20 ENCOUNTER — TELEPHONE (OUTPATIENT)
Dept: CARDIOLOGY CLINIC | Facility: CLINIC | Age: 80
End: 2020-08-20

## 2020-08-21 NOTE — TELEPHONE ENCOUNTER
Left message on identified voicemail advising patient that his blood pressure & pulse recordings log were reviewed by Dr Buzz Yusuf and are in range  Pt verbally understood

## 2020-09-21 DIAGNOSIS — M48.062 SPINAL STENOSIS OF LUMBAR REGION WITH NEUROGENIC CLAUDICATION: ICD-10-CM

## 2020-09-21 RX ORDER — GABAPENTIN 100 MG/1
200 CAPSULE ORAL DAILY
Qty: 60 CAPSULE | Refills: 5 | Status: ON HOLD | OUTPATIENT
Start: 2020-09-21 | End: 2020-11-18 | Stop reason: SDUPTHER

## 2020-09-21 NOTE — TELEPHONE ENCOUNTER
Patient needs a refill for  gabapentin (NEURONTIN) 100 mg capsule     Send to [facility] [address]

## 2020-09-24 ENCOUNTER — TELEPHONE (OUTPATIENT)
Dept: GASTROENTEROLOGY | Facility: CLINIC | Age: 80
End: 2020-09-24

## 2020-09-24 NOTE — TELEPHONE ENCOUNTER
Advised glycerin suppository to be followed by [facility] enemas  If the patient has significant pain he has to go to the ER  She will call me with his progress

## 2020-09-25 ENCOUNTER — TELEPHONE (OUTPATIENT)
Dept: CARDIOLOGY CLINIC | Facility: CLINIC | Age: 80
End: 2020-09-25

## 2020-09-26 ENCOUNTER — APPOINTMENT (EMERGENCY)
Dept: RADIOLOGY | Facility: HOSPITAL | Age: 80
DRG: 854 | End: 2020-09-26
Payer: MEDICARE

## 2020-09-26 ENCOUNTER — HOSPITAL ENCOUNTER (INPATIENT)
Facility: HOSPITAL | Age: 80
LOS: 5 days | Discharge: HOME HEALTH | DRG: 854 | End: 2020-10-01
Attending: EMERGENCY MEDICINE | Admitting: INTERNAL MEDICINE
Payer: MEDICARE

## 2020-09-26 DIAGNOSIS — R65.20 SEVERE SEPSIS (HCC): ICD-10-CM

## 2020-09-26 DIAGNOSIS — A41.9 SEVERE SEPSIS (HCC): ICD-10-CM

## 2020-09-26 DIAGNOSIS — N13.30 HYDRONEPHROSIS OF LEFT KIDNEY: ICD-10-CM

## 2020-09-26 DIAGNOSIS — I50.22 CHRONIC SYSTOLIC HEART FAILURE (HCC): Chronic | ICD-10-CM

## 2020-09-26 DIAGNOSIS — A41.9 SEPSIS (HCC): Primary | ICD-10-CM

## 2020-09-26 DIAGNOSIS — R77.8 ELEVATED TROPONIN: ICD-10-CM

## 2020-09-26 DIAGNOSIS — N13.2 HYDRONEPHROSIS WITH URINARY OBSTRUCTION DUE TO URETERAL CALCULUS: ICD-10-CM

## 2020-09-26 DIAGNOSIS — N17.9 AKI (ACUTE KIDNEY INJURY) (HCC): ICD-10-CM

## 2020-09-26 LAB
ANION GAP SERPL CALCULATED.3IONS-SCNC: 11 MMOL/L (ref 4–13)
BACTERIA UR QL AUTO: ABNORMAL /HPF
BASOPHILS # BLD AUTO: 0.01 THOUSANDS/ΜL (ref 0–0.1)
BASOPHILS NFR BLD AUTO: 0 % (ref 0–1)
BILIRUB UR QL STRIP: ABNORMAL
BUN SERPL-MCNC: 37 MG/DL (ref 5–25)
CALCIUM SERPL-MCNC: 9.6 MG/DL (ref 8.3–10.1)
CHLORIDE SERPL-SCNC: 97 MMOL/L (ref 100–108)
CLARITY UR: ABNORMAL
CO2 SERPL-SCNC: 24 MMOL/L (ref 21–32)
COLOR UR: YELLOW
CREAT SERPL-MCNC: 2.52 MG/DL (ref 0.6–1.3)
EOSINOPHIL # BLD AUTO: 0 THOUSAND/ΜL (ref 0–0.61)
EOSINOPHIL NFR BLD AUTO: 0 % (ref 0–6)
ERYTHROCYTE [DISTWIDTH] IN BLOOD BY AUTOMATED COUNT: 12.9 % (ref 11.6–15.1)
GFR SERPL CREATININE-BSD FRML MDRD: 23 ML/MIN/1.73SQ M
GLUCOSE SERPL-MCNC: 151 MG/DL (ref 65–140)
GLUCOSE UR STRIP-MCNC: NEGATIVE MG/DL
HCT VFR BLD AUTO: 37.8 % (ref 36.5–49.3)
HGB BLD-MCNC: 12.5 G/DL (ref 12–17)
HGB UR QL STRIP.AUTO: ABNORMAL
IMM GRANULOCYTES # BLD AUTO: 0.1 THOUSAND/UL (ref 0–0.2)
IMM GRANULOCYTES NFR BLD AUTO: 1 % (ref 0–2)
KETONES UR STRIP-MCNC: ABNORMAL MG/DL
LACTATE SERPL-SCNC: 2.5 MMOL/L (ref 0.5–2)
LEUKOCYTE ESTERASE UR QL STRIP: ABNORMAL
LYMPHOCYTES # BLD AUTO: 0.55 THOUSANDS/ΜL (ref 0.6–4.47)
LYMPHOCYTES NFR BLD AUTO: 4 % (ref 14–44)
MCH RBC QN AUTO: 32.5 PG (ref 26.8–34.3)
MCHC RBC AUTO-ENTMCNC: 33.1 G/DL (ref 31.4–37.4)
MCV RBC AUTO: 98 FL (ref 82–98)
MONOCYTES # BLD AUTO: 1.17 THOUSAND/ΜL (ref 0.17–1.22)
MONOCYTES NFR BLD AUTO: 9 % (ref 4–12)
NEUTROPHILS # BLD AUTO: 11.09 THOUSANDS/ΜL (ref 1.85–7.62)
NEUTS SEG NFR BLD AUTO: 86 % (ref 43–75)
NITRITE UR QL STRIP: NEGATIVE
NON-SQ EPI CELLS URNS QL MICRO: ABNORMAL /HPF
NRBC BLD AUTO-RTO: 0 /100 WBCS
PH UR STRIP.AUTO: 5.5 [PH]
PLATELET # BLD AUTO: 155 THOUSANDS/UL (ref 149–390)
PMV BLD AUTO: 10.3 FL (ref 8.9–12.7)
POTASSIUM SERPL-SCNC: 4.6 MMOL/L (ref 3.5–5.3)
PROT UR STRIP-MCNC: ABNORMAL MG/DL
RBC # BLD AUTO: 3.85 MILLION/UL (ref 3.88–5.62)
RBC #/AREA URNS AUTO: ABNORMAL /HPF
SARS-COV-2 RNA RESP QL NAA+PROBE: NEGATIVE
SODIUM SERPL-SCNC: 132 MMOL/L (ref 136–145)
SP GR UR STRIP.AUTO: 1.02 (ref 1–1.03)
TROPONIN I SERPL-MCNC: 0.28 NG/ML
UROBILINOGEN UR QL STRIP.AUTO: 1 E.U./DL
WBC # BLD AUTO: 12.92 THOUSAND/UL (ref 4.31–10.16)
WBC #/AREA URNS AUTO: ABNORMAL /HPF

## 2020-09-26 PROCEDURE — 87635 SARS-COV-2 COVID-19 AMP PRB: CPT | Performed by: PHYSICIAN ASSISTANT

## 2020-09-26 PROCEDURE — 87086 URINE CULTURE/COLONY COUNT: CPT | Performed by: PHYSICIAN ASSISTANT

## 2020-09-26 PROCEDURE — 71045 X-RAY EXAM CHEST 1 VIEW: CPT

## 2020-09-26 PROCEDURE — 84484 ASSAY OF TROPONIN QUANT: CPT | Performed by: PHYSICIAN ASSISTANT

## 2020-09-26 PROCEDURE — 87147 CULTURE TYPE IMMUNOLOGIC: CPT | Performed by: PHYSICIAN ASSISTANT

## 2020-09-26 PROCEDURE — 87040 BLOOD CULTURE FOR BACTERIA: CPT | Performed by: PHYSICIAN ASSISTANT

## 2020-09-26 PROCEDURE — 85025 COMPLETE CBC W/AUTO DIFF WBC: CPT | Performed by: PHYSICIAN ASSISTANT

## 2020-09-26 PROCEDURE — 81001 URINALYSIS AUTO W/SCOPE: CPT | Performed by: PHYSICIAN ASSISTANT

## 2020-09-26 PROCEDURE — 87186 SC STD MICRODIL/AGAR DIL: CPT | Performed by: PHYSICIAN ASSISTANT

## 2020-09-26 PROCEDURE — 96360 HYDRATION IV INFUSION INIT: CPT

## 2020-09-26 PROCEDURE — 93005 ELECTROCARDIOGRAM TRACING: CPT

## 2020-09-26 PROCEDURE — 36415 COLL VENOUS BLD VENIPUNCTURE: CPT | Performed by: PHYSICIAN ASSISTANT

## 2020-09-26 PROCEDURE — 1123F ACP DISCUSS/DSCN MKR DOCD: CPT | Performed by: PHYSICIAN ASSISTANT

## 2020-09-26 PROCEDURE — 80048 BASIC METABOLIC PNL TOTAL CA: CPT | Performed by: PHYSICIAN ASSISTANT

## 2020-09-26 PROCEDURE — 83605 ASSAY OF LACTIC ACID: CPT | Performed by: PHYSICIAN ASSISTANT

## 2020-09-26 PROCEDURE — 99285 EMERGENCY DEPT VISIT HI MDM: CPT

## 2020-09-26 PROCEDURE — 87077 CULTURE AEROBIC IDENTIFY: CPT | Performed by: PHYSICIAN ASSISTANT

## 2020-09-26 PROCEDURE — 99285 EMERGENCY DEPT VISIT HI MDM: CPT | Performed by: PHYSICIAN ASSISTANT

## 2020-09-26 RX ORDER — ACETAMINOPHEN 325 MG/1
650 TABLET ORAL ONCE
Status: COMPLETED | OUTPATIENT
Start: 2020-09-26 | End: 2020-09-26

## 2020-09-26 RX ORDER — GABAPENTIN 100 MG/1
200 CAPSULE ORAL ONCE
Status: COMPLETED | OUTPATIENT
Start: 2020-09-26 | End: 2020-09-26

## 2020-09-26 RX ORDER — LANOLIN ALCOHOL/MO/W.PET/CERES
3 CREAM (GRAM) TOPICAL
COMMUNITY
End: 2022-01-01 | Stop reason: ALTCHOICE

## 2020-09-26 RX ORDER — LEVOFLOXACIN 5 MG/ML
750 INJECTION, SOLUTION INTRAVENOUS ONCE
Status: COMPLETED | OUTPATIENT
Start: 2020-09-26 | End: 2020-09-27

## 2020-09-26 RX ADMIN — GABAPENTIN 200 MG: 100 CAPSULE ORAL at 23:02

## 2020-09-26 RX ADMIN — ACETAMINOPHEN 650 MG: 325 TABLET, FILM COATED ORAL at 22:59

## 2020-09-26 RX ADMIN — SODIUM CHLORIDE 500 ML: 0.9 INJECTION, SOLUTION INTRAVENOUS at 23:04

## 2020-09-27 ENCOUNTER — APPOINTMENT (INPATIENT)
Dept: NON INVASIVE DIAGNOSTICS | Facility: HOSPITAL | Age: 80
DRG: 854 | End: 2020-09-27
Payer: MEDICARE

## 2020-09-27 PROBLEM — N17.9 AKI (ACUTE KIDNEY INJURY) (HCC): Status: ACTIVE | Noted: 2020-09-27

## 2020-09-27 PROBLEM — A41.9 SEVERE SEPSIS (HCC): Status: ACTIVE | Noted: 2020-09-27

## 2020-09-27 PROBLEM — N30.00 ACUTE CYSTITIS WITHOUT HEMATURIA: Status: ACTIVE | Noted: 2020-09-27

## 2020-09-27 PROBLEM — R65.20 SEVERE SEPSIS (HCC): Status: ACTIVE | Noted: 2020-09-27

## 2020-09-27 PROBLEM — I21.4 NSTEMI (NON-ST ELEVATED MYOCARDIAL INFARCTION) (HCC): Status: ACTIVE | Noted: 2020-09-27

## 2020-09-27 LAB
ANION GAP SERPL CALCULATED.3IONS-SCNC: 12 MMOL/L (ref 4–13)
BASOPHILS # BLD AUTO: 0.02 THOUSANDS/ΜL (ref 0–0.1)
BASOPHILS NFR BLD AUTO: 0 % (ref 0–1)
BUN SERPL-MCNC: 41 MG/DL (ref 5–25)
CALCIUM SERPL-MCNC: 9.2 MG/DL (ref 8.3–10.1)
CHLORIDE SERPL-SCNC: 98 MMOL/L (ref 100–108)
CO2 SERPL-SCNC: 22 MMOL/L (ref 21–32)
CREAT SERPL-MCNC: 2.66 MG/DL (ref 0.6–1.3)
EOSINOPHIL # BLD AUTO: 0 THOUSAND/ΜL (ref 0–0.61)
EOSINOPHIL NFR BLD AUTO: 0 % (ref 0–6)
ERYTHROCYTE [DISTWIDTH] IN BLOOD BY AUTOMATED COUNT: 12.8 % (ref 11.6–15.1)
GFR SERPL CREATININE-BSD FRML MDRD: 22 ML/MIN/1.73SQ M
GLUCOSE SERPL-MCNC: 112 MG/DL (ref 65–140)
HCT VFR BLD AUTO: 39.2 % (ref 36.5–49.3)
HGB BLD-MCNC: 12.7 G/DL (ref 12–17)
IMM GRANULOCYTES # BLD AUTO: 0.13 THOUSAND/UL (ref 0–0.2)
IMM GRANULOCYTES NFR BLD AUTO: 1 % (ref 0–2)
LACTATE SERPL-SCNC: 1.3 MMOL/L (ref 0.5–2)
LYMPHOCYTES # BLD AUTO: 1.01 THOUSANDS/ΜL (ref 0.6–4.47)
LYMPHOCYTES NFR BLD AUTO: 7 % (ref 14–44)
MCH RBC QN AUTO: 33.2 PG (ref 26.8–34.3)
MCHC RBC AUTO-ENTMCNC: 32.4 G/DL (ref 31.4–37.4)
MCV RBC AUTO: 103 FL (ref 82–98)
MONOCYTES # BLD AUTO: 1.21 THOUSAND/ΜL (ref 0.17–1.22)
MONOCYTES NFR BLD AUTO: 8 % (ref 4–12)
NEUTROPHILS # BLD AUTO: 13.11 THOUSANDS/ΜL (ref 1.85–7.62)
NEUTS SEG NFR BLD AUTO: 84 % (ref 43–75)
NRBC BLD AUTO-RTO: 0 /100 WBCS
PLATELET # BLD AUTO: 161 THOUSANDS/UL (ref 149–390)
PMV BLD AUTO: 10.4 FL (ref 8.9–12.7)
POTASSIUM SERPL-SCNC: 4.8 MMOL/L (ref 3.5–5.3)
RBC # BLD AUTO: 3.82 MILLION/UL (ref 3.88–5.62)
SODIUM SERPL-SCNC: 132 MMOL/L (ref 136–145)
TROPONIN I SERPL-MCNC: 0.4 NG/ML
TROPONIN I SERPL-MCNC: 0.41 NG/ML
WBC # BLD AUTO: 15.48 THOUSAND/UL (ref 4.31–10.16)

## 2020-09-27 PROCEDURE — 99222 1ST HOSP IP/OBS MODERATE 55: CPT | Performed by: INTERNAL MEDICINE

## 2020-09-27 PROCEDURE — 36415 COLL VENOUS BLD VENIPUNCTURE: CPT | Performed by: PHYSICIAN ASSISTANT

## 2020-09-27 PROCEDURE — 84484 ASSAY OF TROPONIN QUANT: CPT | Performed by: PHYSICIAN ASSISTANT

## 2020-09-27 PROCEDURE — 99223 1ST HOSP IP/OBS HIGH 75: CPT | Performed by: INTERNAL MEDICINE

## 2020-09-27 PROCEDURE — 93306 TTE W/DOPPLER COMPLETE: CPT | Performed by: INTERNAL MEDICINE

## 2020-09-27 PROCEDURE — 80048 BASIC METABOLIC PNL TOTAL CA: CPT | Performed by: PHYSICIAN ASSISTANT

## 2020-09-27 PROCEDURE — 93005 ELECTROCARDIOGRAM TRACING: CPT

## 2020-09-27 PROCEDURE — 93306 TTE W/DOPPLER COMPLETE: CPT

## 2020-09-27 PROCEDURE — 83605 ASSAY OF LACTIC ACID: CPT | Performed by: PHYSICIAN ASSISTANT

## 2020-09-27 PROCEDURE — 85025 COMPLETE CBC W/AUTO DIFF WBC: CPT | Performed by: PHYSICIAN ASSISTANT

## 2020-09-27 RX ORDER — ONDANSETRON 2 MG/ML
4 INJECTION INTRAMUSCULAR; INTRAVENOUS EVERY 6 HOURS PRN
Status: DISCONTINUED | OUTPATIENT
Start: 2020-09-27 | End: 2020-10-01 | Stop reason: HOSPADM

## 2020-09-27 RX ORDER — ACETAMINOPHEN 325 MG/1
975 TABLET ORAL EVERY 6 HOURS PRN
Status: DISCONTINUED | OUTPATIENT
Start: 2020-09-27 | End: 2020-10-01 | Stop reason: HOSPADM

## 2020-09-27 RX ORDER — MAGNESIUM HYDROXIDE/ALUMINUM HYDROXICE/SIMETHICONE 120; 1200; 1200 MG/30ML; MG/30ML; MG/30ML
30 SUSPENSION ORAL EVERY 6 HOURS PRN
Status: DISCONTINUED | OUTPATIENT
Start: 2020-09-27 | End: 2020-10-01 | Stop reason: HOSPADM

## 2020-09-27 RX ORDER — POLYETHYLENE GLYCOL 3350 17 G/17G
17 POWDER, FOR SOLUTION ORAL DAILY
Status: DISCONTINUED | OUTPATIENT
Start: 2020-09-27 | End: 2020-10-01 | Stop reason: HOSPADM

## 2020-09-27 RX ORDER — SODIUM CHLORIDE 9 MG/ML
50 INJECTION, SOLUTION INTRAVENOUS CONTINUOUS
Status: DISCONTINUED | OUTPATIENT
Start: 2020-09-27 | End: 2020-09-28

## 2020-09-27 RX ORDER — LANOLIN ALCOHOL/MO/W.PET/CERES
3 CREAM (GRAM) TOPICAL
Status: DISCONTINUED | OUTPATIENT
Start: 2020-09-27 | End: 2020-10-01 | Stop reason: HOSPADM

## 2020-09-27 RX ORDER — SODIUM CHLORIDE 9 MG/ML
100 INJECTION, SOLUTION INTRAVENOUS CONTINUOUS
Status: DISCONTINUED | OUTPATIENT
Start: 2020-09-27 | End: 2020-09-27

## 2020-09-27 RX ORDER — LEVOFLOXACIN 5 MG/ML
250 INJECTION, SOLUTION INTRAVENOUS EVERY 24 HOURS
Status: DISCONTINUED | OUTPATIENT
Start: 2020-09-28 | End: 2020-09-30

## 2020-09-27 RX ORDER — GABAPENTIN 100 MG/1
200 CAPSULE ORAL
Status: DISCONTINUED | OUTPATIENT
Start: 2020-09-27 | End: 2020-10-01 | Stop reason: HOSPADM

## 2020-09-27 RX ORDER — LIDOCAINE 50 MG/G
1 PATCH TOPICAL DAILY
Status: DISCONTINUED | OUTPATIENT
Start: 2020-09-27 | End: 2020-10-01 | Stop reason: HOSPADM

## 2020-09-27 RX ADMIN — SODIUM CHLORIDE 100 ML/HR: 0.9 INJECTION, SOLUTION INTRAVENOUS at 02:24

## 2020-09-27 RX ADMIN — APIXABAN 5 MG: 5 TABLET, FILM COATED ORAL at 17:26

## 2020-09-27 RX ADMIN — LEVOFLOXACIN 750 MG: 5 INJECTION, SOLUTION INTRAVENOUS at 00:15

## 2020-09-27 RX ADMIN — SODIUM CHLORIDE 50 ML/HR: 0.9 INJECTION, SOLUTION INTRAVENOUS at 15:00

## 2020-09-27 RX ADMIN — POLYETHYLENE GLYCOL 3350 17 G: 17 POWDER, FOR SOLUTION ORAL at 08:30

## 2020-09-27 RX ADMIN — GABAPENTIN 200 MG: 100 CAPSULE ORAL at 21:27

## 2020-09-27 RX ADMIN — MELATONIN 3 MG: 3 TAB ORAL at 21:27

## 2020-09-27 RX ADMIN — APIXABAN 5 MG: 5 TABLET, FILM COATED ORAL at 08:30

## 2020-09-27 RX ADMIN — ACETAMINOPHEN 975 MG: 325 TABLET, FILM COATED ORAL at 17:26

## 2020-09-27 RX ADMIN — ACETAMINOPHEN 975 MG: 325 TABLET, FILM COATED ORAL at 08:38

## 2020-09-27 RX ADMIN — LIDOCAINE 5% 1 PATCH: 700 PATCH TOPICAL at 06:02

## 2020-09-28 ENCOUNTER — APPOINTMENT (INPATIENT)
Dept: ULTRASOUND IMAGING | Facility: HOSPITAL | Age: 80
DRG: 854 | End: 2020-09-28
Payer: MEDICARE

## 2020-09-28 ENCOUNTER — APPOINTMENT (INPATIENT)
Dept: CT IMAGING | Facility: HOSPITAL | Age: 80
DRG: 854 | End: 2020-09-28
Payer: MEDICARE

## 2020-09-28 PROBLEM — N13.30 HYDRONEPHROSIS OF LEFT KIDNEY: Status: ACTIVE | Noted: 2020-09-28

## 2020-09-28 PROBLEM — E87.1 HYPONATREMIA: Status: ACTIVE | Noted: 2020-09-28

## 2020-09-28 LAB
ANION GAP SERPL CALCULATED.3IONS-SCNC: 12 MMOL/L (ref 4–13)
ATRIAL RATE: 60 BPM
ATRIAL RATE: 63 BPM
ATRIAL RATE: 85 BPM
BASOPHILS # BLD AUTO: 0.01 THOUSANDS/ΜL (ref 0–0.1)
BASOPHILS NFR BLD AUTO: 0 % (ref 0–1)
BUN SERPL-MCNC: 49 MG/DL (ref 5–25)
CALCIUM SERPL-MCNC: 9.3 MG/DL (ref 8.3–10.1)
CHLORIDE SERPL-SCNC: 94 MMOL/L (ref 100–108)
CO2 SERPL-SCNC: 22 MMOL/L (ref 21–32)
CREAT SERPL-MCNC: 2.83 MG/DL (ref 0.6–1.3)
CREAT UR-MCNC: 171 MG/DL
EOSINOPHIL # BLD AUTO: 0.01 THOUSAND/ΜL (ref 0–0.61)
EOSINOPHIL NFR BLD AUTO: 0 % (ref 0–6)
ERYTHROCYTE [DISTWIDTH] IN BLOOD BY AUTOMATED COUNT: 12.6 % (ref 11.6–15.1)
GFR SERPL CREATININE-BSD FRML MDRD: 20 ML/MIN/1.73SQ M
GLUCOSE SERPL-MCNC: 104 MG/DL (ref 65–140)
HCT VFR BLD AUTO: 36.3 % (ref 36.5–49.3)
HGB BLD-MCNC: 12.3 G/DL (ref 12–17)
IMM GRANULOCYTES # BLD AUTO: 0.08 THOUSAND/UL (ref 0–0.2)
IMM GRANULOCYTES NFR BLD AUTO: 1 % (ref 0–2)
LYMPHOCYTES # BLD AUTO: 0.59 THOUSANDS/ΜL (ref 0.6–4.47)
LYMPHOCYTES NFR BLD AUTO: 5 % (ref 14–44)
MCH RBC QN AUTO: 33.3 PG (ref 26.8–34.3)
MCHC RBC AUTO-ENTMCNC: 33.9 G/DL (ref 31.4–37.4)
MCV RBC AUTO: 98 FL (ref 82–98)
MICROALBUMIN UR-MCNC: 54.7 MG/L (ref 0–20)
MICROALBUMIN/CREAT 24H UR: 32 MG/G CREATININE (ref 0–30)
MONOCYTES # BLD AUTO: 1.07 THOUSAND/ΜL (ref 0.17–1.22)
MONOCYTES NFR BLD AUTO: 9 % (ref 4–12)
NEUTROPHILS # BLD AUTO: 10.1 THOUSANDS/ΜL (ref 1.85–7.62)
NEUTS SEG NFR BLD AUTO: 85 % (ref 43–75)
NRBC BLD AUTO-RTO: 0 /100 WBCS
OSMOLALITY UR: 387 MMOL/KG
PLATELET # BLD AUTO: 145 THOUSANDS/UL (ref 149–390)
PMV BLD AUTO: 10.1 FL (ref 8.9–12.7)
POTASSIUM SERPL-SCNC: 4.5 MMOL/L (ref 3.5–5.3)
QRS AXIS: 106 DEGREES
QRS AXIS: 52 DEGREES
QRS AXIS: 93 DEGREES
QRSD INTERVAL: 152 MS
QRSD INTERVAL: 156 MS
QRSD INTERVAL: 160 MS
QT INTERVAL: 428 MS
QT INTERVAL: 430 MS
QT INTERVAL: 452 MS
QTC INTERVAL: 462 MS
QTC INTERVAL: 467 MS
QTC INTERVAL: 494 MS
RBC # BLD AUTO: 3.69 MILLION/UL (ref 3.88–5.62)
SODIUM 24H UR-SCNC: 5 MOL/L
SODIUM SERPL-SCNC: 128 MMOL/L (ref 136–145)
T WAVE AXIS: -38 DEGREES
T WAVE AXIS: -84 DEGREES
T WAVE AXIS: 222 DEGREES
UUN 24H UR-MCNC: 673 MG/DL
VENTRICULAR RATE: 70 BPM
VENTRICULAR RATE: 71 BPM
VENTRICULAR RATE: 72 BPM
WBC # BLD AUTO: 11.86 THOUSAND/UL (ref 4.31–10.16)

## 2020-09-28 PROCEDURE — 99232 SBSQ HOSP IP/OBS MODERATE 35: CPT | Performed by: INTERNAL MEDICINE

## 2020-09-28 PROCEDURE — 93010 ELECTROCARDIOGRAM REPORT: CPT | Performed by: INTERNAL MEDICINE

## 2020-09-28 PROCEDURE — 83935 ASSAY OF URINE OSMOLALITY: CPT | Performed by: INTERNAL MEDICINE

## 2020-09-28 PROCEDURE — 80048 BASIC METABOLIC PNL TOTAL CA: CPT | Performed by: INTERNAL MEDICINE

## 2020-09-28 PROCEDURE — 76770 US EXAM ABDO BACK WALL COMP: CPT

## 2020-09-28 PROCEDURE — 99223 1ST HOSP IP/OBS HIGH 75: CPT | Performed by: INTERNAL MEDICINE

## 2020-09-28 PROCEDURE — 82043 UR ALBUMIN QUANTITATIVE: CPT | Performed by: INTERNAL MEDICINE

## 2020-09-28 PROCEDURE — 82570 ASSAY OF URINE CREATININE: CPT | Performed by: INTERNAL MEDICINE

## 2020-09-28 PROCEDURE — G1004 CDSM NDSC: HCPCS

## 2020-09-28 PROCEDURE — 74176 CT ABD & PELVIS W/O CONTRAST: CPT

## 2020-09-28 PROCEDURE — 85025 COMPLETE CBC W/AUTO DIFF WBC: CPT | Performed by: INTERNAL MEDICINE

## 2020-09-28 PROCEDURE — 84300 ASSAY OF URINE SODIUM: CPT | Performed by: INTERNAL MEDICINE

## 2020-09-28 PROCEDURE — 84540 ASSAY OF URINE/UREA-N: CPT | Performed by: INTERNAL MEDICINE

## 2020-09-28 RX ORDER — ALBUMIN (HUMAN) 12.5 G/50ML
25 SOLUTION INTRAVENOUS EVERY 6 HOURS
Status: COMPLETED | OUTPATIENT
Start: 2020-09-28 | End: 2020-09-28

## 2020-09-28 RX ADMIN — ALBUMIN (HUMAN) 25 G: 0.25 INJECTION, SOLUTION INTRAVENOUS at 17:37

## 2020-09-28 RX ADMIN — GABAPENTIN 200 MG: 100 CAPSULE ORAL at 22:59

## 2020-09-28 RX ADMIN — APIXABAN 2.5 MG: 2.5 TABLET, FILM COATED ORAL at 17:37

## 2020-09-28 RX ADMIN — LEVOFLOXACIN 250 MG: 5 INJECTION, SOLUTION INTRAVENOUS at 02:19

## 2020-09-28 RX ADMIN — ACETAMINOPHEN 975 MG: 325 TABLET, FILM COATED ORAL at 08:24

## 2020-09-28 RX ADMIN — ALBUMIN (HUMAN) 25 G: 0.25 INJECTION, SOLUTION INTRAVENOUS at 23:07

## 2020-09-28 RX ADMIN — ALBUMIN (HUMAN) 25 G: 0.25 INJECTION, SOLUTION INTRAVENOUS at 11:18

## 2020-09-28 RX ADMIN — POLYETHYLENE GLYCOL 3350 17 G: 17 POWDER, FOR SOLUTION ORAL at 08:19

## 2020-09-28 RX ADMIN — ACETAMINOPHEN 975 MG: 325 TABLET, FILM COATED ORAL at 19:27

## 2020-09-28 RX ADMIN — ONDANSETRON 4 MG: 2 INJECTION INTRAMUSCULAR; INTRAVENOUS at 18:57

## 2020-09-28 RX ADMIN — APIXABAN 5 MG: 5 TABLET, FILM COATED ORAL at 08:19

## 2020-09-28 RX ADMIN — MELATONIN 3 MG: 3 TAB ORAL at 22:59

## 2020-09-29 ENCOUNTER — APPOINTMENT (INPATIENT)
Dept: RADIOLOGY | Facility: HOSPITAL | Age: 80
DRG: 854 | End: 2020-09-29
Payer: MEDICARE

## 2020-09-29 ENCOUNTER — ANESTHESIA (INPATIENT)
Dept: PERIOP | Facility: HOSPITAL | Age: 80
DRG: 854 | End: 2020-09-29
Payer: MEDICARE

## 2020-09-29 ENCOUNTER — ANESTHESIA EVENT (INPATIENT)
Dept: PERIOP | Facility: HOSPITAL | Age: 80
DRG: 854 | End: 2020-09-29
Payer: MEDICARE

## 2020-09-29 ENCOUNTER — TELEPHONE (OUTPATIENT)
Dept: CARDIOLOGY CLINIC | Facility: CLINIC | Age: 80
End: 2020-09-29

## 2020-09-29 VITALS — HEART RATE: 70 BPM

## 2020-09-29 PROBLEM — I50.82 BIVENTRICULAR CONGESTIVE HEART FAILURE (HCC): Status: ACTIVE | Noted: 2020-09-29

## 2020-09-29 LAB
ANION GAP SERPL CALCULATED.3IONS-SCNC: 10 MMOL/L (ref 4–13)
BACTERIA UR CULT: ABNORMAL
BASOPHILS # BLD AUTO: 0.01 THOUSANDS/ΜL (ref 0–0.1)
BASOPHILS NFR BLD AUTO: 0 % (ref 0–1)
BUN SERPL-MCNC: 54 MG/DL (ref 5–25)
CALCIUM SERPL-MCNC: 9.3 MG/DL (ref 8.3–10.1)
CHLORIDE SERPL-SCNC: 94 MMOL/L (ref 100–108)
CK SERPL-CCNC: 67 U/L (ref 39–308)
CO2 SERPL-SCNC: 24 MMOL/L (ref 21–32)
CREAT SERPL-MCNC: 2.93 MG/DL (ref 0.6–1.3)
EOSINOPHIL # BLD AUTO: 0.06 THOUSAND/ΜL (ref 0–0.61)
EOSINOPHIL NFR BLD AUTO: 1 % (ref 0–6)
ERYTHROCYTE [DISTWIDTH] IN BLOOD BY AUTOMATED COUNT: 12.6 % (ref 11.6–15.1)
GFR SERPL CREATININE-BSD FRML MDRD: 19 ML/MIN/1.73SQ M
GLUCOSE SERPL-MCNC: 112 MG/DL (ref 65–140)
HCT VFR BLD AUTO: 33 % (ref 36.5–49.3)
HGB BLD-MCNC: 11.1 G/DL (ref 12–17)
IMM GRANULOCYTES # BLD AUTO: 0.09 THOUSAND/UL (ref 0–0.2)
IMM GRANULOCYTES NFR BLD AUTO: 1 % (ref 0–2)
LYMPHOCYTES # BLD AUTO: 0.55 THOUSANDS/ΜL (ref 0.6–4.47)
LYMPHOCYTES NFR BLD AUTO: 6 % (ref 14–44)
MCH RBC QN AUTO: 33 PG (ref 26.8–34.3)
MCHC RBC AUTO-ENTMCNC: 33.6 G/DL (ref 31.4–37.4)
MCV RBC AUTO: 98 FL (ref 82–98)
MONOCYTES # BLD AUTO: 0.72 THOUSAND/ΜL (ref 0.17–1.22)
MONOCYTES NFR BLD AUTO: 8 % (ref 4–12)
NEUTROPHILS # BLD AUTO: 7.53 THOUSANDS/ΜL (ref 1.85–7.62)
NEUTS SEG NFR BLD AUTO: 84 % (ref 43–75)
NRBC BLD AUTO-RTO: 0 /100 WBCS
NT-PROBNP SERPL-MCNC: ABNORMAL PG/ML
PHOSPHATE SERPL-MCNC: 3.9 MG/DL (ref 2.3–4.1)
PLATELET # BLD AUTO: 137 THOUSANDS/UL (ref 149–390)
PMV BLD AUTO: 10.4 FL (ref 8.9–12.7)
POTASSIUM SERPL-SCNC: 4.4 MMOL/L (ref 3.5–5.3)
RBC # BLD AUTO: 3.36 MILLION/UL (ref 3.88–5.62)
SODIUM SERPL-SCNC: 128 MMOL/L (ref 136–145)
WBC # BLD AUTO: 8.96 THOUSAND/UL (ref 4.31–10.16)

## 2020-09-29 PROCEDURE — 0T778DZ DILATION OF LEFT URETER WITH INTRALUMINAL DEVICE, VIA NATURAL OR ARTIFICIAL OPENING ENDOSCOPIC: ICD-10-PCS | Performed by: UROLOGY

## 2020-09-29 PROCEDURE — C1769 GUIDE WIRE: HCPCS | Performed by: UROLOGY

## 2020-09-29 PROCEDURE — 83880 ASSAY OF NATRIURETIC PEPTIDE: CPT | Performed by: INTERNAL MEDICINE

## 2020-09-29 PROCEDURE — 99232 SBSQ HOSP IP/OBS MODERATE 35: CPT | Performed by: INTERNAL MEDICINE

## 2020-09-29 PROCEDURE — 99233 SBSQ HOSP IP/OBS HIGH 50: CPT | Performed by: INTERNAL MEDICINE

## 2020-09-29 PROCEDURE — 80048 BASIC METABOLIC PNL TOTAL CA: CPT | Performed by: INTERNAL MEDICINE

## 2020-09-29 PROCEDURE — 84100 ASSAY OF PHOSPHORUS: CPT | Performed by: INTERNAL MEDICINE

## 2020-09-29 PROCEDURE — 99232 SBSQ HOSP IP/OBS MODERATE 35: CPT | Performed by: GENERAL PRACTICE

## 2020-09-29 PROCEDURE — 74018 RADEX ABDOMEN 1 VIEW: CPT

## 2020-09-29 PROCEDURE — 52332 CYSTOSCOPY AND TREATMENT: CPT | Performed by: UROLOGY

## 2020-09-29 PROCEDURE — 97163 PT EVAL HIGH COMPLEX 45 MIN: CPT

## 2020-09-29 PROCEDURE — 85025 COMPLETE CBC W/AUTO DIFF WBC: CPT | Performed by: INTERNAL MEDICINE

## 2020-09-29 PROCEDURE — C2617 STENT, NON-COR, TEM W/O DEL: HCPCS | Performed by: UROLOGY

## 2020-09-29 PROCEDURE — 82550 ASSAY OF CK (CPK): CPT | Performed by: INTERNAL MEDICINE

## 2020-09-29 PROCEDURE — 99223 1ST HOSP IP/OBS HIGH 75: CPT | Performed by: UROLOGY

## 2020-09-29 DEVICE — INLAY OPTIMA URETERAL STENT W/O GUIDEWIRE
Type: IMPLANTABLE DEVICE | Site: URETER | Status: FUNCTIONAL
Brand: BARD® INLAY OPTIMA® URETERAL STENT

## 2020-09-29 RX ORDER — PROPOFOL 10 MG/ML
INJECTION, EMULSION INTRAVENOUS AS NEEDED
Status: DISCONTINUED | OUTPATIENT
Start: 2020-09-29 | End: 2020-09-29

## 2020-09-29 RX ORDER — ONDANSETRON 2 MG/ML
4 INJECTION INTRAMUSCULAR; INTRAVENOUS ONCE AS NEEDED
Status: DISCONTINUED | OUTPATIENT
Start: 2020-09-29 | End: 2020-09-29 | Stop reason: HOSPADM

## 2020-09-29 RX ORDER — FENTANYL CITRATE 50 UG/ML
INJECTION, SOLUTION INTRAMUSCULAR; INTRAVENOUS AS NEEDED
Status: DISCONTINUED | OUTPATIENT
Start: 2020-09-29 | End: 2020-09-29

## 2020-09-29 RX ORDER — MAGNESIUM HYDROXIDE 1200 MG/15ML
LIQUID ORAL AS NEEDED
Status: DISCONTINUED | OUTPATIENT
Start: 2020-09-29 | End: 2020-09-29 | Stop reason: HOSPADM

## 2020-09-29 RX ORDER — LIDOCAINE HYDROCHLORIDE 20 MG/ML
INJECTION, SOLUTION EPIDURAL; INFILTRATION; INTRACAUDAL; PERINEURAL AS NEEDED
Status: DISCONTINUED | OUTPATIENT
Start: 2020-09-29 | End: 2020-09-29

## 2020-09-29 RX ORDER — SODIUM CHLORIDE, SODIUM LACTATE, POTASSIUM CHLORIDE, CALCIUM CHLORIDE 600; 310; 30; 20 MG/100ML; MG/100ML; MG/100ML; MG/100ML
INJECTION, SOLUTION INTRAVENOUS CONTINUOUS PRN
Status: DISCONTINUED | OUTPATIENT
Start: 2020-09-29 | End: 2020-09-29

## 2020-09-29 RX ORDER — ONDANSETRON 2 MG/ML
INJECTION INTRAMUSCULAR; INTRAVENOUS AS NEEDED
Status: DISCONTINUED | OUTPATIENT
Start: 2020-09-29 | End: 2020-09-29

## 2020-09-29 RX ORDER — SODIUM CHLORIDE, SODIUM LACTATE, POTASSIUM CHLORIDE, CALCIUM CHLORIDE 600; 310; 30; 20 MG/100ML; MG/100ML; MG/100ML; MG/100ML
20 INJECTION, SOLUTION INTRAVENOUS CONTINUOUS
Status: DISCONTINUED | OUTPATIENT
Start: 2020-09-29 | End: 2020-10-01

## 2020-09-29 RX ORDER — FENTANYL CITRATE/PF 50 MCG/ML
50 SYRINGE (ML) INJECTION
Status: DISCONTINUED | OUTPATIENT
Start: 2020-09-29 | End: 2020-09-29 | Stop reason: HOSPADM

## 2020-09-29 RX ADMIN — PHENYLEPHRINE HYDROCHLORIDE 100 MCG: 10 INJECTION INTRAVENOUS at 13:30

## 2020-09-29 RX ADMIN — PHENYLEPHRINE HYDROCHLORIDE 100 MCG: 10 INJECTION INTRAVENOUS at 13:32

## 2020-09-29 RX ADMIN — ONDANSETRON 4 MG: 2 INJECTION INTRAMUSCULAR; INTRAVENOUS at 13:48

## 2020-09-29 RX ADMIN — PHENYLEPHRINE HYDROCHLORIDE 100 MCG: 10 INJECTION INTRAVENOUS at 13:48

## 2020-09-29 RX ADMIN — SODIUM CHLORIDE, SODIUM LACTATE, POTASSIUM CHLORIDE, AND CALCIUM CHLORIDE 20 ML/HR: .6; .31; .03; .02 INJECTION, SOLUTION INTRAVENOUS at 16:14

## 2020-09-29 RX ADMIN — PHENYLEPHRINE HYDROCHLORIDE 100 MCG: 10 INJECTION INTRAVENOUS at 13:37

## 2020-09-29 RX ADMIN — APIXABAN 2.5 MG: 2.5 TABLET, FILM COATED ORAL at 18:14

## 2020-09-29 RX ADMIN — PHENYLEPHRINE HYDROCHLORIDE 100 MCG: 10 INJECTION INTRAVENOUS at 13:43

## 2020-09-29 RX ADMIN — LIDOCAINE HYDROCHLORIDE 100 MG: 20 INJECTION, SOLUTION EPIDURAL; INFILTRATION; INTRACAUDAL; PERINEURAL at 13:29

## 2020-09-29 RX ADMIN — PHENYLEPHRINE HYDROCHLORIDE 100 MCG: 10 INJECTION INTRAVENOUS at 13:46

## 2020-09-29 RX ADMIN — PHENYLEPHRINE HYDROCHLORIDE 100 MCG: 10 INJECTION INTRAVENOUS at 13:39

## 2020-09-29 RX ADMIN — PHENYLEPHRINE HYDROCHLORIDE 100 MCG: 10 INJECTION INTRAVENOUS at 13:35

## 2020-09-29 RX ADMIN — MELATONIN 3 MG: 3 TAB ORAL at 21:24

## 2020-09-29 RX ADMIN — PROPOFOL 50 MG: 10 INJECTION, EMULSION INTRAVENOUS at 13:30

## 2020-09-29 RX ADMIN — LEVOFLOXACIN 250 MG: 5 INJECTION, SOLUTION INTRAVENOUS at 00:01

## 2020-09-29 RX ADMIN — SODIUM CHLORIDE, SODIUM LACTATE, POTASSIUM CHLORIDE, AND CALCIUM CHLORIDE: .6; .31; .03; .02 INJECTION, SOLUTION INTRAVENOUS at 10:00

## 2020-09-29 RX ADMIN — PROPOFOL 100 MG: 10 INJECTION, EMULSION INTRAVENOUS at 13:29

## 2020-09-29 RX ADMIN — ACETAMINOPHEN 975 MG: 325 TABLET, FILM COATED ORAL at 21:24

## 2020-09-29 RX ADMIN — FENTANYL CITRATE 50 MCG: 50 INJECTION INTRAMUSCULAR; INTRAVENOUS at 14:05

## 2020-09-29 RX ADMIN — PHENYLEPHRINE HYDROCHLORIDE 100 MCG: 10 INJECTION INTRAVENOUS at 13:50

## 2020-09-29 RX ADMIN — PHENYLEPHRINE HYDROCHLORIDE 100 MCG: 10 INJECTION INTRAVENOUS at 13:29

## 2020-09-29 RX ADMIN — FENTANYL CITRATE 50 MCG: 50 INJECTION, SOLUTION INTRAMUSCULAR; INTRAVENOUS at 13:29

## 2020-09-29 RX ADMIN — GABAPENTIN 200 MG: 100 CAPSULE ORAL at 21:24

## 2020-09-29 NOTE — ANESTHESIA POSTPROCEDURE EVALUATION
Post-Op Assessment Note    CV Status:  Stable  Pain Score: 0    Pain management: adequate     Mental Status:  Awake and alert   Hydration Status:  Stable   PONV Controlled:  Controlled   Airway Patency:  Patent      Post Op Vitals Reviewed: Yes      Staff: CRNA         No complications documented      /55 (09/29/20 1358)    Temp 97 6 °F (36 4 °C) (09/29/20 1358)    Pulse 72 (09/29/20 1358)   Resp 18 (09/29/20 1358)    SpO2 98 % (09/29/20 1358)

## 2020-09-29 NOTE — ANESTHESIA PREPROCEDURE EVALUATION
Procedure:  CYSTOSCOPY RETROGRADE PYELOGRAM WITH INSERTION STENT URETERAL (Left Ureter)    Relevant Problems   ANESTHESIA   (-) History of anesthesia complications      CARDIO   (+) Biventricular congestive heart failure (HCC)   (+) Chronic atrial fibrillation (HCC)   (+) Dilation of thoracic aorta (HCC)   (+) Essential hypertension   (+) Hyperlipidemia   (+) NSTEMI (non-ST elevated myocardial infarction) (HCC)      /RENAL   (+) YORDAN (acute kidney injury) (HCC)   (+) Hydronephrosis of left kidney      Other   (+) Acute cystitis without hematuria   (+) Cardiomyopathy (HCC)   (+) Chronic anticoagulation   (+) Chronic systolic heart failure (HCC)   (+) Hyponatremia   (+) Presence of automatic cardioverter/defibrillator (AICD)   (+) Severe sepsis ([address] )   (+) Spinal stenosis of lumbar region with neurogenic claudication      TTE 9/27/20:  LEFT VENTRICLE:  Systolic function was moderately to markedly reduced  LVEF around 35%  Wall thickness was moderately increased  There was severe assymetrical hypertrophy of the septum  Apical hypertrophyic cannot be ruled out completely  Features were consistent with a pseudonormal left ventricular filling pattern, with concomitant abnormal relaxation and increased filling pressure (grade 2 diastolic dysfunction)      RIGHT VENTRICLE:  The ventricle was mildly dilated  Systolic function was mildly reduced      LEFT ATRIUM:  The atrium was moderately dilated      RIGHT ATRIUM:  The atrium was moderately dilated      MITRAL VALVE:  There was moderate annular calcification  There was moderate regurgitation      AORTIC VALVE:  There was trace regurgitation      TRICUSPID VALVE:  There was mild regurgitation      PULMONIC VALVE:  There was trace regurgitation      AORTA:  The root exhibited mild dilatation   4 2 cm(pt known to have aortic root dilation on previous echo)    Physical Exam    Airway    Mallampati score: II  TM Distance: >3 FB  Neck ROM: full     Dental Cardiovascular      Pulmonary      Other Findings        Anesthesia Plan  ASA Score- 3 Emergent    Anesthesia Type- general with ASA Monitors  Additional Monitors:   Airway Plan: LMA  Plan Factors-Exercise tolerance (METS): <4 METS  Chart reviewed  EKG reviewed  Imaging results reviewed  Existing labs reviewed  Patient is not a current smoker  Patient did not smoke on day of surgery  Obstructive sleep apnea risk education given perioperatively  Induction- intravenous  Postoperative Plan- Plan for postoperative opioid use  Planned trial extubation    Informed Consent- Anesthetic plan and risks discussed with patient  I personally reviewed this patient with the CRNA  Discussed and agreed on the Anesthesia Plan with the CRNA  Yemi Jett Patient continued on Eliquis for chronic Afib  Levaquin q24h for urosepsis, last dose 9 29 20     Cr 2 8 up fron 0 92

## 2020-09-30 LAB
ANION GAP SERPL CALCULATED.3IONS-SCNC: 9 MMOL/L (ref 4–13)
BASOPHILS # BLD AUTO: 0.01 THOUSANDS/ΜL (ref 0–0.1)
BASOPHILS NFR BLD AUTO: 0 % (ref 0–1)
BUN SERPL-MCNC: 65 MG/DL (ref 5–25)
CALCIUM SERPL-MCNC: 8.8 MG/DL (ref 8.3–10.1)
CHLORIDE SERPL-SCNC: 94 MMOL/L (ref 100–108)
CO2 SERPL-SCNC: 24 MMOL/L (ref 21–32)
CREAT SERPL-MCNC: 2.72 MG/DL (ref 0.6–1.3)
EOSINOPHIL # BLD AUTO: 0.05 THOUSAND/ΜL (ref 0–0.61)
EOSINOPHIL NFR BLD AUTO: 1 % (ref 0–6)
ERYTHROCYTE [DISTWIDTH] IN BLOOD BY AUTOMATED COUNT: 12.5 % (ref 11.6–15.1)
GFR SERPL CREATININE-BSD FRML MDRD: 21 ML/MIN/1.73SQ M
GLUCOSE SERPL-MCNC: 116 MG/DL (ref 65–140)
HCT VFR BLD AUTO: 31.7 % (ref 36.5–49.3)
HGB BLD-MCNC: 10.6 G/DL (ref 12–17)
IMM GRANULOCYTES # BLD AUTO: 0.07 THOUSAND/UL (ref 0–0.2)
IMM GRANULOCYTES NFR BLD AUTO: 1 % (ref 0–2)
LYMPHOCYTES # BLD AUTO: 0.62 THOUSANDS/ΜL (ref 0.6–4.47)
LYMPHOCYTES NFR BLD AUTO: 7 % (ref 14–44)
MCH RBC QN AUTO: 32.6 PG (ref 26.8–34.3)
MCHC RBC AUTO-ENTMCNC: 33.4 G/DL (ref 31.4–37.4)
MCV RBC AUTO: 98 FL (ref 82–98)
MONOCYTES # BLD AUTO: 0.68 THOUSAND/ΜL (ref 0.17–1.22)
MONOCYTES NFR BLD AUTO: 7 % (ref 4–12)
NEUTROPHILS # BLD AUTO: 7.92 THOUSANDS/ΜL (ref 1.85–7.62)
NEUTS SEG NFR BLD AUTO: 84 % (ref 43–75)
NRBC BLD AUTO-RTO: 0 /100 WBCS
PLATELET # BLD AUTO: 145 THOUSANDS/UL (ref 149–390)
PMV BLD AUTO: 10.2 FL (ref 8.9–12.7)
POTASSIUM SERPL-SCNC: 4.2 MMOL/L (ref 3.5–5.3)
RBC # BLD AUTO: 3.25 MILLION/UL (ref 3.88–5.62)
SODIUM SERPL-SCNC: 127 MMOL/L (ref 136–145)
WBC # BLD AUTO: 9.35 THOUSAND/UL (ref 4.31–10.16)

## 2020-09-30 PROCEDURE — 97167 OT EVAL HIGH COMPLEX 60 MIN: CPT

## 2020-09-30 PROCEDURE — 99233 SBSQ HOSP IP/OBS HIGH 50: CPT | Performed by: INTERNAL MEDICINE

## 2020-09-30 PROCEDURE — 99232 SBSQ HOSP IP/OBS MODERATE 35: CPT | Performed by: INTERNAL MEDICINE

## 2020-09-30 PROCEDURE — 85025 COMPLETE CBC W/AUTO DIFF WBC: CPT | Performed by: UROLOGY

## 2020-09-30 PROCEDURE — 80048 BASIC METABOLIC PNL TOTAL CA: CPT | Performed by: UROLOGY

## 2020-09-30 PROCEDURE — 99232 SBSQ HOSP IP/OBS MODERATE 35: CPT | Performed by: GENERAL PRACTICE

## 2020-09-30 PROCEDURE — 99232 SBSQ HOSP IP/OBS MODERATE 35: CPT | Performed by: PHYSICIAN ASSISTANT

## 2020-09-30 RX ORDER — LEVOFLOXACIN 250 MG/1
250 TABLET ORAL EVERY 24 HOURS
Status: DISCONTINUED | OUTPATIENT
Start: 2020-09-30 | End: 2020-10-01 | Stop reason: HOSPADM

## 2020-09-30 RX ADMIN — SODIUM CHLORIDE, SODIUM LACTATE, POTASSIUM CHLORIDE, AND CALCIUM CHLORIDE 20 ML/HR: .6; .31; .03; .02 INJECTION, SOLUTION INTRAVENOUS at 22:22

## 2020-09-30 RX ADMIN — APIXABAN 2.5 MG: 2.5 TABLET, FILM COATED ORAL at 17:37

## 2020-09-30 RX ADMIN — LEVOFLOXACIN 250 MG: 250 TABLET, FILM COATED ORAL at 22:17

## 2020-09-30 RX ADMIN — LEVOFLOXACIN 250 MG: 5 INJECTION, SOLUTION INTRAVENOUS at 00:23

## 2020-09-30 RX ADMIN — ACETAMINOPHEN 975 MG: 325 TABLET, FILM COATED ORAL at 20:19

## 2020-09-30 RX ADMIN — GABAPENTIN 200 MG: 100 CAPSULE ORAL at 22:17

## 2020-09-30 RX ADMIN — Medication 1 SPRAY: at 00:53

## 2020-09-30 RX ADMIN — POLYETHYLENE GLYCOL 3350 17 G: 17 POWDER, FOR SOLUTION ORAL at 08:47

## 2020-09-30 RX ADMIN — MELATONIN 3 MG: 3 TAB ORAL at 22:18

## 2020-09-30 RX ADMIN — APIXABAN 2.5 MG: 2.5 TABLET, FILM COATED ORAL at 08:47

## 2020-09-30 RX ADMIN — Medication 1 SPRAY: at 08:48

## 2020-10-01 ENCOUNTER — TELEPHONE (OUTPATIENT)
Dept: INTERNAL MEDICINE CLINIC | Facility: CLINIC | Age: 80
End: 2020-10-01

## 2020-10-01 ENCOUNTER — TRANSITIONAL CARE MANAGEMENT (OUTPATIENT)
Dept: INTERNAL MEDICINE CLINIC | Facility: CLINIC | Age: 80
End: 2020-10-01

## 2020-10-01 VITALS
SYSTOLIC BLOOD PRESSURE: 116 MMHG | DIASTOLIC BLOOD PRESSURE: 68 MMHG | HEIGHT: 77 IN | RESPIRATION RATE: 18 BRPM | TEMPERATURE: 97.9 F | WEIGHT: 260.14 LBS | HEART RATE: 70 BPM | BODY MASS INDEX: 30.72 KG/M2 | OXYGEN SATURATION: 96 %

## 2020-10-01 DIAGNOSIS — A41.9 SEPSIS (HCC): ICD-10-CM

## 2020-10-01 PROBLEM — R65.20 SEVERE SEPSIS (HCC): Status: RESOLVED | Noted: 2020-09-27 | Resolved: 2020-10-01

## 2020-10-01 LAB
ANION GAP SERPL CALCULATED.3IONS-SCNC: 10 MMOL/L (ref 4–13)
BASOPHILS # BLD AUTO: 0.01 THOUSANDS/ΜL (ref 0–0.1)
BASOPHILS NFR BLD AUTO: 0 % (ref 0–1)
BUN SERPL-MCNC: 51 MG/DL (ref 5–25)
CALCIUM SERPL-MCNC: 9.4 MG/DL (ref 8.3–10.1)
CHLORIDE SERPL-SCNC: 100 MMOL/L (ref 100–108)
CO2 SERPL-SCNC: 26 MMOL/L (ref 21–32)
CREAT SERPL-MCNC: 1.76 MG/DL (ref 0.6–1.3)
EOSINOPHIL # BLD AUTO: 0.07 THOUSAND/ΜL (ref 0–0.61)
EOSINOPHIL NFR BLD AUTO: 1 % (ref 0–6)
ERYTHROCYTE [DISTWIDTH] IN BLOOD BY AUTOMATED COUNT: 12.5 % (ref 11.6–15.1)
GFR SERPL CREATININE-BSD FRML MDRD: 36 ML/MIN/1.73SQ M
GLUCOSE SERPL-MCNC: 113 MG/DL (ref 65–140)
HCT VFR BLD AUTO: 33.6 % (ref 36.5–49.3)
HGB BLD-MCNC: 11.2 G/DL (ref 12–17)
IMM GRANULOCYTES # BLD AUTO: 0.05 THOUSAND/UL (ref 0–0.2)
IMM GRANULOCYTES NFR BLD AUTO: 1 % (ref 0–2)
LYMPHOCYTES # BLD AUTO: 0.77 THOUSANDS/ΜL (ref 0.6–4.47)
LYMPHOCYTES NFR BLD AUTO: 8 % (ref 14–44)
MCH RBC QN AUTO: 32.4 PG (ref 26.8–34.3)
MCHC RBC AUTO-ENTMCNC: 33.3 G/DL (ref 31.4–37.4)
MCV RBC AUTO: 97 FL (ref 82–98)
MONOCYTES # BLD AUTO: 0.85 THOUSAND/ΜL (ref 0.17–1.22)
MONOCYTES NFR BLD AUTO: 8 % (ref 4–12)
NEUTROPHILS # BLD AUTO: 8.37 THOUSANDS/ΜL (ref 1.85–7.62)
NEUTS SEG NFR BLD AUTO: 82 % (ref 43–75)
NRBC BLD AUTO-RTO: 0 /100 WBCS
PLATELET # BLD AUTO: 178 THOUSANDS/UL (ref 149–390)
PMV BLD AUTO: 10.2 FL (ref 8.9–12.7)
POTASSIUM SERPL-SCNC: 4.2 MMOL/L (ref 3.5–5.3)
RBC # BLD AUTO: 3.46 MILLION/UL (ref 3.88–5.62)
SODIUM SERPL-SCNC: 136 MMOL/L (ref 136–145)
WBC # BLD AUTO: 10.12 THOUSAND/UL (ref 4.31–10.16)

## 2020-10-01 PROCEDURE — 99232 SBSQ HOSP IP/OBS MODERATE 35: CPT | Performed by: NURSE PRACTITIONER

## 2020-10-01 PROCEDURE — 80048 BASIC METABOLIC PNL TOTAL CA: CPT | Performed by: UROLOGY

## 2020-10-01 PROCEDURE — 99233 SBSQ HOSP IP/OBS HIGH 50: CPT | Performed by: INTERNAL MEDICINE

## 2020-10-01 PROCEDURE — 85025 COMPLETE CBC W/AUTO DIFF WBC: CPT | Performed by: UROLOGY

## 2020-10-01 PROCEDURE — 99239 HOSP IP/OBS DSCHRG MGMT >30: CPT | Performed by: GENERAL PRACTICE

## 2020-10-01 RX ORDER — LIDOCAINE 50 MG/G
1 PATCH TOPICAL DAILY
Qty: 10 PATCH | Refills: 0 | Status: SHIPPED | OUTPATIENT
Start: 2020-10-02 | End: 2020-10-05

## 2020-10-01 RX ORDER — LEVOFLOXACIN 250 MG/1
250 TABLET ORAL EVERY 24 HOURS
Qty: 5 TABLET | Refills: 0 | Status: SHIPPED | OUTPATIENT
Start: 2020-10-01 | End: 2020-10-06

## 2020-10-01 RX ORDER — POLYETHYLENE GLYCOL 3350 17 G/17G
17 POWDER, FOR SOLUTION ORAL DAILY
Qty: 30 EACH | Refills: 0 | Status: SHIPPED | OUTPATIENT
Start: 2020-10-02 | End: 2022-01-01

## 2020-10-01 RX ORDER — CARVEDILOL 3.12 MG/1
3.12 TABLET ORAL 2 TIMES DAILY WITH MEALS
Status: DISCONTINUED | OUTPATIENT
Start: 2020-10-01 | End: 2020-10-01 | Stop reason: HOSPADM

## 2020-10-01 RX ADMIN — LIDOCAINE 5% 1 PATCH: 700 PATCH TOPICAL at 10:03

## 2020-10-01 RX ADMIN — APIXABAN 2.5 MG: 2.5 TABLET, FILM COATED ORAL at 10:02

## 2020-10-01 RX ADMIN — POLYETHYLENE GLYCOL 3350 17 G: 17 POWDER, FOR SOLUTION ORAL at 10:03

## 2020-10-01 RX ADMIN — CARVEDILOL 3.12 MG: 3.12 TABLET, FILM COATED ORAL at 13:02

## 2020-10-02 ENCOUNTER — OFFICE VISIT (OUTPATIENT)
Dept: INTERNAL MEDICINE CLINIC | Facility: CLINIC | Age: 80
End: 2020-10-02
Payer: MEDICARE

## 2020-10-02 ENCOUNTER — TELEPHONE (OUTPATIENT)
Dept: UROLOGY | Facility: MEDICAL CENTER | Age: 80
End: 2020-10-02

## 2020-10-02 VITALS
HEART RATE: 70 BPM | OXYGEN SATURATION: 97 % | DIASTOLIC BLOOD PRESSURE: 66 MMHG | SYSTOLIC BLOOD PRESSURE: 122 MMHG | TEMPERATURE: 97.6 F

## 2020-10-02 DIAGNOSIS — N30.00 ACUTE CYSTITIS WITHOUT HEMATURIA: Primary | ICD-10-CM

## 2020-10-02 DIAGNOSIS — N20.1 LEFT URETERAL CALCULUS: Primary | ICD-10-CM

## 2020-10-02 DIAGNOSIS — N13.30 HYDRONEPHROSIS OF LEFT KIDNEY: ICD-10-CM

## 2020-10-02 DIAGNOSIS — N17.9 AKI (ACUTE KIDNEY INJURY) (HCC): ICD-10-CM

## 2020-10-02 PROBLEM — I21.4 NSTEMI (NON-ST ELEVATED MYOCARDIAL INFARCTION) (HCC): Status: RESOLVED | Noted: 2020-09-27 | Resolved: 2020-10-02

## 2020-10-02 LAB
BACTERIA BLD CULT: NORMAL
BACTERIA BLD CULT: NORMAL

## 2020-10-02 PROCEDURE — 99495 TRANSJ CARE MGMT MOD F2F 14D: CPT | Performed by: INTERNAL MEDICINE

## 2020-10-02 RX ORDER — LEVOFLOXACIN 5 MG/ML
750 INJECTION, SOLUTION INTRAVENOUS ONCE
Status: CANCELLED | OUTPATIENT
Start: 2020-10-02 | End: 2020-10-02

## 2020-10-05 ENCOUNTER — APPOINTMENT (OUTPATIENT)
Dept: LAB | Facility: CLINIC | Age: 80
End: 2020-10-05
Payer: MEDICARE

## 2020-10-05 ENCOUNTER — TELEPHONE (OUTPATIENT)
Dept: CARDIOLOGY CLINIC | Facility: CLINIC | Age: 80
End: 2020-10-05

## 2020-10-05 ENCOUNTER — TELEPHONE (OUTPATIENT)
Dept: NEPHROLOGY | Facility: CLINIC | Age: 80
End: 2020-10-05

## 2020-10-05 ENCOUNTER — OFFICE VISIT (OUTPATIENT)
Dept: CARDIOLOGY CLINIC | Facility: CLINIC | Age: 80
End: 2020-10-05
Payer: MEDICARE

## 2020-10-05 VITALS
OXYGEN SATURATION: 97 % | HEIGHT: 76 IN | WEIGHT: 245 LBS | DIASTOLIC BLOOD PRESSURE: 64 MMHG | SYSTOLIC BLOOD PRESSURE: 118 MMHG | HEART RATE: 75 BPM | BODY MASS INDEX: 29.83 KG/M2

## 2020-10-05 DIAGNOSIS — N18.31 STAGE 3A CHRONIC KIDNEY DISEASE (HCC): ICD-10-CM

## 2020-10-05 DIAGNOSIS — I50.22 CHRONIC SYSTOLIC HF (HEART FAILURE) (HCC): ICD-10-CM

## 2020-10-05 DIAGNOSIS — I42.0 DILATED CARDIOMYOPATHY (HCC): Primary | ICD-10-CM

## 2020-10-05 DIAGNOSIS — A41.9 SEPSIS (HCC): ICD-10-CM

## 2020-10-05 DIAGNOSIS — N17.9 AKI (ACUTE KIDNEY INJURY) (HCC): Primary | ICD-10-CM

## 2020-10-05 DIAGNOSIS — I48.20 CHRONIC ATRIAL FIBRILLATION (HCC): ICD-10-CM

## 2020-10-05 DIAGNOSIS — Z79.01 CHRONIC ANTICOAGULATION: ICD-10-CM

## 2020-10-05 LAB
25(OH)D3 SERPL-MCNC: 33.1 NG/ML (ref 30–100)
ALBUMIN SERPL BCP-MCNC: 3.7 G/DL (ref 3.5–5)
ALP SERPL-CCNC: 82 U/L (ref 46–116)
ALT SERPL W P-5'-P-CCNC: 22 U/L (ref 12–78)
ANION GAP SERPL CALCULATED.3IONS-SCNC: 3 MMOL/L (ref 4–13)
AST SERPL W P-5'-P-CCNC: 15 U/L (ref 5–45)
BASOPHILS # BLD AUTO: 0.03 THOUSANDS/ΜL (ref 0–0.1)
BASOPHILS NFR BLD AUTO: 0 % (ref 0–1)
BILIRUB SERPL-MCNC: 2.22 MG/DL (ref 0.2–1)
BUN SERPL-MCNC: 24 MG/DL (ref 5–25)
CALCIUM SERPL-MCNC: 10.2 MG/DL (ref 8.3–10.1)
CHLORIDE SERPL-SCNC: 106 MMOL/L (ref 100–108)
CHOLEST SERPL-MCNC: 95 MG/DL (ref 50–200)
CO2 SERPL-SCNC: 32 MMOL/L (ref 21–32)
CREAT SERPL-MCNC: 1.3 MG/DL (ref 0.6–1.3)
EOSINOPHIL # BLD AUTO: 0.07 THOUSAND/ΜL (ref 0–0.61)
EOSINOPHIL NFR BLD AUTO: 1 % (ref 0–6)
ERYTHROCYTE [DISTWIDTH] IN BLOOD BY AUTOMATED COUNT: 12.9 % (ref 11.6–15.1)
GFR SERPL CREATININE-BSD FRML MDRD: 52 ML/MIN/1.73SQ M
GLUCOSE P FAST SERPL-MCNC: 99 MG/DL (ref 65–99)
HCT VFR BLD AUTO: 37.1 % (ref 36.5–49.3)
HDLC SERPL-MCNC: 31 MG/DL
HGB BLD-MCNC: 12.3 G/DL (ref 12–17)
IMM GRANULOCYTES # BLD AUTO: 0.11 THOUSAND/UL (ref 0–0.2)
IMM GRANULOCYTES NFR BLD AUTO: 1 % (ref 0–2)
LDLC SERPL CALC-MCNC: 46 MG/DL (ref 0–100)
LYMPHOCYTES # BLD AUTO: 1.08 THOUSANDS/ΜL (ref 0.6–4.47)
LYMPHOCYTES NFR BLD AUTO: 11 % (ref 14–44)
MCH RBC QN AUTO: 33.9 PG (ref 26.8–34.3)
MCHC RBC AUTO-ENTMCNC: 33.2 G/DL (ref 31.4–37.4)
MCV RBC AUTO: 102 FL (ref 82–98)
MONOCYTES # BLD AUTO: 0.59 THOUSAND/ΜL (ref 0.17–1.22)
MONOCYTES NFR BLD AUTO: 6 % (ref 4–12)
NEUTROPHILS # BLD AUTO: 7.75 THOUSANDS/ΜL (ref 1.85–7.62)
NEUTS SEG NFR BLD AUTO: 81 % (ref 43–75)
NONHDLC SERPL-MCNC: 64 MG/DL
NRBC BLD AUTO-RTO: 0 /100 WBCS
PLATELET # BLD AUTO: 285 THOUSANDS/UL (ref 149–390)
PMV BLD AUTO: 9.7 FL (ref 8.9–12.7)
POTASSIUM SERPL-SCNC: 5.5 MMOL/L (ref 3.5–5.3)
PROT SERPL-MCNC: 7.6 G/DL (ref 6.4–8.2)
PSA SERPL-MCNC: 5.7 NG/ML (ref 0–4)
RBC # BLD AUTO: 3.63 MILLION/UL (ref 3.88–5.62)
SODIUM SERPL-SCNC: 141 MMOL/L (ref 136–145)
TRIGL SERPL-MCNC: 89 MG/DL
WBC # BLD AUTO: 9.63 THOUSAND/UL (ref 4.31–10.16)

## 2020-10-05 PROCEDURE — 82306 VITAMIN D 25 HYDROXY: CPT | Performed by: INTERNAL MEDICINE

## 2020-10-05 PROCEDURE — 36415 COLL VENOUS BLD VENIPUNCTURE: CPT

## 2020-10-05 PROCEDURE — 99214 OFFICE O/P EST MOD 30 MIN: CPT | Performed by: INTERNAL MEDICINE

## 2020-10-05 PROCEDURE — 84153 ASSAY OF PSA TOTAL: CPT

## 2020-10-05 PROCEDURE — 80053 COMPREHEN METABOLIC PANEL: CPT

## 2020-10-05 PROCEDURE — 80061 LIPID PANEL: CPT

## 2020-10-05 PROCEDURE — 85025 COMPLETE CBC W/AUTO DIFF WBC: CPT

## 2020-10-06 ENCOUNTER — REMOTE DEVICE CLINIC VISIT (OUTPATIENT)
Dept: CARDIOLOGY CLINIC | Facility: CLINIC | Age: 80
End: 2020-10-06
Payer: MEDICARE

## 2020-10-06 DIAGNOSIS — Z95.810 PRESENCE OF IMPLANTABLE CARDIOVERTER-DEFIBRILLATOR (ICD): Primary | ICD-10-CM

## 2020-10-06 PROCEDURE — 93295 DEV INTERROG REMOTE 1/2/MLT: CPT | Performed by: INTERNAL MEDICINE

## 2020-10-06 PROCEDURE — 93296 REM INTERROG EVL PM/IDS: CPT | Performed by: INTERNAL MEDICINE

## 2020-10-08 ENCOUNTER — TELEPHONE (OUTPATIENT)
Dept: NEPHROLOGY | Facility: CLINIC | Age: 80
End: 2020-10-08

## 2020-10-08 DIAGNOSIS — R60.9 EDEMA, UNSPECIFIED TYPE: Primary | ICD-10-CM

## 2020-10-12 RX ORDER — TORSEMIDE 10 MG/1
10 TABLET ORAL EVERY OTHER DAY
Qty: 45 TABLET | Refills: 3 | Status: SHIPPED | OUTPATIENT
Start: 2020-10-12 | End: 2020-11-18 | Stop reason: HOSPADM

## 2020-10-13 ENCOUNTER — TELEPHONE (OUTPATIENT)
Dept: CARDIOLOGY CLINIC | Facility: CLINIC | Age: 80
End: 2020-10-13

## 2020-10-13 ENCOUNTER — LAB (OUTPATIENT)
Dept: LAB | Facility: CLINIC | Age: 80
End: 2020-10-13
Payer: MEDICARE

## 2020-10-13 DIAGNOSIS — N20.1 LEFT URETERAL CALCULUS: ICD-10-CM

## 2020-10-13 DIAGNOSIS — I50.9 CONGESTIVE HEART FAILURE, UNSPECIFIED HF CHRONICITY, UNSPECIFIED HEART FAILURE TYPE (HCC): Primary | ICD-10-CM

## 2020-10-13 DIAGNOSIS — N17.9 AKI (ACUTE KIDNEY INJURY) (HCC): ICD-10-CM

## 2020-10-13 LAB
ANION GAP SERPL CALCULATED.3IONS-SCNC: 3 MMOL/L (ref 4–13)
BUN SERPL-MCNC: 19 MG/DL (ref 5–25)
CALCIUM SERPL-MCNC: 9.8 MG/DL (ref 8.3–10.1)
CHLORIDE SERPL-SCNC: 107 MMOL/L (ref 100–108)
CO2 SERPL-SCNC: 29 MMOL/L (ref 21–32)
CREAT SERPL-MCNC: 1.16 MG/DL (ref 0.6–1.3)
GFR SERPL CREATININE-BSD FRML MDRD: 60 ML/MIN/1.73SQ M
GLUCOSE P FAST SERPL-MCNC: 101 MG/DL (ref 65–99)
POTASSIUM SERPL-SCNC: 4.8 MMOL/L (ref 3.5–5.3)
SODIUM SERPL-SCNC: 139 MMOL/L (ref 136–145)

## 2020-10-13 PROCEDURE — 87086 URINE CULTURE/COLONY COUNT: CPT

## 2020-10-13 PROCEDURE — 36415 COLL VENOUS BLD VENIPUNCTURE: CPT

## 2020-10-13 PROCEDURE — 80048 BASIC METABOLIC PNL TOTAL CA: CPT

## 2020-10-14 ENCOUNTER — TELEPHONE (OUTPATIENT)
Dept: CARDIOLOGY CLINIC | Facility: CLINIC | Age: 80
End: 2020-10-14

## 2020-10-14 LAB — BACTERIA UR CULT: NORMAL

## 2020-10-15 ENCOUNTER — OFFICE VISIT (OUTPATIENT)
Dept: NEPHROLOGY | Facility: CLINIC | Age: 80
End: 2020-10-15
Payer: MEDICARE

## 2020-10-15 VITALS
BODY MASS INDEX: 27.65 KG/M2 | HEART RATE: 76 BPM | SYSTOLIC BLOOD PRESSURE: 120 MMHG | WEIGHT: 222.4 LBS | DIASTOLIC BLOOD PRESSURE: 80 MMHG | HEIGHT: 75 IN | TEMPERATURE: 96 F | RESPIRATION RATE: 16 BRPM

## 2020-10-15 DIAGNOSIS — E87.1 HYPONATREMIA: ICD-10-CM

## 2020-10-15 DIAGNOSIS — N17.9 AKI (ACUTE KIDNEY INJURY) (HCC): Primary | ICD-10-CM

## 2020-10-15 PROCEDURE — 99213 OFFICE O/P EST LOW 20 MIN: CPT | Performed by: INTERNAL MEDICINE

## 2020-10-20 ENCOUNTER — TRANSCRIBE ORDERS (OUTPATIENT)
Dept: LAB | Facility: CLINIC | Age: 80
End: 2020-10-20

## 2020-10-20 ENCOUNTER — APPOINTMENT (OUTPATIENT)
Dept: LAB | Facility: CLINIC | Age: 80
End: 2020-10-20
Payer: MEDICARE

## 2020-10-20 DIAGNOSIS — N13.6 PYONEPHROSIS: ICD-10-CM

## 2020-10-20 DIAGNOSIS — N17.9 AKI (ACUTE KIDNEY INJURY) (HCC): ICD-10-CM

## 2020-10-20 DIAGNOSIS — N13.6 PYONEPHROSIS: Primary | ICD-10-CM

## 2020-10-20 LAB
ANION GAP SERPL CALCULATED.3IONS-SCNC: 6 MMOL/L (ref 4–13)
BACTERIA UR QL AUTO: ABNORMAL /HPF
BASOPHILS # BLD AUTO: 0.02 THOUSANDS/ΜL (ref 0–0.1)
BASOPHILS NFR BLD AUTO: 0 % (ref 0–1)
BILIRUB UR QL STRIP: NEGATIVE
BUN SERPL-MCNC: 19 MG/DL (ref 5–25)
CALCIUM SERPL-MCNC: 9.2 MG/DL (ref 8.3–10.1)
CHLORIDE SERPL-SCNC: 105 MMOL/L (ref 100–108)
CLARITY UR: ABNORMAL
CO2 SERPL-SCNC: 30 MMOL/L (ref 21–32)
COLOR UR: YELLOW
CREAT SERPL-MCNC: 1.13 MG/DL (ref 0.6–1.3)
CREAT UR-MCNC: 63.3 MG/DL
EOSINOPHIL # BLD AUTO: 0.06 THOUSAND/ΜL (ref 0–0.61)
EOSINOPHIL NFR BLD AUTO: 1 % (ref 0–6)
ERYTHROCYTE [DISTWIDTH] IN BLOOD BY AUTOMATED COUNT: 13.2 % (ref 11.6–15.1)
GFR SERPL CREATININE-BSD FRML MDRD: 61 ML/MIN/1.73SQ M
GLUCOSE SERPL-MCNC: 113 MG/DL (ref 65–140)
GLUCOSE UR STRIP-MCNC: NEGATIVE MG/DL
HCT VFR BLD AUTO: 34.5 % (ref 36.5–49.3)
HGB BLD-MCNC: 10.8 G/DL (ref 12–17)
HGB UR QL STRIP.AUTO: ABNORMAL
IMM GRANULOCYTES # BLD AUTO: 0.02 THOUSAND/UL (ref 0–0.2)
IMM GRANULOCYTES NFR BLD AUTO: 0 % (ref 0–2)
KETONES UR STRIP-MCNC: NEGATIVE MG/DL
LEUKOCYTE ESTERASE UR QL STRIP: ABNORMAL
LYMPHOCYTES # BLD AUTO: 1.04 THOUSANDS/ΜL (ref 0.6–4.47)
LYMPHOCYTES NFR BLD AUTO: 19 % (ref 14–44)
MCH RBC QN AUTO: 32.5 PG (ref 26.8–34.3)
MCHC RBC AUTO-ENTMCNC: 31.3 G/DL (ref 31.4–37.4)
MCV RBC AUTO: 104 FL (ref 82–98)
MICROALBUMIN UR-MCNC: 61.3 MG/L (ref 0–20)
MICROALBUMIN/CREAT 24H UR: 97 MG/G CREATININE (ref 0–30)
MONOCYTES # BLD AUTO: 0.48 THOUSAND/ΜL (ref 0.17–1.22)
MONOCYTES NFR BLD AUTO: 9 % (ref 4–12)
NEUTROPHILS # BLD AUTO: 3.84 THOUSANDS/ΜL (ref 1.85–7.62)
NEUTS SEG NFR BLD AUTO: 71 % (ref 43–75)
NITRITE UR QL STRIP: NEGATIVE
NON-SQ EPI CELLS URNS QL MICRO: ABNORMAL /HPF
NRBC BLD AUTO-RTO: 0 /100 WBCS
PH UR STRIP.AUTO: 7 [PH]
PLATELET # BLD AUTO: 177 THOUSANDS/UL (ref 149–390)
PMV BLD AUTO: 10.7 FL (ref 8.9–12.7)
POTASSIUM SERPL-SCNC: 4.2 MMOL/L (ref 3.5–5.3)
PROT UR STRIP-MCNC: ABNORMAL MG/DL
RBC # BLD AUTO: 3.32 MILLION/UL (ref 3.88–5.62)
RBC #/AREA URNS AUTO: ABNORMAL /HPF
SODIUM SERPL-SCNC: 141 MMOL/L (ref 136–145)
SP GR UR STRIP.AUTO: 1.01 (ref 1–1.03)
UROBILINOGEN UR QL STRIP.AUTO: 1 E.U./DL
WBC # BLD AUTO: 5.46 THOUSAND/UL (ref 4.31–10.16)
WBC #/AREA URNS AUTO: ABNORMAL /HPF

## 2020-10-20 PROCEDURE — 36415 COLL VENOUS BLD VENIPUNCTURE: CPT

## 2020-10-20 PROCEDURE — 82043 UR ALBUMIN QUANTITATIVE: CPT | Performed by: INTERNAL MEDICINE

## 2020-10-20 PROCEDURE — 80048 BASIC METABOLIC PNL TOTAL CA: CPT

## 2020-10-20 PROCEDURE — 82570 ASSAY OF URINE CREATININE: CPT | Performed by: INTERNAL MEDICINE

## 2020-10-20 PROCEDURE — 85025 COMPLETE CBC W/AUTO DIFF WBC: CPT

## 2020-10-20 PROCEDURE — 81001 URINALYSIS AUTO W/SCOPE: CPT | Performed by: INTERNAL MEDICINE

## 2020-10-23 ENCOUNTER — TELEPHONE (OUTPATIENT)
Dept: INTERNAL MEDICINE CLINIC | Facility: CLINIC | Age: 80
End: 2020-10-23

## 2020-10-23 ENCOUNTER — TELEPHONE (OUTPATIENT)
Dept: NEPHROLOGY | Facility: CLINIC | Age: 80
End: 2020-10-23

## 2020-10-26 ENCOUNTER — TELEPHONE (OUTPATIENT)
Dept: INTERNAL MEDICINE CLINIC | Facility: CLINIC | Age: 80
End: 2020-10-26

## 2020-10-27 ENCOUNTER — LAB (OUTPATIENT)
Dept: LAB | Facility: CLINIC | Age: 80
End: 2020-10-27
Payer: MEDICARE

## 2020-10-27 ENCOUNTER — TELEPHONE (OUTPATIENT)
Dept: NEPHROLOGY | Facility: CLINIC | Age: 80
End: 2020-10-27

## 2020-10-27 DIAGNOSIS — N20.1 LEFT URETERAL CALCULUS: ICD-10-CM

## 2020-10-27 PROCEDURE — 87086 URINE CULTURE/COLONY COUNT: CPT

## 2020-10-27 PROCEDURE — 87077 CULTURE AEROBIC IDENTIFY: CPT

## 2020-10-27 PROCEDURE — 87186 SC STD MICRODIL/AGAR DIL: CPT

## 2020-10-27 PROCEDURE — 87147 CULTURE TYPE IMMUNOLOGIC: CPT

## 2020-10-28 ENCOUNTER — TELEPHONE (OUTPATIENT)
Dept: CARDIOLOGY CLINIC | Facility: CLINIC | Age: 80
End: 2020-10-28

## 2020-10-29 ENCOUNTER — TELEPHONE (OUTPATIENT)
Dept: UROLOGY | Facility: CLINIC | Age: 80
End: 2020-10-29

## 2020-10-29 DIAGNOSIS — N30.00 ACUTE CYSTITIS WITHOUT HEMATURIA: Primary | ICD-10-CM

## 2020-10-29 LAB — BACTERIA UR CULT: ABNORMAL

## 2020-10-29 RX ORDER — NITROFURANTOIN 25; 75 MG/1; MG/1
100 CAPSULE ORAL 2 TIMES DAILY
Qty: 10 CAPSULE | Refills: 0 | Status: SHIPPED | OUTPATIENT
Start: 2020-10-29 | End: 2020-11-03

## 2020-10-30 ENCOUNTER — TELEPHONE (OUTPATIENT)
Dept: INTERNAL MEDICINE CLINIC | Facility: CLINIC | Age: 80
End: 2020-10-30

## 2020-10-30 ENCOUNTER — TELEPHONE (OUTPATIENT)
Dept: CARDIOLOGY CLINIC | Facility: CLINIC | Age: 80
End: 2020-10-30

## 2020-11-02 ENCOUNTER — TELEPHONE (OUTPATIENT)
Dept: CARDIOLOGY CLINIC | Facility: CLINIC | Age: 80
End: 2020-11-02

## 2020-11-02 ENCOUNTER — ANESTHESIA EVENT (OUTPATIENT)
Dept: PERIOP | Facility: HOSPITAL | Age: 80
End: 2020-11-02
Payer: MEDICARE

## 2020-11-03 ENCOUNTER — TELEPHONE (OUTPATIENT)
Dept: UROLOGY | Facility: CLINIC | Age: 80
End: 2020-11-03

## 2020-11-03 ENCOUNTER — APPOINTMENT (OUTPATIENT)
Dept: RADIOLOGY | Facility: HOSPITAL | Age: 80
End: 2020-11-03
Payer: MEDICARE

## 2020-11-03 ENCOUNTER — HOSPITAL ENCOUNTER (OUTPATIENT)
Facility: HOSPITAL | Age: 80
Setting detail: OUTPATIENT SURGERY
Discharge: HOME | End: 2020-11-03
Attending: UROLOGY | Admitting: UROLOGY
Payer: MEDICARE

## 2020-11-03 ENCOUNTER — ANESTHESIA (OUTPATIENT)
Dept: PERIOP | Facility: HOSPITAL | Age: 80
End: 2020-11-03
Payer: MEDICARE

## 2020-11-03 VITALS
BODY MASS INDEX: 27.6 KG/M2 | TEMPERATURE: 97.9 F | RESPIRATION RATE: 18 BRPM | OXYGEN SATURATION: 98 % | DIASTOLIC BLOOD PRESSURE: 75 MMHG | WEIGHT: 222 LBS | HEART RATE: 69 BPM | HEIGHT: 75 IN | SYSTOLIC BLOOD PRESSURE: 115 MMHG

## 2020-11-03 VITALS — HEART RATE: 69 BPM

## 2020-11-03 DIAGNOSIS — N20.1 LEFT URETERAL CALCULUS: Primary | ICD-10-CM

## 2020-11-03 DIAGNOSIS — N20.1 LEFT URETERAL CALCULUS: ICD-10-CM

## 2020-11-03 PROCEDURE — 52356 CYSTO/URETERO W/LITHOTRIPSY: CPT | Performed by: UROLOGY

## 2020-11-03 PROCEDURE — C2617 STENT, NON-COR, TEM W/O DEL: HCPCS | Performed by: UROLOGY

## 2020-11-03 PROCEDURE — NC001 PR NO CHARGE: Performed by: UROLOGY

## 2020-11-03 PROCEDURE — C1769 GUIDE WIRE: HCPCS | Performed by: UROLOGY

## 2020-11-03 PROCEDURE — 74420 UROGRAPHY RTRGR +-KUB: CPT

## 2020-11-03 PROCEDURE — 82360 CALCULUS ASSAY QUANT: CPT | Performed by: UROLOGY

## 2020-11-03 PROCEDURE — C1758 CATHETER, URETERAL: HCPCS | Performed by: UROLOGY

## 2020-11-03 RX ORDER — LIDOCAINE HYDROCHLORIDE 10 MG/ML
0.5 INJECTION, SOLUTION EPIDURAL; INFILTRATION; INTRACAUDAL; PERINEURAL ONCE AS NEEDED
Status: DISCONTINUED | OUTPATIENT
Start: 2020-11-03 | End: 2020-11-03 | Stop reason: HOSPADM

## 2020-11-03 RX ORDER — TRAMADOL HYDROCHLORIDE 50 MG/1
50 TABLET ORAL EVERY 6 HOURS PRN
Status: DISCONTINUED | OUTPATIENT
Start: 2020-11-03 | End: 2020-11-03 | Stop reason: HOSPADM

## 2020-11-03 RX ORDER — DEXAMETHASONE SODIUM PHOSPHATE 10 MG/ML
INJECTION, SOLUTION INTRAMUSCULAR; INTRAVENOUS AS NEEDED
Status: DISCONTINUED | OUTPATIENT
Start: 2020-11-03 | End: 2020-11-03

## 2020-11-03 RX ORDER — PROPOFOL 10 MG/ML
INJECTION, EMULSION INTRAVENOUS AS NEEDED
Status: DISCONTINUED | OUTPATIENT
Start: 2020-11-03 | End: 2020-11-03

## 2020-11-03 RX ORDER — ONDANSETRON 2 MG/ML
4 INJECTION INTRAMUSCULAR; INTRAVENOUS ONCE AS NEEDED
Status: DISCONTINUED | OUTPATIENT
Start: 2020-11-03 | End: 2020-11-03 | Stop reason: HOSPADM

## 2020-11-03 RX ORDER — PHENAZOPYRIDINE HYDROCHLORIDE 100 MG/1
100 TABLET, FILM COATED ORAL 3 TIMES DAILY PRN
Qty: 20 TABLET | Refills: 0 | Status: SHIPPED | OUTPATIENT
Start: 2020-11-03 | End: 2020-11-18 | Stop reason: HOSPADM

## 2020-11-03 RX ORDER — SODIUM CHLORIDE, SODIUM LACTATE, POTASSIUM CHLORIDE, CALCIUM CHLORIDE 600; 310; 30; 20 MG/100ML; MG/100ML; MG/100ML; MG/100ML
20 INJECTION, SOLUTION INTRAVENOUS CONTINUOUS
Status: CANCELLED | OUTPATIENT
Start: 2020-11-03

## 2020-11-03 RX ORDER — MAGNESIUM HYDROXIDE 1200 MG/15ML
LIQUID ORAL AS NEEDED
Status: DISCONTINUED | OUTPATIENT
Start: 2020-11-03 | End: 2020-11-03 | Stop reason: HOSPADM

## 2020-11-03 RX ORDER — LIDOCAINE HYDROCHLORIDE 20 MG/ML
JELLY TOPICAL AS NEEDED
Status: DISCONTINUED | OUTPATIENT
Start: 2020-11-03 | End: 2020-11-03 | Stop reason: HOSPADM

## 2020-11-03 RX ORDER — TRAMADOL HYDROCHLORIDE 50 MG/1
50 TABLET ORAL EVERY 6 HOURS PRN
Qty: 5 TABLET | Refills: 0 | Status: SHIPPED | OUTPATIENT
Start: 2020-11-03 | End: 2020-12-03 | Stop reason: ALTCHOICE

## 2020-11-03 RX ORDER — FENTANYL CITRATE/PF 50 MCG/ML
25 SYRINGE (ML) INJECTION
Status: DISCONTINUED | OUTPATIENT
Start: 2020-11-03 | End: 2020-11-03 | Stop reason: HOSPADM

## 2020-11-03 RX ORDER — LIDOCAINE HYDROCHLORIDE 10 MG/ML
INJECTION, SOLUTION EPIDURAL; INFILTRATION; INTRACAUDAL; PERINEURAL AS NEEDED
Status: DISCONTINUED | OUTPATIENT
Start: 2020-11-03 | End: 2020-11-03

## 2020-11-03 RX ORDER — LEVOFLOXACIN 5 MG/ML
750 INJECTION, SOLUTION INTRAVENOUS ONCE
Status: COMPLETED | OUTPATIENT
Start: 2020-11-03 | End: 2020-11-03

## 2020-11-03 RX ORDER — PHENAZOPYRIDINE HYDROCHLORIDE 100 MG/1
100 TABLET, FILM COATED ORAL ONCE
Status: COMPLETED | OUTPATIENT
Start: 2020-11-03 | End: 2020-11-03

## 2020-11-03 RX ORDER — SODIUM CHLORIDE, SODIUM LACTATE, POTASSIUM CHLORIDE, CALCIUM CHLORIDE 600; 310; 30; 20 MG/100ML; MG/100ML; MG/100ML; MG/100ML
50 INJECTION, SOLUTION INTRAVENOUS CONTINUOUS
Status: DISCONTINUED | OUTPATIENT
Start: 2020-11-03 | End: 2020-11-03 | Stop reason: HOSPADM

## 2020-11-03 RX ORDER — ONDANSETRON 2 MG/ML
INJECTION INTRAMUSCULAR; INTRAVENOUS AS NEEDED
Status: DISCONTINUED | OUTPATIENT
Start: 2020-11-03 | End: 2020-11-03

## 2020-11-03 RX ORDER — FENTANYL CITRATE 50 UG/ML
INJECTION, SOLUTION INTRAMUSCULAR; INTRAVENOUS AS NEEDED
Status: DISCONTINUED | OUTPATIENT
Start: 2020-11-03 | End: 2020-11-03

## 2020-11-03 RX ADMIN — PHENAZOPYRIDINE 100 MG: 100 TABLET ORAL at 12:58

## 2020-11-03 RX ADMIN — LIDOCAINE HYDROCHLORIDE 50 MG: 10 INJECTION, SOLUTION EPIDURAL; INFILTRATION; INTRACAUDAL; PERINEURAL at 11:24

## 2020-11-03 RX ADMIN — DEXAMETHASONE SODIUM PHOSPHATE 10 MG: 10 INJECTION, SOLUTION INTRAMUSCULAR; INTRAVENOUS at 11:30

## 2020-11-03 RX ADMIN — PROPOFOL 100 MG: 10 INJECTION, EMULSION INTRAVENOUS at 11:24

## 2020-11-03 RX ADMIN — NOREPINEPHRINE BITARTRATE 2 MCG/MIN: 1 INJECTION, SOLUTION, CONCENTRATE INTRAVENOUS at 11:31

## 2020-11-03 RX ADMIN — PROPOFOL 50 MG: 10 INJECTION, EMULSION INTRAVENOUS at 11:26

## 2020-11-03 RX ADMIN — ONDANSETRON 4 MG: 2 INJECTION INTRAMUSCULAR; INTRAVENOUS at 11:30

## 2020-11-03 RX ADMIN — LEVOFLOXACIN 750 MG: 5 INJECTION, SOLUTION INTRAVENOUS at 10:57

## 2020-11-03 RX ADMIN — SODIUM CHLORIDE, SODIUM LACTATE, POTASSIUM CHLORIDE, AND CALCIUM CHLORIDE 50 ML/HR: .6; .31; .03; .02 INJECTION, SOLUTION INTRAVENOUS at 09:32

## 2020-11-03 RX ADMIN — FENTANYL CITRATE 50 MCG: 50 INJECTION, SOLUTION INTRAMUSCULAR; INTRAVENOUS at 11:20

## 2020-11-04 ENCOUNTER — TELEPHONE (OUTPATIENT)
Dept: CARDIOLOGY CLINIC | Facility: CLINIC | Age: 80
End: 2020-11-04

## 2020-11-06 ENCOUNTER — REMOTE DEVICE CLINIC VISIT (OUTPATIENT)
Dept: CARDIOLOGY CLINIC | Facility: CLINIC | Age: 80
End: 2020-11-06
Payer: MEDICARE

## 2020-11-06 ENCOUNTER — PROCEDURE VISIT (OUTPATIENT)
Dept: UROLOGY | Facility: CLINIC | Age: 80
End: 2020-11-06
Payer: MEDICARE

## 2020-11-06 ENCOUNTER — TELEPHONE (OUTPATIENT)
Dept: CARDIOLOGY CLINIC | Facility: CLINIC | Age: 80
End: 2020-11-06

## 2020-11-06 ENCOUNTER — TELEPHONE (OUTPATIENT)
Dept: UROLOGY | Facility: CLINIC | Age: 80
End: 2020-11-06

## 2020-11-06 VITALS
DIASTOLIC BLOOD PRESSURE: 72 MMHG | HEART RATE: 76 BPM | TEMPERATURE: 97.4 F | BODY MASS INDEX: 27.02 KG/M2 | SYSTOLIC BLOOD PRESSURE: 122 MMHG | HEIGHT: 76 IN

## 2020-11-06 DIAGNOSIS — I50.9 CONGESTIVE HEART FAILURE, UNSPECIFIED HF CHRONICITY, UNSPECIFIED HEART FAILURE TYPE (HCC): Primary | ICD-10-CM

## 2020-11-06 DIAGNOSIS — N20.0 NEPHROLITHIASIS: ICD-10-CM

## 2020-11-06 DIAGNOSIS — I10 ESSENTIAL HYPERTENSION: ICD-10-CM

## 2020-11-06 DIAGNOSIS — N13.30 HYDRONEPHROSIS OF LEFT KIDNEY: ICD-10-CM

## 2020-11-06 DIAGNOSIS — Z95.810 PRESENCE OF AUTOMATIC CARDIOVERTER/DEFIBRILLATOR (AICD): Primary | ICD-10-CM

## 2020-11-06 PROCEDURE — 99211 OFF/OP EST MAY X REQ PHY/QHP: CPT

## 2020-11-06 PROCEDURE — 93297 REM INTERROG DEV EVAL ICPMS: CPT | Performed by: INTERNAL MEDICINE

## 2020-11-06 PROCEDURE — G2066 INTER DEVC REMOTE 30D: HCPCS | Performed by: INTERNAL MEDICINE

## 2020-11-07 ENCOUNTER — TELEPHONE (OUTPATIENT)
Dept: OTHER | Facility: OTHER | Age: 80
End: 2020-11-07

## 2020-11-09 ENCOUNTER — APPOINTMENT (EMERGENCY)
Dept: RADIOLOGY | Facility: HOSPITAL | Age: 80
DRG: 308 | End: 2020-11-09
Payer: MEDICARE

## 2020-11-09 ENCOUNTER — HOSPITAL ENCOUNTER (INPATIENT)
Facility: HOSPITAL | Age: 80
LOS: 1 days | DRG: 308 | End: 2020-11-10
Attending: EMERGENCY MEDICINE | Admitting: ANESTHESIOLOGY
Payer: MEDICARE

## 2020-11-09 DIAGNOSIS — I47.2 V-TACH (HCC): Primary | ICD-10-CM

## 2020-11-09 DIAGNOSIS — Z45.02 AICD DISCHARGE: ICD-10-CM

## 2020-11-09 PROBLEM — I47.29 VENTRICULAR TACHYCARDIA (PAROXYSMAL): Status: ACTIVE | Noted: 2020-11-09

## 2020-11-09 PROBLEM — N18.30 STAGE 3 CHRONIC KIDNEY DISEASE (HCC): Status: ACTIVE | Noted: 2020-11-09

## 2020-11-09 LAB
ALBUMIN SERPL BCP-MCNC: 3.3 G/DL (ref 3.5–5)
ALP SERPL-CCNC: 83 U/L (ref 46–116)
ALT SERPL W P-5'-P-CCNC: 24 U/L (ref 12–78)
ANION GAP SERPL CALCULATED.3IONS-SCNC: 12 MMOL/L (ref 4–13)
ANION GAP SERPL CALCULATED.3IONS-SCNC: 6 MMOL/L (ref 4–13)
ANION GAP SERPL CALCULATED.3IONS-SCNC: 7 MMOL/L (ref 4–13)
ANION GAP SERPL CALCULATED.3IONS-SCNC: 8 MMOL/L (ref 4–13)
ANION GAP SERPL CALCULATED.3IONS-SCNC: 9 MMOL/L (ref 4–13)
AST SERPL W P-5'-P-CCNC: 19 U/L (ref 5–45)
ATRIAL RATE: 184 BPM
ATRIAL RATE: 202 BPM
ATRIAL RATE: 60 BPM
ATRIAL RATE: 66 BPM
ATRIAL RATE: 72 BPM
BASE EXCESS BLDA CALC-SCNC: 1 MMOL/L (ref -2–3)
BASOPHILS # BLD AUTO: 0.02 THOUSANDS/ΜL (ref 0–0.1)
BASOPHILS NFR BLD AUTO: 0 % (ref 0–1)
BILIRUB SERPL-MCNC: 2.7 MG/DL (ref 0.2–1)
BUN SERPL-MCNC: 18 MG/DL (ref 5–25)
BUN SERPL-MCNC: 21 MG/DL (ref 5–25)
BUN SERPL-MCNC: 21 MG/DL (ref 5–25)
BUN SERPL-MCNC: 22 MG/DL (ref 5–25)
BUN SERPL-MCNC: 25 MG/DL (ref 5–25)
CA-I BLD-SCNC: 1.14 MMOL/L (ref 1.12–1.32)
CALCIUM ALBUM COR SERPL-MCNC: 10 MG/DL (ref 8.3–10.1)
CALCIUM SERPL-MCNC: 9.3 MG/DL (ref 8.3–10.1)
CALCIUM SERPL-MCNC: 9.3 MG/DL (ref 8.3–10.1)
CALCIUM SERPL-MCNC: 9.4 MG/DL (ref 8.3–10.1)
CALCIUM SERPL-MCNC: 9.5 MG/DL (ref 8.3–10.1)
CALCIUM SERPL-MCNC: 9.6 MG/DL (ref 8.3–10.1)
CHLORIDE SERPL-SCNC: 103 MMOL/L (ref 100–108)
CHLORIDE SERPL-SCNC: 103 MMOL/L (ref 100–108)
CHLORIDE SERPL-SCNC: 104 MMOL/L (ref 100–108)
CO2 SERPL-SCNC: 26 MMOL/L (ref 21–32)
CO2 SERPL-SCNC: 27 MMOL/L (ref 21–32)
CO2 SERPL-SCNC: 28 MMOL/L (ref 21–32)
CO2 SERPL-SCNC: 28 MMOL/L (ref 21–32)
CO2 SERPL-SCNC: 29 MMOL/L (ref 21–32)
CREAT SERPL-MCNC: 1.22 MG/DL (ref 0.6–1.3)
CREAT SERPL-MCNC: 1.27 MG/DL (ref 0.6–1.3)
CREAT SERPL-MCNC: 1.27 MG/DL (ref 0.6–1.3)
CREAT SERPL-MCNC: 1.35 MG/DL (ref 0.6–1.3)
CREAT SERPL-MCNC: 1.57 MG/DL (ref 0.6–1.3)
EOSINOPHIL # BLD AUTO: 0.06 THOUSAND/ΜL (ref 0–0.61)
EOSINOPHIL NFR BLD AUTO: 1 % (ref 0–6)
ERYTHROCYTE [DISTWIDTH] IN BLOOD BY AUTOMATED COUNT: 13.6 % (ref 11.6–15.1)
GFR SERPL CREATININE-BSD FRML MDRD: 41 ML/MIN/1.73SQ M
GFR SERPL CREATININE-BSD FRML MDRD: 50 ML/MIN/1.73SQ M
GFR SERPL CREATININE-BSD FRML MDRD: 53 ML/MIN/1.73SQ M
GFR SERPL CREATININE-BSD FRML MDRD: 53 ML/MIN/1.73SQ M
GFR SERPL CREATININE-BSD FRML MDRD: 56 ML/MIN/1.73SQ M
GLUCOSE SERPL-MCNC: 104 MG/DL (ref 65–140)
GLUCOSE SERPL-MCNC: 127 MG/DL (ref 65–140)
GLUCOSE SERPL-MCNC: 147 MG/DL (ref 65–140)
GLUCOSE SERPL-MCNC: 151 MG/DL (ref 65–140)
GLUCOSE SERPL-MCNC: 157 MG/DL (ref 65–140)
GLUCOSE SERPL-MCNC: 159 MG/DL (ref 65–140)
HCO3 BLDA-SCNC: 25 MMOL/L (ref 24–30)
HCT VFR BLD AUTO: 37.6 % (ref 36.5–49.3)
HCT VFR BLD CALC: 36 % (ref 36.5–49.3)
HGB BLD-MCNC: 11.8 G/DL (ref 12–17)
HGB BLDA-MCNC: 12.2 G/DL (ref 12–17)
IMM GRANULOCYTES # BLD AUTO: 0.03 THOUSAND/UL (ref 0–0.2)
IMM GRANULOCYTES NFR BLD AUTO: 0 % (ref 0–2)
INR PPP: 1.41 (ref 0.84–1.19)
LYMPHOCYTES # BLD AUTO: 2.05 THOUSANDS/ΜL (ref 0.6–4.47)
LYMPHOCYTES NFR BLD AUTO: 25 % (ref 14–44)
MAGNESIUM SERPL-MCNC: 2.3 MG/DL (ref 1.6–2.6)
MAGNESIUM SERPL-MCNC: 2.3 MG/DL (ref 1.6–2.6)
MAGNESIUM SERPL-MCNC: 2.4 MG/DL (ref 1.6–2.6)
MAGNESIUM SERPL-MCNC: 2.5 MG/DL (ref 1.6–2.6)
MCH RBC QN AUTO: 32.2 PG (ref 26.8–34.3)
MCHC RBC AUTO-ENTMCNC: 31.4 G/DL (ref 31.4–37.4)
MCV RBC AUTO: 103 FL (ref 82–98)
MONOCYTES # BLD AUTO: 0.59 THOUSAND/ΜL (ref 0.17–1.22)
MONOCYTES NFR BLD AUTO: 7 % (ref 4–12)
NEUTROPHILS # BLD AUTO: 5.48 THOUSANDS/ΜL (ref 1.85–7.62)
NEUTS SEG NFR BLD AUTO: 67 % (ref 43–75)
NRBC BLD AUTO-RTO: 0 /100 WBCS
NT-PROBNP SERPL-MCNC: 6129 PG/ML
P AXIS: 43 DEGREES
PCO2 BLD: 26 MMOL/L (ref 21–32)
PCO2 BLD: 36.3 MM HG (ref 42–50)
PH BLD: 7.45 [PH] (ref 7.3–7.4)
PHOSPHATE SERPL-MCNC: 3.1 MG/DL (ref 2.3–4.1)
PLATELET # BLD AUTO: 180 THOUSANDS/UL (ref 149–390)
PMV BLD AUTO: 10.2 FL (ref 8.9–12.7)
PO2 BLD: 37 MM HG (ref 35–45)
POTASSIUM BLD-SCNC: 4.2 MMOL/L (ref 3.5–5.3)
POTASSIUM SERPL-SCNC: 4.1 MMOL/L (ref 3.5–5.3)
POTASSIUM SERPL-SCNC: 4.2 MMOL/L (ref 3.5–5.3)
POTASSIUM SERPL-SCNC: 4.3 MMOL/L (ref 3.5–5.3)
POTASSIUM SERPL-SCNC: 4.5 MMOL/L (ref 3.5–5.3)
POTASSIUM SERPL-SCNC: 4.7 MMOL/L (ref 3.5–5.3)
PR INTERVAL: 88 MS
PROT SERPL-MCNC: 6.7 G/DL (ref 6.4–8.2)
PROTHROMBIN TIME: 17.5 SECONDS (ref 11.6–14.5)
QRS AXIS: 105 DEGREES
QRS AXIS: 109 DEGREES
QRS AXIS: 112 DEGREES
QRS AXIS: 131 DEGREES
QRS AXIS: 141 DEGREES
QRSD INTERVAL: 160 MS
QRSD INTERVAL: 160 MS
QRSD INTERVAL: 162 MS
QRSD INTERVAL: 166 MS
QRSD INTERVAL: 168 MS
QT INTERVAL: 278 MS
QT INTERVAL: 388 MS
QT INTERVAL: 394 MS
QT INTERVAL: 448 MS
QT INTERVAL: 480 MS
QTC INTERVAL: 424 MS
QTC INTERVAL: 439 MS
QTC INTERVAL: 483 MS
QTC INTERVAL: 486 MS
QTC INTERVAL: 504 MS
RBC # BLD AUTO: 3.66 MILLION/UL (ref 3.88–5.62)
SAO2 % BLD FROM PO2: 73 % (ref 60–85)
SODIUM BLD-SCNC: 139 MMOL/L (ref 136–145)
SODIUM SERPL-SCNC: 139 MMOL/L (ref 136–145)
SODIUM SERPL-SCNC: 140 MMOL/L (ref 136–145)
SODIUM SERPL-SCNC: 141 MMOL/L (ref 136–145)
SPECIMEN SOURCE: ABNORMAL
T WAVE AXIS: -3 DEGREES
T WAVE AXIS: -85 DEGREES
T WAVE AXIS: 21 DEGREES
T WAVE AXIS: 21 DEGREES
T WAVE AXIS: 6 DEGREES
TROPONIN I SERPL-MCNC: 0.18 NG/ML
TROPONIN I SERPL-MCNC: 0.19 NG/ML
VENTRICULAR RATE: 184 BPM
VENTRICULAR RATE: 61 BPM
VENTRICULAR RATE: 72 BPM
VENTRICULAR RATE: 75 BPM
VENTRICULAR RATE: 76 BPM
WBC # BLD AUTO: 8.23 THOUSAND/UL (ref 4.31–10.16)

## 2020-11-09 PROCEDURE — 96365 THER/PROPH/DIAG IV INF INIT: CPT

## 2020-11-09 PROCEDURE — 93010 ELECTROCARDIOGRAM REPORT: CPT | Performed by: INTERNAL MEDICINE

## 2020-11-09 PROCEDURE — 83880 ASSAY OF NATRIURETIC PEPTIDE: CPT | Performed by: NURSE PRACTITIONER

## 2020-11-09 PROCEDURE — 84295 ASSAY OF SERUM SODIUM: CPT

## 2020-11-09 PROCEDURE — 99285 EMERGENCY DEPT VISIT HI MDM: CPT | Performed by: EMERGENCY MEDICINE

## 2020-11-09 PROCEDURE — 85014 HEMATOCRIT: CPT

## 2020-11-09 PROCEDURE — 84484 ASSAY OF TROPONIN QUANT: CPT | Performed by: PHYSICIAN ASSISTANT

## 2020-11-09 PROCEDURE — 85025 COMPLETE CBC W/AUTO DIFF WBC: CPT | Performed by: EMERGENCY MEDICINE

## 2020-11-09 PROCEDURE — 84484 ASSAY OF TROPONIN QUANT: CPT | Performed by: EMERGENCY MEDICINE

## 2020-11-09 PROCEDURE — 82330 ASSAY OF CALCIUM: CPT

## 2020-11-09 PROCEDURE — 80048 BASIC METABOLIC PNL TOTAL CA: CPT | Performed by: NURSE PRACTITIONER

## 2020-11-09 PROCEDURE — 82803 BLOOD GASES ANY COMBINATION: CPT

## 2020-11-09 PROCEDURE — 99292 CRITICAL CARE ADDL 30 MIN: CPT | Performed by: ANESTHESIOLOGY

## 2020-11-09 PROCEDURE — 36415 COLL VENOUS BLD VENIPUNCTURE: CPT | Performed by: EMERGENCY MEDICINE

## 2020-11-09 PROCEDURE — 93005 ELECTROCARDIOGRAM TRACING: CPT

## 2020-11-09 PROCEDURE — 71045 X-RAY EXAM CHEST 1 VIEW: CPT

## 2020-11-09 PROCEDURE — 84132 ASSAY OF SERUM POTASSIUM: CPT

## 2020-11-09 PROCEDURE — 83735 ASSAY OF MAGNESIUM: CPT | Performed by: NURSE PRACTITIONER

## 2020-11-09 PROCEDURE — 80048 BASIC METABOLIC PNL TOTAL CA: CPT | Performed by: EMERGENCY MEDICINE

## 2020-11-09 PROCEDURE — 82947 ASSAY GLUCOSE BLOOD QUANT: CPT

## 2020-11-09 PROCEDURE — 99223 1ST HOSP IP/OBS HIGH 75: CPT | Performed by: INTERNAL MEDICINE

## 2020-11-09 PROCEDURE — 96374 THER/PROPH/DIAG INJ IV PUSH: CPT

## 2020-11-09 PROCEDURE — 99285 EMERGENCY DEPT VISIT HI MDM: CPT

## 2020-11-09 PROCEDURE — 80053 COMPREHEN METABOLIC PANEL: CPT | Performed by: NURSE PRACTITIONER

## 2020-11-09 PROCEDURE — G0008 ADMIN INFLUENZA VIRUS VAC: HCPCS | Performed by: ANESTHESIOLOGY

## 2020-11-09 PROCEDURE — 99291 CRITICAL CARE FIRST HOUR: CPT | Performed by: NURSE PRACTITIONER

## 2020-11-09 PROCEDURE — 85610 PROTHROMBIN TIME: CPT | Performed by: NURSE PRACTITIONER

## 2020-11-09 PROCEDURE — 84100 ASSAY OF PHOSPHORUS: CPT | Performed by: NURSE PRACTITIONER

## 2020-11-09 PROCEDURE — 84484 ASSAY OF TROPONIN QUANT: CPT | Performed by: NURSE PRACTITIONER

## 2020-11-09 PROCEDURE — 90662 IIV NO PRSV INCREASED AG IM: CPT | Performed by: ANESTHESIOLOGY

## 2020-11-09 RX ORDER — ACETYLCYSTEINE 200 MG/ML
1200 SOLUTION ORAL; RESPIRATORY (INHALATION) 2 TIMES DAILY
Status: DISCONTINUED | OUTPATIENT
Start: 2020-11-09 | End: 2020-11-10

## 2020-11-09 RX ORDER — ONDANSETRON 2 MG/ML
4 INJECTION INTRAMUSCULAR; INTRAVENOUS EVERY 6 HOURS PRN
Status: DISCONTINUED | OUTPATIENT
Start: 2020-11-09 | End: 2020-11-10 | Stop reason: HOSPADM

## 2020-11-09 RX ORDER — POTASSIUM CHLORIDE 20 MEQ/1
40 TABLET, EXTENDED RELEASE ORAL ONCE
Status: COMPLETED | OUTPATIENT
Start: 2020-11-09 | End: 2020-11-09

## 2020-11-09 RX ORDER — MAGNESIUM SULFATE HEPTAHYDRATE 40 MG/ML
2 INJECTION, SOLUTION INTRAVENOUS ONCE
Status: COMPLETED | OUTPATIENT
Start: 2020-11-09 | End: 2020-11-09

## 2020-11-09 RX ORDER — CARVEDILOL 3.12 MG/1
3.12 TABLET ORAL 2 TIMES DAILY WITH MEALS
Status: DISCONTINUED | OUTPATIENT
Start: 2020-11-09 | End: 2020-11-10

## 2020-11-09 RX ORDER — ACETYLCYSTEINE 200 MG/ML
1200 SOLUTION ORAL; RESPIRATORY (INHALATION) 2 TIMES DAILY
Status: DISCONTINUED | OUTPATIENT
Start: 2020-11-10 | End: 2020-11-09

## 2020-11-09 RX ORDER — POLYETHYLENE GLYCOL 3350 17 G/17G
17 POWDER, FOR SOLUTION ORAL DAILY PRN
Status: DISCONTINUED | OUTPATIENT
Start: 2020-11-09 | End: 2020-11-10 | Stop reason: HOSPADM

## 2020-11-09 RX ORDER — TORSEMIDE 10 MG/1
10 TABLET ORAL DAILY
Status: DISCONTINUED | OUTPATIENT
Start: 2020-11-09 | End: 2020-11-09

## 2020-11-09 RX ORDER — GABAPENTIN 100 MG/1
200 CAPSULE ORAL
Status: DISCONTINUED | OUTPATIENT
Start: 2020-11-09 | End: 2020-11-10 | Stop reason: HOSPADM

## 2020-11-09 RX ORDER — METOPROLOL TARTRATE 5 MG/5ML
5 INJECTION INTRAVENOUS ONCE
Status: COMPLETED | OUTPATIENT
Start: 2020-11-09 | End: 2020-11-09

## 2020-11-09 RX ORDER — ONDANSETRON 2 MG/ML
INJECTION INTRAMUSCULAR; INTRAVENOUS
Status: COMPLETED
Start: 2020-11-09 | End: 2020-11-09

## 2020-11-09 RX ORDER — HEPARIN SODIUM 5000 [USP'U]/ML
5000 INJECTION, SOLUTION INTRAVENOUS; SUBCUTANEOUS EVERY 8 HOURS SCHEDULED
Status: DISCONTINUED | OUTPATIENT
Start: 2020-11-09 | End: 2020-11-09

## 2020-11-09 RX ORDER — FUROSEMIDE 10 MG/ML
40 INJECTION INTRAMUSCULAR; INTRAVENOUS ONCE
Status: COMPLETED | OUTPATIENT
Start: 2020-11-09 | End: 2020-11-09

## 2020-11-09 RX ORDER — SODIUM CHLORIDE 9 MG/ML
100 INJECTION, SOLUTION INTRAVENOUS CONTINUOUS
Status: DISCONTINUED | OUTPATIENT
Start: 2020-11-09 | End: 2020-11-10

## 2020-11-09 RX ORDER — SODIUM CHLORIDE 9 MG/ML
3 INJECTION INTRAVENOUS
Status: DISCONTINUED | OUTPATIENT
Start: 2020-11-09 | End: 2020-11-10 | Stop reason: HOSPADM

## 2020-11-09 RX ORDER — TRAMADOL HYDROCHLORIDE 50 MG/1
50 TABLET ORAL EVERY 6 HOURS PRN
Status: DISCONTINUED | OUTPATIENT
Start: 2020-11-09 | End: 2020-11-10 | Stop reason: HOSPADM

## 2020-11-09 RX ORDER — LANOLIN ALCOHOL/MO/W.PET/CERES
3 CREAM (GRAM) TOPICAL
Status: DISCONTINUED | OUTPATIENT
Start: 2020-11-09 | End: 2020-11-10 | Stop reason: HOSPADM

## 2020-11-09 RX ORDER — LIDOCAINE HYDROCHLORIDE 10 MG/ML
INJECTION, SOLUTION EPIDURAL; INFILTRATION; INTRACAUDAL; PERINEURAL
Status: DISCONTINUED
Start: 2020-11-09 | End: 2020-11-09 | Stop reason: WASHOUT

## 2020-11-09 RX ORDER — POTASSIUM CHLORIDE 20 MEQ/1
20 TABLET, EXTENDED RELEASE ORAL ONCE
Status: COMPLETED | OUTPATIENT
Start: 2020-11-09 | End: 2020-11-09

## 2020-11-09 RX ADMIN — ONDANSETRON 4 MG: 2 INJECTION INTRAMUSCULAR; INTRAVENOUS at 23:15

## 2020-11-09 RX ADMIN — AMIODARONE HYDROCHLORIDE 0.5 MG/MIN: 50 INJECTION, SOLUTION INTRAVENOUS at 23:19

## 2020-11-09 RX ADMIN — ENOXAPARIN SODIUM 105 MG: 120 INJECTION SUBCUTANEOUS at 22:06

## 2020-11-09 RX ADMIN — POTASSIUM CHLORIDE 40 MEQ: 1500 TABLET, EXTENDED RELEASE ORAL at 07:17

## 2020-11-09 RX ADMIN — LIDOCAINE HYDROCHLORIDE 100 MG: 20 INJECTION, SOLUTION INTRAVENOUS at 07:08

## 2020-11-09 RX ADMIN — LIDOCAINE HYDROCHLORIDE 100 MG: 20 INJECTION, SOLUTION INTRAVENOUS at 05:06

## 2020-11-09 RX ADMIN — METOROPROLOL TARTRATE 5 MG: 5 INJECTION, SOLUTION INTRAVENOUS at 07:00

## 2020-11-09 RX ADMIN — MAGNESIUM SULFATE HEPTAHYDRATE 2 G: 40 INJECTION, SOLUTION INTRAVENOUS at 05:30

## 2020-11-09 RX ADMIN — SODIUM CHLORIDE 100 ML/HR: 0.9 INJECTION, SOLUTION INTRAVENOUS at 23:22

## 2020-11-09 RX ADMIN — AMIODARONE HYDROCHLORIDE 1 MG/MIN: 50 INJECTION, SOLUTION INTRAVENOUS at 02:57

## 2020-11-09 RX ADMIN — Medication 150 MG: at 04:05

## 2020-11-09 RX ADMIN — LIDOCAINE HYDROCHLORIDE 100 MG: 20 INJECTION INTRAVENOUS at 18:18

## 2020-11-09 RX ADMIN — DEXTROSE 150 MG: 50 INJECTION, SOLUTION INTRAVENOUS at 02:51

## 2020-11-09 RX ADMIN — FUROSEMIDE 40 MG: 10 INJECTION, SOLUTION INTRAMUSCULAR; INTRAVENOUS at 09:24

## 2020-11-09 RX ADMIN — INFLUENZA A VIRUS A/MICHIGAN/45/2015 X-275 (H1N1) ANTIGEN (FORMALDEHYDE INACTIVATED), INFLUENZA A VIRUS A/SINGAPORE/INFIMH-16-0019/2016 IVR-186 (H3N2) ANTIGEN (FORMALDEHYDE INACTIVATED), INFLUENZA B VIRUS B/PHUKET/3073/2013 ANTIGEN (FORMALDEHYDE INACTIVATED), AND INFLUENZA B VIRUS B/MARYLAND/15/2016 BX-69A ANTIGEN (FORMALDEHYDE INACTIVATED) 0.7 ML: 60; 60; 60; 60 INJECTION, SUSPENSION INTRAMUSCULAR at 05:30

## 2020-11-09 RX ADMIN — MELATONIN 3 MG: 3 TAB ORAL at 22:07

## 2020-11-09 RX ADMIN — TRAMADOL HYDROCHLORIDE 50 MG: 50 TABLET, FILM COATED ORAL at 22:07

## 2020-11-09 RX ADMIN — CARVEDILOL 3.12 MG: 3.12 TABLET, FILM COATED ORAL at 17:54

## 2020-11-09 RX ADMIN — APIXABAN 2.5 MG: 2.5 TABLET, FILM COATED ORAL at 08:19

## 2020-11-09 RX ADMIN — MAGNESIUM SULFATE HEPTAHYDRATE 2 G: 40 INJECTION, SOLUTION INTRAVENOUS at 03:53

## 2020-11-09 RX ADMIN — GABAPENTIN 200 MG: 100 CAPSULE ORAL at 22:06

## 2020-11-09 RX ADMIN — ACETYLCYSTEINE 1200 MG: 200 SOLUTION ORAL; RESPIRATORY (INHALATION) at 17:54

## 2020-11-09 RX ADMIN — CARVEDILOL 3.12 MG: 3.12 TABLET, FILM COATED ORAL at 08:19

## 2020-11-09 RX ADMIN — POTASSIUM CHLORIDE 20 MEQ: 1500 TABLET, EXTENDED RELEASE ORAL at 12:42

## 2020-11-10 ENCOUNTER — HOSPITAL ENCOUNTER (INPATIENT)
Facility: HOSPITAL | Age: 80
LOS: 8 days | Discharge: HOME HEALTH | DRG: 286 | End: 2020-11-18
Attending: HOSPITALIST | Admitting: FAMILY MEDICINE
Payer: MEDICARE

## 2020-11-10 ENCOUNTER — TRANSITIONAL CARE MANAGEMENT (OUTPATIENT)
Dept: INTERNAL MEDICINE CLINIC | Facility: CLINIC | Age: 80
End: 2020-11-10

## 2020-11-10 VITALS
OXYGEN SATURATION: 95 % | DIASTOLIC BLOOD PRESSURE: 79 MMHG | RESPIRATION RATE: 20 BRPM | HEIGHT: 77 IN | TEMPERATURE: 97.7 F | HEART RATE: 69 BPM | WEIGHT: 234.35 LBS | BODY MASS INDEX: 27.67 KG/M2 | SYSTOLIC BLOOD PRESSURE: 118 MMHG

## 2020-11-10 DIAGNOSIS — N17.9 ACUTE KIDNEY INJURY SUPERIMPOSED ON CHRONIC KIDNEY DISEASE (HCC): ICD-10-CM

## 2020-11-10 DIAGNOSIS — N18.9 ACUTE KIDNEY INJURY SUPERIMPOSED ON CHRONIC KIDNEY DISEASE (HCC): ICD-10-CM

## 2020-11-10 DIAGNOSIS — I47.2 VENTRICULAR TACHYCARDIA (PAROXYSMAL) (HCC): ICD-10-CM

## 2020-11-10 DIAGNOSIS — M48.062 SPINAL STENOSIS OF LUMBAR REGION WITH NEUROGENIC CLAUDICATION: ICD-10-CM

## 2020-11-10 DIAGNOSIS — I50.22 CHRONIC SYSTOLIC HEART FAILURE (HCC): Chronic | ICD-10-CM

## 2020-11-10 DIAGNOSIS — I48.20 CHRONIC ATRIAL FIBRILLATION (HCC): ICD-10-CM

## 2020-11-10 DIAGNOSIS — I50.23 ACUTE ON CHRONIC SYSTOLIC HEART FAILURE (HCC): ICD-10-CM

## 2020-11-10 DIAGNOSIS — I42.0 DILATED CARDIOMYOPATHY (HCC): Chronic | ICD-10-CM

## 2020-11-10 DIAGNOSIS — Z45.02 ICD (IMPLANTABLE CARDIOVERTER-DEFIBRILLATOR) DISCHARGE: ICD-10-CM

## 2020-11-10 DIAGNOSIS — E85.9 AMYLOIDOSIS, UNSPECIFIED TYPE (HCC): Primary | ICD-10-CM

## 2020-11-10 LAB
ALBUMIN SERPL BCP-MCNC: 3.2 G/DL (ref 3.5–5)
ALP SERPL-CCNC: 79 U/L (ref 46–116)
ALT SERPL W P-5'-P-CCNC: 25 U/L (ref 12–78)
ANION GAP SERPL CALCULATED.3IONS-SCNC: 7 MMOL/L (ref 4–13)
AST SERPL W P-5'-P-CCNC: 22 U/L (ref 5–45)
ATRIAL RATE: 39 BPM
ATRIAL RATE: 80 BPM
BILIRUB DIRECT SERPL-MCNC: 0.43 MG/DL (ref 0–0.2)
BILIRUB SERPL-MCNC: 3 MG/DL (ref 0.2–1)
BUN SERPL-MCNC: 24 MG/DL (ref 5–25)
CALCIUM SERPL-MCNC: 9.3 MG/DL (ref 8.3–10.1)
CHLORIDE SERPL-SCNC: 103 MMOL/L (ref 100–108)
CO2 SERPL-SCNC: 28 MMOL/L (ref 21–32)
CREAT SERPL-MCNC: 1.49 MG/DL (ref 0.6–1.3)
ERYTHROCYTE [DISTWIDTH] IN BLOOD BY AUTOMATED COUNT: 13.8 % (ref 11.6–15.1)
GFR SERPL CREATININE-BSD FRML MDRD: 44 ML/MIN/1.73SQ M
GLUCOSE SERPL-MCNC: 134 MG/DL (ref 65–140)
HCT VFR BLD AUTO: 34.5 % (ref 36.5–49.3)
HGB BLD-MCNC: 11 G/DL (ref 12–17)
MAGNESIUM SERPL-MCNC: 2.8 MG/DL (ref 1.6–2.6)
MCH RBC QN AUTO: 32.1 PG (ref 26.8–34.3)
MCHC RBC AUTO-ENTMCNC: 31.9 G/DL (ref 31.4–37.4)
MCV RBC AUTO: 101 FL (ref 82–98)
PLATELET # BLD AUTO: 163 THOUSANDS/UL (ref 149–390)
PMV BLD AUTO: 10.5 FL (ref 8.9–12.7)
POTASSIUM SERPL-SCNC: 4.6 MMOL/L (ref 3.5–5.3)
PROT SERPL-MCNC: 6.6 G/DL (ref 6.4–8.2)
QRS AXIS: 118 DEGREES
QRS AXIS: 119 DEGREES
QRSD INTERVAL: 160 MS
QRSD INTERVAL: 166 MS
QT INTERVAL: 440 MS
QT INTERVAL: 474 MS
QTC INTERVAL: 475 MS
QTC INTERVAL: 511 MS
RBC # BLD AUTO: 3.43 MILLION/UL (ref 3.88–5.62)
SODIUM SERPL-SCNC: 138 MMOL/L (ref 136–145)
T WAVE AXIS: -41 DEGREES
T WAVE AXIS: -42 DEGREES
TSH SERPL DL<=0.05 MIU/L-ACNC: 1.24 UIU/ML (ref 0.36–3.74)
VENTRICULAR RATE: 70 BPM
VENTRICULAR RATE: 70 BPM
WBC # BLD AUTO: 7.54 THOUSAND/UL (ref 4.31–10.16)

## 2020-11-10 PROCEDURE — 80048 BASIC METABOLIC PNL TOTAL CA: CPT | Performed by: NURSE PRACTITIONER

## 2020-11-10 PROCEDURE — 99232 SBSQ HOSP IP/OBS MODERATE 35: CPT | Performed by: INTERNAL MEDICINE

## 2020-11-10 PROCEDURE — 80076 HEPATIC FUNCTION PANEL: CPT | Performed by: PHYSICIAN ASSISTANT

## 2020-11-10 PROCEDURE — 83735 ASSAY OF MAGNESIUM: CPT | Performed by: NURSE PRACTITIONER

## 2020-11-10 PROCEDURE — 93010 ELECTROCARDIOGRAM REPORT: CPT | Performed by: INTERNAL MEDICINE

## 2020-11-10 PROCEDURE — 93005 ELECTROCARDIOGRAM TRACING: CPT

## 2020-11-10 PROCEDURE — 99223 1ST HOSP IP/OBS HIGH 75: CPT | Performed by: INTERNAL MEDICINE

## 2020-11-10 PROCEDURE — 85027 COMPLETE CBC AUTOMATED: CPT | Performed by: NURSE PRACTITIONER

## 2020-11-10 PROCEDURE — 99233 SBSQ HOSP IP/OBS HIGH 50: CPT | Performed by: INTERNAL MEDICINE

## 2020-11-10 PROCEDURE — 99222 1ST HOSP IP/OBS MODERATE 55: CPT | Performed by: PHYSICIAN ASSISTANT

## 2020-11-10 PROCEDURE — 99291 CRITICAL CARE FIRST HOUR: CPT | Performed by: PHYSICIAN ASSISTANT

## 2020-11-10 PROCEDURE — 84443 ASSAY THYROID STIM HORMONE: CPT | Performed by: NURSE PRACTITIONER

## 2020-11-10 PROCEDURE — NC001 PR NO CHARGE: Performed by: NURSE PRACTITIONER

## 2020-11-10 RX ORDER — LANOLIN ALCOHOL/MO/W.PET/CERES
3 CREAM (GRAM) TOPICAL
Status: CANCELLED | OUTPATIENT
Start: 2020-11-10

## 2020-11-10 RX ORDER — GABAPENTIN 100 MG/1
200 CAPSULE ORAL
Status: CANCELLED | OUTPATIENT
Start: 2020-11-10

## 2020-11-10 RX ORDER — POLYETHYLENE GLYCOL 3350 17 G/17G
17 POWDER, FOR SOLUTION ORAL DAILY PRN
Status: CANCELLED | OUTPATIENT
Start: 2020-11-10

## 2020-11-10 RX ORDER — CARVEDILOL 6.25 MG/1
6.25 TABLET ORAL 2 TIMES DAILY WITH MEALS
Status: DISCONTINUED | OUTPATIENT
Start: 2020-11-10 | End: 2020-11-10

## 2020-11-10 RX ORDER — FUROSEMIDE 10 MG/ML
20 INJECTION INTRAMUSCULAR; INTRAVENOUS ONCE
Status: COMPLETED | OUTPATIENT
Start: 2020-11-10 | End: 2020-11-10

## 2020-11-10 RX ORDER — SODIUM CHLORIDE 9 MG/ML
3 INJECTION INTRAVENOUS
Status: CANCELLED | OUTPATIENT
Start: 2020-11-10

## 2020-11-10 RX ORDER — LIDOCAINE HYDROCHLORIDE ANHYDROUS AND DEXTROSE MONOHYDRATE .8; 5 G/100ML; G/100ML
1 INJECTION, SOLUTION INTRAVENOUS CONTINUOUS
Status: DISCONTINUED | OUTPATIENT
Start: 2020-11-10 | End: 2020-11-10 | Stop reason: HOSPADM

## 2020-11-10 RX ORDER — LIDOCAINE HYDROCHLORIDE ANHYDROUS AND DEXTROSE MONOHYDRATE .8; 5 G/100ML; G/100ML
1 INJECTION, SOLUTION INTRAVENOUS CONTINUOUS
Status: DISCONTINUED | OUTPATIENT
Start: 2020-11-10 | End: 2020-11-10

## 2020-11-10 RX ORDER — LIDOCAINE HYDROCHLORIDE ANHYDROUS AND DEXTROSE MONOHYDRATE .8; 5 G/100ML; G/100ML
1 INJECTION, SOLUTION INTRAVENOUS CONTINUOUS
Status: CANCELLED | OUTPATIENT
Start: 2020-11-10

## 2020-11-10 RX ORDER — TRAMADOL HYDROCHLORIDE 50 MG/1
50 TABLET ORAL EVERY 6 HOURS PRN
Status: DISCONTINUED | OUTPATIENT
Start: 2020-11-10 | End: 2020-11-18 | Stop reason: HOSPADM

## 2020-11-10 RX ORDER — TRAMADOL HYDROCHLORIDE 50 MG/1
50 TABLET ORAL EVERY 6 HOURS PRN
Status: CANCELLED | OUTPATIENT
Start: 2020-11-10

## 2020-11-10 RX ORDER — POLYETHYLENE GLYCOL 3350 17 G/17G
17 POWDER, FOR SOLUTION ORAL DAILY PRN
Status: DISCONTINUED | OUTPATIENT
Start: 2020-11-10 | End: 2020-11-18 | Stop reason: HOSPADM

## 2020-11-10 RX ORDER — SODIUM CHLORIDE 9 MG/ML
3 INJECTION INTRAVENOUS
Status: DISCONTINUED | OUTPATIENT
Start: 2020-11-10 | End: 2020-11-18 | Stop reason: HOSPADM

## 2020-11-10 RX ORDER — GABAPENTIN 100 MG/1
200 CAPSULE ORAL
Status: DISCONTINUED | OUTPATIENT
Start: 2020-11-10 | End: 2020-11-18 | Stop reason: HOSPADM

## 2020-11-10 RX ORDER — ONDANSETRON 2 MG/ML
4 INJECTION INTRAMUSCULAR; INTRAVENOUS EVERY 6 HOURS PRN
Status: DISCONTINUED | OUTPATIENT
Start: 2020-11-10 | End: 2020-11-10

## 2020-11-10 RX ORDER — LIDOCAINE HYDROCHLORIDE ANHYDROUS AND DEXTROSE MONOHYDRATE .8; 5 G/100ML; G/100ML
1 INJECTION, SOLUTION INTRAVENOUS CONTINUOUS
Status: DISCONTINUED | OUTPATIENT
Start: 2020-11-10 | End: 2020-11-11

## 2020-11-10 RX ORDER — MAGNESIUM SULFATE HEPTAHYDRATE 40 MG/ML
2 INJECTION, SOLUTION INTRAVENOUS ONCE
Status: COMPLETED | OUTPATIENT
Start: 2020-11-10 | End: 2020-11-10

## 2020-11-10 RX ORDER — ONDANSETRON 2 MG/ML
4 INJECTION INTRAMUSCULAR; INTRAVENOUS EVERY 6 HOURS PRN
Status: CANCELLED | OUTPATIENT
Start: 2020-11-10

## 2020-11-10 RX ORDER — FUROSEMIDE 10 MG/ML
80 INJECTION INTRAMUSCULAR; INTRAVENOUS ONCE
Status: DISCONTINUED | OUTPATIENT
Start: 2020-11-10 | End: 2020-11-10

## 2020-11-10 RX ORDER — CARVEDILOL 3.12 MG/1
3.12 TABLET ORAL ONCE
Status: COMPLETED | OUTPATIENT
Start: 2020-11-10 | End: 2020-11-10

## 2020-11-10 RX ORDER — CARVEDILOL 6.25 MG/1
6.25 TABLET ORAL 2 TIMES DAILY WITH MEALS
Status: CANCELLED | OUTPATIENT
Start: 2020-11-10

## 2020-11-10 RX ORDER — LANOLIN ALCOHOL/MO/W.PET/CERES
3 CREAM (GRAM) TOPICAL
Status: DISCONTINUED | OUTPATIENT
Start: 2020-11-10 | End: 2020-11-18 | Stop reason: HOSPADM

## 2020-11-10 RX ORDER — METOPROLOL SUCCINATE 50 MG/1
50 TABLET, EXTENDED RELEASE ORAL 2 TIMES DAILY
Status: DISCONTINUED | OUTPATIENT
Start: 2020-11-10 | End: 2020-11-18 | Stop reason: HOSPADM

## 2020-11-10 RX ORDER — CARVEDILOL 6.25 MG/1
6.25 TABLET ORAL 2 TIMES DAILY WITH MEALS
Status: DISCONTINUED | OUTPATIENT
Start: 2020-11-10 | End: 2020-11-10 | Stop reason: HOSPADM

## 2020-11-10 RX ORDER — POTASSIUM CHLORIDE 20 MEQ/1
40 TABLET, EXTENDED RELEASE ORAL ONCE
Status: COMPLETED | OUTPATIENT
Start: 2020-11-10 | End: 2020-11-10

## 2020-11-10 RX ADMIN — LIDOCAINE HYDROCHLORIDE 1 MG/MIN: 8 INJECTION, SOLUTION INTRAVENOUS at 03:13

## 2020-11-10 RX ADMIN — LIDOCAINE HYDROCHLORIDE ANHYDROUS AND DEXTROSE MONOHYDRATE 1 MG/MIN: .8; 5 INJECTION, SOLUTION INTRAVENOUS at 16:17

## 2020-11-10 RX ADMIN — ACETYLCYSTEINE 1200 MG: 200 SOLUTION ORAL; RESPIRATORY (INHALATION) at 08:29

## 2020-11-10 RX ADMIN — CARVEDILOL 6.25 MG: 6.25 TABLET, FILM COATED ORAL at 16:20

## 2020-11-10 RX ADMIN — POTASSIUM CHLORIDE 40 MEQ: 1500 TABLET, EXTENDED RELEASE ORAL at 17:27

## 2020-11-10 RX ADMIN — METOPROLOL SUCCINATE 50 MG: 50 TABLET, EXTENDED RELEASE ORAL at 22:18

## 2020-11-10 RX ADMIN — FUROSEMIDE 20 MG: 10 INJECTION, SOLUTION INTRAMUSCULAR; INTRAVENOUS at 17:27

## 2020-11-10 RX ADMIN — SODIUM CHLORIDE 100 ML/HR: 0.9 INJECTION, SOLUTION INTRAVENOUS at 08:30

## 2020-11-10 RX ADMIN — MELATONIN 3 MG: at 22:18

## 2020-11-10 RX ADMIN — CARVEDILOL 3.12 MG: 3.12 TABLET, FILM COATED ORAL at 00:29

## 2020-11-10 RX ADMIN — MAGNESIUM SULFATE HEPTAHYDRATE 2 G: 40 INJECTION, SOLUTION INTRAVENOUS at 01:47

## 2020-11-10 RX ADMIN — FUROSEMIDE 20 MG: 10 INJECTION, SOLUTION INTRAMUSCULAR; INTRAVENOUS at 11:26

## 2020-11-10 RX ADMIN — AMIODARONE HYDROCHLORIDE 0.5 MG/MIN: 50 INJECTION, SOLUTION INTRAVENOUS at 16:17

## 2020-11-10 RX ADMIN — CARVEDILOL 6.25 MG: 6.25 TABLET, FILM COATED ORAL at 08:28

## 2020-11-10 RX ADMIN — GABAPENTIN 200 MG: 100 CAPSULE ORAL at 22:18

## 2020-11-11 ENCOUNTER — APPOINTMENT (INPATIENT)
Dept: RADIOLOGY | Facility: HOSPITAL | Age: 80
DRG: 286 | End: 2020-11-11
Payer: MEDICARE

## 2020-11-11 PROBLEM — R25.2: Status: ACTIVE | Noted: 2020-11-11

## 2020-11-11 LAB
ANION GAP SERPL CALCULATED.3IONS-SCNC: 7 MMOL/L (ref 4–13)
ATRIAL RATE: 375 BPM
ATRIAL RATE: 77 BPM
BACTERIA UR QL AUTO: ABNORMAL /HPF
BASOPHILS # BLD AUTO: 0.02 THOUSANDS/ΜL (ref 0–0.1)
BASOPHILS NFR BLD AUTO: 0 % (ref 0–1)
BILIRUB UR QL STRIP: ABNORMAL
BUN SERPL-MCNC: 31 MG/DL (ref 5–25)
CALCIUM SERPL-MCNC: 9.5 MG/DL (ref 8.3–10.1)
CHLORIDE SERPL-SCNC: 104 MMOL/L (ref 100–108)
CLARITY UR: CLEAR
CO2 SERPL-SCNC: 25 MMOL/L (ref 21–32)
COLOR UR: ABNORMAL
CREAT SERPL-MCNC: 1.62 MG/DL (ref 0.6–1.3)
EOSINOPHIL # BLD AUTO: 0.01 THOUSAND/ΜL (ref 0–0.61)
EOSINOPHIL NFR BLD AUTO: 0 % (ref 0–6)
ERYTHROCYTE [DISTWIDTH] IN BLOOD BY AUTOMATED COUNT: 13.8 % (ref 11.6–15.1)
FERRITIN SERPL-MCNC: 334 NG/ML (ref 8–388)
GFR SERPL CREATININE-BSD FRML MDRD: 40 ML/MIN/1.73SQ M
GLUCOSE SERPL-MCNC: 98 MG/DL (ref 65–140)
GLUCOSE UR STRIP-MCNC: NEGATIVE MG/DL
HCT VFR BLD AUTO: 36.5 % (ref 36.5–49.3)
HGB BLD-MCNC: 11.5 G/DL (ref 12–17)
HGB UR QL STRIP.AUTO: ABNORMAL
HYALINE CASTS #/AREA URNS LPF: ABNORMAL /LPF
IMM GRANULOCYTES # BLD AUTO: 0.02 THOUSAND/UL (ref 0–0.2)
IMM GRANULOCYTES NFR BLD AUTO: 0 % (ref 0–2)
IRON SATN MFR SERPL: 24 %
IRON SERPL-MCNC: 68 UG/DL (ref 65–175)
KETONES UR STRIP-MCNC: NEGATIVE MG/DL
LEUKOCYTE ESTERASE UR QL STRIP: ABNORMAL
LYMPHOCYTES # BLD AUTO: 1.4 THOUSANDS/ΜL (ref 0.6–4.47)
LYMPHOCYTES NFR BLD AUTO: 19 % (ref 14–44)
MAGNESIUM SERPL-MCNC: 2.6 MG/DL (ref 1.6–2.6)
MCH RBC QN AUTO: 32.4 PG (ref 26.8–34.3)
MCHC RBC AUTO-ENTMCNC: 31.5 G/DL (ref 31.4–37.4)
MCV RBC AUTO: 103 FL (ref 82–98)
MONOCYTES # BLD AUTO: 0.64 THOUSAND/ΜL (ref 0.17–1.22)
MONOCYTES NFR BLD AUTO: 9 % (ref 4–12)
NEUTROPHILS # BLD AUTO: 5.26 THOUSANDS/ΜL (ref 1.85–7.62)
NEUTS SEG NFR BLD AUTO: 72 % (ref 43–75)
NITRITE UR QL STRIP: NEGATIVE
NON-SQ EPI CELLS URNS QL MICRO: ABNORMAL /HPF
NRBC BLD AUTO-RTO: 0 /100 WBCS
PH UR STRIP.AUTO: 5 [PH]
PHOSPHATE SERPL-MCNC: 3.7 MG/DL (ref 2.3–4.1)
PLATELET # BLD AUTO: 161 THOUSANDS/UL (ref 149–390)
PMV BLD AUTO: 11.1 FL (ref 8.9–12.7)
POTASSIUM SERPL-SCNC: 4.6 MMOL/L (ref 3.5–5.3)
PROT UR STRIP-MCNC: NEGATIVE MG/DL
QRS AXIS: 109 DEGREES
QRS AXIS: 98 DEGREES
QRSD INTERVAL: 168 MS
QRSD INTERVAL: 170 MS
QT INTERVAL: 488 MS
QT INTERVAL: 490 MS
QTC INTERVAL: 527 MS
QTC INTERVAL: 529 MS
RBC # BLD AUTO: 3.55 MILLION/UL (ref 3.88–5.62)
RBC #/AREA URNS AUTO: ABNORMAL /HPF
SODIUM SERPL-SCNC: 136 MMOL/L (ref 136–145)
SP GR UR STRIP.AUTO: 1.02 (ref 1–1.03)
T WAVE AXIS: -31 DEGREES
T WAVE AXIS: -33 DEGREES
TIBC SERPL-MCNC: 284 UG/DL (ref 250–450)
UROBILINOGEN UR QL STRIP.AUTO: 1 E.U./DL
VENTRICULAR RATE: 70 BPM
VENTRICULAR RATE: 70 BPM
WBC # BLD AUTO: 7.35 THOUSAND/UL (ref 4.31–10.16)
WBC #/AREA URNS AUTO: ABNORMAL /HPF

## 2020-11-11 PROCEDURE — 83735 ASSAY OF MAGNESIUM: CPT | Performed by: INTERNAL MEDICINE

## 2020-11-11 PROCEDURE — 84100 ASSAY OF PHOSPHORUS: CPT | Performed by: INTERNAL MEDICINE

## 2020-11-11 PROCEDURE — 84165 PROTEIN E-PHORESIS SERUM: CPT | Performed by: NURSE PRACTITIONER

## 2020-11-11 PROCEDURE — 83550 IRON BINDING TEST: CPT | Performed by: NURSE PRACTITIONER

## 2020-11-11 PROCEDURE — 99232 SBSQ HOSP IP/OBS MODERATE 35: CPT | Performed by: PHYSICIAN ASSISTANT

## 2020-11-11 PROCEDURE — NC001 PR NO CHARGE: Performed by: INTERNAL MEDICINE

## 2020-11-11 PROCEDURE — 80048 BASIC METABOLIC PNL TOTAL CA: CPT | Performed by: INTERNAL MEDICINE

## 2020-11-11 PROCEDURE — 87522 HEPATITIS C REVRS TRNSCRPJ: CPT | Performed by: NURSE PRACTITIONER

## 2020-11-11 PROCEDURE — 92610 EVALUATE SWALLOWING FUNCTION: CPT

## 2020-11-11 PROCEDURE — 81001 URINALYSIS AUTO W/SCOPE: CPT | Performed by: FAMILY MEDICINE

## 2020-11-11 PROCEDURE — 83540 ASSAY OF IRON: CPT | Performed by: NURSE PRACTITIONER

## 2020-11-11 PROCEDURE — 93005 ELECTROCARDIOGRAM TRACING: CPT

## 2020-11-11 PROCEDURE — 84165 PROTEIN E-PHORESIS SERUM: CPT | Performed by: PATHOLOGY

## 2020-11-11 PROCEDURE — 99233 SBSQ HOSP IP/OBS HIGH 50: CPT | Performed by: INTERNAL MEDICINE

## 2020-11-11 PROCEDURE — 86430 RHEUMATOID FACTOR TEST QUAL: CPT | Performed by: NURSE PRACTITIONER

## 2020-11-11 PROCEDURE — 84166 PROTEIN E-PHORESIS/URINE/CSF: CPT | Performed by: PATHOLOGY

## 2020-11-11 PROCEDURE — 99233 SBSQ HOSP IP/OBS HIGH 50: CPT | Performed by: FAMILY MEDICINE

## 2020-11-11 PROCEDURE — 93010 ELECTROCARDIOGRAM REPORT: CPT | Performed by: INTERNAL MEDICINE

## 2020-11-11 PROCEDURE — 85025 COMPLETE CBC W/AUTO DIFF WBC: CPT | Performed by: INTERNAL MEDICINE

## 2020-11-11 PROCEDURE — 86431 RHEUMATOID FACTOR QUANT: CPT | Performed by: NURSE PRACTITIONER

## 2020-11-11 PROCEDURE — 76770 US EXAM ABDO BACK WALL COMP: CPT

## 2020-11-11 PROCEDURE — 82728 ASSAY OF FERRITIN: CPT | Performed by: NURSE PRACTITIONER

## 2020-11-11 PROCEDURE — 83883 ASSAY NEPHELOMETRY NOT SPEC: CPT | Performed by: NURSE PRACTITIONER

## 2020-11-11 PROCEDURE — 84166 PROTEIN E-PHORESIS/URINE/CSF: CPT | Performed by: NURSE PRACTITIONER

## 2020-11-11 RX ORDER — AMIODARONE HYDROCHLORIDE 200 MG/1
200 TABLET ORAL
Status: DISCONTINUED | OUTPATIENT
Start: 2020-11-11 | End: 2020-11-18

## 2020-11-11 RX ORDER — SODIUM CHLORIDE 9 MG/ML
50 INJECTION, SOLUTION INTRAVENOUS CONTINUOUS
Status: DISCONTINUED | OUTPATIENT
Start: 2020-11-11 | End: 2020-11-11

## 2020-11-11 RX ORDER — ACETYLCYSTEINE 200 MG/ML
1200 SOLUTION ORAL; RESPIRATORY (INHALATION) 2 TIMES DAILY
Status: DISCONTINUED | OUTPATIENT
Start: 2020-11-11 | End: 2020-11-12

## 2020-11-11 RX ORDER — METOCLOPRAMIDE HYDROCHLORIDE 5 MG/ML
10 INJECTION INTRAMUSCULAR; INTRAVENOUS EVERY 6 HOURS PRN
Status: DISCONTINUED | OUTPATIENT
Start: 2020-11-11 | End: 2020-11-18 | Stop reason: HOSPADM

## 2020-11-11 RX ORDER — FUROSEMIDE 10 MG/ML
20 INJECTION INTRAMUSCULAR; INTRAVENOUS
Status: DISCONTINUED | OUTPATIENT
Start: 2020-11-11 | End: 2020-11-12

## 2020-11-11 RX ORDER — ALBUMIN (HUMAN) 12.5 G/50ML
25 SOLUTION INTRAVENOUS ONCE
Status: COMPLETED | OUTPATIENT
Start: 2020-11-11 | End: 2020-11-11

## 2020-11-11 RX ORDER — ACETYLCYSTEINE 200 MG/ML
600 SOLUTION ORAL; RESPIRATORY (INHALATION) 2 TIMES DAILY
Status: DISCONTINUED | OUTPATIENT
Start: 2020-11-11 | End: 2020-11-11

## 2020-11-11 RX ORDER — SODIUM CHLORIDE 9 MG/ML
75 INJECTION, SOLUTION INTRAVENOUS CONTINUOUS
Status: DISCONTINUED | OUTPATIENT
Start: 2020-11-11 | End: 2020-11-11

## 2020-11-11 RX ADMIN — ALBUMIN (HUMAN) 25 G: 0.25 INJECTION, SOLUTION INTRAVENOUS at 12:51

## 2020-11-11 RX ADMIN — ACETYLCYSTEINE 1200 MG: 200 SOLUTION ORAL; RESPIRATORY (INHALATION) at 17:28

## 2020-11-11 RX ADMIN — MELATONIN 3 MG: at 22:03

## 2020-11-11 RX ADMIN — GABAPENTIN 200 MG: 100 CAPSULE ORAL at 22:03

## 2020-11-11 RX ADMIN — METOCLOPRAMIDE 10 MG: 5 INJECTION, SOLUTION INTRAMUSCULAR; INTRAVENOUS at 22:03

## 2020-11-11 RX ADMIN — AMIODARONE HYDROCHLORIDE 200 MG: 200 TABLET ORAL at 17:27

## 2020-11-11 RX ADMIN — APIXABAN 2.5 MG: 2.5 TABLET, FILM COATED ORAL at 17:27

## 2020-11-11 RX ADMIN — APIXABAN 2.5 MG: 2.5 TABLET, FILM COATED ORAL at 09:49

## 2020-11-11 RX ADMIN — METOPROLOL SUCCINATE 50 MG: 50 TABLET, EXTENDED RELEASE ORAL at 21:45

## 2020-11-11 RX ADMIN — METOCLOPRAMIDE 10 MG: 5 INJECTION, SOLUTION INTRAMUSCULAR; INTRAVENOUS at 05:56

## 2020-11-11 RX ADMIN — FUROSEMIDE 20 MG: 10 INJECTION, SOLUTION INTRAMUSCULAR; INTRAVENOUS at 12:46

## 2020-11-11 RX ADMIN — AMIODARONE HYDROCHLORIDE 200 MG: 200 TABLET ORAL at 14:57

## 2020-11-11 RX ADMIN — POLYETHYLENE GLYCOL 3350 17 G: 17 POWDER, FOR SOLUTION ORAL at 08:56

## 2020-11-11 RX ADMIN — AMIODARONE HYDROCHLORIDE 0.5 MG/MIN: 50 INJECTION, SOLUTION INTRAVENOUS at 06:48

## 2020-11-12 LAB
ALBUMIN SERPL ELPH-MCNC: 3.88 G/DL (ref 3.5–5)
ALBUMIN SERPL ELPH-MCNC: 59.7 % (ref 52–65)
ALBUMIN UR ELPH-MCNC: 100 %
ALPHA1 GLOB MFR UR ELPH: 0 %
ALPHA1 GLOB SERPL ELPH-MCNC: 0.33 G/DL (ref 0.1–0.4)
ALPHA1 GLOB SERPL ELPH-MCNC: 5 % (ref 2.5–5)
ALPHA2 GLOB MFR UR ELPH: 0 %
ALPHA2 GLOB SERPL ELPH-MCNC: 0.59 G/DL (ref 0.4–1.2)
ALPHA2 GLOB SERPL ELPH-MCNC: 9.1 % (ref 7–13)
ANION GAP SERPL CALCULATED.3IONS-SCNC: 5 MMOL/L (ref 4–13)
B-GLOBULIN MFR UR ELPH: 0 %
BASOPHILS # BLD AUTO: 0.02 THOUSANDS/ΜL (ref 0–0.1)
BASOPHILS NFR BLD AUTO: 0 % (ref 0–1)
BETA GLOB ABNORMAL SERPL ELPH-MCNC: 0.79 G/DL (ref 0.4–0.8)
BETA1 GLOB SERPL ELPH-MCNC: 12.2 % (ref 5–13)
BETA2 GLOB SERPL ELPH-MCNC: 3.1 % (ref 2–8)
BETA2+GAMMA GLOB SERPL ELPH-MCNC: 0.2 G/DL (ref 0.2–0.5)
BUN SERPL-MCNC: 36 MG/DL (ref 5–25)
CALCIUM OXALATE DIHYDRATE MFR STONE IR: 50 %
CALCIUM SERPL-MCNC: 9.1 MG/DL (ref 8.3–10.1)
CHLORIDE SERPL-SCNC: 103 MMOL/L (ref 100–108)
CO2 SERPL-SCNC: 27 MMOL/L (ref 21–32)
COLOR STONE: NORMAL
COM MFR STONE: 50 %
COMMENT-STONE3: NORMAL
COMPOSITION: NORMAL
CREAT SERPL-MCNC: 1.6 MG/DL (ref 0.6–1.3)
CRYOGLOB RF SER-ACNC: ABNORMAL [IU]/ML
EOSINOPHIL # BLD AUTO: 0.01 THOUSAND/ΜL (ref 0–0.61)
EOSINOPHIL NFR BLD AUTO: 0 % (ref 0–6)
ERYTHROCYTE [DISTWIDTH] IN BLOOD BY AUTOMATED COUNT: 13.6 % (ref 11.6–15.1)
GAMMA GLOB ABNORMAL SERPL ELPH-MCNC: 0.71 G/DL (ref 0.5–1.6)
GAMMA GLOB MFR UR ELPH: 0 %
GAMMA GLOB SERPL ELPH-MCNC: 10.9 % (ref 12–22)
GFR SERPL CREATININE-BSD FRML MDRD: 40 ML/MIN/1.73SQ M
GLUCOSE SERPL-MCNC: 104 MG/DL (ref 65–140)
HCT VFR BLD AUTO: 36.3 % (ref 36.5–49.3)
HGB BLD-MCNC: 11.8 G/DL (ref 12–17)
IGG/ALB SER: 1.48 {RATIO} (ref 1.1–1.8)
IMM GRANULOCYTES # BLD AUTO: 0.02 THOUSAND/UL (ref 0–0.2)
IMM GRANULOCYTES NFR BLD AUTO: 0 % (ref 0–2)
KAPPA LC FREE SER-MCNC: 85.1 MG/L (ref 3.3–19.4)
KAPPA LC FREE/LAMBDA FREE SER: 5.46 {RATIO} (ref 0.26–1.65)
LABORATORY COMMENT REPORT: NORMAL
LAMBDA LC FREE SERPL-MCNC: 15.6 MG/L (ref 5.7–26.3)
LYMPHOCYTES # BLD AUTO: 1.54 THOUSANDS/ΜL (ref 0.6–4.47)
LYMPHOCYTES NFR BLD AUTO: 17 % (ref 14–44)
MAGNESIUM SERPL-MCNC: 2.3 MG/DL (ref 1.6–2.6)
MCH RBC QN AUTO: 32.7 PG (ref 26.8–34.3)
MCHC RBC AUTO-ENTMCNC: 32.5 G/DL (ref 31.4–37.4)
MCV RBC AUTO: 101 FL (ref 82–98)
MONOCYTES # BLD AUTO: 0.86 THOUSAND/ΜL (ref 0.17–1.22)
MONOCYTES NFR BLD AUTO: 10 % (ref 4–12)
NEUTROPHILS # BLD AUTO: 6.62 THOUSANDS/ΜL (ref 1.85–7.62)
NEUTS SEG NFR BLD AUTO: 73 % (ref 43–75)
NRBC BLD AUTO-RTO: 0 /100 WBCS
PHOSPHATE SERPL-MCNC: 3.7 MG/DL (ref 2.3–4.1)
PHOTO: NORMAL
PLATELET # BLD AUTO: 179 THOUSANDS/UL (ref 149–390)
PMV BLD AUTO: 10.8 FL (ref 8.9–12.7)
POTASSIUM SERPL-SCNC: 4.3 MMOL/L (ref 3.5–5.3)
PROT PATTERN SERPL ELPH-IMP: ABNORMAL
PROT PATTERN UR ELPH-IMP: ABNORMAL
PROT SERPL-MCNC: 6.5 G/DL (ref 6.4–8.2)
PROT UR-MCNC: 20 MG/DL
RBC # BLD AUTO: 3.61 MILLION/UL (ref 3.88–5.62)
RHEUMATOID FACT SER QL LA: POSITIVE
SIZE STONE: NORMAL MM
SODIUM SERPL-SCNC: 135 MMOL/L (ref 136–145)
SPEC SOURCE SUBJ: NORMAL
STONE ANALYSIS-IMP: NORMAL
WBC # BLD AUTO: 9.07 THOUSAND/UL (ref 4.31–10.16)
WT STONE: 22 MG

## 2020-11-12 PROCEDURE — 99232 SBSQ HOSP IP/OBS MODERATE 35: CPT | Performed by: INTERNAL MEDICINE

## 2020-11-12 PROCEDURE — 85025 COMPLETE CBC W/AUTO DIFF WBC: CPT | Performed by: INTERNAL MEDICINE

## 2020-11-12 PROCEDURE — 84100 ASSAY OF PHOSPHORUS: CPT | Performed by: INTERNAL MEDICINE

## 2020-11-12 PROCEDURE — 83735 ASSAY OF MAGNESIUM: CPT | Performed by: INTERNAL MEDICINE

## 2020-11-12 PROCEDURE — 80048 BASIC METABOLIC PNL TOTAL CA: CPT | Performed by: INTERNAL MEDICINE

## 2020-11-12 PROCEDURE — 97163 PT EVAL HIGH COMPLEX 45 MIN: CPT

## 2020-11-12 PROCEDURE — 99233 SBSQ HOSP IP/OBS HIGH 50: CPT | Performed by: FAMILY MEDICINE

## 2020-11-12 PROCEDURE — 97167 OT EVAL HIGH COMPLEX 60 MIN: CPT

## 2020-11-12 RX ORDER — SODIUM PHOSPHATE, DIBASIC AND SODIUM PHOSPHATE, MONOBASIC 7; 19 G/133ML; G/133ML
1 ENEMA RECTAL ONCE
Status: COMPLETED | OUTPATIENT
Start: 2020-11-12 | End: 2020-11-12

## 2020-11-12 RX ORDER — FUROSEMIDE 10 MG/ML
20 INJECTION INTRAMUSCULAR; INTRAVENOUS ONCE
Status: COMPLETED | OUTPATIENT
Start: 2020-11-12 | End: 2020-11-12

## 2020-11-12 RX ORDER — FUROSEMIDE 10 MG/ML
40 INJECTION INTRAMUSCULAR; INTRAVENOUS ONCE
Status: COMPLETED | OUTPATIENT
Start: 2020-11-12 | End: 2020-11-12

## 2020-11-12 RX ORDER — POTASSIUM CHLORIDE 20 MEQ/1
20 TABLET, EXTENDED RELEASE ORAL ONCE
Status: COMPLETED | OUTPATIENT
Start: 2020-11-12 | End: 2020-11-12

## 2020-11-12 RX ADMIN — POLYETHYLENE GLYCOL 3350 17 G: 17 POWDER, FOR SOLUTION ORAL at 12:02

## 2020-11-12 RX ADMIN — METOPROLOL SUCCINATE 50 MG: 50 TABLET, EXTENDED RELEASE ORAL at 08:05

## 2020-11-12 RX ADMIN — FUROSEMIDE 20 MG: 10 INJECTION, SOLUTION INTRAMUSCULAR; INTRAVENOUS at 13:33

## 2020-11-12 RX ADMIN — POTASSIUM CHLORIDE 20 MEQ: 1500 TABLET, EXTENDED RELEASE ORAL at 13:33

## 2020-11-12 RX ADMIN — FUROSEMIDE 20 MG: 10 INJECTION, SOLUTION INTRAMUSCULAR; INTRAVENOUS at 08:05

## 2020-11-12 RX ADMIN — APIXABAN 2.5 MG: 2.5 TABLET, FILM COATED ORAL at 17:11

## 2020-11-12 RX ADMIN — SODIUM PHOSPHATE 1 ENEMA: 7; 19 ENEMA RECTAL at 11:59

## 2020-11-12 RX ADMIN — AMIODARONE HYDROCHLORIDE 200 MG: 200 TABLET ORAL at 11:32

## 2020-11-12 RX ADMIN — FUROSEMIDE 40 MG: 10 INJECTION, SOLUTION INTRAMUSCULAR; INTRAVENOUS at 17:11

## 2020-11-12 RX ADMIN — METOPROLOL SUCCINATE 50 MG: 50 TABLET, EXTENDED RELEASE ORAL at 21:12

## 2020-11-12 RX ADMIN — MELATONIN 3 MG: at 21:12

## 2020-11-12 RX ADMIN — AMIODARONE HYDROCHLORIDE 200 MG: 200 TABLET ORAL at 08:05

## 2020-11-12 RX ADMIN — GABAPENTIN 200 MG: 100 CAPSULE ORAL at 21:12

## 2020-11-12 RX ADMIN — METOCLOPRAMIDE 10 MG: 5 INJECTION, SOLUTION INTRAMUSCULAR; INTRAVENOUS at 04:17

## 2020-11-12 RX ADMIN — AMIODARONE HYDROCHLORIDE 200 MG: 200 TABLET ORAL at 17:11

## 2020-11-12 RX ADMIN — APIXABAN 2.5 MG: 2.5 TABLET, FILM COATED ORAL at 08:05

## 2020-11-13 ENCOUNTER — APPOINTMENT (INPATIENT)
Dept: RADIOLOGY | Facility: HOSPITAL | Age: 80
DRG: 286 | End: 2020-11-13
Payer: MEDICARE

## 2020-11-13 PROBLEM — R25.2: Status: RESOLVED | Noted: 2020-11-11 | Resolved: 2020-11-13

## 2020-11-13 LAB
ANION GAP SERPL CALCULATED.3IONS-SCNC: 5 MMOL/L (ref 4–13)
BASOPHILS # BLD AUTO: 0.02 THOUSANDS/ΜL (ref 0–0.1)
BASOPHILS NFR BLD AUTO: 0 % (ref 0–1)
BUN SERPL-MCNC: 30 MG/DL (ref 5–25)
CALCIUM SERPL-MCNC: 8.8 MG/DL (ref 8.3–10.1)
CHLORIDE SERPL-SCNC: 105 MMOL/L (ref 100–108)
CO2 SERPL-SCNC: 31 MMOL/L (ref 21–32)
CREAT SERPL-MCNC: 1.38 MG/DL (ref 0.6–1.3)
EOSINOPHIL # BLD AUTO: 0.03 THOUSAND/ΜL (ref 0–0.61)
EOSINOPHIL NFR BLD AUTO: 0 % (ref 0–6)
ERYTHROCYTE [DISTWIDTH] IN BLOOD BY AUTOMATED COUNT: 13.7 % (ref 11.6–15.1)
GFR SERPL CREATININE-BSD FRML MDRD: 48 ML/MIN/1.73SQ M
GLUCOSE SERPL-MCNC: 85 MG/DL (ref 65–140)
HCT VFR BLD AUTO: 36.9 % (ref 36.5–49.3)
HCV RNA SERPL NAA+PROBE-ACNC: NORMAL IU/ML
HGB BLD-MCNC: 11.4 G/DL (ref 12–17)
IMM GRANULOCYTES # BLD AUTO: 0.03 THOUSAND/UL (ref 0–0.2)
IMM GRANULOCYTES NFR BLD AUTO: 0 % (ref 0–2)
LYMPHOCYTES # BLD AUTO: 1.43 THOUSANDS/ΜL (ref 0.6–4.47)
LYMPHOCYTES NFR BLD AUTO: 18 % (ref 14–44)
MAGNESIUM SERPL-MCNC: 2.1 MG/DL (ref 1.6–2.6)
MCH RBC QN AUTO: 32.1 PG (ref 26.8–34.3)
MCHC RBC AUTO-ENTMCNC: 30.9 G/DL (ref 31.4–37.4)
MCV RBC AUTO: 104 FL (ref 82–98)
MONOCYTES # BLD AUTO: 0.77 THOUSAND/ΜL (ref 0.17–1.22)
MONOCYTES NFR BLD AUTO: 10 % (ref 4–12)
NEUTROPHILS # BLD AUTO: 5.5 THOUSANDS/ΜL (ref 1.85–7.62)
NEUTS SEG NFR BLD AUTO: 72 % (ref 43–75)
NRBC BLD AUTO-RTO: 0 /100 WBCS
PHOSPHATE SERPL-MCNC: 3.8 MG/DL (ref 2.3–4.1)
PLATELET # BLD AUTO: 148 THOUSANDS/UL (ref 149–390)
PMV BLD AUTO: 11 FL (ref 8.9–12.7)
POTASSIUM SERPL-SCNC: 3.4 MMOL/L (ref 3.5–5.3)
RBC # BLD AUTO: 3.55 MILLION/UL (ref 3.88–5.62)
SODIUM SERPL-SCNC: 141 MMOL/L (ref 136–145)
TEST INFORMATION: NORMAL
WBC # BLD AUTO: 7.78 THOUSAND/UL (ref 4.31–10.16)

## 2020-11-13 PROCEDURE — 83735 ASSAY OF MAGNESIUM: CPT | Performed by: INTERNAL MEDICINE

## 2020-11-13 PROCEDURE — 80048 BASIC METABOLIC PNL TOTAL CA: CPT | Performed by: INTERNAL MEDICINE

## 2020-11-13 PROCEDURE — A9503 TC99M MEDRONATE: HCPCS

## 2020-11-13 PROCEDURE — G1004 CDSM NDSC: HCPCS

## 2020-11-13 PROCEDURE — 99232 SBSQ HOSP IP/OBS MODERATE 35: CPT | Performed by: INTERNAL MEDICINE

## 2020-11-13 PROCEDURE — 92526 ORAL FUNCTION THERAPY: CPT

## 2020-11-13 PROCEDURE — 99232 SBSQ HOSP IP/OBS MODERATE 35: CPT | Performed by: PHYSICIAN ASSISTANT

## 2020-11-13 PROCEDURE — 99233 SBSQ HOSP IP/OBS HIGH 50: CPT | Performed by: FAMILY MEDICINE

## 2020-11-13 PROCEDURE — 85025 COMPLETE CBC W/AUTO DIFF WBC: CPT | Performed by: INTERNAL MEDICINE

## 2020-11-13 PROCEDURE — A9538 TC99M PYROPHOSPHATE: HCPCS

## 2020-11-13 PROCEDURE — 78803 RP LOCLZJ TUM SPECT 1 AREA: CPT

## 2020-11-13 PROCEDURE — 84100 ASSAY OF PHOSPHORUS: CPT | Performed by: INTERNAL MEDICINE

## 2020-11-13 RX ORDER — FUROSEMIDE 10 MG/ML
40 INJECTION INTRAMUSCULAR; INTRAVENOUS
Status: DISCONTINUED | OUTPATIENT
Start: 2020-11-13 | End: 2020-11-15

## 2020-11-13 RX ORDER — POTASSIUM CHLORIDE 20 MEQ/1
40 TABLET, EXTENDED RELEASE ORAL 2 TIMES DAILY
Status: DISCONTINUED | OUTPATIENT
Start: 2020-11-13 | End: 2020-11-16

## 2020-11-13 RX ADMIN — METOPROLOL SUCCINATE 50 MG: 50 TABLET, EXTENDED RELEASE ORAL at 08:05

## 2020-11-13 RX ADMIN — AMIODARONE HYDROCHLORIDE 200 MG: 200 TABLET ORAL at 07:53

## 2020-11-13 RX ADMIN — POTASSIUM CHLORIDE 40 MEQ: 1500 TABLET, EXTENDED RELEASE ORAL at 17:54

## 2020-11-13 RX ADMIN — APIXABAN 2.5 MG: 2.5 TABLET, FILM COATED ORAL at 08:05

## 2020-11-13 RX ADMIN — METOPROLOL SUCCINATE 50 MG: 50 TABLET, EXTENDED RELEASE ORAL at 21:28

## 2020-11-13 RX ADMIN — POTASSIUM CHLORIDE 40 MEQ: 1500 TABLET, EXTENDED RELEASE ORAL at 10:12

## 2020-11-13 RX ADMIN — FUROSEMIDE 40 MG: 10 INJECTION, SOLUTION INTRAMUSCULAR; INTRAVENOUS at 10:12

## 2020-11-13 RX ADMIN — AMIODARONE HYDROCHLORIDE 200 MG: 200 TABLET ORAL at 11:53

## 2020-11-13 RX ADMIN — FUROSEMIDE 40 MG: 10 INJECTION, SOLUTION INTRAMUSCULAR; INTRAVENOUS at 17:54

## 2020-11-13 RX ADMIN — GABAPENTIN 200 MG: 100 CAPSULE ORAL at 21:28

## 2020-11-13 RX ADMIN — AMIODARONE HYDROCHLORIDE 200 MG: 200 TABLET ORAL at 17:54

## 2020-11-13 RX ADMIN — APIXABAN 2.5 MG: 2.5 TABLET, FILM COATED ORAL at 17:54

## 2020-11-13 RX ADMIN — POLYETHYLENE GLYCOL 3350 17 G: 17 POWDER, FOR SOLUTION ORAL at 17:55

## 2020-11-13 RX ADMIN — MELATONIN 3 MG: at 21:28

## 2020-11-14 PROBLEM — I50.23 ACUTE ON CHRONIC SYSTOLIC HEART FAILURE (HCC): Status: ACTIVE | Noted: 2018-02-02

## 2020-11-14 LAB
ANION GAP SERPL CALCULATED.3IONS-SCNC: 8 MMOL/L (ref 4–13)
BUN SERPL-MCNC: 28 MG/DL (ref 5–25)
CALCIUM SERPL-MCNC: 9.6 MG/DL (ref 8.3–10.1)
CHLORIDE SERPL-SCNC: 103 MMOL/L (ref 100–108)
CO2 SERPL-SCNC: 29 MMOL/L (ref 21–32)
CREAT SERPL-MCNC: 1.24 MG/DL (ref 0.6–1.3)
GFR SERPL CREATININE-BSD FRML MDRD: 55 ML/MIN/1.73SQ M
GLUCOSE SERPL-MCNC: 92 MG/DL (ref 65–140)
POTASSIUM SERPL-SCNC: 3.9 MMOL/L (ref 3.5–5.3)
SODIUM SERPL-SCNC: 140 MMOL/L (ref 136–145)

## 2020-11-14 PROCEDURE — 99232 SBSQ HOSP IP/OBS MODERATE 35: CPT | Performed by: INTERNAL MEDICINE

## 2020-11-14 PROCEDURE — 99233 SBSQ HOSP IP/OBS HIGH 50: CPT | Performed by: FAMILY MEDICINE

## 2020-11-14 PROCEDURE — 80048 BASIC METABOLIC PNL TOTAL CA: CPT | Performed by: FAMILY MEDICINE

## 2020-11-14 RX ADMIN — MELATONIN 3 MG: at 21:14

## 2020-11-14 RX ADMIN — FUROSEMIDE 40 MG: 10 INJECTION, SOLUTION INTRAMUSCULAR; INTRAVENOUS at 16:58

## 2020-11-14 RX ADMIN — GABAPENTIN 200 MG: 100 CAPSULE ORAL at 21:14

## 2020-11-14 RX ADMIN — AMIODARONE HYDROCHLORIDE 200 MG: 200 TABLET ORAL at 16:58

## 2020-11-14 RX ADMIN — APIXABAN 2.5 MG: 2.5 TABLET, FILM COATED ORAL at 17:00

## 2020-11-14 RX ADMIN — AMIODARONE HYDROCHLORIDE 200 MG: 200 TABLET ORAL at 11:46

## 2020-11-14 RX ADMIN — FUROSEMIDE 40 MG: 10 INJECTION, SOLUTION INTRAMUSCULAR; INTRAVENOUS at 09:02

## 2020-11-14 RX ADMIN — POTASSIUM CHLORIDE 40 MEQ: 1500 TABLET, EXTENDED RELEASE ORAL at 09:01

## 2020-11-14 RX ADMIN — APIXABAN 2.5 MG: 2.5 TABLET, FILM COATED ORAL at 09:01

## 2020-11-14 RX ADMIN — METOPROLOL SUCCINATE 50 MG: 50 TABLET, EXTENDED RELEASE ORAL at 09:01

## 2020-11-14 RX ADMIN — POTASSIUM CHLORIDE 40 MEQ: 1500 TABLET, EXTENDED RELEASE ORAL at 17:00

## 2020-11-14 RX ADMIN — METOPROLOL SUCCINATE 50 MG: 50 TABLET, EXTENDED RELEASE ORAL at 21:14

## 2020-11-14 RX ADMIN — AMIODARONE HYDROCHLORIDE 200 MG: 200 TABLET ORAL at 09:01

## 2020-11-14 RX ADMIN — POLYETHYLENE GLYCOL 3350 17 G: 17 POWDER, FOR SOLUTION ORAL at 11:46

## 2020-11-15 LAB
ANION GAP SERPL CALCULATED.3IONS-SCNC: 3 MMOL/L (ref 4–13)
BUN SERPL-MCNC: 26 MG/DL (ref 5–25)
CALCIUM SERPL-MCNC: 9.3 MG/DL (ref 8.3–10.1)
CHLORIDE SERPL-SCNC: 104 MMOL/L (ref 100–108)
CO2 SERPL-SCNC: 33 MMOL/L (ref 21–32)
CREAT SERPL-MCNC: 1.3 MG/DL (ref 0.6–1.3)
GFR SERPL CREATININE-BSD FRML MDRD: 52 ML/MIN/1.73SQ M
GLUCOSE SERPL-MCNC: 93 MG/DL (ref 65–140)
POTASSIUM SERPL-SCNC: 4.6 MMOL/L (ref 3.5–5.3)
SODIUM SERPL-SCNC: 140 MMOL/L (ref 136–145)

## 2020-11-15 PROCEDURE — 99232 SBSQ HOSP IP/OBS MODERATE 35: CPT | Performed by: INTERNAL MEDICINE

## 2020-11-15 PROCEDURE — 80048 BASIC METABOLIC PNL TOTAL CA: CPT | Performed by: FAMILY MEDICINE

## 2020-11-15 PROCEDURE — 99232 SBSQ HOSP IP/OBS MODERATE 35: CPT | Performed by: FAMILY MEDICINE

## 2020-11-15 RX ORDER — ACETYLCYSTEINE 200 MG/ML
1200 SOLUTION ORAL; RESPIRATORY (INHALATION) 2 TIMES DAILY
Status: COMPLETED | OUTPATIENT
Start: 2020-11-15 | End: 2020-11-16

## 2020-11-15 RX ORDER — SODIUM CHLORIDE 9 MG/ML
50 INJECTION, SOLUTION INTRAVENOUS CONTINUOUS
Status: DISCONTINUED | OUTPATIENT
Start: 2020-11-16 | End: 2020-11-16

## 2020-11-15 RX ORDER — FUROSEMIDE 10 MG/ML
40 INJECTION INTRAMUSCULAR; INTRAVENOUS DAILY
Status: DISCONTINUED | OUTPATIENT
Start: 2020-11-16 | End: 2020-11-16

## 2020-11-15 RX ADMIN — AMIODARONE HYDROCHLORIDE 200 MG: 200 TABLET ORAL at 11:11

## 2020-11-15 RX ADMIN — METOPROLOL SUCCINATE 50 MG: 50 TABLET, EXTENDED RELEASE ORAL at 21:23

## 2020-11-15 RX ADMIN — ACETYLCYSTEINE 1200 MG: 200 SOLUTION ORAL; RESPIRATORY (INHALATION) at 17:27

## 2020-11-15 RX ADMIN — APIXABAN 2.5 MG: 2.5 TABLET, FILM COATED ORAL at 17:25

## 2020-11-15 RX ADMIN — METOPROLOL SUCCINATE 50 MG: 50 TABLET, EXTENDED RELEASE ORAL at 08:54

## 2020-11-15 RX ADMIN — APIXABAN 2.5 MG: 2.5 TABLET, FILM COATED ORAL at 08:54

## 2020-11-15 RX ADMIN — POTASSIUM CHLORIDE 40 MEQ: 1500 TABLET, EXTENDED RELEASE ORAL at 08:54

## 2020-11-15 RX ADMIN — FUROSEMIDE 40 MG: 10 INJECTION, SOLUTION INTRAMUSCULAR; INTRAVENOUS at 08:54

## 2020-11-15 RX ADMIN — GABAPENTIN 200 MG: 100 CAPSULE ORAL at 21:23

## 2020-11-15 RX ADMIN — MELATONIN 3 MG: at 21:23

## 2020-11-15 RX ADMIN — POTASSIUM CHLORIDE 40 MEQ: 1500 TABLET, EXTENDED RELEASE ORAL at 17:25

## 2020-11-15 RX ADMIN — POLYETHYLENE GLYCOL 3350 17 G: 17 POWDER, FOR SOLUTION ORAL at 11:17

## 2020-11-15 RX ADMIN — AMIODARONE HYDROCHLORIDE 200 MG: 200 TABLET ORAL at 17:25

## 2020-11-15 RX ADMIN — ACETYLCYSTEINE 1200 MG: 200 SOLUTION ORAL; RESPIRATORY (INHALATION) at 11:12

## 2020-11-15 RX ADMIN — AMIODARONE HYDROCHLORIDE 200 MG: 200 TABLET ORAL at 08:54

## 2020-11-16 LAB
ANION GAP SERPL CALCULATED.3IONS-SCNC: 4 MMOL/L (ref 4–13)
BUN SERPL-MCNC: 26 MG/DL (ref 5–25)
CALCIUM SERPL-MCNC: 9.4 MG/DL (ref 8.3–10.1)
CHLORIDE SERPL-SCNC: 106 MMOL/L (ref 100–108)
CO2 SERPL-SCNC: 30 MMOL/L (ref 21–32)
CREAT SERPL-MCNC: 1.29 MG/DL (ref 0.6–1.3)
ERYTHROCYTE [DISTWIDTH] IN BLOOD BY AUTOMATED COUNT: 14.1 % (ref 11.6–15.1)
GFR SERPL CREATININE-BSD FRML MDRD: 52 ML/MIN/1.73SQ M
GLUCOSE SERPL-MCNC: 90 MG/DL (ref 65–140)
HCT VFR BLD AUTO: 35.8 % (ref 36.5–49.3)
HGB BLD-MCNC: 11.3 G/DL (ref 12–17)
MCH RBC QN AUTO: 32.2 PG (ref 26.8–34.3)
MCHC RBC AUTO-ENTMCNC: 31.6 G/DL (ref 31.4–37.4)
MCV RBC AUTO: 102 FL (ref 82–98)
PLATELET # BLD AUTO: 187 THOUSANDS/UL (ref 149–390)
PMV BLD AUTO: 10.4 FL (ref 8.9–12.7)
POTASSIUM SERPL-SCNC: 4.5 MMOL/L (ref 3.5–5.3)
RBC # BLD AUTO: 3.51 MILLION/UL (ref 3.88–5.62)
SODIUM SERPL-SCNC: 140 MMOL/L (ref 136–145)
WBC # BLD AUTO: 7.38 THOUSAND/UL (ref 4.31–10.16)

## 2020-11-16 PROCEDURE — 80048 BASIC METABOLIC PNL TOTAL CA: CPT | Performed by: FAMILY MEDICINE

## 2020-11-16 PROCEDURE — 99233 SBSQ HOSP IP/OBS HIGH 50: CPT | Performed by: INTERNAL MEDICINE

## 2020-11-16 PROCEDURE — 85027 COMPLETE CBC AUTOMATED: CPT | Performed by: FAMILY MEDICINE

## 2020-11-16 PROCEDURE — 99233 SBSQ HOSP IP/OBS HIGH 50: CPT | Performed by: FAMILY MEDICINE

## 2020-11-16 RX ORDER — SODIUM CHLORIDE 9 MG/ML
50 INJECTION, SOLUTION INTRAVENOUS CONTINUOUS
Status: DISCONTINUED | OUTPATIENT
Start: 2020-11-16 | End: 2020-11-16

## 2020-11-16 RX ORDER — SODIUM CHLORIDE 9 MG/ML
50 INJECTION, SOLUTION INTRAVENOUS CONTINUOUS
Status: DISCONTINUED | OUTPATIENT
Start: 2020-11-17 | End: 2020-11-17

## 2020-11-16 RX ORDER — FUROSEMIDE 10 MG/ML
40 INJECTION INTRAMUSCULAR; INTRAVENOUS DAILY
Status: COMPLETED | OUTPATIENT
Start: 2020-11-17 | End: 2020-11-17

## 2020-11-16 RX ADMIN — GABAPENTIN 200 MG: 100 CAPSULE ORAL at 20:51

## 2020-11-16 RX ADMIN — APIXABAN 2.5 MG: 2.5 TABLET, FILM COATED ORAL at 08:48

## 2020-11-16 RX ADMIN — POTASSIUM CHLORIDE 40 MEQ: 1500 TABLET, EXTENDED RELEASE ORAL at 08:47

## 2020-11-16 RX ADMIN — ACETYLCYSTEINE 1200 MG: 200 SOLUTION ORAL; RESPIRATORY (INHALATION) at 08:47

## 2020-11-16 RX ADMIN — AMIODARONE HYDROCHLORIDE 200 MG: 200 TABLET ORAL at 08:48

## 2020-11-16 RX ADMIN — ACETYLCYSTEINE 1200 MG: 200 SOLUTION ORAL; RESPIRATORY (INHALATION) at 17:13

## 2020-11-16 RX ADMIN — SODIUM CHLORIDE 50 ML/HR: 0.9 INJECTION, SOLUTION INTRAVENOUS at 11:05

## 2020-11-16 RX ADMIN — FUROSEMIDE 40 MG: 10 INJECTION, SOLUTION INTRAMUSCULAR; INTRAVENOUS at 08:52

## 2020-11-16 RX ADMIN — METOPROLOL SUCCINATE 50 MG: 50 TABLET, EXTENDED RELEASE ORAL at 20:51

## 2020-11-16 RX ADMIN — METOPROLOL SUCCINATE 50 MG: 50 TABLET, EXTENDED RELEASE ORAL at 08:51

## 2020-11-16 RX ADMIN — AMIODARONE HYDROCHLORIDE 200 MG: 200 TABLET ORAL at 17:13

## 2020-11-16 RX ADMIN — MELATONIN 3 MG: at 20:51

## 2020-11-16 RX ADMIN — AMIODARONE HYDROCHLORIDE 200 MG: 200 TABLET ORAL at 11:05

## 2020-11-16 RX ADMIN — POLYETHYLENE GLYCOL 3350 17 G: 17 POWDER, FOR SOLUTION ORAL at 15:09

## 2020-11-17 ENCOUNTER — APPOINTMENT (INPATIENT)
Dept: NON INVASIVE DIAGNOSTICS | Facility: HOSPITAL | Age: 80
DRG: 286 | End: 2020-11-17
Attending: INTERNAL MEDICINE
Payer: MEDICARE

## 2020-11-17 LAB
ANION GAP SERPL CALCULATED.3IONS-SCNC: 5 MMOL/L (ref 4–13)
BUN SERPL-MCNC: 25 MG/DL (ref 5–25)
CALCIUM SERPL-MCNC: 9.3 MG/DL (ref 8.3–10.1)
CHLORIDE SERPL-SCNC: 104 MMOL/L (ref 100–108)
CO2 SERPL-SCNC: 30 MMOL/L (ref 21–32)
CREAT SERPL-MCNC: 1.3 MG/DL (ref 0.6–1.3)
ERYTHROCYTE [DISTWIDTH] IN BLOOD BY AUTOMATED COUNT: 14 % (ref 11.6–15.1)
GFR SERPL CREATININE-BSD FRML MDRD: 52 ML/MIN/1.73SQ M
GLUCOSE SERPL-MCNC: 85 MG/DL (ref 65–140)
HCT VFR BLD AUTO: 36.5 % (ref 36.5–49.3)
HGB BLD-MCNC: 11.7 G/DL (ref 12–17)
MCH RBC QN AUTO: 32.7 PG (ref 26.8–34.3)
MCHC RBC AUTO-ENTMCNC: 32.1 G/DL (ref 31.4–37.4)
MCV RBC AUTO: 102 FL (ref 82–98)
PLATELET # BLD AUTO: 179 THOUSANDS/UL (ref 149–390)
PMV BLD AUTO: 10.2 FL (ref 8.9–12.7)
POTASSIUM SERPL-SCNC: 4.1 MMOL/L (ref 3.5–5.3)
RBC # BLD AUTO: 3.58 MILLION/UL (ref 3.88–5.62)
SODIUM SERPL-SCNC: 139 MMOL/L (ref 136–145)
WBC # BLD AUTO: 6.52 THOUSAND/UL (ref 4.31–10.16)

## 2020-11-17 PROCEDURE — 80048 BASIC METABOLIC PNL TOTAL CA: CPT | Performed by: FAMILY MEDICINE

## 2020-11-17 PROCEDURE — 99153 MOD SED SAME PHYS/QHP EA: CPT | Performed by: INTERNAL MEDICINE

## 2020-11-17 PROCEDURE — 99233 SBSQ HOSP IP/OBS HIGH 50: CPT | Performed by: INTERNAL MEDICINE

## 2020-11-17 PROCEDURE — 99233 SBSQ HOSP IP/OBS HIGH 50: CPT | Performed by: GENERAL PRACTICE

## 2020-11-17 PROCEDURE — 99152 MOD SED SAME PHYS/QHP 5/>YRS: CPT | Performed by: INTERNAL MEDICINE

## 2020-11-17 PROCEDURE — B2111ZZ FLUOROSCOPY OF MULTIPLE CORONARY ARTERIES USING LOW OSMOLAR CONTRAST: ICD-10-PCS | Performed by: INTERNAL MEDICINE

## 2020-11-17 PROCEDURE — 85027 COMPLETE CBC AUTOMATED: CPT | Performed by: FAMILY MEDICINE

## 2020-11-17 PROCEDURE — 93458 L HRT ARTERY/VENTRICLE ANGIO: CPT | Performed by: INTERNAL MEDICINE

## 2020-11-17 PROCEDURE — C1769 GUIDE WIRE: HCPCS | Performed by: INTERNAL MEDICINE

## 2020-11-17 PROCEDURE — 4A023N7 MEASUREMENT OF CARDIAC SAMPLING AND PRESSURE, LEFT HEART, PERCUTANEOUS APPROACH: ICD-10-PCS | Performed by: INTERNAL MEDICINE

## 2020-11-17 PROCEDURE — C1894 INTRO/SHEATH, NON-LASER: HCPCS | Performed by: INTERNAL MEDICINE

## 2020-11-17 RX ORDER — ACETAMINOPHEN 325 MG/1
650 TABLET ORAL EVERY 4 HOURS PRN
Status: DISCONTINUED | OUTPATIENT
Start: 2020-11-17 | End: 2020-11-18 | Stop reason: HOSPADM

## 2020-11-17 RX ORDER — ONDANSETRON 2 MG/ML
4 INJECTION INTRAMUSCULAR; INTRAVENOUS EVERY 6 HOURS PRN
Status: DISCONTINUED | OUTPATIENT
Start: 2020-11-17 | End: 2020-11-18 | Stop reason: HOSPADM

## 2020-11-17 RX ORDER — ASPIRIN 81 MG/1
TABLET, CHEWABLE ORAL CODE/TRAUMA/SEDATION MEDICATION
Status: COMPLETED | OUTPATIENT
Start: 2020-11-17 | End: 2020-11-17

## 2020-11-17 RX ORDER — LIDOCAINE HYDROCHLORIDE 10 MG/ML
INJECTION, SOLUTION EPIDURAL; INFILTRATION; INTRACAUDAL; PERINEURAL CODE/TRAUMA/SEDATION MEDICATION
Status: COMPLETED | OUTPATIENT
Start: 2020-11-17 | End: 2020-11-17

## 2020-11-17 RX ORDER — HEPARIN SODIUM 1000 [USP'U]/ML
INJECTION, SOLUTION INTRAVENOUS; SUBCUTANEOUS CODE/TRAUMA/SEDATION MEDICATION
Status: COMPLETED | OUTPATIENT
Start: 2020-11-17 | End: 2020-11-17

## 2020-11-17 RX ORDER — VERAPAMIL HYDROCHLORIDE 2.5 MG/ML
INJECTION, SOLUTION INTRAVENOUS CODE/TRAUMA/SEDATION MEDICATION
Status: COMPLETED | OUTPATIENT
Start: 2020-11-17 | End: 2020-11-17

## 2020-11-17 RX ORDER — FENTANYL CITRATE 50 UG/ML
INJECTION, SOLUTION INTRAMUSCULAR; INTRAVENOUS CODE/TRAUMA/SEDATION MEDICATION
Status: COMPLETED | OUTPATIENT
Start: 2020-11-17 | End: 2020-11-17

## 2020-11-17 RX ORDER — SODIUM CHLORIDE 9 MG/ML
50 INJECTION, SOLUTION INTRAVENOUS CONTINUOUS
Status: DISPENSED | OUTPATIENT
Start: 2020-11-17 | End: 2020-11-17

## 2020-11-17 RX ORDER — MIDAZOLAM HYDROCHLORIDE 2 MG/2ML
INJECTION, SOLUTION INTRAMUSCULAR; INTRAVENOUS CODE/TRAUMA/SEDATION MEDICATION
Status: COMPLETED | OUTPATIENT
Start: 2020-11-17 | End: 2020-11-17

## 2020-11-17 RX ORDER — NITROGLYCERIN 20 MG/100ML
INJECTION INTRAVENOUS CODE/TRAUMA/SEDATION MEDICATION
Status: COMPLETED | OUTPATIENT
Start: 2020-11-17 | End: 2020-11-17

## 2020-11-17 RX ADMIN — SODIUM CHLORIDE 50 ML/HR: 0.9 INJECTION, SOLUTION INTRAVENOUS at 04:54

## 2020-11-17 RX ADMIN — IOHEXOL 70 ML: 350 INJECTION, SOLUTION INTRAVENOUS at 09:22

## 2020-11-17 RX ADMIN — Medication 200 MCG: at 09:11

## 2020-11-17 RX ADMIN — MELATONIN 3 MG: at 21:12

## 2020-11-17 RX ADMIN — HEPARIN SODIUM 4000 UNITS: 1000 INJECTION INTRAVENOUS; SUBCUTANEOUS at 09:10

## 2020-11-17 RX ADMIN — AMIODARONE HYDROCHLORIDE 200 MG: 200 TABLET ORAL at 08:30

## 2020-11-17 RX ADMIN — METOPROLOL SUCCINATE 50 MG: 50 TABLET, EXTENDED RELEASE ORAL at 21:12

## 2020-11-17 RX ADMIN — AMIODARONE HYDROCHLORIDE 200 MG: 200 TABLET ORAL at 11:07

## 2020-11-17 RX ADMIN — ASPIRIN 81 MG 324 MG: 81 TABLET ORAL at 08:59

## 2020-11-17 RX ADMIN — METOPROLOL SUCCINATE 50 MG: 50 TABLET, EXTENDED RELEASE ORAL at 08:30

## 2020-11-17 RX ADMIN — APIXABAN 5 MG: 5 TABLET, FILM COATED ORAL at 17:09

## 2020-11-17 RX ADMIN — VERAPAMIL HYDROCHLORIDE 2.5 MG: 2.5 INJECTION INTRAVENOUS at 09:10

## 2020-11-17 RX ADMIN — SODIUM CHLORIDE 50 ML/HR: 0.9 INJECTION, SOLUTION INTRAVENOUS at 10:25

## 2020-11-17 RX ADMIN — MIDAZOLAM 2 MG: 1 INJECTION INTRAMUSCULAR; INTRAVENOUS at 09:03

## 2020-11-17 RX ADMIN — LIDOCAINE HYDROCHLORIDE 1 ML: 10 INJECTION, SOLUTION EPIDURAL; INFILTRATION; INTRACAUDAL; PERINEURAL at 09:09

## 2020-11-17 RX ADMIN — AMIODARONE HYDROCHLORIDE 200 MG: 200 TABLET ORAL at 17:09

## 2020-11-17 RX ADMIN — GABAPENTIN 200 MG: 100 CAPSULE ORAL at 21:12

## 2020-11-17 RX ADMIN — FUROSEMIDE 40 MG: 10 INJECTION, SOLUTION INTRAMUSCULAR; INTRAVENOUS at 17:09

## 2020-11-17 RX ADMIN — FENTANYL CITRATE 50 MCG: 50 INJECTION, SOLUTION INTRAMUSCULAR; INTRAVENOUS at 09:03

## 2020-11-18 ENCOUNTER — TRANSITIONAL CARE MANAGEMENT (OUTPATIENT)
Dept: INTERNAL MEDICINE CLINIC | Facility: CLINIC | Age: 80
End: 2020-11-18

## 2020-11-18 ENCOUNTER — TELEPHONE (OUTPATIENT)
Dept: CARDIOLOGY CLINIC | Facility: CLINIC | Age: 80
End: 2020-11-18

## 2020-11-18 VITALS
DIASTOLIC BLOOD PRESSURE: 60 MMHG | BODY MASS INDEX: 24.99 KG/M2 | TEMPERATURE: 97.5 F | RESPIRATION RATE: 18 BRPM | SYSTOLIC BLOOD PRESSURE: 99 MMHG | HEART RATE: 69 BPM | HEIGHT: 77 IN | WEIGHT: 211.64 LBS | OXYGEN SATURATION: 99 %

## 2020-11-18 DIAGNOSIS — E85.4 CARDIAC AMYLOIDOSIS (HCC): Primary | ICD-10-CM

## 2020-11-18 DIAGNOSIS — I43 CARDIAC AMYLOIDOSIS (HCC): Primary | ICD-10-CM

## 2020-11-18 DIAGNOSIS — I48.20 CHRONIC ATRIAL FIBRILLATION (HCC): ICD-10-CM

## 2020-11-18 LAB
ANION GAP SERPL CALCULATED.3IONS-SCNC: 6 MMOL/L (ref 4–13)
BUN SERPL-MCNC: 26 MG/DL (ref 5–25)
CALCIUM SERPL-MCNC: 9.5 MG/DL (ref 8.3–10.1)
CHLORIDE SERPL-SCNC: 104 MMOL/L (ref 100–108)
CO2 SERPL-SCNC: 29 MMOL/L (ref 21–32)
CREAT SERPL-MCNC: 1.21 MG/DL (ref 0.6–1.3)
ERYTHROCYTE [DISTWIDTH] IN BLOOD BY AUTOMATED COUNT: 13.9 % (ref 11.6–15.1)
GFR SERPL CREATININE-BSD FRML MDRD: 57 ML/MIN/1.73SQ M
GLUCOSE SERPL-MCNC: 85 MG/DL (ref 65–140)
HCT VFR BLD AUTO: 35.5 % (ref 36.5–49.3)
HGB BLD-MCNC: 11.2 G/DL (ref 12–17)
MCH RBC QN AUTO: 32.1 PG (ref 26.8–34.3)
MCHC RBC AUTO-ENTMCNC: 31.5 G/DL (ref 31.4–37.4)
MCV RBC AUTO: 102 FL (ref 82–98)
PLATELET # BLD AUTO: 169 THOUSANDS/UL (ref 149–390)
PMV BLD AUTO: 10.2 FL (ref 8.9–12.7)
POTASSIUM SERPL-SCNC: 3.8 MMOL/L (ref 3.5–5.3)
RBC # BLD AUTO: 3.49 MILLION/UL (ref 3.88–5.62)
SODIUM SERPL-SCNC: 139 MMOL/L (ref 136–145)
WBC # BLD AUTO: 7.01 THOUSAND/UL (ref 4.31–10.16)

## 2020-11-18 PROCEDURE — 80048 BASIC METABOLIC PNL TOTAL CA: CPT | Performed by: GENERAL PRACTICE

## 2020-11-18 PROCEDURE — 99233 SBSQ HOSP IP/OBS HIGH 50: CPT | Performed by: INTERNAL MEDICINE

## 2020-11-18 PROCEDURE — 99232 SBSQ HOSP IP/OBS MODERATE 35: CPT | Performed by: INTERNAL MEDICINE

## 2020-11-18 PROCEDURE — 85027 COMPLETE CBC AUTOMATED: CPT | Performed by: GENERAL PRACTICE

## 2020-11-18 PROCEDURE — 99239 HOSP IP/OBS DSCHRG MGMT >30: CPT | Performed by: GENERAL PRACTICE

## 2020-11-18 RX ORDER — POTASSIUM CHLORIDE 750 MG/1
10 TABLET, EXTENDED RELEASE ORAL DAILY
Status: DISCONTINUED | OUTPATIENT
Start: 2020-11-18 | End: 2020-11-18 | Stop reason: HOSPADM

## 2020-11-18 RX ORDER — AMIODARONE HYDROCHLORIDE 200 MG/1
200 TABLET ORAL
Status: DISCONTINUED | OUTPATIENT
Start: 2020-11-18 | End: 2020-11-18 | Stop reason: HOSPADM

## 2020-11-18 RX ORDER — METOPROLOL SUCCINATE 50 MG/1
50 TABLET, EXTENDED RELEASE ORAL 2 TIMES DAILY
Qty: 60 TABLET | Refills: 0 | Status: SHIPPED | OUTPATIENT
Start: 2020-11-18 | End: 2020-11-29 | Stop reason: HOSPADM

## 2020-11-18 RX ORDER — AMIODARONE HYDROCHLORIDE 200 MG/1
TABLET ORAL
Qty: 40 TABLET | Refills: 0 | Status: SHIPPED | OUTPATIENT
Start: 2020-11-18 | End: 2020-11-18 | Stop reason: SDUPTHER

## 2020-11-18 RX ORDER — TORSEMIDE 20 MG/1
20 TABLET ORAL DAILY
Status: DISCONTINUED | OUTPATIENT
Start: 2020-11-18 | End: 2020-11-18 | Stop reason: HOSPADM

## 2020-11-18 RX ORDER — TORSEMIDE 20 MG/1
20 TABLET ORAL DAILY
Qty: 30 TABLET | Refills: 0 | Status: SHIPPED | OUTPATIENT
Start: 2020-11-19 | End: 2020-11-24

## 2020-11-18 RX ORDER — POTASSIUM CHLORIDE 750 MG/1
10 TABLET, EXTENDED RELEASE ORAL DAILY
Qty: 30 TABLET | Refills: 0 | Status: SHIPPED | OUTPATIENT
Start: 2020-11-18 | End: 2020-12-14 | Stop reason: ALTCHOICE

## 2020-11-18 RX ORDER — AMIODARONE HYDROCHLORIDE 200 MG/1
200 TABLET ORAL
Status: DISCONTINUED | OUTPATIENT
Start: 2020-11-23 | End: 2020-11-18 | Stop reason: HOSPADM

## 2020-11-18 RX ORDER — GABAPENTIN 100 MG/1
200 CAPSULE ORAL
Start: 2020-11-18 | End: 2021-04-05 | Stop reason: SDUPTHER

## 2020-11-18 RX ORDER — TAFAMIDIS 61 MG/1
61 CAPSULE, LIQUID FILLED ORAL DAILY
Qty: 30 CAPSULE | Refills: 11 | Status: SHIPPED | OUTPATIENT
Start: 2020-11-18 | End: 2020-12-03 | Stop reason: SDUPTHER

## 2020-11-18 RX ORDER — AMIODARONE HYDROCHLORIDE 200 MG/1
TABLET ORAL
Qty: 40 TABLET | Refills: 0 | Status: SHIPPED | OUTPATIENT
Start: 2020-11-18 | End: 2020-12-04 | Stop reason: SDUPTHER

## 2020-11-18 RX ADMIN — AMIODARONE HYDROCHLORIDE 200 MG: 200 TABLET ORAL at 08:24

## 2020-11-18 RX ADMIN — AMIODARONE HYDROCHLORIDE 200 MG: 200 TABLET ORAL at 11:14

## 2020-11-18 RX ADMIN — APIXABAN 5 MG: 5 TABLET, FILM COATED ORAL at 08:24

## 2020-11-18 RX ADMIN — POTASSIUM CHLORIDE 10 MEQ: 750 TABLET, EXTENDED RELEASE ORAL at 12:18

## 2020-11-18 RX ADMIN — TORSEMIDE 20 MG: 20 TABLET ORAL at 08:23

## 2020-11-18 RX ADMIN — METOPROLOL SUCCINATE 50 MG: 50 TABLET, EXTENDED RELEASE ORAL at 08:24

## 2020-11-19 ENCOUNTER — TELEPHONE (OUTPATIENT)
Dept: CARDIOLOGY CLINIC | Facility: CLINIC | Age: 80
End: 2020-11-19

## 2020-11-23 ENCOUNTER — LAB (OUTPATIENT)
Dept: LAB | Facility: CLINIC | Age: 80
End: 2020-11-23
Payer: MEDICARE

## 2020-11-23 ENCOUNTER — TELEMEDICINE (OUTPATIENT)
Dept: INTERNAL MEDICINE CLINIC | Facility: CLINIC | Age: 80
End: 2020-11-23
Payer: MEDICARE

## 2020-11-23 ENCOUNTER — TELEPHONE (OUTPATIENT)
Dept: CARDIOLOGY CLINIC | Facility: CLINIC | Age: 80
End: 2020-11-23

## 2020-11-23 ENCOUNTER — PATIENT OUTREACH (OUTPATIENT)
Dept: INTERNAL MEDICINE CLINIC | Facility: CLINIC | Age: 80
End: 2020-11-23

## 2020-11-23 VITALS — WEIGHT: 207.1 LBS | BODY MASS INDEX: 24.56 KG/M2 | SYSTOLIC BLOOD PRESSURE: 106 MMHG | DIASTOLIC BLOOD PRESSURE: 62 MMHG

## 2020-11-23 DIAGNOSIS — N17.9 ACUTE KIDNEY INJURY SUPERIMPOSED ON CHRONIC KIDNEY DISEASE (HCC): ICD-10-CM

## 2020-11-23 DIAGNOSIS — N18.9 ACUTE KIDNEY INJURY SUPERIMPOSED ON CHRONIC KIDNEY DISEASE (HCC): ICD-10-CM

## 2020-11-23 DIAGNOSIS — I50.23 ACUTE ON CHRONIC SYSTOLIC HEART FAILURE (HCC): ICD-10-CM

## 2020-11-23 DIAGNOSIS — I47.2 VENTRICULAR TACHYCARDIA (PAROXYSMAL) (HCC): Primary | ICD-10-CM

## 2020-11-23 LAB
ANION GAP SERPL CALCULATED.3IONS-SCNC: 3 MMOL/L (ref 4–13)
BUN SERPL-MCNC: 31 MG/DL (ref 5–25)
CALCIUM SERPL-MCNC: 10.1 MG/DL (ref 8.3–10.1)
CHLORIDE SERPL-SCNC: 104 MMOL/L (ref 100–108)
CO2 SERPL-SCNC: 33 MMOL/L (ref 21–32)
CREAT SERPL-MCNC: 1.51 MG/DL (ref 0.6–1.3)
GFR SERPL CREATININE-BSD FRML MDRD: 43 ML/MIN/1.73SQ M
GLUCOSE P FAST SERPL-MCNC: 105 MG/DL (ref 65–99)
POTASSIUM SERPL-SCNC: 4.8 MMOL/L (ref 3.5–5.3)
SODIUM SERPL-SCNC: 140 MMOL/L (ref 136–145)

## 2020-11-23 PROCEDURE — 80048 BASIC METABOLIC PNL TOTAL CA: CPT

## 2020-11-23 PROCEDURE — 36415 COLL VENOUS BLD VENIPUNCTURE: CPT

## 2020-11-23 PROCEDURE — 99495 TRANSJ CARE MGMT MOD F2F 14D: CPT | Performed by: NURSE PRACTITIONER

## 2020-11-24 ENCOUNTER — EPISODE CHANGES (OUTPATIENT)
Dept: CASE MANAGEMENT | Facility: HOSPITAL | Age: 80
End: 2020-11-24

## 2020-11-24 ENCOUNTER — TELEPHONE (OUTPATIENT)
Dept: OTHER | Facility: OTHER | Age: 80
End: 2020-11-24

## 2020-11-24 ENCOUNTER — OFFICE VISIT (OUTPATIENT)
Dept: CARDIOLOGY CLINIC | Facility: CLINIC | Age: 80
End: 2020-11-24
Payer: MEDICARE

## 2020-11-24 VITALS
SYSTOLIC BLOOD PRESSURE: 102 MMHG | BODY MASS INDEX: 24.72 KG/M2 | HEIGHT: 77 IN | DIASTOLIC BLOOD PRESSURE: 68 MMHG | OXYGEN SATURATION: 98 % | HEART RATE: 73 BPM | WEIGHT: 209.4 LBS

## 2020-11-24 DIAGNOSIS — I50.23 ACUTE ON CHRONIC SYSTOLIC HEART FAILURE (HCC): ICD-10-CM

## 2020-11-24 PROCEDURE — 99215 OFFICE O/P EST HI 40 MIN: CPT | Performed by: INTERNAL MEDICINE

## 2020-11-24 RX ORDER — TORSEMIDE 20 MG/1
10 TABLET ORAL DAILY
Qty: 30 TABLET | Refills: 0 | Status: SHIPPED | OUTPATIENT
Start: 2020-11-24 | End: 2020-11-30 | Stop reason: SDUPTHER

## 2020-11-27 ENCOUNTER — TELEPHONE (OUTPATIENT)
Dept: CARDIOLOGY CLINIC | Facility: CLINIC | Age: 80
End: 2020-11-27

## 2020-11-27 ENCOUNTER — APPOINTMENT (EMERGENCY)
Dept: RADIOLOGY | Facility: HOSPITAL | Age: 80
DRG: 313 | End: 2020-11-27
Payer: MEDICARE

## 2020-11-27 ENCOUNTER — HOSPITAL ENCOUNTER (INPATIENT)
Facility: HOSPITAL | Age: 80
LOS: 1 days | Discharge: HOME HEALTH | DRG: 313 | End: 2020-11-29
Attending: EMERGENCY MEDICINE | Admitting: INTERNAL MEDICINE
Payer: MEDICARE

## 2020-11-27 DIAGNOSIS — K21.9 GERD (GASTROESOPHAGEAL REFLUX DISEASE): ICD-10-CM

## 2020-11-27 DIAGNOSIS — R07.9 CHEST PAIN: Primary | ICD-10-CM

## 2020-11-27 DIAGNOSIS — I42.0 DILATED CARDIOMYOPATHY (HCC): Chronic | ICD-10-CM

## 2020-11-27 LAB
ALBUMIN SERPL BCP-MCNC: 3.9 G/DL (ref 3.5–5)
ALP SERPL-CCNC: 87 U/L (ref 46–116)
ALT SERPL W P-5'-P-CCNC: 29 U/L (ref 12–78)
ANION GAP SERPL CALCULATED.3IONS-SCNC: 9 MMOL/L (ref 4–13)
AST SERPL W P-5'-P-CCNC: 25 U/L (ref 5–45)
BASOPHILS # BLD AUTO: 0.02 THOUSANDS/ΜL (ref 0–0.1)
BASOPHILS NFR BLD AUTO: 0 % (ref 0–1)
BILIRUB SERPL-MCNC: 1.8 MG/DL (ref 0.2–1)
BUN SERPL-MCNC: 28 MG/DL (ref 5–25)
CALCIUM SERPL-MCNC: 9.6 MG/DL (ref 8.3–10.1)
CHLORIDE SERPL-SCNC: 102 MMOL/L (ref 100–108)
CO2 SERPL-SCNC: 28 MMOL/L (ref 21–32)
CREAT SERPL-MCNC: 1.59 MG/DL (ref 0.6–1.3)
EOSINOPHIL # BLD AUTO: 0.07 THOUSAND/ΜL (ref 0–0.61)
EOSINOPHIL NFR BLD AUTO: 1 % (ref 0–6)
ERYTHROCYTE [DISTWIDTH] IN BLOOD BY AUTOMATED COUNT: 13.5 % (ref 11.6–15.1)
FLUAV RNA RESP QL NAA+PROBE: NEGATIVE
FLUBV RNA RESP QL NAA+PROBE: NEGATIVE
GFR SERPL CREATININE-BSD FRML MDRD: 41 ML/MIN/1.73SQ M
GLUCOSE SERPL-MCNC: 124 MG/DL (ref 65–140)
HCT VFR BLD AUTO: 40 % (ref 36.5–49.3)
HGB BLD-MCNC: 12.9 G/DL (ref 12–17)
IMM GRANULOCYTES # BLD AUTO: 0.02 THOUSAND/UL (ref 0–0.2)
IMM GRANULOCYTES NFR BLD AUTO: 0 % (ref 0–2)
LYMPHOCYTES # BLD AUTO: 2.43 THOUSANDS/ΜL (ref 0.6–4.47)
LYMPHOCYTES NFR BLD AUTO: 29 % (ref 14–44)
MCH RBC QN AUTO: 32.1 PG (ref 26.8–34.3)
MCHC RBC AUTO-ENTMCNC: 32.3 G/DL (ref 31.4–37.4)
MCV RBC AUTO: 100 FL (ref 82–98)
MONOCYTES # BLD AUTO: 0.49 THOUSAND/ΜL (ref 0.17–1.22)
MONOCYTES NFR BLD AUTO: 6 % (ref 4–12)
NEUTROPHILS # BLD AUTO: 5.28 THOUSANDS/ΜL (ref 1.85–7.62)
NEUTS SEG NFR BLD AUTO: 64 % (ref 43–75)
NRBC BLD AUTO-RTO: 0 /100 WBCS
NT-PROBNP SERPL-MCNC: 4743 PG/ML
PLATELET # BLD AUTO: 219 THOUSANDS/UL (ref 149–390)
PMV BLD AUTO: 10.1 FL (ref 8.9–12.7)
POTASSIUM SERPL-SCNC: 5 MMOL/L (ref 3.5–5.3)
PROT SERPL-MCNC: 7.8 G/DL (ref 6.4–8.2)
RBC # BLD AUTO: 4.02 MILLION/UL (ref 3.88–5.62)
RSV RNA RESP QL NAA+PROBE: NEGATIVE
SARS-COV-2 RNA RESP QL NAA+PROBE: NEGATIVE
SODIUM SERPL-SCNC: 139 MMOL/L (ref 136–145)
TROPONIN I SERPL-MCNC: 0.1 NG/ML
WBC # BLD AUTO: 8.31 THOUSAND/UL (ref 4.31–10.16)

## 2020-11-27 PROCEDURE — 99285 EMERGENCY DEPT VISIT HI MDM: CPT | Performed by: EMERGENCY MEDICINE

## 2020-11-27 PROCEDURE — 99285 EMERGENCY DEPT VISIT HI MDM: CPT

## 2020-11-27 PROCEDURE — 0241U HB NFCT DS VIR RESP RNA 4 TRGT: CPT | Performed by: EMERGENCY MEDICINE

## 2020-11-27 PROCEDURE — 71045 X-RAY EXAM CHEST 1 VIEW: CPT

## 2020-11-27 PROCEDURE — 85025 COMPLETE CBC W/AUTO DIFF WBC: CPT | Performed by: EMERGENCY MEDICINE

## 2020-11-27 PROCEDURE — 83735 ASSAY OF MAGNESIUM: CPT | Performed by: PHYSICIAN ASSISTANT

## 2020-11-27 PROCEDURE — 36415 COLL VENOUS BLD VENIPUNCTURE: CPT | Performed by: EMERGENCY MEDICINE

## 2020-11-27 PROCEDURE — 84484 ASSAY OF TROPONIN QUANT: CPT | Performed by: EMERGENCY MEDICINE

## 2020-11-27 PROCEDURE — 80053 COMPREHEN METABOLIC PANEL: CPT | Performed by: EMERGENCY MEDICINE

## 2020-11-27 PROCEDURE — 83880 ASSAY OF NATRIURETIC PEPTIDE: CPT | Performed by: EMERGENCY MEDICINE

## 2020-11-27 PROCEDURE — 93005 ELECTROCARDIOGRAM TRACING: CPT

## 2020-11-27 RX ORDER — ASPIRIN 81 MG/1
324 TABLET, CHEWABLE ORAL ONCE
Status: COMPLETED | OUTPATIENT
Start: 2020-11-27 | End: 2020-11-27

## 2020-11-27 RX ORDER — SODIUM CHLORIDE 9 MG/ML
3 INJECTION INTRAVENOUS
Status: DISCONTINUED | OUTPATIENT
Start: 2020-11-27 | End: 2020-11-29 | Stop reason: HOSPADM

## 2020-11-27 RX ADMIN — ASPIRIN 81 MG CHEWABLE TABLET 324 MG: 81 TABLET CHEWABLE at 22:31

## 2020-11-28 LAB
ANION GAP SERPL CALCULATED.3IONS-SCNC: 6 MMOL/L (ref 4–13)
BASOPHILS # BLD AUTO: 0.01 THOUSANDS/ΜL (ref 0–0.1)
BASOPHILS NFR BLD AUTO: 0 % (ref 0–1)
BILIRUB UR QL STRIP: NEGATIVE
BUN SERPL-MCNC: 29 MG/DL (ref 5–25)
CALCIUM SERPL-MCNC: 9.2 MG/DL (ref 8.3–10.1)
CHLORIDE SERPL-SCNC: 105 MMOL/L (ref 100–108)
CLARITY UR: CLEAR
CO2 SERPL-SCNC: 29 MMOL/L (ref 21–32)
COLOR UR: YELLOW
CREAT SERPL-MCNC: 1.38 MG/DL (ref 0.6–1.3)
EOSINOPHIL # BLD AUTO: 0.07 THOUSAND/ΜL (ref 0–0.61)
EOSINOPHIL NFR BLD AUTO: 1 % (ref 0–6)
ERYTHROCYTE [DISTWIDTH] IN BLOOD BY AUTOMATED COUNT: 13.6 % (ref 11.6–15.1)
GFR SERPL CREATININE-BSD FRML MDRD: 48 ML/MIN/1.73SQ M
GLUCOSE P FAST SERPL-MCNC: 93 MG/DL (ref 65–99)
GLUCOSE SERPL-MCNC: 93 MG/DL (ref 65–140)
GLUCOSE UR STRIP-MCNC: NEGATIVE MG/DL
HCT VFR BLD AUTO: 35.1 % (ref 36.5–49.3)
HGB BLD-MCNC: 11.2 G/DL (ref 12–17)
HGB UR QL STRIP.AUTO: NEGATIVE
IMM GRANULOCYTES # BLD AUTO: 0.01 THOUSAND/UL (ref 0–0.2)
IMM GRANULOCYTES NFR BLD AUTO: 0 % (ref 0–2)
KETONES UR STRIP-MCNC: NEGATIVE MG/DL
LEUKOCYTE ESTERASE UR QL STRIP: NEGATIVE
LYMPHOCYTES # BLD AUTO: 1.76 THOUSANDS/ΜL (ref 0.6–4.47)
LYMPHOCYTES NFR BLD AUTO: 30 % (ref 14–44)
MAGNESIUM SERPL-MCNC: 2.3 MG/DL (ref 1.6–2.6)
MCH RBC QN AUTO: 32.3 PG (ref 26.8–34.3)
MCHC RBC AUTO-ENTMCNC: 31.9 G/DL (ref 31.4–37.4)
MCV RBC AUTO: 101 FL (ref 82–98)
MONOCYTES # BLD AUTO: 0.39 THOUSAND/ΜL (ref 0.17–1.22)
MONOCYTES NFR BLD AUTO: 7 % (ref 4–12)
NEUTROPHILS # BLD AUTO: 3.57 THOUSANDS/ΜL (ref 1.85–7.62)
NEUTS SEG NFR BLD AUTO: 62 % (ref 43–75)
NITRITE UR QL STRIP: NEGATIVE
NRBC BLD AUTO-RTO: 0 /100 WBCS
PH UR STRIP.AUTO: 7 [PH]
PLATELET # BLD AUTO: 162 THOUSANDS/UL (ref 149–390)
PMV BLD AUTO: 9.9 FL (ref 8.9–12.7)
POTASSIUM SERPL-SCNC: 4.4 MMOL/L (ref 3.5–5.3)
PROT UR STRIP-MCNC: NEGATIVE MG/DL
RBC # BLD AUTO: 3.47 MILLION/UL (ref 3.88–5.62)
SODIUM SERPL-SCNC: 140 MMOL/L (ref 136–145)
SP GR UR STRIP.AUTO: 1.01 (ref 1–1.03)
TROPONIN I SERPL-MCNC: 0.1 NG/ML
TROPONIN I SERPL-MCNC: 0.12 NG/ML
TROPONIN I SERPL-MCNC: 0.12 NG/ML
UROBILINOGEN UR QL STRIP.AUTO: 0.2 E.U./DL
WBC # BLD AUTO: 5.81 THOUSAND/UL (ref 4.31–10.16)

## 2020-11-28 PROCEDURE — 93005 ELECTROCARDIOGRAM TRACING: CPT

## 2020-11-28 PROCEDURE — 99223 1ST HOSP IP/OBS HIGH 75: CPT | Performed by: INTERNAL MEDICINE

## 2020-11-28 PROCEDURE — 36415 COLL VENOUS BLD VENIPUNCTURE: CPT | Performed by: PHYSICIAN ASSISTANT

## 2020-11-28 PROCEDURE — 84484 ASSAY OF TROPONIN QUANT: CPT | Performed by: INTERNAL MEDICINE

## 2020-11-28 PROCEDURE — 84484 ASSAY OF TROPONIN QUANT: CPT | Performed by: PHYSICIAN ASSISTANT

## 2020-11-28 PROCEDURE — 80048 BASIC METABOLIC PNL TOTAL CA: CPT | Performed by: PHYSICIAN ASSISTANT

## 2020-11-28 PROCEDURE — 85025 COMPLETE CBC W/AUTO DIFF WBC: CPT | Performed by: PHYSICIAN ASSISTANT

## 2020-11-28 PROCEDURE — 81003 URINALYSIS AUTO W/O SCOPE: CPT | Performed by: PHYSICIAN ASSISTANT

## 2020-11-28 RX ORDER — GABAPENTIN 100 MG/1
200 CAPSULE ORAL
Status: DISCONTINUED | OUTPATIENT
Start: 2020-11-28 | End: 2020-11-29 | Stop reason: HOSPADM

## 2020-11-28 RX ORDER — TORSEMIDE 20 MG/1
10 TABLET ORAL DAILY
Status: DISCONTINUED | OUTPATIENT
Start: 2020-11-28 | End: 2020-11-29 | Stop reason: HOSPADM

## 2020-11-28 RX ORDER — POTASSIUM CHLORIDE 750 MG/1
10 TABLET, EXTENDED RELEASE ORAL DAILY
Status: DISCONTINUED | OUTPATIENT
Start: 2020-11-28 | End: 2020-11-29 | Stop reason: HOSPADM

## 2020-11-28 RX ORDER — ACETAMINOPHEN 325 MG/1
650 TABLET ORAL EVERY 6 HOURS PRN
Status: DISCONTINUED | OUTPATIENT
Start: 2020-11-28 | End: 2020-11-29 | Stop reason: HOSPADM

## 2020-11-28 RX ORDER — METOPROLOL SUCCINATE 25 MG/1
25 TABLET, EXTENDED RELEASE ORAL 2 TIMES DAILY
Status: DISCONTINUED | OUTPATIENT
Start: 2020-11-28 | End: 2020-11-29 | Stop reason: HOSPADM

## 2020-11-28 RX ORDER — PANTOPRAZOLE SODIUM 40 MG/1
40 TABLET, DELAYED RELEASE ORAL
Status: DISCONTINUED | OUTPATIENT
Start: 2020-11-28 | End: 2020-11-29 | Stop reason: HOSPADM

## 2020-11-28 RX ORDER — AMIODARONE HYDROCHLORIDE 200 MG/1
200 TABLET ORAL
Status: DISCONTINUED | OUTPATIENT
Start: 2020-11-28 | End: 2020-11-29 | Stop reason: HOSPADM

## 2020-11-28 RX ORDER — METOPROLOL SUCCINATE 50 MG/1
50 TABLET, EXTENDED RELEASE ORAL 2 TIMES DAILY
Status: DISCONTINUED | OUTPATIENT
Start: 2020-11-28 | End: 2020-11-28

## 2020-11-28 RX ORDER — DOCUSATE SODIUM 100 MG/1
100 CAPSULE, LIQUID FILLED ORAL 2 TIMES DAILY
Status: DISCONTINUED | OUTPATIENT
Start: 2020-11-28 | End: 2020-11-29 | Stop reason: HOSPADM

## 2020-11-28 RX ORDER — LANOLIN ALCOHOL/MO/W.PET/CERES
3 CREAM (GRAM) TOPICAL
Status: DISCONTINUED | OUTPATIENT
Start: 2020-11-28 | End: 2020-11-29 | Stop reason: HOSPADM

## 2020-11-28 RX ORDER — POLYETHYLENE GLYCOL 3350 17 G/17G
17 POWDER, FOR SOLUTION ORAL DAILY
Status: DISCONTINUED | OUTPATIENT
Start: 2020-11-28 | End: 2020-11-29 | Stop reason: HOSPADM

## 2020-11-28 RX ORDER — ONDANSETRON 2 MG/ML
4 INJECTION INTRAMUSCULAR; INTRAVENOUS EVERY 6 HOURS PRN
Status: DISCONTINUED | OUTPATIENT
Start: 2020-11-28 | End: 2020-11-29 | Stop reason: HOSPADM

## 2020-11-28 RX ADMIN — APIXABAN 5 MG: 5 TABLET, FILM COATED ORAL at 11:07

## 2020-11-28 RX ADMIN — GABAPENTIN 200 MG: 100 CAPSULE ORAL at 21:19

## 2020-11-28 RX ADMIN — PANTOPRAZOLE SODIUM 40 MG: 40 TABLET, DELAYED RELEASE ORAL at 09:45

## 2020-11-28 RX ADMIN — GABAPENTIN 200 MG: 100 CAPSULE ORAL at 02:32

## 2020-11-28 RX ADMIN — MELATONIN 3 MG: 3 TAB ORAL at 02:32

## 2020-11-28 RX ADMIN — AMIODARONE HYDROCHLORIDE 200 MG: 200 TABLET ORAL at 09:42

## 2020-11-28 RX ADMIN — POLYETHYLENE GLYCOL 3350 17 G: 17 POWDER, FOR SOLUTION ORAL at 09:41

## 2020-11-28 RX ADMIN — ACETAMINOPHEN 650 MG: 325 TABLET, FILM COATED ORAL at 09:44

## 2020-11-28 RX ADMIN — MELATONIN 3 MG: 3 TAB ORAL at 21:19

## 2020-11-28 RX ADMIN — POTASSIUM CHLORIDE 10 MEQ: 750 TABLET, EXTENDED RELEASE ORAL at 09:44

## 2020-11-28 RX ADMIN — APIXABAN 5 MG: 5 TABLET, FILM COATED ORAL at 17:32

## 2020-11-29 ENCOUNTER — TELEPHONE (OUTPATIENT)
Dept: OTHER | Facility: OTHER | Age: 80
End: 2020-11-29

## 2020-11-29 VITALS
HEIGHT: 77 IN | SYSTOLIC BLOOD PRESSURE: 100 MMHG | DIASTOLIC BLOOD PRESSURE: 60 MMHG | WEIGHT: 209.44 LBS | HEART RATE: 72 BPM | TEMPERATURE: 98.3 F | BODY MASS INDEX: 24.73 KG/M2 | OXYGEN SATURATION: 96 % | RESPIRATION RATE: 16 BRPM

## 2020-11-29 PROBLEM — R07.9 CHEST PAIN: Status: RESOLVED | Noted: 2020-11-27 | Resolved: 2020-11-29

## 2020-11-29 LAB
ANION GAP SERPL CALCULATED.3IONS-SCNC: 9 MMOL/L (ref 4–13)
BASOPHILS # BLD AUTO: 0.03 THOUSANDS/ΜL (ref 0–0.1)
BASOPHILS NFR BLD AUTO: 1 % (ref 0–1)
BUN SERPL-MCNC: 29 MG/DL (ref 5–25)
CALCIUM SERPL-MCNC: 9.4 MG/DL (ref 8.3–10.1)
CHLORIDE SERPL-SCNC: 105 MMOL/L (ref 100–108)
CO2 SERPL-SCNC: 26 MMOL/L (ref 21–32)
CREAT SERPL-MCNC: 1.53 MG/DL (ref 0.6–1.3)
EOSINOPHIL # BLD AUTO: 0.07 THOUSAND/ΜL (ref 0–0.61)
EOSINOPHIL NFR BLD AUTO: 1 % (ref 0–6)
ERYTHROCYTE [DISTWIDTH] IN BLOOD BY AUTOMATED COUNT: 13.5 % (ref 11.6–15.1)
GFR SERPL CREATININE-BSD FRML MDRD: 43 ML/MIN/1.73SQ M
GLUCOSE SERPL-MCNC: 98 MG/DL (ref 65–140)
HCT VFR BLD AUTO: 34 % (ref 36.5–49.3)
HGB BLD-MCNC: 11 G/DL (ref 12–17)
IMM GRANULOCYTES # BLD AUTO: 0.01 THOUSAND/UL (ref 0–0.2)
IMM GRANULOCYTES NFR BLD AUTO: 0 % (ref 0–2)
LYMPHOCYTES # BLD AUTO: 1.67 THOUSANDS/ΜL (ref 0.6–4.47)
LYMPHOCYTES NFR BLD AUTO: 30 % (ref 14–44)
MCH RBC QN AUTO: 32.4 PG (ref 26.8–34.3)
MCHC RBC AUTO-ENTMCNC: 32.4 G/DL (ref 31.4–37.4)
MCV RBC AUTO: 100 FL (ref 82–98)
MONOCYTES # BLD AUTO: 0.41 THOUSAND/ΜL (ref 0.17–1.22)
MONOCYTES NFR BLD AUTO: 7 % (ref 4–12)
NEUTROPHILS # BLD AUTO: 3.47 THOUSANDS/ΜL (ref 1.85–7.62)
NEUTS SEG NFR BLD AUTO: 61 % (ref 43–75)
NRBC BLD AUTO-RTO: 0 /100 WBCS
PLATELET # BLD AUTO: 152 THOUSANDS/UL (ref 149–390)
PMV BLD AUTO: 9.7 FL (ref 8.9–12.7)
POTASSIUM SERPL-SCNC: 4.2 MMOL/L (ref 3.5–5.3)
RBC # BLD AUTO: 3.39 MILLION/UL (ref 3.88–5.62)
SODIUM SERPL-SCNC: 140 MMOL/L (ref 136–145)
WBC # BLD AUTO: 5.66 THOUSAND/UL (ref 4.31–10.16)

## 2020-11-29 PROCEDURE — 80048 BASIC METABOLIC PNL TOTAL CA: CPT | Performed by: INTERNAL MEDICINE

## 2020-11-29 PROCEDURE — 99238 HOSP IP/OBS DSCHRG MGMT 30/<: CPT | Performed by: INTERNAL MEDICINE

## 2020-11-29 PROCEDURE — 85025 COMPLETE CBC W/AUTO DIFF WBC: CPT | Performed by: INTERNAL MEDICINE

## 2020-11-29 RX ORDER — METOPROLOL SUCCINATE 25 MG/1
25 TABLET, EXTENDED RELEASE ORAL 2 TIMES DAILY
Qty: 60 TABLET | Refills: 0 | Status: SHIPPED | OUTPATIENT
Start: 2020-11-29 | End: 2020-11-30 | Stop reason: SDUPTHER

## 2020-11-29 RX ORDER — PANTOPRAZOLE SODIUM 40 MG/1
40 TABLET, DELAYED RELEASE ORAL
Qty: 30 TABLET | Refills: 0 | Status: SHIPPED | OUTPATIENT
Start: 2020-11-30 | End: 2020-11-30

## 2020-11-29 RX ADMIN — APIXABAN 5 MG: 5 TABLET, FILM COATED ORAL at 09:04

## 2020-11-29 RX ADMIN — POTASSIUM CHLORIDE 10 MEQ: 750 TABLET, EXTENDED RELEASE ORAL at 09:09

## 2020-11-29 RX ADMIN — PANTOPRAZOLE SODIUM 40 MG: 40 TABLET, DELAYED RELEASE ORAL at 05:23

## 2020-11-29 RX ADMIN — DOCUSATE SODIUM 100 MG: 100 CAPSULE ORAL at 09:04

## 2020-11-29 RX ADMIN — AMIODARONE HYDROCHLORIDE 200 MG: 200 TABLET ORAL at 09:01

## 2020-11-29 RX ADMIN — POLYETHYLENE GLYCOL 3350 17 G: 17 POWDER, FOR SOLUTION ORAL at 09:10

## 2020-11-30 ENCOUNTER — TRANSITIONAL CARE MANAGEMENT (OUTPATIENT)
Dept: INTERNAL MEDICINE CLINIC | Facility: CLINIC | Age: 80
End: 2020-11-30

## 2020-11-30 ENCOUNTER — TELEPHONE (OUTPATIENT)
Dept: INTERNAL MEDICINE CLINIC | Facility: CLINIC | Age: 80
End: 2020-11-30

## 2020-11-30 ENCOUNTER — TELEPHONE (OUTPATIENT)
Dept: CARDIOLOGY CLINIC | Facility: CLINIC | Age: 80
End: 2020-11-30

## 2020-11-30 DIAGNOSIS — E78.5 HYPERLIPIDEMIA, UNSPECIFIED HYPERLIPIDEMIA TYPE: Primary | Chronic | ICD-10-CM

## 2020-11-30 DIAGNOSIS — R07.9 CHEST PAIN: ICD-10-CM

## 2020-11-30 DIAGNOSIS — N18.9 ACUTE KIDNEY INJURY SUPERIMPOSED ON CHRONIC KIDNEY DISEASE (HCC): ICD-10-CM

## 2020-11-30 DIAGNOSIS — K21.9 GERD (GASTROESOPHAGEAL REFLUX DISEASE): ICD-10-CM

## 2020-11-30 DIAGNOSIS — N17.9 ACUTE KIDNEY INJURY SUPERIMPOSED ON CHRONIC KIDNEY DISEASE (HCC): ICD-10-CM

## 2020-11-30 DIAGNOSIS — I50.23 ACUTE ON CHRONIC SYSTOLIC HEART FAILURE (HCC): ICD-10-CM

## 2020-11-30 DIAGNOSIS — I42.0 DILATED CARDIOMYOPATHY (HCC): Chronic | ICD-10-CM

## 2020-11-30 RX ORDER — PANTOPRAZOLE SODIUM 40 MG/1
40 TABLET, DELAYED RELEASE ORAL
Qty: 30 TABLET | Refills: 0 | Status: SHIPPED | OUTPATIENT
Start: 2020-11-30 | End: 2020-11-30 | Stop reason: SDUPTHER

## 2020-11-30 RX ORDER — METOPROLOL SUCCINATE 25 MG/1
25 TABLET, EXTENDED RELEASE ORAL EVERY 12 HOURS
Qty: 60 TABLET | Refills: 0 | Status: SHIPPED | OUTPATIENT
Start: 2020-11-30 | End: 2020-12-04 | Stop reason: DRUGHIGH

## 2020-11-30 RX ORDER — TORSEMIDE 10 MG/1
10 TABLET ORAL DAILY
Qty: 90 TABLET | Refills: 3 | Status: SHIPPED | OUTPATIENT
Start: 2020-11-30 | End: 2020-12-22 | Stop reason: SDUPTHER

## 2020-11-30 RX ORDER — PANTOPRAZOLE SODIUM 40 MG/1
40 TABLET, DELAYED RELEASE ORAL
Qty: 90 TABLET | Refills: 3 | Status: SHIPPED | OUTPATIENT
Start: 2020-11-30 | End: 2020-12-22 | Stop reason: SDUPTHER

## 2020-11-30 RX ORDER — METOPROLOL SUCCINATE 25 MG/1
25 TABLET, EXTENDED RELEASE ORAL 2 TIMES DAILY
Qty: 180 TABLET | Refills: 3 | Status: SHIPPED | OUTPATIENT
Start: 2020-11-30 | End: 2020-12-04 | Stop reason: SDUPTHER

## 2020-12-01 LAB
ATRIAL RATE: 79 BPM
ATRIAL RATE: 81 BPM
QRS AXIS: 107 DEGREES
QRS AXIS: 118 DEGREES
QRSD INTERVAL: 170 MS
QRSD INTERVAL: 180 MS
QT INTERVAL: 462 MS
QT INTERVAL: 480 MS
QTC INTERVAL: 518 MS
QTC INTERVAL: 526 MS
T WAVE AXIS: -30 DEGREES
T WAVE AXIS: 17 DEGREES
VENTRICULAR RATE: 70 BPM
VENTRICULAR RATE: 78 BPM

## 2020-12-01 PROCEDURE — 93010 ELECTROCARDIOGRAM REPORT: CPT | Performed by: INTERNAL MEDICINE

## 2020-12-02 ENCOUNTER — TELEPHONE (OUTPATIENT)
Dept: INTERNAL MEDICINE CLINIC | Facility: CLINIC | Age: 80
End: 2020-12-02

## 2020-12-03 ENCOUNTER — APPOINTMENT (OUTPATIENT)
Dept: LAB | Facility: CLINIC | Age: 80
End: 2020-12-03
Payer: MEDICARE

## 2020-12-03 ENCOUNTER — OFFICE VISIT (OUTPATIENT)
Dept: INTERNAL MEDICINE CLINIC | Facility: CLINIC | Age: 80
End: 2020-12-03
Payer: MEDICARE

## 2020-12-03 ENCOUNTER — NURSE TRIAGE (OUTPATIENT)
Dept: OTHER | Facility: OTHER | Age: 80
End: 2020-12-03

## 2020-12-03 ENCOUNTER — TELEPHONE (OUTPATIENT)
Dept: CARDIOLOGY CLINIC | Facility: CLINIC | Age: 80
End: 2020-12-03

## 2020-12-03 VITALS
DIASTOLIC BLOOD PRESSURE: 66 MMHG | HEIGHT: 77 IN | WEIGHT: 208 LBS | TEMPERATURE: 97.1 F | BODY MASS INDEX: 24.56 KG/M2 | HEART RATE: 72 BPM | RESPIRATION RATE: 12 BRPM | SYSTOLIC BLOOD PRESSURE: 98 MMHG

## 2020-12-03 DIAGNOSIS — N18.30 STAGE 3 CHRONIC KIDNEY DISEASE, UNSPECIFIED WHETHER STAGE 3A OR 3B CKD (HCC): ICD-10-CM

## 2020-12-03 DIAGNOSIS — Z95.810 PRESENCE OF AUTOMATIC CARDIOVERTER/DEFIBRILLATOR (AICD): ICD-10-CM

## 2020-12-03 DIAGNOSIS — I50.82 BIVENTRICULAR CONGESTIVE HEART FAILURE (HCC): ICD-10-CM

## 2020-12-03 DIAGNOSIS — I42.0 DILATED CARDIOMYOPATHY (HCC): Chronic | ICD-10-CM

## 2020-12-03 DIAGNOSIS — I48.20 CHRONIC ATRIAL FIBRILLATION (HCC): ICD-10-CM

## 2020-12-03 DIAGNOSIS — D64.9 LOW HEMOGLOBIN: Primary | ICD-10-CM

## 2020-12-03 DIAGNOSIS — E85.4 CARDIAC AMYLOIDOSIS (HCC): ICD-10-CM

## 2020-12-03 DIAGNOSIS — I43 CARDIAC AMYLOIDOSIS (HCC): ICD-10-CM

## 2020-12-03 DIAGNOSIS — E78.5 HYPERLIPIDEMIA, UNSPECIFIED HYPERLIPIDEMIA TYPE: Chronic | ICD-10-CM

## 2020-12-03 DIAGNOSIS — I10 ESSENTIAL HYPERTENSION: ICD-10-CM

## 2020-12-03 DIAGNOSIS — D64.9 LOW HEMOGLOBIN: ICD-10-CM

## 2020-12-03 PROBLEM — E87.1 HYPONATREMIA: Status: RESOLVED | Noted: 2020-09-28 | Resolved: 2020-12-03

## 2020-12-03 PROBLEM — N18.9 ACUTE KIDNEY INJURY SUPERIMPOSED ON CHRONIC KIDNEY DISEASE (HCC): Status: RESOLVED | Noted: 2020-09-27 | Resolved: 2020-12-03

## 2020-12-03 PROBLEM — N30.00 ACUTE CYSTITIS WITHOUT HEMATURIA: Status: RESOLVED | Noted: 2020-09-27 | Resolved: 2020-12-03

## 2020-12-03 PROBLEM — N17.9 ACUTE KIDNEY INJURY SUPERIMPOSED ON CHRONIC KIDNEY DISEASE (HCC): Status: RESOLVED | Noted: 2020-09-27 | Resolved: 2020-12-03

## 2020-12-03 PROBLEM — N13.30 HYDRONEPHROSIS OF LEFT KIDNEY: Status: RESOLVED | Noted: 2020-09-28 | Resolved: 2020-12-03

## 2020-12-03 LAB
BASOPHILS # BLD AUTO: 0.03 THOUSANDS/ΜL (ref 0–0.1)
BASOPHILS NFR BLD AUTO: 0 % (ref 0–1)
EOSINOPHIL # BLD AUTO: 0.06 THOUSAND/ΜL (ref 0–0.61)
EOSINOPHIL NFR BLD AUTO: 1 % (ref 0–6)
ERYTHROCYTE [DISTWIDTH] IN BLOOD BY AUTOMATED COUNT: 13.5 % (ref 11.6–15.1)
HCT VFR BLD AUTO: 41.4 % (ref 36.5–49.3)
HGB BLD-MCNC: 12.9 G/DL (ref 12–17)
IMM GRANULOCYTES # BLD AUTO: 0.01 THOUSAND/UL (ref 0–0.2)
IMM GRANULOCYTES NFR BLD AUTO: 0 % (ref 0–2)
LYMPHOCYTES # BLD AUTO: 2.04 THOUSANDS/ΜL (ref 0.6–4.47)
LYMPHOCYTES NFR BLD AUTO: 29 % (ref 14–44)
MCH RBC QN AUTO: 31.9 PG (ref 26.8–34.3)
MCHC RBC AUTO-ENTMCNC: 31.2 G/DL (ref 31.4–37.4)
MCV RBC AUTO: 102 FL (ref 82–98)
MONOCYTES # BLD AUTO: 0.52 THOUSAND/ΜL (ref 0.17–1.22)
MONOCYTES NFR BLD AUTO: 7 % (ref 4–12)
NEUTROPHILS # BLD AUTO: 4.5 THOUSANDS/ΜL (ref 1.85–7.62)
NEUTS SEG NFR BLD AUTO: 63 % (ref 43–75)
NRBC BLD AUTO-RTO: 0 /100 WBCS
PLATELET # BLD AUTO: 209 THOUSANDS/UL (ref 149–390)
PMV BLD AUTO: 10.4 FL (ref 8.9–12.7)
RBC # BLD AUTO: 4.05 MILLION/UL (ref 3.88–5.62)
WBC # BLD AUTO: 7.16 THOUSAND/UL (ref 4.31–10.16)

## 2020-12-03 PROCEDURE — 85025 COMPLETE CBC W/AUTO DIFF WBC: CPT

## 2020-12-03 PROCEDURE — 99215 OFFICE O/P EST HI 40 MIN: CPT | Performed by: NURSE PRACTITIONER

## 2020-12-03 PROCEDURE — 36415 COLL VENOUS BLD VENIPUNCTURE: CPT

## 2020-12-03 RX ORDER — TAFAMIDIS 61 MG/1
61 CAPSULE, LIQUID FILLED ORAL DAILY
Qty: 30 CAPSULE | Refills: 11 | Status: SHIPPED | OUTPATIENT
Start: 2020-12-03 | End: 2021-01-01

## 2020-12-04 ENCOUNTER — TELEPHONE (OUTPATIENT)
Dept: CARDIOLOGY CLINIC | Facility: CLINIC | Age: 80
End: 2020-12-04

## 2020-12-04 DIAGNOSIS — I48.20 CHRONIC ATRIAL FIBRILLATION (HCC): ICD-10-CM

## 2020-12-04 DIAGNOSIS — I42.0 DILATED CARDIOMYOPATHY (HCC): Primary | ICD-10-CM

## 2020-12-04 LAB
ATRIAL RATE: 42 BPM
QRS AXIS: 119 DEGREES
QRSD INTERVAL: 176 MS
QT INTERVAL: 486 MS
QTC INTERVAL: 516 MS
T WAVE AXIS: -16 DEGREES
VENTRICULAR RATE: 68 BPM

## 2020-12-04 PROCEDURE — 93010 ELECTROCARDIOGRAM REPORT: CPT | Performed by: INTERNAL MEDICINE

## 2020-12-04 RX ORDER — METOPROLOL SUCCINATE 25 MG/1
25 TABLET, EXTENDED RELEASE ORAL DAILY
Qty: 30 TABLET | Refills: 3
Start: 2020-12-04 | End: 2021-01-18 | Stop reason: SDUPTHER

## 2020-12-04 RX ORDER — AMIODARONE HYDROCHLORIDE 200 MG/1
TABLET ORAL
Qty: 40 TABLET | Refills: 0
Start: 2020-12-04 | End: 2020-12-10 | Stop reason: SDUPTHER

## 2020-12-04 NOTE — TELEPHONE ENCOUNTER
Pt's wife called to give pt's bp readings   Pt's wife had also said that she called [facility] & they told her to call Dr Brandy Mabry office right away to discuss pt's bp readings     11/29 - 111/68 pulse 75, 122/67 pulse 74  11/30 - 112/66 pulse 71, 103/63 pulse 78              101/59 pulse 72  12/01 - 110/63 pulse 73, 106/61 pulse 73  12/02 - 108/62 pulse 76, 90/59 pulse 74               94/64 pulse 74  12/03 - 102/69 pulse 73, 88/55 pulse 73               93/57 pulse 74, 97/62   12/04 - 97/62 pulse 78

## 2020-12-04 NOTE — TELEPHONE ENCOUNTER
Spoke with the patient's wife Fred Gonzalez and explained to her to please reduce the amount of the Metoprolol from 50mg a day to 25mg a day  Fred Gonzalez understood and will call back if anything else chnages  Changes were made with the medication as indicated by Dr Pat Balbuena  Thank you

## 2020-12-04 NOTE — TELEPHONE ENCOUNTER
To decrease the dosage of metoprolol succinate 25 mg to once a day instead of twice a day  Please update the med list after talking to patient's wife

## 2020-12-07 ENCOUNTER — REMOTE DEVICE CLINIC VISIT (OUTPATIENT)
Dept: CARDIOLOGY CLINIC | Facility: CLINIC | Age: 80
End: 2020-12-07
Payer: MEDICARE

## 2020-12-07 ENCOUNTER — LAB (OUTPATIENT)
Dept: LAB | Facility: CLINIC | Age: 80
End: 2020-12-07
Payer: MEDICARE

## 2020-12-07 DIAGNOSIS — Z95.810 PRESENCE OF IMPLANTABLE CARDIOVERTER-DEFIBRILLATOR (ICD): Primary | ICD-10-CM

## 2020-12-07 DIAGNOSIS — I50.23 ACUTE ON CHRONIC SYSTOLIC HEART FAILURE (HCC): ICD-10-CM

## 2020-12-07 LAB
ANION GAP SERPL CALCULATED.3IONS-SCNC: 3 MMOL/L (ref 4–13)
BUN SERPL-MCNC: 25 MG/DL (ref 5–25)
CALCIUM SERPL-MCNC: 10.2 MG/DL (ref 8.3–10.1)
CHLORIDE SERPL-SCNC: 105 MMOL/L (ref 100–108)
CO2 SERPL-SCNC: 33 MMOL/L (ref 21–32)
CREAT SERPL-MCNC: 1.57 MG/DL (ref 0.6–1.3)
GFR SERPL CREATININE-BSD FRML MDRD: 41 ML/MIN/1.73SQ M
GLUCOSE SERPL-MCNC: 99 MG/DL (ref 65–140)
POTASSIUM SERPL-SCNC: 5.3 MMOL/L (ref 3.5–5.3)
SODIUM SERPL-SCNC: 141 MMOL/L (ref 136–145)

## 2020-12-07 PROCEDURE — G2066 INTER DEVC REMOTE 30D: HCPCS | Performed by: INTERNAL MEDICINE

## 2020-12-07 PROCEDURE — 93297 REM INTERROG DEV EVAL ICPMS: CPT | Performed by: INTERNAL MEDICINE

## 2020-12-07 PROCEDURE — 80048 BASIC METABOLIC PNL TOTAL CA: CPT

## 2020-12-07 PROCEDURE — 36415 COLL VENOUS BLD VENIPUNCTURE: CPT

## 2020-12-08 ENCOUNTER — TELEPHONE (OUTPATIENT)
Dept: CARDIOLOGY CLINIC | Facility: CLINIC | Age: 80
End: 2020-12-08

## 2020-12-10 ENCOUNTER — TELEPHONE (OUTPATIENT)
Dept: CARDIOLOGY CLINIC | Facility: CLINIC | Age: 80
End: 2020-12-10

## 2020-12-10 ENCOUNTER — OFFICE VISIT (OUTPATIENT)
Dept: CARDIOLOGY CLINIC | Facility: CLINIC | Age: 80
End: 2020-12-10
Payer: MEDICARE

## 2020-12-10 VITALS
HEIGHT: 77 IN | BODY MASS INDEX: 24.44 KG/M2 | HEART RATE: 78 BPM | OXYGEN SATURATION: 97 % | WEIGHT: 207 LBS | SYSTOLIC BLOOD PRESSURE: 114 MMHG | RESPIRATION RATE: 18 BRPM | DIASTOLIC BLOOD PRESSURE: 70 MMHG

## 2020-12-10 DIAGNOSIS — Z95.810 PRESENCE OF IMPLANTABLE CARDIOVERTER-DEFIBRILLATOR (ICD): Primary | ICD-10-CM

## 2020-12-10 DIAGNOSIS — Z92.29 H/O AMIODARONE THERAPY: ICD-10-CM

## 2020-12-10 DIAGNOSIS — I48.20 CHRONIC ATRIAL FIBRILLATION (HCC): ICD-10-CM

## 2020-12-10 DIAGNOSIS — I47.2 VENTRICULAR TACHYCARDIA (PAROXYSMAL) (HCC): ICD-10-CM

## 2020-12-10 PROCEDURE — 99215 OFFICE O/P EST HI 40 MIN: CPT | Performed by: INTERNAL MEDICINE

## 2020-12-10 RX ORDER — AMIODARONE HYDROCHLORIDE 200 MG/1
TABLET ORAL
Qty: 30 TABLET | Refills: 11 | Status: SHIPPED | OUTPATIENT
Start: 2020-12-10 | End: 2021-06-28 | Stop reason: SDUPTHER

## 2020-12-10 NOTE — TELEPHONE ENCOUNTER
Pt spouse called requesting the procedure order mail and they would like to know what procedure pt needs

## 2020-12-11 ENCOUNTER — TELEPHONE (OUTPATIENT)
Dept: INTERNAL MEDICINE CLINIC | Facility: CLINIC | Age: 80
End: 2020-12-11

## 2020-12-13 PROCEDURE — 93000 ELECTROCARDIOGRAM COMPLETE: CPT | Performed by: INTERNAL MEDICINE

## 2020-12-14 ENCOUNTER — TELEMEDICINE (OUTPATIENT)
Dept: CARDIOLOGY CLINIC | Facility: CLINIC | Age: 80
End: 2020-12-14
Payer: MEDICARE

## 2020-12-14 VITALS
HEART RATE: 99 BPM | SYSTOLIC BLOOD PRESSURE: 110 MMHG | DIASTOLIC BLOOD PRESSURE: 67 MMHG | HEIGHT: 77 IN | BODY MASS INDEX: 24.19 KG/M2 | WEIGHT: 204.9 LBS

## 2020-12-14 DIAGNOSIS — I48.20 CHRONIC ATRIAL FIBRILLATION (HCC): ICD-10-CM

## 2020-12-14 DIAGNOSIS — I42.0 DILATED CARDIOMYOPATHY (HCC): Primary | ICD-10-CM

## 2020-12-14 DIAGNOSIS — E85.4 CARDIAC AMYLOIDOSIS (HCC): ICD-10-CM

## 2020-12-14 DIAGNOSIS — I43 CARDIAC AMYLOIDOSIS (HCC): ICD-10-CM

## 2020-12-14 DIAGNOSIS — Z79.01 CHRONIC ANTICOAGULATION: ICD-10-CM

## 2020-12-14 DIAGNOSIS — I47.2 VENTRICULAR TACHYCARDIA (PAROXYSMAL) (HCC): ICD-10-CM

## 2020-12-14 DIAGNOSIS — Z79.899 ON AMIODARONE THERAPY: ICD-10-CM

## 2020-12-14 DIAGNOSIS — I50.22 CHRONIC SYSTOLIC HEART FAILURE (HCC): ICD-10-CM

## 2020-12-14 PROCEDURE — 99214 OFFICE O/P EST MOD 30 MIN: CPT | Performed by: INTERNAL MEDICINE

## 2020-12-16 ENCOUNTER — TELEPHONE (OUTPATIENT)
Dept: NEPHROLOGY | Facility: CLINIC | Age: 80
End: 2020-12-16

## 2020-12-16 ENCOUNTER — DOCUMENTATION (OUTPATIENT)
Dept: CARDIOLOGY CLINIC | Facility: CLINIC | Age: 80
End: 2020-12-16

## 2020-12-18 ENCOUNTER — TELEPHONE (OUTPATIENT)
Dept: INTERNAL MEDICINE CLINIC | Facility: CLINIC | Age: 80
End: 2020-12-18

## 2020-12-22 DIAGNOSIS — I50.23 ACUTE ON CHRONIC SYSTOLIC HEART FAILURE (HCC): ICD-10-CM

## 2020-12-22 DIAGNOSIS — K21.9 GERD (GASTROESOPHAGEAL REFLUX DISEASE): ICD-10-CM

## 2020-12-22 DIAGNOSIS — R07.9 CHEST PAIN: ICD-10-CM

## 2020-12-22 RX ORDER — PANTOPRAZOLE SODIUM 40 MG/1
40 TABLET, DELAYED RELEASE ORAL
Qty: 90 TABLET | Refills: 3 | Status: SHIPPED | OUTPATIENT
Start: 2020-12-22 | End: 2021-06-28 | Stop reason: SDUPTHER

## 2020-12-22 RX ORDER — TORSEMIDE 10 MG/1
10 TABLET ORAL DAILY
Qty: 90 TABLET | Refills: 3 | Status: SHIPPED | OUTPATIENT
Start: 2020-12-22 | End: 2021-05-03 | Stop reason: SDUPTHER

## 2020-12-28 ENCOUNTER — LAB (OUTPATIENT)
Dept: LAB | Facility: CLINIC | Age: 80
End: 2020-12-28
Payer: MEDICARE

## 2020-12-28 LAB
ALBUMIN SERPL BCP-MCNC: 4 G/DL (ref 3.5–5)
ALP SERPL-CCNC: 79 U/L (ref 46–116)
ALT SERPL W P-5'-P-CCNC: 22 U/L (ref 12–78)
ANION GAP SERPL CALCULATED.3IONS-SCNC: 6 MMOL/L (ref 4–13)
AST SERPL W P-5'-P-CCNC: 18 U/L (ref 5–45)
BASOPHILS # BLD AUTO: 0.01 THOUSANDS/ΜL (ref 0–0.1)
BASOPHILS NFR BLD AUTO: 0 % (ref 0–1)
BILIRUB SERPL-MCNC: 2.21 MG/DL (ref 0.2–1)
BUN SERPL-MCNC: 23 MG/DL (ref 5–25)
CALCIUM SERPL-MCNC: 10.1 MG/DL (ref 8.3–10.1)
CHLORIDE SERPL-SCNC: 103 MMOL/L (ref 100–108)
CO2 SERPL-SCNC: 29 MMOL/L (ref 21–32)
CREAT SERPL-MCNC: 1.44 MG/DL (ref 0.6–1.3)
EOSINOPHIL # BLD AUTO: 0.03 THOUSAND/ΜL (ref 0–0.61)
EOSINOPHIL NFR BLD AUTO: 1 % (ref 0–6)
ERYTHROCYTE [DISTWIDTH] IN BLOOD BY AUTOMATED COUNT: 14 % (ref 11.6–15.1)
GFR SERPL CREATININE-BSD FRML MDRD: 46 ML/MIN/1.73SQ M
GLUCOSE SERPL-MCNC: 90 MG/DL (ref 65–140)
HCT VFR BLD AUTO: 40.5 % (ref 36.5–49.3)
HGB BLD-MCNC: 12.8 G/DL (ref 12–17)
IMM GRANULOCYTES # BLD AUTO: 0.01 THOUSAND/UL (ref 0–0.2)
IMM GRANULOCYTES NFR BLD AUTO: 0 % (ref 0–2)
LYMPHOCYTES # BLD AUTO: 1.48 THOUSANDS/ΜL (ref 0.6–4.47)
LYMPHOCYTES NFR BLD AUTO: 24 % (ref 14–44)
MCH RBC QN AUTO: 31.7 PG (ref 26.8–34.3)
MCHC RBC AUTO-ENTMCNC: 31.6 G/DL (ref 31.4–37.4)
MCV RBC AUTO: 100 FL (ref 82–98)
MONOCYTES # BLD AUTO: 0.45 THOUSAND/ΜL (ref 0.17–1.22)
MONOCYTES NFR BLD AUTO: 7 % (ref 4–12)
NEUTROPHILS # BLD AUTO: 4.3 THOUSANDS/ΜL (ref 1.85–7.62)
NEUTS SEG NFR BLD AUTO: 68 % (ref 43–75)
NRBC BLD AUTO-RTO: 0 /100 WBCS
PLATELET # BLD AUTO: 178 THOUSANDS/UL (ref 149–390)
PMV BLD AUTO: 10.5 FL (ref 8.9–12.7)
POTASSIUM SERPL-SCNC: 4 MMOL/L (ref 3.5–5.3)
PROT SERPL-MCNC: 7.6 G/DL (ref 6.4–8.2)
RBC # BLD AUTO: 4.04 MILLION/UL (ref 3.88–5.62)
SODIUM SERPL-SCNC: 138 MMOL/L (ref 136–145)
TSH SERPL DL<=0.05 MIU/L-ACNC: 1.39 UIU/ML (ref 0.36–3.74)
WBC # BLD AUTO: 6.28 THOUSAND/UL (ref 4.31–10.16)

## 2020-12-28 PROCEDURE — 85025 COMPLETE CBC W/AUTO DIFF WBC: CPT | Performed by: INTERNAL MEDICINE

## 2020-12-28 PROCEDURE — 36415 COLL VENOUS BLD VENIPUNCTURE: CPT | Performed by: INTERNAL MEDICINE

## 2020-12-28 PROCEDURE — 84443 ASSAY THYROID STIM HORMONE: CPT | Performed by: INTERNAL MEDICINE

## 2020-12-28 PROCEDURE — 80053 COMPREHEN METABOLIC PANEL: CPT | Performed by: INTERNAL MEDICINE

## 2020-12-29 ENCOUNTER — TELEPHONE (OUTPATIENT)
Dept: CARDIOLOGY CLINIC | Facility: CLINIC | Age: 80
End: 2020-12-29

## 2020-12-30 ENCOUNTER — TELEPHONE (OUTPATIENT)
Dept: CARDIOLOGY CLINIC | Facility: CLINIC | Age: 80
End: 2020-12-30

## 2021-01-01 ENCOUNTER — TELEPHONE (OUTPATIENT)
Dept: INTERNAL MEDICINE CLINIC | Facility: CLINIC | Age: 81
End: 2021-01-01

## 2021-01-01 ENCOUNTER — TELEPHONE (OUTPATIENT)
Dept: LAB | Facility: HOSPITAL | Age: 81
End: 2021-01-01

## 2021-01-01 ENCOUNTER — APPOINTMENT (OUTPATIENT)
Dept: LAB | Facility: HOSPITAL | Age: 81
End: 2021-01-01
Payer: MEDICARE

## 2021-01-01 ENCOUNTER — APPOINTMENT (INPATIENT)
Dept: NON INVASIVE DIAGNOSTICS | Facility: HOSPITAL | Age: 81
DRG: 291 | End: 2021-01-01
Payer: MEDICARE

## 2021-01-01 ENCOUNTER — NURSING HOME VISIT (OUTPATIENT)
Dept: GERIATRICS | Facility: OTHER | Age: 81
End: 2021-01-01
Payer: MEDICARE

## 2021-01-01 ENCOUNTER — PATIENT OUTREACH (OUTPATIENT)
Dept: CASE MANAGEMENT | Facility: HOSPITAL | Age: 81
End: 2021-01-01

## 2021-01-01 ENCOUNTER — TRANSITIONAL CARE MANAGEMENT (OUTPATIENT)
Dept: INTERNAL MEDICINE CLINIC | Facility: CLINIC | Age: 81
End: 2021-01-01

## 2021-01-01 ENCOUNTER — TELEMEDICINE (OUTPATIENT)
Dept: NEPHROLOGY | Facility: CLINIC | Age: 81
End: 2021-01-01
Payer: MEDICARE

## 2021-01-01 ENCOUNTER — TELEPHONE (OUTPATIENT)
Dept: CARDIOLOGY CLINIC | Facility: CLINIC | Age: 81
End: 2021-01-01

## 2021-01-01 ENCOUNTER — APPOINTMENT (INPATIENT)
Dept: VASCULAR ULTRASOUND | Facility: HOSPITAL | Age: 81
DRG: 291 | End: 2021-01-01
Payer: MEDICARE

## 2021-01-01 ENCOUNTER — TELEPHONE (OUTPATIENT)
Dept: GASTROENTEROLOGY | Facility: CLINIC | Age: 81
End: 2021-01-01

## 2021-01-01 ENCOUNTER — OFFICE VISIT (OUTPATIENT)
Dept: CARDIOLOGY CLINIC | Facility: CLINIC | Age: 81
End: 2021-01-01
Payer: MEDICARE

## 2021-01-01 ENCOUNTER — APPOINTMENT (OUTPATIENT)
Dept: LAB | Facility: CLINIC | Age: 81
End: 2021-01-01
Payer: MEDICARE

## 2021-01-01 ENCOUNTER — TELEMEDICINE (OUTPATIENT)
Dept: INTERNAL MEDICINE CLINIC | Facility: CLINIC | Age: 81
End: 2021-01-01
Payer: MEDICARE

## 2021-01-01 ENCOUNTER — HOSPITAL ENCOUNTER (INPATIENT)
Facility: HOSPITAL | Age: 81
LOS: 6 days | Discharge: HOME HEALTH | DRG: 291 | End: 2021-11-25
Attending: EMERGENCY MEDICINE | Admitting: STUDENT IN AN ORGANIZED HEALTH CARE EDUCATION/TRAINING PROGRAM
Payer: MEDICARE

## 2021-01-01 ENCOUNTER — HOSPITAL ENCOUNTER (OUTPATIENT)
Dept: RADIOLOGY | Facility: HOSPITAL | Age: 81
Discharge: HOME | End: 2021-12-22
Payer: MEDICARE

## 2021-01-01 ENCOUNTER — OFFICE VISIT (OUTPATIENT)
Dept: INTERNAL MEDICINE CLINIC | Facility: CLINIC | Age: 81
End: 2021-01-01
Payer: MEDICARE

## 2021-01-01 ENCOUNTER — REMOTE DEVICE CLINIC VISIT (OUTPATIENT)
Dept: CARDIOLOGY CLINIC | Facility: CLINIC | Age: 81
End: 2021-01-01

## 2021-01-01 ENCOUNTER — APPOINTMENT (INPATIENT)
Dept: RADIOLOGY | Facility: HOSPITAL | Age: 81
DRG: 291 | End: 2021-01-01
Payer: MEDICARE

## 2021-01-01 ENCOUNTER — APPOINTMENT (OUTPATIENT)
Dept: LAB | Facility: HOSPITAL | Age: 81
End: 2021-01-01
Attending: INTERNAL MEDICINE
Payer: MEDICARE

## 2021-01-01 ENCOUNTER — APPOINTMENT (EMERGENCY)
Dept: RADIOLOGY | Facility: HOSPITAL | Age: 81
DRG: 291 | End: 2021-01-01
Payer: MEDICARE

## 2021-01-01 ENCOUNTER — HOSPITAL ENCOUNTER (OUTPATIENT)
Dept: GASTROENTEROLOGY | Facility: HOSPITAL | Age: 81
Setting detail: OUTPATIENT SURGERY
Discharge: HOME | End: 2021-09-16
Attending: INTERNAL MEDICINE | Admitting: INTERNAL MEDICINE
Payer: MEDICARE

## 2021-01-01 ENCOUNTER — APPOINTMENT (INPATIENT)
Dept: NON INVASIVE DIAGNOSTICS | Facility: HOSPITAL | Age: 81
DRG: 291 | End: 2021-01-01
Attending: INTERNAL MEDICINE
Payer: MEDICARE

## 2021-01-01 ENCOUNTER — PATIENT OUTREACH (OUTPATIENT)
Dept: INTERNAL MEDICINE CLINIC | Facility: CLINIC | Age: 81
End: 2021-01-01

## 2021-01-01 ENCOUNTER — TELEPHONE (OUTPATIENT)
Dept: NEPHROLOGY | Facility: CLINIC | Age: 81
End: 2021-01-01

## 2021-01-01 ENCOUNTER — TELEPHONE (OUTPATIENT)
Dept: GASTROENTEROLOGY | Facility: HOSPITAL | Age: 81
End: 2021-01-01

## 2021-01-01 ENCOUNTER — ANESTHESIA (OUTPATIENT)
Dept: GASTROENTEROLOGY | Facility: HOSPITAL | Age: 81
End: 2021-01-01

## 2021-01-01 ENCOUNTER — REMOTE DEVICE CLINIC VISIT (OUTPATIENT)
Dept: CARDIOLOGY CLINIC | Facility: CLINIC | Age: 81
End: 2021-01-01
Payer: MEDICARE

## 2021-01-01 ENCOUNTER — ANESTHESIA EVENT (OUTPATIENT)
Dept: GASTROENTEROLOGY | Facility: HOSPITAL | Age: 81
End: 2021-01-01

## 2021-01-01 VITALS
BODY MASS INDEX: 23.38 KG/M2 | OXYGEN SATURATION: 95 % | TEMPERATURE: 97.6 F | DIASTOLIC BLOOD PRESSURE: 61 MMHG | WEIGHT: 198 LBS | HEART RATE: 70 BPM | RESPIRATION RATE: 16 BRPM | SYSTOLIC BLOOD PRESSURE: 107 MMHG | HEIGHT: 77 IN

## 2021-01-01 VITALS
WEIGHT: 197 LBS | BODY MASS INDEX: 25.28 KG/M2 | HEIGHT: 74 IN | OXYGEN SATURATION: 99 % | HEART RATE: 77 BPM | SYSTOLIC BLOOD PRESSURE: 130 MMHG | DIASTOLIC BLOOD PRESSURE: 70 MMHG

## 2021-01-01 VITALS
WEIGHT: 189.15 LBS | RESPIRATION RATE: 18 BRPM | OXYGEN SATURATION: 97 % | HEART RATE: 65 BPM | TEMPERATURE: 98 F | HEIGHT: 77 IN | SYSTOLIC BLOOD PRESSURE: 101 MMHG | DIASTOLIC BLOOD PRESSURE: 61 MMHG | BODY MASS INDEX: 22.33 KG/M2

## 2021-01-01 VITALS
BODY MASS INDEX: 22.43 KG/M2 | RESPIRATION RATE: 16 BRPM | TEMPERATURE: 98.2 F | HEART RATE: 75 BPM | HEIGHT: 77 IN | WEIGHT: 190 LBS | SYSTOLIC BLOOD PRESSURE: 108 MMHG | DIASTOLIC BLOOD PRESSURE: 65 MMHG

## 2021-01-01 VITALS
RESPIRATION RATE: 17 BRPM | DIASTOLIC BLOOD PRESSURE: 72 MMHG | OXYGEN SATURATION: 98 % | SYSTOLIC BLOOD PRESSURE: 116 MMHG | TEMPERATURE: 97.5 F | HEART RATE: 76 BPM

## 2021-01-01 VITALS
BODY MASS INDEX: 22.43 KG/M2 | SYSTOLIC BLOOD PRESSURE: 118 MMHG | HEART RATE: 70 BPM | HEIGHT: 77 IN | DIASTOLIC BLOOD PRESSURE: 72 MMHG | WEIGHT: 190 LBS

## 2021-01-01 DIAGNOSIS — G47.00 INSOMNIA, UNSPECIFIED TYPE: Primary | ICD-10-CM

## 2021-01-01 DIAGNOSIS — I50.22 CHRONIC SYSTOLIC HEART FAILURE (HCC): ICD-10-CM

## 2021-01-01 DIAGNOSIS — I50.23 ACUTE ON CHRONIC SYSTOLIC CHF (CONGESTIVE HEART FAILURE) (HCC): ICD-10-CM

## 2021-01-01 DIAGNOSIS — N18.32 STAGE 3B CHRONIC KIDNEY DISEASE (HCC): Primary | ICD-10-CM

## 2021-01-01 DIAGNOSIS — I10 ESSENTIAL HYPERTENSION: ICD-10-CM

## 2021-01-01 DIAGNOSIS — R21 RASH: ICD-10-CM

## 2021-01-01 DIAGNOSIS — N18.32 STAGE 3B CHRONIC KIDNEY DISEASE (HCC): ICD-10-CM

## 2021-01-01 DIAGNOSIS — S72.141D CLOSED COMMINUTED INTERTROCHANTERIC FRACTURE OF RIGHT FEMUR WITH ROUTINE HEALING: Primary | ICD-10-CM

## 2021-01-01 DIAGNOSIS — D64.9 ANEMIA, UNSPECIFIED TYPE: ICD-10-CM

## 2021-01-01 DIAGNOSIS — I43 CARDIAC AMYLOIDOSIS (HCC): ICD-10-CM

## 2021-01-01 DIAGNOSIS — I50.22 HYPERTENSIVE HEART AND KIDNEY DISEASE WITH CHRONIC SYSTOLIC CONGESTIVE HEART FAILURE AND STAGE 3B CHRONIC KIDNEY DISEASE (HCC): Chronic | ICD-10-CM

## 2021-01-01 DIAGNOSIS — I50.22 CHRONIC SYSTOLIC HEART FAILURE (HCC): Chronic | ICD-10-CM

## 2021-01-01 DIAGNOSIS — I48.20 CHRONIC ATRIAL FIBRILLATION (HCC): ICD-10-CM

## 2021-01-01 DIAGNOSIS — R10.13 EPIGASTRIC PAIN: ICD-10-CM

## 2021-01-01 DIAGNOSIS — N18.32 HYPERTENSIVE HEART AND KIDNEY DISEASE WITH CHRONIC SYSTOLIC CONGESTIVE HEART FAILURE AND STAGE 3B CHRONIC KIDNEY DISEASE (HCC): Chronic | ICD-10-CM

## 2021-01-01 DIAGNOSIS — I12.9 HYPERTENSIVE KIDNEY DISEASE WITH STAGE 3 CHRONIC KIDNEY DISEASE, UNSPECIFIED WHETHER STAGE 3A OR 3B CKD (HCC): ICD-10-CM

## 2021-01-01 DIAGNOSIS — I42.0 DILATED CARDIOMYOPATHY (HCC): Chronic | ICD-10-CM

## 2021-01-01 DIAGNOSIS — I42.0 DILATED CARDIOMYOPATHY (HCC): Primary | ICD-10-CM

## 2021-01-01 DIAGNOSIS — I42.0 DILATED CARDIOMYOPATHY (HCC): ICD-10-CM

## 2021-01-01 DIAGNOSIS — Z23 ENCOUNTER FOR ADMINISTRATION OF VACCINE: Primary | ICD-10-CM

## 2021-01-01 DIAGNOSIS — I50.23 ACUTE ON CHRONIC SYSTOLIC HEART FAILURE (HCC): ICD-10-CM

## 2021-01-01 DIAGNOSIS — R06.02 SHORTNESS OF BREATH: ICD-10-CM

## 2021-01-01 DIAGNOSIS — I13.0 HYPERTENSIVE HEART AND KIDNEY DISEASE WITH CHRONIC SYSTOLIC CONGESTIVE HEART FAILURE AND STAGE 3B CHRONIC KIDNEY DISEASE (HCC): Chronic | ICD-10-CM

## 2021-01-01 DIAGNOSIS — R26.2 AMBULATORY DYSFUNCTION: ICD-10-CM

## 2021-01-01 DIAGNOSIS — N18.32 HYPERTENSIVE HEART AND KIDNEY DISEASE WITH CHRONIC SYSTOLIC CONGESTIVE HEART FAILURE AND STAGE 3B CHRONIC KIDNEY DISEASE (HCC): Primary | ICD-10-CM

## 2021-01-01 DIAGNOSIS — N18.30 HYPERTENSIVE KIDNEY DISEASE WITH STAGE 3 CHRONIC KIDNEY DISEASE, UNSPECIFIED WHETHER STAGE 3A OR 3B CKD (HCC): ICD-10-CM

## 2021-01-01 DIAGNOSIS — J90 PLEURAL EFFUSION: ICD-10-CM

## 2021-01-01 DIAGNOSIS — Z95.810 PRESENCE OF AUTOMATIC CARDIOVERTER/DEFIBRILLATOR (AICD): Primary | ICD-10-CM

## 2021-01-01 DIAGNOSIS — R22.32 LOCALIZED SWELLING ON LEFT HAND: ICD-10-CM

## 2021-01-01 DIAGNOSIS — N17.9 AKI (ACUTE KIDNEY INJURY) (HCC): ICD-10-CM

## 2021-01-01 DIAGNOSIS — Z79.899 ON AMIODARONE THERAPY: ICD-10-CM

## 2021-01-01 DIAGNOSIS — I50.82 BIVENTRICULAR CONGESTIVE HEART FAILURE (HCC): ICD-10-CM

## 2021-01-01 DIAGNOSIS — G47.00 INSOMNIA, UNSPECIFIED TYPE: ICD-10-CM

## 2021-01-01 DIAGNOSIS — E85.4 CARDIAC AMYLOIDOSIS (HCC): ICD-10-CM

## 2021-01-01 DIAGNOSIS — I50.22 CHRONIC SYSTOLIC HF (HEART FAILURE) (HCC): ICD-10-CM

## 2021-01-01 DIAGNOSIS — M48.062 SPINAL STENOSIS OF LUMBAR REGION WITH NEUROGENIC CLAUDICATION: ICD-10-CM

## 2021-01-01 DIAGNOSIS — N25.81 SECONDARY HYPERPARATHYROIDISM OF RENAL ORIGIN (HCC): ICD-10-CM

## 2021-01-01 DIAGNOSIS — K59.01 SLOW TRANSIT CONSTIPATION: ICD-10-CM

## 2021-01-01 DIAGNOSIS — I50.22 CHRONIC SYSTOLIC HEART FAILURE (HCC): Primary | ICD-10-CM

## 2021-01-01 DIAGNOSIS — R80.8 OTHER PROTEINURIA: ICD-10-CM

## 2021-01-01 DIAGNOSIS — K31.84 GASTROPARESIS: ICD-10-CM

## 2021-01-01 DIAGNOSIS — J90 BILATERAL PLEURAL EFFUSION: Primary | ICD-10-CM

## 2021-01-01 DIAGNOSIS — I50.22 HYPERTENSIVE HEART AND KIDNEY DISEASE WITH CHRONIC SYSTOLIC CONGESTIVE HEART FAILURE AND STAGE 3B CHRONIC KIDNEY DISEASE (HCC): Primary | ICD-10-CM

## 2021-01-01 DIAGNOSIS — R77.8 ELEVATED TROPONIN: ICD-10-CM

## 2021-01-01 DIAGNOSIS — I47.2 VENTRICULAR TACHYCARDIA (HCC): ICD-10-CM

## 2021-01-01 DIAGNOSIS — R05.9 COUGH: Primary | ICD-10-CM

## 2021-01-01 DIAGNOSIS — I48.0 PAROXYSMAL ATRIAL FIBRILLATION (HCC): ICD-10-CM

## 2021-01-01 DIAGNOSIS — D69.6 PLATELETS DECREASED (HCC): ICD-10-CM

## 2021-01-01 DIAGNOSIS — I50.22 CHRONIC SYSTOLIC HEART FAILURE (HCC): Primary | Chronic | ICD-10-CM

## 2021-01-01 DIAGNOSIS — Z95.810 PRESENCE OF AUTOMATIC CARDIOVERTER/DEFIBRILLATOR (AICD): ICD-10-CM

## 2021-01-01 DIAGNOSIS — I50.9 CHF EXACERBATION (HCC): Primary | ICD-10-CM

## 2021-01-01 DIAGNOSIS — N18.4 CKD (CHRONIC KIDNEY DISEASE) STAGE 4, GFR 15-29 ML/MIN (HCC): Primary | ICD-10-CM

## 2021-01-01 DIAGNOSIS — R10.84 GENERALIZED ABDOMINAL PAIN: Primary | ICD-10-CM

## 2021-01-01 DIAGNOSIS — Z00.00 MEDICARE ANNUAL WELLNESS VISIT, SUBSEQUENT: ICD-10-CM

## 2021-01-01 DIAGNOSIS — I34.0 MITRAL VALVE INSUFFICIENCY, UNSPECIFIED ETIOLOGY: ICD-10-CM

## 2021-01-01 DIAGNOSIS — N18.31 STAGE 3A CHRONIC KIDNEY DISEASE (HCC): ICD-10-CM

## 2021-01-01 DIAGNOSIS — R21 RASH: Primary | ICD-10-CM

## 2021-01-01 DIAGNOSIS — I13.0 HYPERTENSIVE HEART AND KIDNEY DISEASE WITH CHRONIC SYSTOLIC CONGESTIVE HEART FAILURE AND STAGE 3B CHRONIC KIDNEY DISEASE (HCC): Primary | ICD-10-CM

## 2021-01-01 LAB
25(OH)D3 SERPL-MCNC: 59.8 NG/ML (ref 30–100)
25(OH)D3 SERPL-MCNC: 63.3 NG/ML (ref 30–100)
2HR DELTA HS TROPONIN: -7 NG/L
2HR DELTA HS TROPONIN: 7 NG/L
4HR DELTA HS TROPONIN: -6 NG/L
4HR DELTA HS TROPONIN: 3 NG/L
ALBUMIN SERPL BCP-MCNC: 3.4 G/DL (ref 3.5–5)
ALP SERPL-CCNC: 206 U/L (ref 46–116)
ALT SERPL W P-5'-P-CCNC: 21 U/L (ref 12–78)
ANION GAP SERPL CALCULATED.3IONS-SCNC: 10 MMOL/L (ref 4–13)
ANION GAP SERPL CALCULATED.3IONS-SCNC: 11 MMOL/L (ref 4–13)
ANION GAP SERPL CALCULATED.3IONS-SCNC: 12 MMOL/L (ref 4–13)
ANION GAP SERPL CALCULATED.3IONS-SCNC: 3 MMOL/L (ref 4–13)
ANION GAP SERPL CALCULATED.3IONS-SCNC: 4 MMOL/L (ref 4–13)
ANION GAP SERPL CALCULATED.3IONS-SCNC: 7 MMOL/L (ref 4–13)
ANION GAP SERPL CALCULATED.3IONS-SCNC: 8 MMOL/L (ref 4–13)
ANION GAP SERPL CALCULATED.3IONS-SCNC: 8 MMOL/L (ref 4–13)
ANION GAP SERPL CALCULATED.3IONS-SCNC: 9 MMOL/L (ref 4–13)
AORTIC ROOT: 4 CM
APICAL FOUR CHAMBER EJECTION FRACTION: 50 %
APPEARANCE FLD: ABNORMAL
ASCENDING AORTA: 4.2 CM
AST SERPL W P-5'-P-CCNC: 24 U/L (ref 5–45)
ATRIAL RATE: 66 BPM
ATRIAL RATE: 70 BPM
BACTERIA SPEC BFLD CULT: NO GROWTH
BACTERIA UR QL AUTO: NORMAL /HPF
BASOPHILS # BLD AUTO: 0.01 THOUSANDS/ΜL (ref 0–0.1)
BASOPHILS # BLD AUTO: 0.02 THOUSANDS/ΜL (ref 0–0.1)
BASOPHILS NFR BLD AUTO: 0 % (ref 0–1)
BASOPHILS NFR BLD AUTO: 0 % (ref 0–1)
BILIRUB SERPL-MCNC: 2.29 MG/DL (ref 0.2–1)
BILIRUB UR QL STRIP: NEGATIVE
BUN SERPL-MCNC: 27 MG/DL (ref 5–25)
BUN SERPL-MCNC: 29 MG/DL (ref 5–25)
BUN SERPL-MCNC: 29 MG/DL (ref 5–25)
BUN SERPL-MCNC: 32 MG/DL (ref 5–25)
BUN SERPL-MCNC: 33 MG/DL (ref 5–25)
BUN SERPL-MCNC: 33 MG/DL (ref 5–25)
BUN SERPL-MCNC: 34 MG/DL (ref 5–25)
BUN SERPL-MCNC: 35 MG/DL (ref 5–25)
BUN SERPL-MCNC: 38 MG/DL (ref 5–25)
CALCIUM ALBUM COR SERPL-MCNC: 9.5 MG/DL (ref 8.3–10.1)
CALCIUM SERPL-MCNC: 8.6 MG/DL (ref 8.3–10.1)
CALCIUM SERPL-MCNC: 8.9 MG/DL (ref 8.3–10.1)
CALCIUM SERPL-MCNC: 8.9 MG/DL (ref 8.3–10.1)
CALCIUM SERPL-MCNC: 9 MG/DL (ref 8.3–10.1)
CALCIUM SERPL-MCNC: 9 MG/DL (ref 8.3–10.1)
CALCIUM SERPL-MCNC: 9.1 MG/DL (ref 8.3–10.1)
CALCIUM SERPL-MCNC: 9.2 MG/DL (ref 8.3–10.1)
CALCIUM SERPL-MCNC: 9.3 MG/DL (ref 8.3–10.1)
CALCIUM SERPL-MCNC: 9.5 MG/DL (ref 8.3–10.1)
CALCIUM SERPL-MCNC: 9.5 MG/DL (ref 8.3–10.1)
CALCIUM SERPL-MCNC: 9.7 MG/DL (ref 8.3–10.1)
CARDIAC TROPONIN I PNL SERPL HS: 115 NG/L
CARDIAC TROPONIN I PNL SERPL HS: 116 NG/L
CARDIAC TROPONIN I PNL SERPL HS: 122 NG/L
CARDIAC TROPONIN I PNL SERPL HS: 126 NG/L
CARDIAC TROPONIN I PNL SERPL HS: 129 NG/L
CARDIAC TROPONIN I PNL SERPL HS: 133 NG/L
CHLORIDE SERPL-SCNC: 101 MMOL/L (ref 100–108)
CHLORIDE SERPL-SCNC: 102 MMOL/L (ref 100–108)
CHLORIDE SERPL-SCNC: 103 MMOL/L (ref 100–108)
CHLORIDE SERPL-SCNC: 103 MMOL/L (ref 100–108)
CHLORIDE SERPL-SCNC: 104 MMOL/L (ref 100–108)
CHLORIDE SERPL-SCNC: 104 MMOL/L (ref 100–108)
CHLORIDE SERPL-SCNC: 105 MMOL/L (ref 100–108)
CHLORIDE SERPL-SCNC: 105 MMOL/L (ref 100–108)
CHLORIDE SERPL-SCNC: 106 MMOL/L (ref 100–108)
CLARITY UR: CLEAR
CO2 SERPL-SCNC: 27 MMOL/L (ref 21–32)
CO2 SERPL-SCNC: 28 MMOL/L (ref 21–32)
CO2 SERPL-SCNC: 29 MMOL/L (ref 21–32)
CO2 SERPL-SCNC: 30 MMOL/L (ref 21–32)
COLOR FLD: YELLOW
COLOR UR: YELLOW
CREAT SERPL-MCNC: 1.4 MG/DL (ref 0.6–1.3)
CREAT SERPL-MCNC: 1.46 MG/DL (ref 0.6–1.3)
CREAT SERPL-MCNC: 1.51 MG/DL (ref 0.6–1.3)
CREAT SERPL-MCNC: 1.52 MG/DL (ref 0.6–1.3)
CREAT SERPL-MCNC: 1.52 MG/DL (ref 0.6–1.3)
CREAT SERPL-MCNC: 1.53 MG/DL (ref 0.6–1.3)
CREAT SERPL-MCNC: 1.54 MG/DL (ref 0.6–1.3)
CREAT SERPL-MCNC: 1.74 MG/DL (ref 0.6–1.3)
CREAT SERPL-MCNC: 1.79 MG/DL (ref 0.6–1.3)
CREAT SERPL-MCNC: 1.83 MG/DL (ref 0.6–1.3)
CREAT SERPL-MCNC: 1.84 MG/DL (ref 0.6–1.3)
CREAT UR-MCNC: 25.3 MG/DL
E WAVE DECELERATION TIME: 176 MS
EOSINOPHIL # BLD AUTO: 0.02 THOUSAND/ΜL (ref 0–0.61)
EOSINOPHIL # BLD AUTO: 0.04 THOUSAND/ΜL (ref 0–0.61)
EOSINOPHIL NFR BLD AUTO: 0 % (ref 0–6)
EOSINOPHIL NFR BLD AUTO: 1 % (ref 0–6)
ERYTHROCYTE [DISTWIDTH] IN BLOOD BY AUTOMATED COUNT: 14.7 % (ref 11.6–15.1)
ERYTHROCYTE [DISTWIDTH] IN BLOOD BY AUTOMATED COUNT: 14.8 % (ref 11.6–15.1)
ERYTHROCYTE [DISTWIDTH] IN BLOOD BY AUTOMATED COUNT: 14.9 % (ref 11.6–15.1)
ERYTHROCYTE [DISTWIDTH] IN BLOOD BY AUTOMATED COUNT: 14.9 % (ref 11.6–15.1)
ERYTHROCYTE [DISTWIDTH] IN BLOOD BY AUTOMATED COUNT: 15 % (ref 11.6–15.1)
ERYTHROCYTE [DISTWIDTH] IN BLOOD BY AUTOMATED COUNT: 15.1 % (ref 11.6–15.1)
ERYTHROCYTE [DISTWIDTH] IN BLOOD BY AUTOMATED COUNT: 15.2 % (ref 11.6–15.1)
ERYTHROCYTE [DISTWIDTH] IN BLOOD BY AUTOMATED COUNT: 15.4 % (ref 11.6–15.1)
FLUAV RNA RESP QL NAA+PROBE: NEGATIVE
FLUBV RNA RESP QL NAA+PROBE: NEGATIVE
FRACTIONAL SHORTENING: 8 % (ref 28–44)
GFR SERPL CREATININE-BSD FRML MDRD: 34 ML/MIN/1.73SQ M
GFR SERPL CREATININE-BSD FRML MDRD: 34 ML/MIN/1.73SQ M
GFR SERPL CREATININE-BSD FRML MDRD: 35 ML/MIN/1.73SQ M
GFR SERPL CREATININE-BSD FRML MDRD: 36 ML/MIN/1.73SQ M
GFR SERPL CREATININE-BSD FRML MDRD: 42 ML/MIN/1.73SQ M
GFR SERPL CREATININE-BSD FRML MDRD: 42 ML/MIN/1.73SQ M
GFR SERPL CREATININE-BSD FRML MDRD: 43 ML/MIN/1.73SQ M
GFR SERPL CREATININE-BSD FRML MDRD: 45 ML/MIN/1.73SQ M
GFR SERPL CREATININE-BSD FRML MDRD: 47 ML/MIN/1.73SQ M
GLUCOSE FLD-MCNC: 116 MG/DL
GLUCOSE P FAST SERPL-MCNC: 103 MG/DL (ref 65–99)
GLUCOSE P FAST SERPL-MCNC: 113 MG/DL (ref 65–99)
GLUCOSE P FAST SERPL-MCNC: 120 MG/DL (ref 65–99)
GLUCOSE SERPL-MCNC: 100 MG/DL (ref 65–140)
GLUCOSE SERPL-MCNC: 102 MG/DL (ref 65–140)
GLUCOSE SERPL-MCNC: 103 MG/DL (ref 65–140)
GLUCOSE SERPL-MCNC: 105 MG/DL (ref 65–140)
GLUCOSE SERPL-MCNC: 96 MG/DL (ref 65–140)
GLUCOSE SERPL-MCNC: 96 MG/DL (ref 65–140)
GLUCOSE SERPL-MCNC: 97 MG/DL (ref 65–140)
GLUCOSE SERPL-MCNC: 99 MG/DL (ref 65–140)
GLUCOSE UR STRIP-MCNC: NEGATIVE MG/DL
GRAM STN SPEC: NORMAL
GRAM STN SPEC: NORMAL
HCT VFR BLD AUTO: 33.4 % (ref 36.5–49.3)
HCT VFR BLD AUTO: 33.9 % (ref 36.5–49.3)
HCT VFR BLD AUTO: 33.9 % (ref 36.5–49.3)
HCT VFR BLD AUTO: 34 % (ref 36.5–49.3)
HCT VFR BLD AUTO: 34.5 % (ref 36.5–49.3)
HCT VFR BLD AUTO: 35 % (ref 36.5–49.3)
HCT VFR BLD AUTO: 35.7 % (ref 36.5–49.3)
HCT VFR BLD AUTO: 37.5 % (ref 36.5–49.3)
HGB BLD-MCNC: 10.5 G/DL (ref 12–17)
HGB BLD-MCNC: 10.8 G/DL (ref 12–17)
HGB BLD-MCNC: 10.9 G/DL (ref 12–17)
HGB BLD-MCNC: 11 G/DL (ref 12–17)
HGB BLD-MCNC: 11.1 G/DL (ref 12–17)
HGB BLD-MCNC: 11.2 G/DL (ref 12–17)
HGB BLD-MCNC: 11.3 G/DL (ref 12–17)
HGB BLD-MCNC: 11.9 G/DL (ref 12–17)
HGB UR QL STRIP.AUTO: NEGATIVE
HYALINE CASTS #/AREA URNS LPF: NORMAL /LPF
IMM GRANULOCYTES # BLD AUTO: 0.01 THOUSAND/UL (ref 0–0.2)
IMM GRANULOCYTES # BLD AUTO: 0.02 THOUSAND/UL (ref 0–0.2)
IMM GRANULOCYTES NFR BLD AUTO: 0 % (ref 0–2)
IMM GRANULOCYTES NFR BLD AUTO: 0 % (ref 0–2)
INTERVENTRICULAR SEPTUM IN DIASTOLE (PARASTERNAL SHORT AXIS VIEW): 2.3 CM
KETONES UR STRIP-MCNC: NEGATIVE MG/DL
LDH FLD L TO P-CCNC: 79 U/L
LEFT ATRIUM AREA SYSTOLE SINGLE PLANE A4C: 30 CM2
LEFT INTERNAL DIMENSION IN SYSTOLE: 4.6 CM (ref 2.1–4)
LEFT VENTRICULAR INTERNAL DIMENSION IN DIASTOLE: 5 CM (ref 5.97–8.9)
LEFT VENTRICULAR POSTERIOR WALL IN END DIASTOLE: 1.8 CM
LEFT VENTRICULAR STROKE VOLUME: 22 ML
LEUKOCYTE ESTERASE UR QL STRIP: NEGATIVE
LYMPHOCYTES # BLD AUTO: 0.81 THOUSANDS/ΜL (ref 0.6–4.47)
LYMPHOCYTES # BLD AUTO: 1.1 THOUSANDS/ΜL (ref 0.6–4.47)
LYMPHOCYTES NFR BLD AUTO: 15 % (ref 14–44)
LYMPHOCYTES NFR BLD AUTO: 19 % (ref 14–44)
LYMPHOCYTES NFR BLD AUTO: 28 %
MAGNESIUM SERPL-MCNC: 2.2 MG/DL (ref 1.6–2.6)
MCH RBC QN AUTO: 32.8 PG (ref 26.8–34.3)
MCH RBC QN AUTO: 33 PG (ref 26.8–34.3)
MCH RBC QN AUTO: 33.3 PG (ref 26.8–34.3)
MCH RBC QN AUTO: 33.5 PG (ref 26.8–34.3)
MCH RBC QN AUTO: 33.6 PG (ref 26.8–34.3)
MCH RBC QN AUTO: 34 PG (ref 26.8–34.3)
MCH RBC QN AUTO: 34.2 PG (ref 26.8–34.3)
MCH RBC QN AUTO: 34.3 PG (ref 26.8–34.3)
MCHC RBC AUTO-ENTMCNC: 31 G/DL (ref 31.4–37.4)
MCHC RBC AUTO-ENTMCNC: 31.7 G/DL (ref 31.4–37.4)
MCHC RBC AUTO-ENTMCNC: 31.7 G/DL (ref 31.4–37.4)
MCHC RBC AUTO-ENTMCNC: 31.8 G/DL (ref 31.4–37.4)
MCHC RBC AUTO-ENTMCNC: 31.9 G/DL (ref 31.4–37.4)
MCHC RBC AUTO-ENTMCNC: 32 G/DL (ref 31.4–37.4)
MCHC RBC AUTO-ENTMCNC: 32.6 G/DL (ref 31.4–37.4)
MCHC RBC AUTO-ENTMCNC: 32.7 G/DL (ref 31.4–37.4)
MCV RBC AUTO: 103 FL (ref 82–98)
MCV RBC AUTO: 104 FL (ref 82–98)
MCV RBC AUTO: 106 FL (ref 82–98)
MCV RBC AUTO: 106 FL (ref 82–98)
MCV RBC AUTO: 111 FL (ref 82–98)
MICROALBUMIN UR-MCNC: 18.2 MG/L (ref 0–20)
MICROALBUMIN/CREAT 24H UR: 72 MG/G CREATININE (ref 0–30)
MITRAL REGURGITATION PEAK VELOCITY: 4.38 M/S
MITRAL VALVE MEAN INFLOW VELOCITY: 3.26 M/S
MITRAL VALVE REGURGITANT PEAK GRADIENT: 77 MMHG
MONOCYTES # BLD AUTO: 0.43 THOUSAND/ΜL (ref 0.17–1.22)
MONOCYTES # BLD AUTO: 0.47 THOUSAND/ΜL (ref 0.17–1.22)
MONOCYTES NFR BLD AUTO: 54 %
MONOCYTES NFR BLD AUTO: 8 % (ref 4–12)
MONOCYTES NFR BLD AUTO: 9 % (ref 4–12)
MV E'TISSUE VEL-SEP: 4 CM/S
MV PEAK A VEL: 0.25 M/S
MV PEAK E VEL: 76 CM/S
MV STENOSIS PRESSURE HALF TIME: 0 MS
NEUTROPHILS # BLD AUTO: 4.16 THOUSANDS/ΜL (ref 1.85–7.62)
NEUTROPHILS # BLD AUTO: 4.2 THOUSANDS/ΜL (ref 1.85–7.62)
NEUTS SEG NFR BLD AUTO: 18 %
NEUTS SEG NFR BLD AUTO: 72 % (ref 43–75)
NEUTS SEG NFR BLD AUTO: 76 % (ref 43–75)
NITRITE UR QL STRIP: NEGATIVE
NON-SQ EPI CELLS URNS QL MICRO: NORMAL /HPF
NRBC BLD AUTO-RTO: 0 /100 WBCS
NRBC BLD AUTO-RTO: 0 /100 WBCS
NT-PROBNP SERPL-MCNC: 6272 PG/ML
PA SYSTOLIC PRESSURE: 60 MMHG
PH BODY FLUID: 7.7
PH UR STRIP.AUTO: 6.5 [PH]
PHOSPHATE SERPL-MCNC: 2.8 MG/DL (ref 2.3–4.1)
PLATELET # BLD AUTO: 140 THOUSANDS/UL (ref 149–390)
PLATELET # BLD AUTO: 161 THOUSANDS/UL (ref 149–390)
PLATELET # BLD AUTO: 166 THOUSANDS/UL (ref 149–390)
PLATELET # BLD AUTO: 168 THOUSANDS/UL (ref 149–390)
PLATELET # BLD AUTO: 177 THOUSANDS/UL (ref 149–390)
PLATELET # BLD AUTO: 179 THOUSANDS/UL (ref 149–390)
PLATELET # BLD AUTO: 183 THOUSANDS/UL (ref 149–390)
PLATELET # BLD AUTO: 190 THOUSANDS/UL (ref 149–390)
PMV BLD AUTO: 10 FL (ref 8.9–12.7)
PMV BLD AUTO: 10.1 FL (ref 8.9–12.7)
PMV BLD AUTO: 10.2 FL (ref 8.9–12.7)
PMV BLD AUTO: 10.2 FL (ref 8.9–12.7)
PMV BLD AUTO: 10.3 FL (ref 8.9–12.7)
PMV BLD AUTO: 10.3 FL (ref 8.9–12.7)
POTASSIUM SERPL-SCNC: 3.9 MMOL/L (ref 3.5–5.3)
POTASSIUM SERPL-SCNC: 4 MMOL/L (ref 3.5–5.3)
POTASSIUM SERPL-SCNC: 4.1 MMOL/L (ref 3.5–5.3)
POTASSIUM SERPL-SCNC: 4.2 MMOL/L (ref 3.5–5.3)
POTASSIUM SERPL-SCNC: 4.2 MMOL/L (ref 3.5–5.3)
POTASSIUM SERPL-SCNC: 4.3 MMOL/L (ref 3.5–5.3)
POTASSIUM SERPL-SCNC: 4.4 MMOL/L (ref 3.5–5.3)
POTASSIUM SERPL-SCNC: 4.7 MMOL/L (ref 3.5–5.3)
POTASSIUM SERPL-SCNC: 4.7 MMOL/L (ref 3.5–5.3)
PROT FLD-MCNC: 2.2 G/DL
PROT SERPL-MCNC: 7.2 G/DL (ref 6.4–8.2)
PROT UR STRIP-MCNC: NEGATIVE MG/DL
PTH-INTACT SERPL-MCNC: 98.1 PG/ML (ref 18.4–80.1)
QRS AXIS: 125 DEGREES
QRS AXIS: 155 DEGREES
QRSD INTERVAL: 158 MS
QRSD INTERVAL: 190 MS
QT INTERVAL: 474 MS
QT INTERVAL: 508 MS
QTC INTERVAL: 515 MS
QTC INTERVAL: 548 MS
RBC # BLD AUTO: 3.06 MILLION/UL (ref 3.88–5.62)
RBC # BLD AUTO: 3.21 MILLION/UL (ref 3.88–5.62)
RBC # BLD AUTO: 3.25 MILLION/UL (ref 3.88–5.62)
RBC # BLD AUTO: 3.27 MILLION/UL (ref 3.88–5.62)
RBC # BLD AUTO: 3.29 MILLION/UL (ref 3.88–5.62)
RBC # BLD AUTO: 3.36 MILLION/UL (ref 3.88–5.62)
RBC # BLD AUTO: 3.42 MILLION/UL (ref 3.88–5.62)
RBC # BLD AUTO: 3.55 MILLION/UL (ref 3.88–5.62)
RBC #/AREA URNS AUTO: NORMAL /HPF
RIGHT ATRIUM AREA SYSTOLE A4C: 38.8 CM2
RIGHT VENTRICLE ID DIMENSION: 5.6 CM
RSV RNA RESP QL NAA+PROBE: NEGATIVE
SARS-COV-2 RNA RESP QL NAA+PROBE: NEGATIVE
SITE: ABNORMAL
SL CV DOP CALC MV VTI RETROGRADE: 127 CM
SL CV LV EF: 40
SL CV MV MEAN GRADIENT RETROGRADE: 49 MMHG
SL CV PED ECHO LEFT VENTRICLE DIASTOLIC VOLUME (MOD BIPLANE) 2D: 118 ML
SL CV PED ECHO LEFT VENTRICLE SYSTOLIC VOLUME (MOD BIPLANE) 2D: 97 ML
SODIUM SERPL-SCNC: 137 MMOL/L (ref 136–145)
SODIUM SERPL-SCNC: 138 MMOL/L (ref 136–145)
SODIUM SERPL-SCNC: 140 MMOL/L (ref 136–145)
SODIUM SERPL-SCNC: 140 MMOL/L (ref 136–145)
SODIUM SERPL-SCNC: 141 MMOL/L (ref 136–145)
SODIUM SERPL-SCNC: 141 MMOL/L (ref 136–145)
SODIUM SERPL-SCNC: 144 MMOL/L (ref 136–145)
SP GR UR STRIP.AUTO: 1.01 (ref 1–1.03)
T WAVE AXIS: -23 DEGREES
T WAVE AXIS: -46 DEGREES
TOTAL CELLS COUNTED SPEC: 50
TRICUSPID VALVE PEAK REGURGITATION VELOCITY: 3.52 M/S
TRICUSPID VALVE S': 0.8 CM/S
TSH SERPL DL<=0.05 MIU/L-ACNC: 1.06 UIU/ML (ref 0.36–3.74)
TV PEAK GRADIENT: 50 MMHG
URATE SERPL-MCNC: 5.9 MG/DL (ref 4.2–8)
UROBILINOGEN UR QL STRIP.AUTO: 1 E.U./DL
VENTRICULAR RATE: 70 BPM
VENTRICULAR RATE: 71 BPM
WBC # BLD AUTO: 5.29 THOUSAND/UL (ref 4.31–10.16)
WBC # BLD AUTO: 5.53 THOUSAND/UL (ref 4.31–10.16)
WBC # BLD AUTO: 5.6 THOUSAND/UL (ref 4.31–10.16)
WBC # BLD AUTO: 5.76 THOUSAND/UL (ref 4.31–10.16)
WBC # BLD AUTO: 5.82 THOUSAND/UL (ref 4.31–10.16)
WBC # BLD AUTO: 5.86 THOUSAND/UL (ref 4.31–10.16)
WBC # BLD AUTO: 6.3 THOUSAND/UL (ref 4.31–10.16)
WBC # BLD AUTO: 6.46 THOUSAND/UL (ref 4.31–10.16)
WBC # FLD MANUAL: 147 /UL
WBC #/AREA URNS AUTO: NORMAL /HPF
Z-SCORE OF LEFT VENTRICULAR DIMENSION IN END SYSTOLE: -3.77

## 2021-01-01 PROCEDURE — 1123F ACP DISCUSS/DSCN MKR DOCD: CPT | Performed by: INTERNAL MEDICINE

## 2021-01-01 PROCEDURE — 99316 NF DSCHRG MGMT 30 MIN+: CPT | Performed by: FAMILY MEDICINE

## 2021-01-01 PROCEDURE — 99214 OFFICE O/P EST MOD 30 MIN: CPT | Performed by: INTERNAL MEDICINE

## 2021-01-01 PROCEDURE — 99223 1ST HOSP IP/OBS HIGH 75: CPT | Performed by: INTERNAL MEDICINE

## 2021-01-01 PROCEDURE — 99309 SBSQ NF CARE MODERATE MDM 30: CPT | Performed by: NURSE PRACTITIONER

## 2021-01-01 PROCEDURE — 93971 EXTREMITY STUDY: CPT | Performed by: SURGERY

## 2021-01-01 PROCEDURE — 99306 1ST NF CARE HIGH MDM 50: CPT | Performed by: FAMILY MEDICINE

## 2021-01-01 PROCEDURE — 71046 X-RAY EXAM CHEST 2 VIEWS: CPT

## 2021-01-01 PROCEDURE — 36415 COLL VENOUS BLD VENIPUNCTURE: CPT | Performed by: INTERNAL MEDICINE

## 2021-01-01 PROCEDURE — 99222 1ST HOSP IP/OBS MODERATE 55: CPT | Performed by: STUDENT IN AN ORGANIZED HEALTH CARE EDUCATION/TRAINING PROGRAM

## 2021-01-01 PROCEDURE — 88112 CYTOPATH CELL ENHANCE TECH: CPT | Performed by: PATHOLOGY

## 2021-01-01 PROCEDURE — 85027 COMPLETE CBC AUTOMATED: CPT | Performed by: NURSE PRACTITIONER

## 2021-01-01 PROCEDURE — 83970 ASSAY OF PARATHORMONE: CPT

## 2021-01-01 PROCEDURE — RECHECK: Performed by: INTERNAL MEDICINE

## 2021-01-01 PROCEDURE — 90662 IIV NO PRSV INCREASED AG IM: CPT | Performed by: NURSE PRACTITIONER

## 2021-01-01 PROCEDURE — 99232 SBSQ HOSP IP/OBS MODERATE 35: CPT | Performed by: INTERNAL MEDICINE

## 2021-01-01 PROCEDURE — 80048 BASIC METABOLIC PNL TOTAL CA: CPT | Performed by: NURSE PRACTITIONER

## 2021-01-01 PROCEDURE — 93306 TTE W/DOPPLER COMPLETE: CPT | Performed by: INTERNAL MEDICINE

## 2021-01-01 PROCEDURE — 84550 ASSAY OF BLOOD/URIC ACID: CPT

## 2021-01-01 PROCEDURE — 83986 ASSAY PH BODY FLUID NOS: CPT | Performed by: INTERNAL MEDICINE

## 2021-01-01 PROCEDURE — 87205 SMEAR GRAM STAIN: CPT | Performed by: INTERNAL MEDICINE

## 2021-01-01 PROCEDURE — 87070 CULTURE OTHR SPECIMN AEROBIC: CPT | Performed by: INTERNAL MEDICINE

## 2021-01-01 PROCEDURE — 32555 ASPIRATE PLEURA W/ IMAGING: CPT

## 2021-01-01 PROCEDURE — 89051 BODY FLUID CELL COUNT: CPT | Performed by: INTERNAL MEDICINE

## 2021-01-01 PROCEDURE — 88305 TISSUE EXAM BY PATHOLOGIST: CPT | Performed by: PATHOLOGY

## 2021-01-01 PROCEDURE — 83880 ASSAY OF NATRIURETIC PEPTIDE: CPT | Performed by: EMERGENCY MEDICINE

## 2021-01-01 PROCEDURE — 84157 ASSAY OF PROTEIN OTHER: CPT | Performed by: INTERNAL MEDICINE

## 2021-01-01 PROCEDURE — C8929 TTE W OR WO FOL WCON,DOPPLER: HCPCS

## 2021-01-01 PROCEDURE — 85027 COMPLETE CBC AUTOMATED: CPT | Performed by: INTERNAL MEDICINE

## 2021-01-01 PROCEDURE — 84484 ASSAY OF TROPONIN QUANT: CPT | Performed by: NURSE PRACTITIONER

## 2021-01-01 PROCEDURE — G0008 ADMIN INFLUENZA VIRUS VAC: HCPCS | Performed by: NURSE PRACTITIONER

## 2021-01-01 PROCEDURE — 80053 COMPREHEN METABOLIC PANEL: CPT | Performed by: EMERGENCY MEDICINE

## 2021-01-01 PROCEDURE — 99213 OFFICE O/P EST LOW 20 MIN: CPT | Performed by: NURSE PRACTITIONER

## 2021-01-01 PROCEDURE — 99233 SBSQ HOSP IP/OBS HIGH 50: CPT | Performed by: NURSE PRACTITIONER

## 2021-01-01 PROCEDURE — 93971 EXTREMITY STUDY: CPT

## 2021-01-01 PROCEDURE — 84443 ASSAY THYROID STIM HORMONE: CPT | Performed by: NURSE PRACTITIONER

## 2021-01-01 PROCEDURE — 99285 EMERGENCY DEPT VISIT HI MDM: CPT | Performed by: EMERGENCY MEDICINE

## 2021-01-01 PROCEDURE — 32555 ASPIRATE PLEURA W/ IMAGING: CPT | Performed by: RADIOLOGY

## 2021-01-01 PROCEDURE — 99495 TRANSJ CARE MGMT MOD F2F 14D: CPT | Performed by: NURSE PRACTITIONER

## 2021-01-01 PROCEDURE — 80048 BASIC METABOLIC PNL TOTAL CA: CPT | Performed by: INTERNAL MEDICINE

## 2021-01-01 PROCEDURE — 81001 URINALYSIS AUTO W/SCOPE: CPT

## 2021-01-01 PROCEDURE — 80048 BASIC METABOLIC PNL TOTAL CA: CPT

## 2021-01-01 PROCEDURE — 84484 ASSAY OF TROPONIN QUANT: CPT | Performed by: EMERGENCY MEDICINE

## 2021-01-01 PROCEDURE — 99232 SBSQ HOSP IP/OBS MODERATE 35: CPT | Performed by: NURSE PRACTITIONER

## 2021-01-01 PROCEDURE — 85025 COMPLETE CBC W/AUTO DIFF WBC: CPT | Performed by: EMERGENCY MEDICINE

## 2021-01-01 PROCEDURE — 93296 REM INTERROG EVL PM/IDS: CPT | Performed by: INTERNAL MEDICINE

## 2021-01-01 PROCEDURE — 71045 X-RAY EXAM CHEST 1 VIEW: CPT

## 2021-01-01 PROCEDURE — 82306 VITAMIN D 25 HYDROXY: CPT

## 2021-01-01 PROCEDURE — 43236 UPPR GI SCOPE W/SUBMUC INJ: CPT | Performed by: INTERNAL MEDICINE

## 2021-01-01 PROCEDURE — 36415 COLL VENOUS BLD VENIPUNCTURE: CPT

## 2021-01-01 PROCEDURE — 99285 EMERGENCY DEPT VISIT HI MDM: CPT

## 2021-01-01 PROCEDURE — 0241U HB NFCT DS VIR RESP RNA 4 TRGT: CPT | Performed by: EMERGENCY MEDICINE

## 2021-01-01 PROCEDURE — 93005 ELECTROCARDIOGRAM TRACING: CPT

## 2021-01-01 PROCEDURE — G0439 PPPS, SUBSEQ VISIT: HCPCS | Performed by: INTERNAL MEDICINE

## 2021-01-01 PROCEDURE — 96374 THER/PROPH/DIAG INJ IV PUSH: CPT

## 2021-01-01 PROCEDURE — 93010 ELECTROCARDIOGRAM REPORT: CPT | Performed by: INTERNAL MEDICINE

## 2021-01-01 PROCEDURE — 0W9B3ZZ DRAINAGE OF LEFT PLEURAL CAVITY, PERCUTANEOUS APPROACH: ICD-10-PCS | Performed by: INTERNAL MEDICINE

## 2021-01-01 PROCEDURE — 82945 GLUCOSE OTHER FLUID: CPT | Performed by: INTERNAL MEDICINE

## 2021-01-01 PROCEDURE — 93295 DEV INTERROG REMOTE 1/2/MLT: CPT | Performed by: INTERNAL MEDICINE

## 2021-01-01 PROCEDURE — 99222 1ST HOSP IP/OBS MODERATE 55: CPT | Performed by: INTERNAL MEDICINE

## 2021-01-01 PROCEDURE — 82570 ASSAY OF URINE CREATININE: CPT

## 2021-01-01 PROCEDURE — 99239 HOSP IP/OBS DSCHRG MGMT >30: CPT | Performed by: INTERNAL MEDICINE

## 2021-01-01 PROCEDURE — 0W993ZZ DRAINAGE OF RIGHT PLEURAL CAVITY, PERCUTANEOUS APPROACH: ICD-10-PCS | Performed by: INTERNAL MEDICINE

## 2021-01-01 PROCEDURE — 85025 COMPLETE CBC W/AUTO DIFF WBC: CPT

## 2021-01-01 PROCEDURE — 83615 LACTATE (LD) (LDH) ENZYME: CPT | Performed by: INTERNAL MEDICINE

## 2021-01-01 PROCEDURE — 83735 ASSAY OF MAGNESIUM: CPT | Performed by: NURSE PRACTITIONER

## 2021-01-01 PROCEDURE — 84100 ASSAY OF PHOSPHORUS: CPT

## 2021-01-01 PROCEDURE — 82043 UR ALBUMIN QUANTITATIVE: CPT

## 2021-01-01 PROCEDURE — 99214 OFFICE O/P EST MOD 30 MIN: CPT | Performed by: PHYSICIAN ASSISTANT

## 2021-01-01 RX ORDER — TAFAMIDIS 61 MG/1
CAPSULE, LIQUID FILLED ORAL
Qty: 30 CAPSULE | Refills: 11 | Status: SHIPPED | OUTPATIENT
Start: 2021-01-01 | End: 2021-01-01 | Stop reason: SDUPTHER

## 2021-01-01 RX ORDER — LANOLIN ALCOHOL/MO/W.PET/CERES
6 CREAM (GRAM) TOPICAL
Status: DISCONTINUED | OUTPATIENT
Start: 2021-01-01 | End: 2021-01-01 | Stop reason: HOSPADM

## 2021-01-01 RX ORDER — FAMOTIDINE 20 MG/1
20 TABLET, FILM COATED ORAL 2 TIMES DAILY
Qty: 180 TABLET | Refills: 0 | Status: SHIPPED | OUTPATIENT
Start: 2021-01-01 | End: 2021-01-01 | Stop reason: SDUPTHER

## 2021-01-01 RX ORDER — PANTOPRAZOLE SODIUM 40 MG/1
40 TABLET, DELAYED RELEASE ORAL
Status: DISCONTINUED | OUTPATIENT
Start: 2021-01-01 | End: 2021-01-01 | Stop reason: HOSPADM

## 2021-01-01 RX ORDER — LIDOCAINE HYDROCHLORIDE 20 MG/ML
INJECTION, SOLUTION EPIDURAL; INFILTRATION; INTRACAUDAL; PERINEURAL AS NEEDED
Status: DISCONTINUED | OUTPATIENT
Start: 2021-01-01 | End: 2021-01-01

## 2021-01-01 RX ORDER — FAMOTIDINE 20 MG/1
20 TABLET, FILM COATED ORAL
Status: DISCONTINUED | OUTPATIENT
Start: 2021-01-01 | End: 2021-01-01 | Stop reason: HOSPADM

## 2021-01-01 RX ORDER — ONDANSETRON 2 MG/ML
4 INJECTION INTRAMUSCULAR; INTRAVENOUS EVERY 6 HOURS PRN
Status: DISCONTINUED | OUTPATIENT
Start: 2021-01-01 | End: 2021-01-01 | Stop reason: HOSPADM

## 2021-01-01 RX ORDER — LIDOCAINE WITH 8.4% SOD BICARB 0.9%(10ML)
SYRINGE (ML) INJECTION CODE/TRAUMA/SEDATION MEDICATION
Status: COMPLETED | OUTPATIENT
Start: 2021-01-01 | End: 2021-01-01

## 2021-01-01 RX ORDER — PROPOFOL 10 MG/ML
INJECTION, EMULSION INTRAVENOUS AS NEEDED
Status: DISCONTINUED | OUTPATIENT
Start: 2021-01-01 | End: 2021-01-01

## 2021-01-01 RX ORDER — SENNOSIDES 8.6 MG
2 TABLET ORAL
Status: DISCONTINUED | OUTPATIENT
Start: 2021-01-01 | End: 2021-01-01 | Stop reason: HOSPADM

## 2021-01-01 RX ORDER — FUROSEMIDE 10 MG/ML
20 INJECTION INTRAMUSCULAR; INTRAVENOUS 2 TIMES DAILY
Status: DISCONTINUED | OUTPATIENT
Start: 2021-01-01 | End: 2021-01-01

## 2021-01-01 RX ORDER — FUROSEMIDE 10 MG/ML
40 INJECTION INTRAMUSCULAR; INTRAVENOUS
Status: DISCONTINUED | OUTPATIENT
Start: 2021-01-01 | End: 2021-01-01

## 2021-01-01 RX ORDER — DOCUSATE SODIUM 100 MG/1
100 CAPSULE, LIQUID FILLED ORAL 2 TIMES DAILY
Status: DISCONTINUED | OUTPATIENT
Start: 2021-01-01 | End: 2021-01-01 | Stop reason: HOSPADM

## 2021-01-01 RX ORDER — TORSEMIDE 20 MG/1
20 TABLET ORAL DAILY
Status: DISCONTINUED | OUTPATIENT
Start: 2021-01-01 | End: 2021-01-01 | Stop reason: HOSPADM

## 2021-01-01 RX ORDER — TORSEMIDE 10 MG/1
TABLET ORAL
Qty: 30 TABLET | Refills: 5 | Status: SHIPPED | OUTPATIENT
Start: 2021-01-01 | End: 2022-01-01

## 2021-01-01 RX ORDER — TRAZODONE HYDROCHLORIDE 50 MG/1
50-100 TABLET ORAL
Qty: 60 TABLET | Refills: 1 | Status: SHIPPED | OUTPATIENT
Start: 2021-01-01 | End: 2021-01-01 | Stop reason: ALTCHOICE

## 2021-01-01 RX ORDER — BENZONATATE 100 MG/1
100 CAPSULE ORAL 3 TIMES DAILY PRN
Qty: 20 CAPSULE | Refills: 0 | Status: SHIPPED | OUTPATIENT
Start: 2021-01-01 | End: 2021-01-01 | Stop reason: ALTCHOICE

## 2021-01-01 RX ORDER — MIDAZOLAM HYDROCHLORIDE 2 MG/2ML
INJECTION, SOLUTION INTRAMUSCULAR; INTRAVENOUS AS NEEDED
Status: DISCONTINUED | OUTPATIENT
Start: 2021-01-01 | End: 2021-01-01

## 2021-01-01 RX ORDER — FAMOTIDINE 20 MG/1
20 TABLET, FILM COATED ORAL
Qty: 90 TABLET | Refills: 2 | Status: SHIPPED | OUTPATIENT
Start: 2021-01-01 | End: 2022-01-01 | Stop reason: SDUPTHER

## 2021-01-01 RX ORDER — FUROSEMIDE 10 MG/ML
20 INJECTION INTRAMUSCULAR; INTRAVENOUS
Status: DISCONTINUED | OUTPATIENT
Start: 2021-01-01 | End: 2021-01-01

## 2021-01-01 RX ORDER — TORSEMIDE 10 MG/1
20 TABLET ORAL DAILY
Qty: 30 TABLET | Refills: 5 | Status: SHIPPED | OUTPATIENT
Start: 2021-01-01 | End: 2021-01-01

## 2021-01-01 RX ORDER — TORSEMIDE 10 MG/1
20 TABLET ORAL DAILY
Qty: 90 TABLET | Refills: 0 | Status: SHIPPED | OUTPATIENT
Start: 2021-01-01 | End: 2021-01-01 | Stop reason: SDUPTHER

## 2021-01-01 RX ORDER — SODIUM CHLORIDE, SODIUM LACTATE, POTASSIUM CHLORIDE, CALCIUM CHLORIDE 600; 310; 30; 20 MG/100ML; MG/100ML; MG/100ML; MG/100ML
125 INJECTION, SOLUTION INTRAVENOUS CONTINUOUS
Status: DISCONTINUED | OUTPATIENT
Start: 2021-01-01 | End: 2021-01-01 | Stop reason: HOSPADM

## 2021-01-01 RX ORDER — TAFAMIDIS 61 MG/1
61 CAPSULE, LIQUID FILLED ORAL DAILY
Qty: 30 CAPSULE | Refills: 11 | Status: SHIPPED | OUTPATIENT
Start: 2021-01-01 | End: 2022-01-01

## 2021-01-01 RX ORDER — CEFAZOLIN SODIUM 1 G/50ML
1000 SOLUTION INTRAVENOUS EVERY 8 HOURS
Status: DISCONTINUED | OUTPATIENT
Start: 2021-01-01 | End: 2021-01-01

## 2021-01-01 RX ORDER — POLYETHYLENE GLYCOL 3350 17 G/17G
17 POWDER, FOR SOLUTION ORAL DAILY
Status: DISCONTINUED | OUTPATIENT
Start: 2021-01-01 | End: 2021-01-01 | Stop reason: HOSPADM

## 2021-01-01 RX ORDER — FUROSEMIDE 10 MG/ML
40 INJECTION INTRAMUSCULAR; INTRAVENOUS ONCE
Status: COMPLETED | OUTPATIENT
Start: 2021-01-01 | End: 2021-01-01

## 2021-01-01 RX ORDER — CEPHALEXIN 500 MG/1
500 CAPSULE ORAL EVERY 6 HOURS SCHEDULED
Status: COMPLETED | OUTPATIENT
Start: 2021-01-01 | End: 2021-01-01

## 2021-01-01 RX ORDER — METOPROLOL SUCCINATE 25 MG/1
12.5 TABLET, EXTENDED RELEASE ORAL DAILY
Status: DISCONTINUED | OUTPATIENT
Start: 2021-01-01 | End: 2021-01-01 | Stop reason: HOSPADM

## 2021-01-01 RX ORDER — KETAMINE HCL IN NACL, ISO-OSM 100MG/10ML
SYRINGE (ML) INJECTION AS NEEDED
Status: DISCONTINUED | OUTPATIENT
Start: 2021-01-01 | End: 2021-01-01

## 2021-01-01 RX ORDER — NYSTATIN 100000 [USP'U]/G
POWDER TOPICAL 3 TIMES DAILY
Qty: 30 G | Refills: 3 | Status: SHIPPED | OUTPATIENT
Start: 2021-01-01 | End: 2021-01-01

## 2021-01-01 RX ORDER — CLOTRIMAZOLE 1 %
CREAM (GRAM) TOPICAL 2 TIMES DAILY
Qty: 60 G | Refills: 3 | Status: SHIPPED | OUTPATIENT
Start: 2021-01-01 | End: 2021-01-01 | Stop reason: ALTCHOICE

## 2021-01-01 RX ORDER — METOPROLOL SUCCINATE 25 MG/1
12.5 TABLET, EXTENDED RELEASE ORAL DAILY
Qty: 15 TABLET | Refills: 0
Start: 2021-01-01 | End: 2022-01-01 | Stop reason: SDUPTHER

## 2021-01-01 RX ORDER — FENTANYL CITRATE 50 UG/ML
INJECTION, SOLUTION INTRAMUSCULAR; INTRAVENOUS AS NEEDED
Status: DISCONTINUED | OUTPATIENT
Start: 2021-01-01 | End: 2021-01-01

## 2021-01-01 RX ORDER — LANOLIN ALCOHOL/MO/W.PET/CERES
3 CREAM (GRAM) TOPICAL
Status: DISCONTINUED | OUTPATIENT
Start: 2021-01-01 | End: 2021-01-01

## 2021-01-01 RX ORDER — DICYCLOMINE HCL 20 MG
20 TABLET ORAL 4 TIMES DAILY PRN
Qty: 60 TABLET | Refills: 2 | Status: SHIPPED | OUTPATIENT
Start: 2021-01-01 | End: 2021-01-01 | Stop reason: ALTCHOICE

## 2021-01-01 RX ORDER — AMIODARONE HYDROCHLORIDE 200 MG/1
200 TABLET ORAL
Status: DISCONTINUED | OUTPATIENT
Start: 2021-01-01 | End: 2021-01-01 | Stop reason: HOSPADM

## 2021-01-01 RX ORDER — METOPROLOL SUCCINATE 25 MG/1
12.5 TABLET, EXTENDED RELEASE ORAL DAILY
Start: 2021-01-01 | End: 2021-01-01

## 2021-01-01 RX ORDER — CLOTRIMAZOLE 1 %
CREAM (GRAM) TOPICAL 2 TIMES DAILY
Qty: 60 G | Refills: 3 | Status: SHIPPED | OUTPATIENT
Start: 2021-01-01 | End: 2021-01-01 | Stop reason: SDUPTHER

## 2021-01-01 RX ORDER — DOCUSATE SODIUM 100 MG/1
100 CAPSULE, LIQUID FILLED ORAL 2 TIMES DAILY
Status: DISCONTINUED | OUTPATIENT
Start: 2021-01-01 | End: 2021-01-01

## 2021-01-01 RX ORDER — ACETAMINOPHEN 325 MG/1
650 TABLET ORAL EVERY 6 HOURS PRN
Status: DISCONTINUED | OUTPATIENT
Start: 2021-01-01 | End: 2021-01-01 | Stop reason: HOSPADM

## 2021-01-01 RX ORDER — MAGNESIUM HYDROXIDE/ALUMINUM HYDROXICE/SIMETHICONE 120; 1200; 1200 MG/30ML; MG/30ML; MG/30ML
30 SUSPENSION ORAL EVERY 6 HOURS PRN
Status: DISCONTINUED | OUTPATIENT
Start: 2021-01-01 | End: 2021-01-01 | Stop reason: HOSPADM

## 2021-01-01 RX ADMIN — TAFAMIDIS 61 MG: 61 CAPSULE, LIQUID FILLED ORAL at 11:58

## 2021-01-01 RX ADMIN — CEPHALEXIN 500 MG: 500 CAPSULE ORAL at 05:43

## 2021-01-01 RX ADMIN — POLYETHYLENE GLYCOL 3350 17 G: 17 POWDER, FOR SOLUTION ORAL at 08:00

## 2021-01-01 RX ADMIN — CEPHALEXIN 500 MG: 500 CAPSULE ORAL at 23:10

## 2021-01-01 RX ADMIN — CEPHALEXIN 500 MG: 500 CAPSULE ORAL at 11:41

## 2021-01-01 RX ADMIN — TAFAMIDIS 61 MG: 61 CAPSULE, LIQUID FILLED ORAL at 12:28

## 2021-01-01 RX ADMIN — FAMOTIDINE 20 MG: 20 TABLET ORAL at 21:39

## 2021-01-01 RX ADMIN — DOCUSATE SODIUM 100 MG: 100 CAPSULE, LIQUID FILLED ORAL at 17:49

## 2021-01-01 RX ADMIN — APIXABAN 2.5 MG: 2.5 TABLET, FILM COATED ORAL at 17:06

## 2021-01-01 RX ADMIN — AMIODARONE HYDROCHLORIDE 200 MG: 200 TABLET ORAL at 08:26

## 2021-01-01 RX ADMIN — Medication 6 MG: at 21:35

## 2021-01-01 RX ADMIN — APIXABAN 2.5 MG: 2.5 TABLET, FILM COATED ORAL at 18:57

## 2021-01-01 RX ADMIN — CEPHALEXIN 500 MG: 500 CAPSULE ORAL at 05:24

## 2021-01-01 RX ADMIN — PANTOPRAZOLE SODIUM 40 MG: 40 TABLET, DELAYED RELEASE ORAL at 06:49

## 2021-01-01 RX ADMIN — SENNOSIDES 17.2 MG: 8.6 TABLET, FILM COATED ORAL at 21:12

## 2021-01-01 RX ADMIN — CEPHALEXIN 500 MG: 500 CAPSULE ORAL at 17:11

## 2021-01-01 RX ADMIN — PANTOPRAZOLE SODIUM 40 MG: 40 TABLET, DELAYED RELEASE ORAL at 05:44

## 2021-01-01 RX ADMIN — FUROSEMIDE 40 MG: 10 INJECTION, SOLUTION INTRAMUSCULAR; INTRAVENOUS at 08:31

## 2021-01-01 RX ADMIN — AMIODARONE HYDROCHLORIDE 200 MG: 200 TABLET ORAL at 08:00

## 2021-01-01 RX ADMIN — PROPOFOL 20 MG: 10 INJECTION, EMULSION INTRAVENOUS at 11:49

## 2021-01-01 RX ADMIN — FAMOTIDINE 20 MG: 20 TABLET ORAL at 21:35

## 2021-01-01 RX ADMIN — APIXABAN 2.5 MG: 2.5 TABLET, FILM COATED ORAL at 10:00

## 2021-01-01 RX ADMIN — FAMOTIDINE 20 MG: 20 TABLET ORAL at 21:19

## 2021-01-01 RX ADMIN — Medication 6 MG: at 22:05

## 2021-01-01 RX ADMIN — CEFAZOLIN SODIUM 1000 MG: 1 SOLUTION INTRAVENOUS at 03:51

## 2021-01-01 RX ADMIN — FUROSEMIDE 20 MG: 10 INJECTION, SOLUTION INTRAMUSCULAR; INTRAVENOUS at 17:13

## 2021-01-01 RX ADMIN — APIXABAN 2.5 MG: 2.5 TABLET, FILM COATED ORAL at 17:13

## 2021-01-01 RX ADMIN — CEFAZOLIN SODIUM 1000 MG: 1 SOLUTION INTRAVENOUS at 17:36

## 2021-01-01 RX ADMIN — Medication 6 MG: at 21:19

## 2021-01-01 RX ADMIN — TAFAMIDIS 61 MG: 61 CAPSULE, LIQUID FILLED ORAL at 10:06

## 2021-01-01 RX ADMIN — PANTOPRAZOLE SODIUM 40 MG: 40 TABLET, DELAYED RELEASE ORAL at 05:23

## 2021-01-01 RX ADMIN — PANTOPRAZOLE SODIUM 40 MG: 40 TABLET, DELAYED RELEASE ORAL at 05:54

## 2021-01-01 RX ADMIN — TAFAMIDIS 61 MG: 61 CAPSULE, LIQUID FILLED ORAL at 12:54

## 2021-01-01 RX ADMIN — Medication 20 MG: at 11:45

## 2021-01-01 RX ADMIN — Medication 6 MG: at 21:39

## 2021-01-01 RX ADMIN — TAFAMIDIS 61 MG: 61 CAPSULE, LIQUID FILLED ORAL at 08:36

## 2021-01-01 RX ADMIN — Medication 20 ML: at 10:37

## 2021-01-01 RX ADMIN — CEPHALEXIN 500 MG: 500 CAPSULE ORAL at 23:44

## 2021-01-01 RX ADMIN — AMIODARONE HYDROCHLORIDE 200 MG: 200 TABLET ORAL at 10:14

## 2021-01-01 RX ADMIN — ALUMINA, MAGNESIA, AND SIMETHICONE ORAL SUSPENSION REGULAR STRENGTH 30 ML: 1200; 1200; 120 SUSPENSION ORAL at 15:47

## 2021-01-01 RX ADMIN — POLYETHYLENE GLYCOL 3350 17 G: 17 POWDER, FOR SOLUTION ORAL at 08:26

## 2021-01-01 RX ADMIN — PANTOPRAZOLE SODIUM 40 MG: 40 TABLET, DELAYED RELEASE ORAL at 05:22

## 2021-01-01 RX ADMIN — FUROSEMIDE 40 MG: 10 INJECTION, SOLUTION INTRAMUSCULAR; INTRAVENOUS at 05:49

## 2021-01-01 RX ADMIN — FUROSEMIDE 40 MG: 10 INJECTION, SOLUTION INTRAMUSCULAR; INTRAVENOUS at 17:49

## 2021-01-01 RX ADMIN — CEFAZOLIN SODIUM 1000 MG: 1 SOLUTION INTRAVENOUS at 11:30

## 2021-01-01 RX ADMIN — FUROSEMIDE 40 MG: 10 INJECTION, SOLUTION INTRAVENOUS at 16:35

## 2021-01-01 RX ADMIN — APIXABAN 2.5 MG: 2.5 TABLET, FILM COATED ORAL at 17:49

## 2021-01-01 RX ADMIN — FAMOTIDINE 20 MG: 20 TABLET ORAL at 21:12

## 2021-01-01 RX ADMIN — CEFAZOLIN SODIUM 1000 MG: 1 SOLUTION INTRAVENOUS at 10:50

## 2021-01-01 RX ADMIN — SODIUM CHLORIDE, SODIUM LACTATE, POTASSIUM CHLORIDE, AND CALCIUM CHLORIDE 125 ML/HR: .6; .31; .03; .02 INJECTION, SOLUTION INTRAVENOUS at 11:20

## 2021-01-01 RX ADMIN — FAMOTIDINE 20 MG: 20 TABLET ORAL at 22:01

## 2021-01-01 RX ADMIN — MIDAZOLAM 1 MG: 1 INJECTION INTRAMUSCULAR; INTRAVENOUS at 11:49

## 2021-01-01 RX ADMIN — TAFAMIDIS 61 MG: 61 CAPSULE, LIQUID FILLED ORAL at 08:27

## 2021-01-01 RX ADMIN — METOPROLOL SUCCINATE 12.5 MG: 25 TABLET, EXTENDED RELEASE ORAL at 09:15

## 2021-01-01 RX ADMIN — PANTOPRAZOLE SODIUM 40 MG: 40 TABLET, DELAYED RELEASE ORAL at 05:24

## 2021-01-01 RX ADMIN — APIXABAN 2.5 MG: 2.5 TABLET, FILM COATED ORAL at 08:43

## 2021-01-01 RX ADMIN — POLYETHYLENE GLYCOL 3350 17 G: 17 POWDER, FOR SOLUTION ORAL at 09:16

## 2021-01-01 RX ADMIN — FUROSEMIDE 40 MG: 10 INJECTION, SOLUTION INTRAMUSCULAR; INTRAVENOUS at 13:24

## 2021-01-01 RX ADMIN — CEPHALEXIN 500 MG: 500 CAPSULE ORAL at 11:58

## 2021-01-01 RX ADMIN — LIDOCAINE HYDROCHLORIDE 100 MG: 20 INJECTION, SOLUTION EPIDURAL; INFILTRATION; INTRACAUDAL; PERINEURAL at 11:46

## 2021-01-01 RX ADMIN — Medication 6 MG: at 21:13

## 2021-01-01 RX ADMIN — APIXABAN 2.5 MG: 2.5 TABLET, FILM COATED ORAL at 09:14

## 2021-01-01 RX ADMIN — AMIODARONE HYDROCHLORIDE 200 MG: 200 TABLET ORAL at 08:43

## 2021-01-01 RX ADMIN — DOCUSATE SODIUM 100 MG: 100 CAPSULE, LIQUID FILLED ORAL at 08:00

## 2021-01-01 RX ADMIN — FUROSEMIDE 40 MG: 10 INJECTION, SOLUTION INTRAMUSCULAR; INTRAVENOUS at 17:10

## 2021-01-01 RX ADMIN — CEFAZOLIN SODIUM 1000 MG: 1 SOLUTION INTRAVENOUS at 17:50

## 2021-01-01 RX ADMIN — AMIODARONE HYDROCHLORIDE 200 MG: 200 TABLET ORAL at 09:25

## 2021-01-01 RX ADMIN — Medication 6 MG: at 21:30

## 2021-01-01 RX ADMIN — POLYETHYLENE GLYCOL 3350 17 G: 17 POWDER, FOR SOLUTION ORAL at 08:48

## 2021-01-01 RX ADMIN — POLYETHYLENE GLYCOL 3350 17 G: 17 POWDER, FOR SOLUTION ORAL at 10:01

## 2021-01-01 RX ADMIN — ONABOTULINUMTOXINA 100 UNITS: 100 INJECTION, POWDER, LYOPHILIZED, FOR SOLUTION INTRADERMAL; INTRAMUSCULAR at 11:50

## 2021-01-01 RX ADMIN — TOPICAL ANESTHETIC 1 SPRAY: 200 SPRAY DENTAL; PERIODONTAL at 11:45

## 2021-01-01 RX ADMIN — FUROSEMIDE 40 MG: 10 INJECTION, SOLUTION INTRAMUSCULAR; INTRAVENOUS at 05:54

## 2021-01-01 RX ADMIN — MIDAZOLAM 1 MG: 1 INJECTION INTRAMUSCULAR; INTRAVENOUS at 11:45

## 2021-01-01 RX ADMIN — PERFLUTREN 0.4 ML/MIN: 6.52 INJECTION, SUSPENSION INTRAVENOUS at 13:46

## 2021-01-01 RX ADMIN — FUROSEMIDE 20 MG: 10 INJECTION, SOLUTION INTRAMUSCULAR; INTRAVENOUS at 18:57

## 2021-01-01 RX ADMIN — FUROSEMIDE 20 MG: 10 INJECTION, SOLUTION INTRAMUSCULAR; INTRAVENOUS at 10:36

## 2021-01-01 RX ADMIN — FENTANYL CITRATE 25 MCG: 50 INJECTION, SOLUTION INTRAMUSCULAR; INTRAVENOUS at 11:45

## 2021-01-01 RX ADMIN — FUROSEMIDE 20 MG: 10 INJECTION, SOLUTION INTRAMUSCULAR; INTRAVENOUS at 09:16

## 2021-01-01 RX ADMIN — DOCUSATE SODIUM 100 MG: 100 CAPSULE, LIQUID FILLED ORAL at 10:50

## 2021-01-01 RX ADMIN — CEFAZOLIN SODIUM 1000 MG: 1 SOLUTION INTRAVENOUS at 02:44

## 2021-01-01 RX ADMIN — CEFAZOLIN SODIUM 1000 MG: 1 SOLUTION INTRAVENOUS at 02:34

## 2021-01-01 RX ADMIN — CEFAZOLIN SODIUM 1000 MG: 1 SOLUTION INTRAVENOUS at 18:08

## 2021-01-01 RX ADMIN — Medication 20 ML: at 10:38

## 2021-01-01 RX ADMIN — APIXABAN 2.5 MG: 2.5 TABLET, FILM COATED ORAL at 10:02

## 2021-01-01 RX ADMIN — CEFAZOLIN SODIUM 1000 MG: 1 SOLUTION INTRAVENOUS at 10:01

## 2021-01-01 RX ADMIN — DOCUSATE SODIUM 100 MG: 100 CAPSULE, LIQUID FILLED ORAL at 17:06

## 2021-01-01 RX ADMIN — METOPROLOL SUCCINATE 12.5 MG: 25 TABLET, EXTENDED RELEASE ORAL at 08:00

## 2021-01-01 RX ADMIN — AMIODARONE HYDROCHLORIDE 200 MG: 200 TABLET ORAL at 08:32

## 2021-01-01 RX ADMIN — CEPHALEXIN 500 MG: 500 CAPSULE ORAL at 17:10

## 2021-01-01 RX ADMIN — POLYETHYLENE GLYCOL 3350 17 G: 17 POWDER, FOR SOLUTION ORAL at 08:32

## 2021-01-01 RX ADMIN — FAMOTIDINE 20 MG: 20 TABLET ORAL at 21:30

## 2021-01-05 ENCOUNTER — REMOTE DEVICE CLINIC VISIT (OUTPATIENT)
Dept: CARDIOLOGY CLINIC | Facility: CLINIC | Age: 81
End: 2021-01-05
Payer: MEDICARE

## 2021-01-05 DIAGNOSIS — Z95.810 AICD (AUTOMATIC CARDIOVERTER/DEFIBRILLATOR) PRESENT: Primary | ICD-10-CM

## 2021-01-05 PROCEDURE — 93295 DEV INTERROG REMOTE 1/2/MLT: CPT | Performed by: INTERNAL MEDICINE

## 2021-01-05 PROCEDURE — 93296 REM INTERROG EVL PM/IDS: CPT | Performed by: INTERNAL MEDICINE

## 2021-01-05 NOTE — PROGRESS NOTES
Results for orders placed or performed in visit on 01/05/21   Cardiac EP device report    Narrative    MDT CRT-D (VVIR 70)  CARELINK TRANSMISSION: BATTERY STATUS "OK"   99% VSRP 0 5%  ALL AVAILABLE LEAD PARAMETERS WITHIN NORMAL LIMITS  1 NSVT NOTED; NO THERAPIES GIVEN  PT ON AMIO, ELIQUIS, & METO SUCC  EF 35% (2020)  1 VENT SENSING NOTED; MARKERS ONLY  OPTI-VOL WITHIN NORMAL LIMITS  NORMAL DEVICE FUNCTION   NC       Current Outpatient Medications:     amiodarone 200 mg tablet, 1 tab daily, Disp: 30 tablet, Rfl: 11    apixaban (ELIQUIS) 5 mg, Take 1 tablet (5 mg total) by mouth 2 (two) times a day, Disp: 60 tablet, Rfl: 0    gabapentin (NEURONTIN) 100 mg capsule, Take 2 capsules (200 mg total) by mouth daily at bedtime, Disp: , Rfl:     melatonin 3 mg, Take 3 mg by mouth daily at bedtime, Disp: , Rfl:     metoprolol succinate (TOPROL-XL) 25 mg 24 hr tablet, Take 1 tablet (25 mg total) by mouth daily, Disp: 30 tablet, Rfl: 3    pantoprazole (PROTONIX) 40 mg tablet, Take 1 tablet (40 mg total) by mouth daily in the early morning, Disp: 90 tablet, Rfl: 3    polyethylene glycol (MIRALAX) 17 g packet, Take 17 g by mouth daily, Disp: 30 each, Rfl: 0    Tafamidis (Vyndamax) 61 MG CAPS, Take 61 mg by mouth daily, Disp: 30 capsule, Rfl: 11    torsemide (DEMADEX) 10 mg tablet, Take 1 tablet (10 mg total) by mouth daily, Disp: 90 tablet, Rfl: 3

## 2021-01-07 ENCOUNTER — TELEPHONE (OUTPATIENT)
Dept: CARDIOLOGY CLINIC | Facility: CLINIC | Age: 81
End: 2021-01-07

## 2021-01-07 NOTE — TELEPHONE ENCOUNTER
Patient to take an additional torsemide 10 mg for the next 3 days (  Total of 20mg)  And go back on 10 mg daily thereafter  Vital signs seems to be stable  Thank you

## 2021-01-07 NOTE — TELEPHONE ENCOUNTER
Spoke with patient's wife Zacarias Trace  Informed her that [facility] 10 mg torsemide for the next 3 days, for a total of 20 mg daily, then go back to 10 mg daily thereafter  Patients vitals seem to be stable  Patient's wife understood

## 2021-01-07 NOTE — TELEPHONE ENCOUNTER
Pt's wife called & wanted to give pt's weight, bp & pulse readings:    12/29/20 - WEIGHT: 205 9   /71 PULSE 84 /70 PULSE 76     12/30/20 - WEIGHT: 206 4   /69 PULSE 80  /69 PULSE 74    12/31/20 - WEIGHT: 206 3   /60 PULSE 77 /69 PULSE 77    1/01/21 - WEIGHT: 207 8   /61 PULSE 86 /67 PULSE 73    1/02/21 - WEIGHT: 207 3   /64 PULSE 76 /69 PULSE 76      1/03/21 - WEIGHT: 208 2   /64 PULSE 77 /72 PULSE 73    1/04/21 - WEIGHT: 207 4   /66 PULSE 77 /65 PULSE 74    1/05/21 - WEIGHT: 207 6   /67 PULSE 81 /72 PULSE 74    1/06/21 - WEIGHT: 207 5   /63 PULSE 79 /63 PULSE 79    1/07/21 - WEIGHT: 209 2   /68 PULSE 76    Pt's wife said that pt's feet & ankles have no swelling, no sob, & pt feels fine but she is concerned about the weight inc

## 2021-01-11 ENCOUNTER — TELEPHONE (OUTPATIENT)
Dept: CARDIOLOGY CLINIC | Facility: CLINIC | Age: 81
End: 2021-01-11

## 2021-01-11 NOTE — TELEPHONE ENCOUNTER
Dr Janae Wang in [place] prescribed the patient Vyndamax 61 mg and he just started his second month of taking it  Spouse wants to make you aware that he is taking it and if they should watch out for any side effects while taking it

## 2021-01-11 NOTE — TELEPHONE ENCOUNTER
Jonnie's wife call to make f/u appt with Dr Luis Carlos Duran  In March  She stated that she wants to make sure that Dr Luis Carlos Duran is aware that Reynoldpreet Waldronroe is on Vyndamax 61 mg  She wants to know if there is anything that she should watch out for while he is taking this medication , any bad side effects  Dr Joseph Ba in [place] prescribed this medication for him  He has been taking medication just over a month  He is starting his second month on it       Sierra Hung - [phone]

## 2021-01-11 NOTE — TELEPHONE ENCOUNTER
S/w Diego Mir and verbally understood  Wife stated that she was told that patient will need to be seen by HF clinic twice a year

## 2021-01-11 NOTE — TELEPHONE ENCOUNTER
Yes  I am aware of this patient on Vyndamax  Since this medication is prescribed by the heart failure clinic ( we do not prescribe it) through [facility], it is important that the patient has an appointment once in a few months with them

## 2021-01-12 ENCOUNTER — TELEPHONE (OUTPATIENT)
Dept: CARDIOLOGY CLINIC | Facility: CLINIC | Age: 81
End: 2021-01-12

## 2021-01-12 NOTE — TELEPHONE ENCOUNTER
Pt's wife called & wanted to give pt's weight, bp & pulse readings                              BP    PULSE  WEIGHT  1/8 Morning - 116/63,   82,        208 6        Night     - 114/64     78           1/9- Morning- 106/68     82,        206 9          Night -     113/65     76,    1/10- Morning- 109/67,   78,      207 3            Night-     115/67    77    1/11- Morning- 111/69,  79,      207 8           Night-      117/69,  79    1/12- Morning - 121/62,  78,      209 2

## 2021-01-12 NOTE — TELEPHONE ENCOUNTER
These numbers look good  If there is more than 3 pounds weight gain from baseline, patient can take an extra 10 milligrams of torsemide that day    Thank you

## 2021-01-18 ENCOUNTER — TELEPHONE (OUTPATIENT)
Dept: CARDIOLOGY CLINIC | Facility: CLINIC | Age: 81
End: 2021-01-18

## 2021-01-18 DIAGNOSIS — I48.20 CHRONIC ATRIAL FIBRILLATION (HCC): ICD-10-CM

## 2021-01-18 DIAGNOSIS — I42.0 DILATED CARDIOMYOPATHY (HCC): ICD-10-CM

## 2021-01-18 RX ORDER — METOPROLOL SUCCINATE 25 MG/1
25 TABLET, EXTENDED RELEASE ORAL DAILY
Qty: 90 TABLET | Refills: 3 | Status: SHIPPED | OUTPATIENT
Start: 2021-01-18 | End: 2021-06-28 | Stop reason: SDUPTHER

## 2021-01-20 DIAGNOSIS — Z23 ENCOUNTER FOR IMMUNIZATION: ICD-10-CM

## 2021-01-26 ENCOUNTER — IMMUNIZATIONS (OUTPATIENT)
Dept: FAMILY MEDICINE CLINIC | Facility: HOSPITAL | Age: 81
End: 2021-01-26

## 2021-01-26 DIAGNOSIS — Z23 ENCOUNTER FOR IMMUNIZATION: Primary | ICD-10-CM

## 2021-01-26 PROCEDURE — 0011A SARS-COV-2 / COVID-19 MRNA VACCINE (MODERNA) 100 MCG: CPT

## 2021-01-26 PROCEDURE — 91301 SARS-COV-2 / COVID-19 MRNA VACCINE (MODERNA) 100 MCG: CPT

## 2021-01-31 ENCOUNTER — APPOINTMENT (EMERGENCY)
Dept: CT IMAGING | Facility: HOSPITAL | Age: 81
End: 2021-01-31
Payer: MEDICARE

## 2021-01-31 ENCOUNTER — HOSPITAL ENCOUNTER (EMERGENCY)
Facility: HOSPITAL | Age: 81
Discharge: HOME | End: 2021-01-31
Attending: EMERGENCY MEDICINE
Payer: MEDICARE

## 2021-01-31 ENCOUNTER — APPOINTMENT (EMERGENCY)
Dept: RADIOLOGY | Facility: HOSPITAL | Age: 81
End: 2021-01-31
Payer: MEDICARE

## 2021-01-31 VITALS
WEIGHT: 204.81 LBS | OXYGEN SATURATION: 98 % | HEART RATE: 71 BPM | RESPIRATION RATE: 22 BRPM | SYSTOLIC BLOOD PRESSURE: 110 MMHG | BODY MASS INDEX: 24.29 KG/M2 | DIASTOLIC BLOOD PRESSURE: 64 MMHG | TEMPERATURE: 97.9 F

## 2021-01-31 DIAGNOSIS — R10.13 EPIGASTRIC ABDOMINAL PAIN: Primary | ICD-10-CM

## 2021-01-31 LAB
ALBUMIN SERPL BCP-MCNC: 3.9 G/DL (ref 3.5–5)
ALP SERPL-CCNC: 79 U/L (ref 46–116)
ALT SERPL W P-5'-P-CCNC: 19 U/L (ref 12–78)
ANION GAP SERPL CALCULATED.3IONS-SCNC: 9 MMOL/L (ref 4–13)
AST SERPL W P-5'-P-CCNC: 20 U/L (ref 5–45)
ATRIAL RATE: 72 BPM
BASOPHILS # BLD AUTO: 0.01 THOUSANDS/ΜL (ref 0–0.1)
BASOPHILS NFR BLD AUTO: 0 % (ref 0–1)
BILIRUB SERPL-MCNC: 2.3 MG/DL (ref 0.2–1)
BUN SERPL-MCNC: 23 MG/DL (ref 5–25)
CALCIUM SERPL-MCNC: 10 MG/DL (ref 8.3–10.1)
CHLORIDE SERPL-SCNC: 101 MMOL/L (ref 100–108)
CO2 SERPL-SCNC: 30 MMOL/L (ref 21–32)
CREAT SERPL-MCNC: 1.61 MG/DL (ref 0.6–1.3)
EOSINOPHIL # BLD AUTO: 0.04 THOUSAND/ΜL (ref 0–0.61)
EOSINOPHIL NFR BLD AUTO: 1 % (ref 0–6)
ERYTHROCYTE [DISTWIDTH] IN BLOOD BY AUTOMATED COUNT: 14.2 % (ref 11.6–15.1)
GFR SERPL CREATININE-BSD FRML MDRD: 40 ML/MIN/1.73SQ M
GLUCOSE SERPL-MCNC: 140 MG/DL (ref 65–140)
HCT VFR BLD AUTO: 41.8 % (ref 36.5–49.3)
HGB BLD-MCNC: 13.4 G/DL (ref 12–17)
IMM GRANULOCYTES # BLD AUTO: 0.01 THOUSAND/UL (ref 0–0.2)
IMM GRANULOCYTES NFR BLD AUTO: 0 % (ref 0–2)
LIPASE SERPL-CCNC: 240 U/L (ref 73–393)
LYMPHOCYTES # BLD AUTO: 1.27 THOUSANDS/ΜL (ref 0.6–4.47)
LYMPHOCYTES NFR BLD AUTO: 22 % (ref 14–44)
MCH RBC QN AUTO: 32 PG (ref 26.8–34.3)
MCHC RBC AUTO-ENTMCNC: 32.1 G/DL (ref 31.4–37.4)
MCV RBC AUTO: 100 FL (ref 82–98)
MONOCYTES # BLD AUTO: 0.34 THOUSAND/ΜL (ref 0.17–1.22)
MONOCYTES NFR BLD AUTO: 6 % (ref 4–12)
NEUTROPHILS # BLD AUTO: 4.21 THOUSANDS/ΜL (ref 1.85–7.62)
NEUTS SEG NFR BLD AUTO: 71 % (ref 43–75)
NRBC BLD AUTO-RTO: 0 /100 WBCS
NT-PROBNP SERPL-MCNC: 3738 PG/ML
PLATELET # BLD AUTO: 499 THOUSANDS/UL (ref 149–390)
POTASSIUM SERPL-SCNC: 4.2 MMOL/L (ref 3.5–5.3)
PROT SERPL-MCNC: 7.8 G/DL (ref 6.4–8.2)
QRS AXIS: 118 DEGREES
QRSD INTERVAL: 186 MS
QT INTERVAL: 470 MS
QTC INTERVAL: 524 MS
RBC # BLD AUTO: 4.19 MILLION/UL (ref 3.88–5.62)
SODIUM SERPL-SCNC: 140 MMOL/L (ref 136–145)
T WAVE AXIS: -35 DEGREES
TROPONIN I SERPL-MCNC: 0.11 NG/ML
TROPONIN I SERPL-MCNC: 0.11 NG/ML
VENTRICULAR RATE: 75 BPM
WBC # BLD AUTO: 5.88 THOUSAND/UL (ref 4.31–10.16)

## 2021-01-31 PROCEDURE — 83880 ASSAY OF NATRIURETIC PEPTIDE: CPT | Performed by: NURSE PRACTITIONER

## 2021-01-31 PROCEDURE — 93005 ELECTROCARDIOGRAM TRACING: CPT

## 2021-01-31 PROCEDURE — 99284 EMERGENCY DEPT VISIT MOD MDM: CPT

## 2021-01-31 PROCEDURE — 71045 X-RAY EXAM CHEST 1 VIEW: CPT

## 2021-01-31 PROCEDURE — 83690 ASSAY OF LIPASE: CPT | Performed by: NURSE PRACTITIONER

## 2021-01-31 PROCEDURE — G1004 CDSM NDSC: HCPCS

## 2021-01-31 PROCEDURE — 93010 ELECTROCARDIOGRAM REPORT: CPT | Performed by: INTERNAL MEDICINE

## 2021-01-31 PROCEDURE — 36415 COLL VENOUS BLD VENIPUNCTURE: CPT

## 2021-01-31 PROCEDURE — 71275 CT ANGIOGRAPHY CHEST: CPT

## 2021-01-31 PROCEDURE — 74174 CTA ABD&PLVS W/CONTRAST: CPT

## 2021-01-31 PROCEDURE — 84484 ASSAY OF TROPONIN QUANT: CPT | Performed by: NURSE PRACTITIONER

## 2021-01-31 PROCEDURE — 99285 EMERGENCY DEPT VISIT HI MDM: CPT | Performed by: NURSE PRACTITIONER

## 2021-01-31 PROCEDURE — 80053 COMPREHEN METABOLIC PANEL: CPT | Performed by: EMERGENCY MEDICINE

## 2021-01-31 PROCEDURE — 85025 COMPLETE CBC W/AUTO DIFF WBC: CPT | Performed by: EMERGENCY MEDICINE

## 2021-01-31 PROCEDURE — 84484 ASSAY OF TROPONIN QUANT: CPT | Performed by: EMERGENCY MEDICINE

## 2021-01-31 RX ORDER — PANTOPRAZOLE SODIUM 40 MG/1
40 INJECTION, POWDER, FOR SOLUTION INTRAVENOUS ONCE
Status: DISCONTINUED | OUTPATIENT
Start: 2021-01-31 | End: 2021-01-31 | Stop reason: HOSPADM

## 2021-01-31 RX ORDER — MAGNESIUM HYDROXIDE/ALUMINUM HYDROXICE/SIMETHICONE 120; 1200; 1200 MG/30ML; MG/30ML; MG/30ML
30 SUSPENSION ORAL ONCE
Status: COMPLETED | OUTPATIENT
Start: 2021-01-31 | End: 2021-01-31

## 2021-01-31 RX ORDER — LIDOCAINE HYDROCHLORIDE 20 MG/ML
10 SOLUTION OROPHARYNGEAL ONCE
Status: COMPLETED | OUTPATIENT
Start: 2021-01-31 | End: 2021-01-31

## 2021-01-31 RX ADMIN — LIDOCAINE HYDROCHLORIDE 10 ML: 20 SOLUTION ORAL; TOPICAL at 11:57

## 2021-01-31 RX ADMIN — ALUMINA, MAGNESIA, AND SIMETHICONE ORAL SUSPENSION REGULAR STRENGTH 30 ML: 1200; 1200; 120 SUSPENSION ORAL at 11:57

## 2021-01-31 RX ADMIN — IOHEXOL 100 ML: 350 INJECTION, SOLUTION INTRAVENOUS at 12:24

## 2021-01-31 NOTE — ED PROVIDER NOTES
History  Chief Complaint   Patient presents with    Epigastric Pain     Pt reports hes had epigastric and abd p[ain since 11pm lastnight  Reports the pain is constant  Denies N V/D  This is a 71-year-old male patient presents here with a chief complaint of epigastric abdominal discomfort since 11:00 p m  Yesterday reports some associated nausea with it  There are no alleviating or exacerbating factors  He does have a history of GERD  Does also have a history of congestive heart failure  Prior to Admission Medications   Prescriptions Last Dose Informant Patient Reported? Taking?    Tafamidis (Vyndamax) 61 MG CAPS  Spouse/Significant Other No No   Sig: Take 61 mg by mouth daily   amiodarone 200 mg tablet   No No   Si tab daily   apixaban (ELIQUIS) 5 mg   No No   Sig: Take 1 tablet (5 mg total) by mouth 2 (two) times a day   gabapentin (NEURONTIN) 100 mg capsule  Spouse/Significant Other No No   Sig: Take 2 capsules (200 mg total) by mouth daily at bedtime   melatonin 3 mg  Spouse/Significant Other Yes No   Sig: Take 3 mg by mouth daily at bedtime   metoprolol succinate (TOPROL-XL) 25 mg 24 hr tablet   No No   Sig: Take 1 tablet (25 mg total) by mouth daily   pantoprazole (PROTONIX) 40 mg tablet   No No   Sig: Take 1 tablet (40 mg total) by mouth daily in the early morning   polyethylene glycol (MIRALAX) 17 g packet  Spouse/Significant Other No No   Sig: Take 17 g by mouth daily   torsemide (DEMADEX) 10 mg tablet   No No   Sig: Take 1 tablet (10 mg total) by mouth daily      Facility-Administered Medications: None       Past Medical History:   Diagnosis Date    Anticoagulant long-term use     Atrial fibrillation (HCC)     Cardiac defibrillator in situ     Cardiomyopathy ([facility] [address] )     Colon polyp     Congestive heart failure (CHF) (HCC)     DJD (degenerative joint disease)     HL (hearing loss)     Hyperlipidemia     Shortness of breath     Sick sinus syndrome ([address] )     Spinal stenosis Past Surgical History:   Procedure Laterality Date    CARDIAC DEFIBRILLATOR PLACEMENT      FL RETROGRADE PYELOGRAM  11/3/2020    INSERT / REPLACE / REMOVE PACEMAKER      KNEE SURGERY Left     [facility]CTOMY      in [facility] had [facility]    VT COLONOSCOPY FLX DX W/COLLJ SPEC WHEN PFRMD N/A 2017    Procedure: COLONOSCOPY;  Surgeon: Neville Neil MD;  Location: [facility]; Service: Gastroenterology    VT CYSTO/URETERO W/LITHOTRIPSY &INDWELL STENT INSRT Left 11/3/2020    Procedure: CYSTOSCOPY URETEROSCOPY WITH LITHOTRIPSY HOLMIUM LASER, RETROGRADE PYELOGRAM AND INSERTION STENT URETERAL;  Surgeon: Eden Essex, MD;  Location: [facility];  Service: Urology    VT CYSTOURETHROSCOPY,URETER CATHETER Left 2020    Procedure: CYSTOSCOPY RETROGRADE PYELOGRAM WITH INSERTION STENT URETERAL;  Surgeon: Monica Siegel MD;  [facility];  Service: Urology    TONSILLECTOMY      TRANSURETHRAL RESECTION OF PROSTATE         Family History   Problem Relation Age of Onset    Coronary artery disease Mother     Hypertension Son      I have reviewed and agree with the history as documented  E-Cigarette/Vaping    E-Cigarette Use Never User      E-Cigarette/Vaping Substances    Nicotine No     THC No     CBD No     Flavoring No     Other No     Unknown No      Social History     Tobacco Use    Smoking status: Former Smoker     Packs/day: 0 50     Years: 3 00     Pack years: 1 50     Types: Cigarettes, Cigars     Quit date: 1968     Years since quittin 8    Smokeless tobacco: Never Used   Substance Use Topics    Alcohol use: Yes     Alcohol/week: 2 0 standard drinks     Types: 2 Glasses of wine per week     Frequency: 4 or more times a week     Drinks per session: 1 or 2     Binge frequency: Never     Comment: daily    Drug use: Never       Review of Systems   Constitutional: Negative for diaphoresis, fatigue and fever     HENT: Negative for congestion, ear pain, nosebleeds and sore throat  Eyes: Negative for photophobia, pain, discharge and visual disturbance  Respiratory: Negative for cough, choking, chest tightness, shortness of breath and wheezing  Cardiovascular: Negative for chest pain and palpitations  Gastrointestinal: Positive for nausea  Negative for abdominal distention, abdominal pain, diarrhea and vomiting  Genitourinary: Negative for dysuria, flank pain and frequency  Musculoskeletal: Negative for back pain, gait problem and joint swelling  Skin: Negative for color change and rash  Neurological: Negative for dizziness, syncope and headaches  Psychiatric/Behavioral: Negative for behavioral problems and confusion  The patient is not nervous/anxious  All other systems reviewed and are negative  Physical Exam  Physical Exam  Vitals signs and nursing note reviewed  Constitutional:       General: He is not in acute distress  Appearance: He is well-developed  HENT:      Head: Normocephalic and atraumatic  Eyes:      General:         Right eye: No discharge  Left eye: No discharge  Conjunctiva/sclera: Conjunctivae normal    Neck:      Musculoskeletal: Normal range of motion and neck supple  Cardiovascular:      Rate and Rhythm: Normal rate  Pulmonary:      Effort: Pulmonary effort is normal  No respiratory distress  Abdominal:      General: There is no distension  Tenderness: There is no guarding  Musculoskeletal:         General: No deformity  Skin:     General: Skin is warm and dry  Neurological:      Mental Status: He is alert and oriented to person, place, and time        Coordination: Coordination normal          Vital Signs  ED Triage Vitals   Temperature Pulse Respirations Blood Pressure SpO2   01/31/21 1202 01/31/21 0933 01/31/21 0933 01/31/21 0933 01/31/21 0933   97 9 °F (36 6 °C) 79 18 115/78 98 %      Temp Source Heart Rate Source Patient Position - Orthostatic VS BP Location FiO2 (%)   01/31/21 1202 01/31/21 3504 01/31/21 1030 01/31/21 0933 --   Oral Monitor Lying Right arm       Pain Score       --                  Vitals:    01/31/21 0933 01/31/21 1030 01/31/21 1200   BP: 115/78 109/70 104/68   Pulse: 79 69 69   Patient Position - Orthostatic VS:  Lying          Visual Acuity      ED Medications  Medications   pantoprazole (PROTONIX) injection 40 mg (40 mg Intravenous Not Given 1/31/21 1201)   aluminum-magnesium hydroxide-simethicone (MYLANTA) oral suspension 30 mL (30 mL Oral Given 1/31/21 1157)   Lidocaine Viscous HCl (XYLOCAINE) 2 % mucosal solution 10 mL (10 mL Swish & Swallow Given 1/31/21 1157)   iohexol (OMNIPAQUE) 350 MG/ML injection (MULTI-DOSE) 100 mL (100 mL Intravenous Given 1/31/21 1224)       Diagnostic Studies  Results Reviewed     Procedure Component Value Units Date/Time    Troponin I [[id number]]  (Abnormal) Collected: 01/31/21 1246    Lab Status: Final result Specimen: Blood from Arm, Right Updated: 01/31/21 1318     Troponin I 0 11 ng/mL     NT-BNP PRO [[id number]]  (Abnormal) Collected: 01/31/21 0942    Lab Status: Final result Specimen: Blood from Arm, Right Updated: 01/31/21 1138     NT-proBNP 3,738 pg/mL     Lipase [[id number]]  (Normal) Collected: 01/31/21 0942    Lab Status: Final result Specimen: Blood from Arm, Right Updated: 01/31/21 1138     Lipase 240 u/L     Comprehensive metabolic panel [[id number]]  (Abnormal) Collected: 01/31/21 0942    Lab Status: Final result Specimen: Blood from Arm, Right Updated: 01/31/21 1011     Sodium 140 mmol/L      Potassium 4 2 mmol/L      Chloride 101 mmol/L      CO2 30 mmol/L      ANION GAP 9 mmol/L      BUN 23 mg/dL      Creatinine 1 61 mg/dL      Glucose 140 mg/dL      Calcium 10 0 mg/dL      AST 20 U/L      ALT 19 U/L      Alkaline Phosphatase 79 U/L      Total Protein 7 8 g/dL      Albumin 3 9 g/dL      Total Bilirubin 2 30 mg/dL      eGFR 40 ml/min/1 73sq m     Narrative:      [place] guidelines for Chronic Kidney Disease (CKD):   Stage 1 with normal or high GFR (GFR > 90 mL/min/1 73 square meters)    Stage 2 Mild CKD (GFR = 60-89 mL/min/1 73 square meters)    Stage 3A Moderate CKD (GFR = 45-59 mL/min/1 73 square meters)    Stage 3B Moderate CKD (GFR = 30-44 mL/min/1 73 square meters)    Stage 4 Severe CKD (GFR = 15-29 mL/min/1 73 square meters)    Stage 5 End Stage CKD (GFR <15 mL/min/1 73 square meters)  Note: GFR calculation is accurate only with a steady state creatinine    Troponin I [[id number]]  (Abnormal) Collected: 01/31/21 0942    Lab Status: Final result Specimen: Blood from Arm, Right Updated: 01/31/21 1007     Troponin I 0 11 ng/mL     CBC and differential [[id number]]  (Abnormal) Collected: 01/31/21 0942    Lab Status: Final result Specimen: Blood from Arm, Right Updated: 01/31/21 0948     WBC 5 88 Thousand/uL      RBC 4 19 Million/uL      Hemoglobin 13 4 g/dL      Hematocrit 41 8 %       fL      MCH 32 0 pg      MCHC 32 1 g/dL      RDW 14 2 %      Platelets 771 Thousands/uL      nRBC 0 /100 WBCs      Neutrophils Relative 71 %      Immat GRANS % 0 %      Lymphocytes Relative 22 %      Monocytes Relative 6 %      Eosinophils Relative 1 %      Basophils Relative 0 %      Neutrophils Absolute 4 21 Thousands/µL      Immature Grans Absolute 0 01 Thousand/uL      Lymphocytes Absolute 1 27 Thousands/µL      Monocytes Absolute 0 34 Thousand/µL      Eosinophils Absolute 0 04 Thousand/µL      Basophils Absolute 0 01 Thousands/µL                  CTA dissection protocol chest abdomen pelvis w wo contrast   Final Result by Fred Flores DO (01/31 5589)   1  No evidence of aortic dissection  2   Stable 4 1 cm ascending aortic aneurysm  3   Dilatation of the pulmonary arteries, consistent with pulmonary artery hypertension  4   Nonobstructing left renal calyceal calculi                    Workstation performed: [id number]         XR chest 1 view portable    (Results Pending)              Procedures  ECG 12 Lead Documentation Only    Date/Time: 1/31/2021 1:52 PM  Performed by: LEWIS Medina  Authorized by: LEWIS Medina     ECG reviewed by me, the ED Provider: yes    Patient location:  ED  Interpretation:     Interpretation: normal    Rate:     ECG rate assessment: normal    Rhythm:     Rhythm: sinus rhythm and paced    Ectopy:     Ectopy: none    QRS:     QRS axis:  Normal  Conduction:     Conduction: normal    ST segments:     ST segments:  Non-specific  T waves:     T waves: non-specific               ED Course                                           MDM  Number of Diagnoses or Management Options  Epigastric abdominal pain: new and requires workup  Diagnosis management comments: Troponin seems to be baseline for the patient  Symptoms have improved since his arrival   He is already medically maximized on therapy including blood thinners antihypertensives  No evidence of aortic dissection  Patient would like to go home offered observation but has declined  Amount and/or Complexity of Data Reviewed  Clinical lab tests: reviewed and ordered  Tests in the radiology section of CPT®: reviewed and ordered  Tests in the medicine section of CPT®: reviewed and ordered  Independent visualization of images, tracings, or specimens: yes    Patient Progress  Patient progress: stable      Disposition  Final diagnoses:   Epigastric abdominal pain     Time reflects when diagnosis was documented in both MDM as applicable and the Disposition within this note     Time User Action Codes Description Comment    1/31/2021  1:53 PM Jessica Harry Add [R10 13] Epigastric abdominal pain       ED Disposition     ED Disposition Condition Date/Time Comment    Discharge Stable Sun Jan 31, 2021  1:53 PM Brandi Peng discharge to home/self care              Follow-up Information     Follow up With Specialties Details Why [address], DO Internal Medicine Schedule an appointment as soon as possible for a visit For [address]  [phone]            Patient's Medications   Discharge Prescriptions    No medications on file     No discharge procedures on file      PDMP Review     None          ED Provider  Electronically Signed by           LEWIS Hannon  01/31/21 2672

## 2021-01-31 NOTE — DISCHARGE INSTRUCTIONS
Epigastric Pain   WHAT YOU NEED TO KNOW:   Epigastric pain is felt in the middle of the upper abdomen, between the ribs and the bellybutton  The pain may be mild or severe  Pain may spread from or to another part of your body  Epigastric pain may be a sign of a serious health problem that needs to be treated  DISCHARGE INSTRUCTIONS:   Call 911 for any of the following:   · You have any of the following signs of a heart attack:      ? Squeezing, pressure, or pain in your chest    ? You may  also have any of the following:     § Discomfort or pain in your back, neck, jaw, stomach, or arm    § Shortness of breath    § Nausea or vomiting    § Lightheadedness or a sudden cold sweat    · You have severe pain that radiates to your jaw or back  Return to the emergency department if:   · You have severe pain that starts suddenly and quickly gets worse  · You cannot have a bowel movement and are vomiting  · You vomit or cough up blood  · You see blood in your urine or bowel movement  · You feel drowsy and your breathing is slower than usual     Contact your healthcare provider if:   · You have a fever or chills  · You have yellowing of your skin or the whites of your eyes  · You vomit often or several times in a row  · You lose weight without trying  · You have symptoms for longer than 2 weeks  · You have questions or concerns about your condition or care  Medicines:   · Medicines  may be given to treat pain or stop vomiting  You may also need medicines to reduce or control stomach acid, or treat an infection  · Take your medicine as directed  Contact your healthcare provider if you think your medicine is not helping or if you have side effects  Tell him of her if you are allergic to any medicine  Keep a list of the medicines, vitamins, and herbs you take  Include the amounts, and when and why you take them  Bring the list or the pill bottles to follow-up visits   Carry your medicine list with you in case of an emergency  Follow up with your healthcare provider as directed:  Write down your questions so you remember to ask them during your visits  Manage your symptoms:   · Keep a record of your symptoms  Include when the pain starts, how long it lasts, and if it is sharp or dull  Also include any foods you ate or activities you did before the pain started  Keep track of anything that helped the pain  · Eat a variety of healthy foods  Healthy foods include fruits, vegetables, whole-grain breads, low-fat dairy products, beans, lean meats, and fish  Ask if you need to be on a special diet  Certain foods may cause your pain, such as alcohol or foods that are high in fat  You may need to eat smaller meals and to eat more often than usual     · Drink liquids as directed  Ask how much liquid to drink each day and which liquids are best for you  Do not have drinks that contain alcohol or caffeine  © Copyright [address] Information is for End User's use only and may not be sold, redistributed or otherwise used for commercial purposes  All illustrations and images included in CareNotes® are the copyrighted property of A D A M , Inc  or [facility] Janeth Hassan   The above information is an  only  It is not intended as medical advice for individual conditions or treatments  Talk to your doctor, nurse or pharmacist before following any medical regimen to see if it is safe and effective for you

## 2021-02-03 ENCOUNTER — HOSPITAL ENCOUNTER (OUTPATIENT)
Dept: RADIOLOGY | Facility: HOSPITAL | Age: 81
Discharge: HOME | End: 2021-02-03
Attending: UROLOGY

## 2021-02-03 ENCOUNTER — HOSPITAL ENCOUNTER (OUTPATIENT)
Dept: ULTRASOUND IMAGING | Facility: HOSPITAL | Age: 81
Discharge: HOME | End: 2021-02-03
Attending: UROLOGY
Payer: MEDICARE

## 2021-02-03 DIAGNOSIS — N20.1 LEFT URETERAL CALCULUS: ICD-10-CM

## 2021-02-03 PROCEDURE — 51798 US URINE CAPACITY MEASURE: CPT

## 2021-02-05 ENCOUNTER — TELEMEDICINE (OUTPATIENT)
Dept: INTERNAL MEDICINE CLINIC | Facility: CLINIC | Age: 81
End: 2021-02-05
Payer: MEDICARE

## 2021-02-05 DIAGNOSIS — N40.0 ENLARGED PROSTATE: Primary | ICD-10-CM

## 2021-02-05 DIAGNOSIS — I71.4 ABDOMINAL AORTIC ANEURYSM (AAA) WITHOUT RUPTURE (HCC): ICD-10-CM

## 2021-02-05 DIAGNOSIS — N20.0 KIDNEY STONE: ICD-10-CM

## 2021-02-05 DIAGNOSIS — R10.13 EPIGASTRIC PAIN: ICD-10-CM

## 2021-02-05 PROBLEM — I71.40 AAA (ABDOMINAL AORTIC ANEURYSM): Status: ACTIVE | Noted: 2021-02-05

## 2021-02-05 PROCEDURE — 99213 OFFICE O/P EST LOW 20 MIN: CPT | Performed by: NURSE PRACTITIONER

## 2021-02-05 NOTE — ASSESSMENT & PLAN NOTE
The patient had a CT performed in the emergency room which showed an enlarged prostate gland  He continues to follow with Urology  A biopsy was recommended in the past but the patient declined this at this time  PELVIS  REPRODUCTIVE ORGANS:  Prostate gland enlarged and heterogeneous in CT density

## 2021-02-05 NOTE — ASSESSMENT & PLAN NOTE
KIDNEYS/URETERS:    4 mm calyceal calculus upper pole left kidney (series 2, image 70)       CTA performed in the emergency room showed 2 small left pole renal calculi  He continues to follow with urology  5 mm calyceal calculus lower pole left kidney (series 2, image 82)      No ureteric calculi are seen      No hydronephrosis or hydroureter      No renal masses

## 2021-02-05 NOTE — ASSESSMENT & PLAN NOTE
Patient was evaluated in the emergency room on January 31st with a diagnosis of epigastric pain  A cardiac workup was performed and was negative  A CTA was performed which showed no evidence of aortic dissection  The patient does have a stable 4 1 cm ascending aortic aneurysm  He continues to follow with vascular  IMPRESSION:  1  No evidence of aortic dissection  2   Stable 4 1 cm ascending aortic aneurysm  3   Dilatation of the pulmonary arteries, consistent with pulmonary artery hypertension  4   Nonobstructing left renal calyceal calculi

## 2021-02-05 NOTE — PROGRESS NOTES
Virtual Regular Visit      Assessment/Plan:    Problem List Items Addressed This Visit        Cardiovascular and Mediastinum    AAA (abdominal aortic aneurysm) ([facility] [address] )       Patient was evaluated in the emergency room on January 31st with a diagnosis of epigastric pain  A cardiac workup was performed and was negative  A CTA was performed which showed no evidence of aortic dissection  The patient does have a stable 4 1 cm ascending aortic aneurysm  He continues to follow with vascular  IMPRESSION:  1  No evidence of aortic dissection  2   Stable 4 1 cm ascending aortic aneurysm  3   Dilatation of the pulmonary arteries, consistent with pulmonary artery hypertension  4   Nonobstructing left renal calyceal calculi  Genitourinary    Kidney stone     KIDNEYS/URETERS:    4 mm calyceal calculus upper pole left kidney (series 2, image 70)       CTA performed in the emergency room showed 2 small left pole renal calculi  He continues to follow with urology  5 mm calyceal calculus lower pole left kidney (series 2, image 82)      No ureteric calculi are seen      No hydronephrosis or hydroureter      No renal masses  Other    Epigastric pain       Patient was evaluated in the emergency room on January 31st    A full cardiac workup was performed and was negative  Patient is still continuing with mild epigastric pain and occasional nausea  He is taking Protonix  He is already established with a gastroenterologist   I did recommend the patient contact his gastroenterologist to discuss his ongoing symptoms and make an appointment for follow-up  Enlarged prostate - Primary       The patient had a CT performed in the emergency room which showed an enlarged prostate gland  He continues to follow with Urology  A biopsy was recommended in the past but the patient declined this at this time  PELVIS  REPRODUCTIVE ORGANS:  Prostate gland enlarged and heterogeneous in CT density  Reason for visit is   Chief Complaint   Patient presents with    Virtual Regular Visit        Encounter provider LEWIS Burr    Provider located at [address] [place] [address]      Recent Visits  No visits were found meeting these conditions  Showing recent visits within past 7 days and meeting all other requirements     Today's Visits  Date Type Provider Dept   02/05/21 Telemedicine Blake Viramontes, [address] today's visits and meeting all other requirements     Future Appointments  No visits were found meeting these conditions  Showing future appointments within next 150 days and meeting all other requirements        The patient was identified by name and date of birth  Viky Rebolledo was informed that this is a telemedicine visit and that the visit is being conducted through [employer] and patient was informed that this is a secure, HIPAA-compliant platform  He agrees to proceed     My office door was closed  No one else was in the room  He acknowledged consent and understanding of privacy and security of the video platform  The patient has agreed to participate and understands they can discontinue the visit at any time  Patient is aware this is a billable service  Subjective  Viky Rebolledo is a 79 y o  male recently evaluated in the emergency roof for epigastric pain  HPI     Ed Neil   Was evaluated emergency room on January 31st for an episode of epigastric pain  A cardiac workup was performed and was negative  A CTA was performed which did not show any evidence for his epigastric pain  The pain does continue although it is milder  The patient denies any other symptoms such as vomiting, diarrhea or chest pain  The patient already is established with Gastroenterology    I did recommend that he contact his gastroenterologist to make an appointment for further evaluation  He will continue on his Protonix at this time  Past Medical History:   Diagnosis Date    Anticoagulant long-term use     Atrial fibrillation (HCC)     Cardiac defibrillator in situ     Cardiomyopathy (HCC)     Colon polyp     Congestive heart failure (CHF) (HCC)     DJD (degenerative joint disease)     HL (hearing loss)     Hyperlipidemia     Shortness of breath     Sick sinus syndrome ([address] )     Spinal stenosis        Past Surgical History:   Procedure Laterality Date    CARDIAC DEFIBRILLATOR PLACEMENT      FL RETROGRADE PYELOGRAM  11/3/2020    INSERT / REPLACE / REMOVE PACEMAKER      KNEE SURGERY Left     M[facility]ISCECTOMY      in [facility] had this    IL COLONOSCOPY FLX DX W/COLLJ SPEC WHEN PFRMD N/A 6/5/2017    Procedure: COLONOSCOPY;  Surgeon: Marlene Hernandez MD;  Location: [facility];   Service: Gastroenterology    IL CYSTO/URETERO W/LITHOTRIPSY &INDWELL STENT INSRT Left 11/3/2020    Procedure: CYSTOSCOPY URETEROSCOPY WITH LITHOTRIPSY HOLMIUM LASER, RETROGRADE PYELOGRAM AND INSERTION STENT URETERAL;  Surgeon: Lance Worthy MD;  [facility]: [facility];  Service: Urology    IL CYSTOURETHROSCOPY,URETER CATHETER Left 9/29/2020    Procedure: CYSTOSCOPY RETROGRADE PYELOGRAM WITH INSERTION STENT URETERAL;  Surgeon: Essence Sifuentes MD;  Location: [facility];  Service: Urology    TONSILLECTOMY      TRANSURETHRAL RESECTION OF PROSTATE         Current Outpatient Medications   Medication Sig Dispense Refill    amiodarone 200 mg tablet 1 tab daily 30 tablet 11    apixaban (ELIQUIS) 5 mg Take 1 tablet (5 mg total) by mouth 2 (two) times a day 180 tablet 3    gabapentin (NEURONTIN) 100 mg capsule Take 2 capsules (200 mg total) by mouth daily at bedtime      melatonin 3 mg Take 3 mg by mouth daily at bedtime      metoprolol succinate (TOPROL-XL) 25 mg 24 hr tablet Take 1 tablet (25 mg total) by mouth daily 90 tablet 3    pantoprazole (PROTONIX) 40 mg tablet Take 1 tablet (40 mg total) by mouth daily in the early morning 90 tablet 3    polyethylene glycol (MIRALAX) 17 g packet Take 17 g by mouth daily 30 each 0    Tafamidis (Vyndamax) 61 MG CAPS Take 61 mg by mouth daily 30 capsule 11    torsemide (DEMADEX) 10 mg tablet Take 1 tablet (10 mg total) by mouth daily 90 tablet 3     No current facility-administered medications for this visit  Allergies   Allergen Reactions    Other Sneezing     Seasonal; pollen; reactions; congestion    Penicillin G Benzathine Rash    Penicillins Rash       Review of Systems   Constitutional: Negative  Negative for fatigue  HENT: Negative  Negative for congestion, postnasal drip, rhinorrhea and trouble swallowing  Eyes: Negative  Negative for visual disturbance  Respiratory: Negative  Negative for choking and shortness of breath  Cardiovascular: Negative  Negative for chest pain  Gastrointestinal: Positive for abdominal pain (epigastric pain) and nausea (occasional)  Endocrine: Negative  Genitourinary: Negative  Musculoskeletal: Negative  Negative for arthralgias, back pain, myalgias and neck pain  Skin: Negative  Neurological: Negative for dizziness and headaches  Psychiatric/Behavioral: Negative  Video Exam    There were no vitals filed for this visit  Physical Exam  Constitutional:       General: He is not in acute distress  Appearance: He is well-developed  HENT:      Head: Normocephalic and atraumatic  Neck:      Musculoskeletal: Normal range of motion  Pulmonary:      Effort: Pulmonary effort is normal    Neurological:      Mental Status: He is alert and oriented to person, place, and time  Psychiatric:         Mood and Affect: Mood normal          Behavior: Behavior normal          Thought Content:  Thought content normal          Judgment: Judgment normal           I spent 15 minutes with patient today in which greater than 50% of the time was spent in counseling/coordination of care regarding symptom management, diet control, follow up with gastroenterology      VIRTUAL VISIT [address] acknowledges that he has consented to an online visit or consultation  He understands that the online visit is based solely on information provided by him, and that, in the absence of a face-to-face physical evaluation by the physician, the diagnosis he receives is both limited and provisional in terms of accuracy and completeness  This is not intended to replace a full medical face-to-face evaluation by the physician  Renata First understands and accepts these terms

## 2021-02-05 NOTE — PATIENT INSTRUCTIONS
Diet for Stomach Ulcers and Gastritis   WHAT YOU NEED TO KNOW:   A diet for stomach ulcers and gastritis is a meal plan that limits foods that irritate your stomach  Certain foods may worsen symptoms such as stomach pain, bloating, heartburn, or indigestion  DISCHARGE INSTRUCTIONS:   Foods to limit or avoid:  You may need to avoid acidic, spicy, or high-fat foods  Not all foods affect everyone the same way  You will need to learn which foods worsen your symptoms and limit those foods  The following are some foods that may worsen ulcer or gastritis symptoms:  · Beverages:      ? Whole milk and chocolate milk    ? Hot cocoa and cola    ? Any beverage with caffeine    ? Regular and decaffeinated coffee    ? Peppermint and spearmint tea    ? Green and black tea, with or without caffeine    ? Orange and grapefruit juices    ? Drinks that contain alcohol    · Spices and seasonings:      ? Black and red pepper    ? Chili powder    ? Mustard seed and nutmeg    · Other foods:      ? Dairy foods made from whole milk or cream    ? Chocolate    ? Spicy or strongly flavored cheeses, such as jalapeno or black pepper    ? Highly seasoned, high-fat meats, such as sausage, salami, marquez, ham, and cold cuts    ? Hot chiles and peppers    ? Tomato products, such as tomato paste, tomato sauce, or tomato juice    Foods to include:  Eat a variety of healthy foods from all the food groups  Eat fruits, vegetables, whole grains, and fat-free or low-fat dairy foods  Whole grains include whole-wheat breads, cereals, pasta, and brown rice  Choose lean meats, poultry (chicken and turkey), fish, beans, eggs, and nuts  A healthy meal plan is low in unhealthy fats, salt, and added sugar  Healthy fats include olive oil and canola oil  Ask your dietitian for more information about a healthy diet  Other helpful guidelines:   · Do not eat right before bedtime  Stop eating at least 2 hours before bedtime  · Eat small, frequent meals    Your stomach may tolerate small, frequent meals better than large meals  © Copyright [address] Information is for End User's use only and may not be sold, redistributed or otherwise used for commercial purposes  All illustrations and images included in CareNotes® are the copyrighted property of [employer]  or Antoinette Bunn  The above information is an  only  It is not intended as medical advice for individual conditions or treatments  Talk to your doctor, nurse or pharmacist before following any medical regimen to see if it is safe and effective for you

## 2021-02-05 NOTE — ASSESSMENT & PLAN NOTE
Patient was evaluated in the emergency room on January 31st    A full cardiac workup was performed and was negative  Patient is still continuing with mild epigastric pain and occasional nausea  He is taking Protonix  He is already established with a gastroenterologist   I did recommend the patient contact his gastroenterologist to discuss his ongoing symptoms and make an appointment for follow-up

## 2021-02-05 NOTE — PROGRESS NOTES
INTERNAL MEDICINE FOLLOW-UP OFFICE VISIT  [facility] - [facility] [address] [place] [address]    NAME: Renata Doshi  AGE: 79 y o  SEX: male    DATE OF ENCOUNTER: 2/5/2021   Assessment and Plan:     {Assess/Plan SmartLinks:70917}    No follow-ups on file  Counseling:     · Medication Side Effects - Adverse side effects of medications were reviewed with the patient/guardian today: {YES/NO:66968}  · Counseling was given regarding: {AMB Counseling Topics:1881177117}  · Barriers to treatment include: {sl amb Barriers:33370}      Chief Complaint:     No chief complaint on file  History of Present Illness:     HPI    The following portions of the patient's history were reviewed and updated as appropriate: allergies, current medications, past family history, past medical history, past social history, past surgical history and problem list      Review of Systems:     Review of Systems     Problem List:     Patient Active Problem List   Diagnosis    Cardiomyopathy ([address] )    Acute on chronic systolic heart failure ([address] )    Atrial fibrillation    Chronic anticoagulation    Dilation of thoracic aorta ([address] )    Hyperlipidemia    Essential hypertension    Presence of automatic cardioverter/defibrillator (AICD)    Spinal stenosis of lumbar region with neurogenic claudication    Biventricular congestive heart failure ([address] )    Nonsustained ventricular tachycardia    Stage 3 chronic kidney disease        Objective: There were no vitals taken for this visit      Physical Exam    Pertinent Laboratory/Diagnostic Studies:    Laboratory Results: {Labs reviewed:64082}  Radiology/Other Diagnostic Testing Results: {Results Review Statement:20251}     Current Medications:     Current Outpatient Medications   Medication Sig Dispense Refill    amiodarone 200 mg tablet 1 tab daily 30 tablet 11    apixaban (ELIQUIS) 5 mg Take 1 tablet (5 mg total) by mouth 2 (two) times a day 180 tablet 3    gabapentin (NEURONTIN) 100 mg capsule Take 2 capsules (200 mg total) by mouth daily at bedtime      melatonin 3 mg Take 3 mg by mouth daily at bedtime      metoprolol succinate (TOPROL-XL) 25 mg 24 hr tablet Take 1 tablet (25 mg total) by mouth daily 90 tablet 3    pantoprazole (PROTONIX) 40 mg tablet Take 1 tablet (40 mg total) by mouth daily in the early morning 90 tablet 3    polyethylene glycol (MIRALAX) 17 g packet Take 17 g by mouth daily 30 each 0    Tafamidis (Vyndamax) 61 MG CAPS Take 61 mg by mouth daily 30 capsule 11    torsemide (DEMADEX) 10 mg tablet Take 1 tablet (10 mg total) by mouth daily 90 tablet 3     No current facility-administered medications for this visit  Patient Instructions     Diet for Stomach Ulcers and Gastritis   WHAT YOU NEED TO KNOW:   A diet for stomach ulcers and gastritis is a meal plan that limits foods that irritate your stomach  Certain foods may worsen symptoms such as stomach pain, bloating, heartburn, or indigestion  DISCHARGE INSTRUCTIONS:   Foods to limit or avoid:  You may need to avoid acidic, spicy, or high-fat foods  Not all foods affect everyone the same way  You will need to learn which foods worsen your symptoms and limit those foods  The following are some foods that may worsen ulcer or gastritis symptoms:  · Beverages:      ? Whole milk and chocolate milk    ? Hot cocoa and cola    ? Any beverage with caffeine    ? Regular and decaffeinated coffee    ? Peppermint and spearmint tea    ? Green and black tea, with or without caffeine    ? Orange and grapefruit juices    ? Drinks that contain alcohol    · Spices and seasonings:      ? Black and red pepper    ? Chili powder    ? Mustard seed and nutmeg    · Other foods:      ? Dairy foods made from whole milk or cream    ? Chocolate    ? Spicy or strongly flavored cheeses, such as jalapeno or black pepper    ? Highly seasoned, high-fat meats, such as sausage, salami, marquez, ham, and cold cuts    ?  Hot chiles and peppers    ? Tomato products, such as tomato paste, tomato sauce, or tomato juice    Foods to include:  Eat a variety of healthy foods from all the food groups  Eat fruits, vegetables, whole grains, and fat-free or low-fat dairy foods  Whole grains include whole-wheat breads, cereals, pasta, and brown rice  Choose lean meats, poultry (chicken and turkey), fish, beans, eggs, and nuts  A healthy meal plan is low in unhealthy fats, salt, and added sugar  Healthy fats include olive oil and canola oil  Ask your dietitian for more information about a healthy diet  Other helpful guidelines:   · Do not eat right before bedtime  Stop eating at least 2 hours before bedtime  · Eat small, frequent meals  Your stomach may tolerate small, frequent meals better than large meals  © Copyright [address] Information is for End User's use only and may not be sold, redistributed or otherwise used for commercial purposes  All illustrations and images included in CareNotes® are the copyrighted property of [employer]  or [address] [place], [place] [address]   The above information is an  only  It is not intended as medical advice for individual conditions or treatments  Talk to your doctor, nurse or pharmacist before following any medical regimen to see if it is safe and effective for you          LEWIS Bernstein  [facility]

## 2021-02-08 ENCOUNTER — TELEPHONE (OUTPATIENT)
Dept: CARDIOLOGY CLINIC | Facility: CLINIC | Age: 81
End: 2021-02-08

## 2021-02-08 NOTE — TELEPHONE ENCOUNTER
Spoke with patient's wife  Informed her that weight increase of one pound does not require and additional Torsemide  Advised her to take the patient's weight at the same time each day, if his weight increased 2-3 lbs in a 24 hour period or more than 5 lbs in a week to call the office and inform Dr Dori Vora  Continue to give patient 10 mg Torsemide daily  Spouse understood

## 2021-02-08 NOTE — TELEPHONE ENCOUNTER
He is on Torsemide 10 mg  He takes one every morning with rest of his meds  If he has a weight increase of one pound he is getting one more pill Torsemide  This is happening about twice a week and His wife wants to know if this is okay that she is doing this       [phone]

## 2021-02-11 ENCOUNTER — TELEMEDICINE (OUTPATIENT)
Dept: GASTROENTEROLOGY | Facility: CLINIC | Age: 81
End: 2021-02-11
Payer: MEDICARE

## 2021-02-11 ENCOUNTER — TELEPHONE (OUTPATIENT)
Dept: GASTROENTEROLOGY | Facility: CLINIC | Age: 81
End: 2021-02-11

## 2021-02-11 ENCOUNTER — TELEPHONE (OUTPATIENT)
Dept: CARDIOLOGY CLINIC | Facility: CLINIC | Age: 81
End: 2021-02-11

## 2021-02-11 DIAGNOSIS — R10.13 EPIGASTRIC PAIN: Primary | ICD-10-CM

## 2021-02-11 PROCEDURE — 99213 OFFICE O/P EST LOW 20 MIN: CPT | Performed by: PHYSICIAN ASSISTANT

## 2021-02-11 NOTE — TELEPHONE ENCOUNTER
----- Message from Kiran Lomeli PA-C sent at 2/11/2021 11:43 AM EST -----  Please schedule patient for an EGD    He is on Eliquis and his cardiologist is Dr Tammy Perez

## 2021-02-11 NOTE — TELEPHONE ENCOUNTER
Spoke with the patient's wife Alicia Geiger and asked if patient is having any symptoms like chest pain, numbness, leg swelling or dizziness and she indicated not at the present time  Alicia Geiger is just concern that the patient's BP is slowly climbing  Last night /70 and this morning /71  Patient's BP was in the low teens at one point  Please advise  Thank you

## 2021-02-11 NOTE — TELEPHONE ENCOUNTER
Pt's wife Tim Barnard called and stated that she is a bit concernd with pt's BP  Tim Barnard states pt's bp is always in the teens and last night it was 122/70 and this morning 124/71  Tim Barnard would like a call back

## 2021-02-11 NOTE — PROGRESS NOTES
Virtual Regular Visit      Assessment/Plan:    1  Epigastric Pain    Patient reports ongoing epigastric discomfort x several months  He has been on Pantoprazole 40mg po daily with insufficient relief  He had a recent CTA Chest/Abdomen/Pelvis which showed only mild narrowing of the celiac axis and SMA, an unchanged 4 1 cm AAA, nonobstructing left renal calculi, and a hiatal hernia  He had a cholecystectomy in the past     Will plan for EGD to investigate  Note: He has a significant cardiac history with cardiac amyloidosis, CHF, s/p ICD, and Afib on Eliquis  He follows closely with Dr Olivia Cedeno     Problem List Items Addressed This Visit        Other    Epigastric pain - Primary             Reason for visit is No chief complaint on file  Encounter provider Allen Fernandez PA-C    Provider located at [address] [place] [address]      Recent Visits  No visits were found meeting these conditions  Showing recent visits within past 7 days and meeting all other requirements     Today's Visits  Date Type Provider Dept   02/11/21 Telemedicine Allen Fernandez PA-C [facility]   Showing today's visits and meeting all other requirements     Future Appointments  No visits were found meeting these conditions  Showing future appointments within next 150 days and meeting all other requirements        The patient was identified by name and date of birth  Nathalie Arndt was informed that this is a telemedicine visit and that the visit is being conducted through [employer] and patient was informed that this is a secure, HIPAA-compliant platform  He agrees to proceed     My office door was closed  No one else was in the room  He acknowledged consent and understanding of privacy and security of the video platform   The patient has agreed to participate and understands they can discontinue the visit at any time     Patient is aware this is a billable service  Subjective  Parth Pepe is a 79 y o  male who presents for a virtual visit for an evaluation of ongoing epigastric discomfort x few months  He describes the discomfort as burning  He was put on Pantoprazole 40mg po daily but has had insufficient benefit with this medication  No nausea or vomiting  No dysphagia  No blood in the stools  He takes Miralax for his chronic constipation which regulates his BMs  He had a recent CTA Chest/Abdomen/Pelvis which showed only mild narrowing of the celiac axis and SMA, an unchanged 4 1 cm AAA, nonobstructing left renal calculi, and a hiatal hernia  He had a cholecystectomy in the past   He has never had an EGD  He also has a significant cardiac history with cardiac amyloidosis, CHF, s/p ICD, and Afib on Eliquis  He follows closely with Dr Herberth Aems    He had a colonoscopy in 2017 which showed diverticulosis  HPI     Past Medical History:   Diagnosis Date    Anticoagulant long-term use     Atrial fibrillation (HCC)     Cardiac defibrillator in situ     Cardiomyopathy (HCC)     Colon polyp     Congestive heart failure (CHF) (HCC)     DJD (degenerative joint disease)     HL (hearing loss)     Hyperlipidemia     Shortness of breath     Sick sinus syndrome ([address] )     Spinal stenosis        Past Surgical History:   Procedure Laterality Date    CARDIAC DEFIBRILLATOR PLACEMENT      FL RETROGRADE PYELOGRAM  11/3/2020    INSERT / REPLACE / REMOVE PACEMAKER      KNEE SURGERY Left     [facility]CTOM[facility]      in [facility]    [place] COLONOSCOPY FLX DX W/COLLJ SPEC WHEN PFRMD N/A 6/5/2017    Procedure: COLONOSCOPY;  Surgeon: Lino Daugherty MD;  [facility];   Service: Gastroenterology    [place] CYSTO/URETERO W/LITHOTRIPSY &INDWELL STENT INSRT Left 11/3/2020    Procedure: CYSTOSCOPY URETEROSCOPY WITH LITHOTRIPSY HOLMIUM LASER, RETROGRADE PYELOGRAM AND INSERTION STENT URETERAL;  Surgeon: Glenroy Barnes Marquita Castro MD;  [facility];  Service: Urology    [place] CYSTOURETHROSCOPY,URETER CATHETER Left 9/29/2020    Procedure: CYSTOSCOPY RETROGRADE PYELOGRAM WITH INSERTION STENT URETERAL;  Surgeon: Nayeli Pemberton MD;  [facility];  Service: Urology    TONSILLECTOMY      TRANSURETHRAL RESECTION OF PROSTATE         Current Outpatient Medications   Medication Sig Dispense Refill    amiodarone 200 mg tablet 1 tab daily 30 tablet 11    apixaban (ELIQUIS) 5 mg Take 1 tablet (5 mg total) by mouth 2 (two) times a day 180 tablet 3    gabapentin (NEURONTIN) 100 mg capsule Take 2 capsules (200 mg total) by mouth daily at bedtime      melatonin 3 mg Take 3 mg by mouth daily at bedtime      metoprolol succinate (TOPROL-XL) 25 mg 24 hr tablet Take 1 tablet (25 mg total) by mouth daily 90 tablet 3    pantoprazole (PROTONIX) 40 mg tablet Take 1 tablet (40 mg total) by mouth daily in the early morning 90 tablet 3    polyethylene glycol (MIRALAX) 17 g packet Take 17 g by mouth daily 30 each 0    Tafamidis (Vyndamax) 61 MG CAPS Take 61 mg by mouth daily 30 capsule 11    torsemide (DEMADEX) 10 mg tablet Take 1 tablet (10 mg total) by mouth daily 90 tablet 3     No current facility-administered medications for this visit  Allergies   Allergen Reactions    Other Sneezing     Seasonal; pollen; reactions; congestion    Penicillin G Benzathine Rash    Penicillins Rash       REVIEW OF SYSTEMS:    CONSTITUTIONAL: Denies any fever, chills, rigors, and weight loss  HEENT: No earache or tinnitus  Denies hearing loss or visual disturbances  CARDIOVASCULAR: No chest pain or palpitations  RESPIRATORY: Denies any cough, hemoptysis; shortness of breath  GASTROINTESTINAL: As noted in the History of Present Illness  GENITOURINARY: No problems with urination  Denies any hematuria or dysuria  NEUROLOGIC: No dizziness or vertigo, denies headaches  MUSCULOSKELETAL: Denies any muscle or joint pain     SKIN: Denies skin rashes or itching  ENDOCRINE: Denies excessive thirst  Denies intolerance to heat or cold  PSYCHOSOCIAL: Denies depression or anxiety  Denies any recent memory loss  PHYSICAL EXAMINATION:  Appearance and vitals taken from home devices    General Appearance:   Alert, cooperative, no distress   HEENT:  Normocephalic, atraumatic, anicteric  Neck supple, symmetrical, trachea midline  Lungs:   Equal chest rise and unlabored breathing, normal effort, no coughing  Cardiovascular:   No visualized JVD  Abdomen:   No abdominal distension  Skin:   No jaundice, rashes, or lesions  Musculoskeletal:   Normal range of motion visualized  Psych:  Normal affect and normal insight  Neuro:  Alert and appropriate  I spent 12 minutes directly with the patient during this visit      DB/Zachery 10 acknowledges that he has consented to an online visit or consultation  He understands that the online visit is based solely on information provided by him, and that, in the absence of a face-to-face physical evaluation by the physician, the diagnosis he receives is both limited and provisional in terms of accuracy and completeness  This is not intended to replace a full medical face-to-face evaluation by the physician  Brandi Peng understands and accepts these terms

## 2021-02-11 NOTE — H&P (VIEW-ONLY)
Virtual Regular Visit      Assessment/Plan:    1  Epigastric Pain    Patient reports ongoing epigastric discomfort x several months  He has been on Pantoprazole 40mg po daily with insufficient relief  He had a recent CTA Chest/Abdomen/Pelvis which showed only mild narrowing of the celiac axis and SMA, an unchanged 4 1 cm AAA, nonobstructing left renal calculi, and a hiatal hernia  He had a cholecystectomy in the past     Will plan for EGD to investigate  Note: He has a significant cardiac history with cardiac amyloidosis, CHF, s/p ICD, and Afib on Eliquis  He follows closely with Dr Dennis Sanchez     Problem List Items Addressed This Visit        Other    Epigastric pain - Primary             Reason for visit is No chief complaint on file  Encounter provider Renzo Claire PA-C    Provider located at [address] [place] [address]      Recent Visits  No visits were found meeting these conditions  Showing recent visits within past 7 days and meeting all other requirements     Today's Visits  Date Type Provider Dept   02/11/21 Telemedicine Renzo Claire PA-C [facility]   Showing today's visits and meeting all other requirements     Future Appointments  No visits were found meeting these conditions  Showing future appointments within next 150 days and meeting all other requirements        The patient was identified by name and date of birth  Catalinajoe Servin was informed that this is a telemedicine visit and that the visit is being conducted through [employer] and patient was informed that this is a secure, HIPAA-compliant platform  He agrees to proceed     My office door was closed  No one else was in the room  He acknowledged consent and understanding of privacy and security of the video platform   The patient has agreed to participate and understands they can discontinue the visit at any time     Patient is aware this is a billable service  Subjective  Cinda Figueroa is a 79 y o  male who presents for a virtual visit for an evaluation of ongoing epigastric discomfort x few months  He describes the discomfort as burning  He was put on Pantoprazole 40mg po daily but has had insufficient benefit with this medication  No nausea or vomiting  No dysphagia  No blood in the stools  He takes Miralax for his chronic constipation which regulates his BMs  He had a recent CTA Chest/Abdomen/Pelvis which showed only mild narrowing of the celiac axis and SMA, an unchanged 4 1 cm AAA, nonobstructing left renal calculi, and a hiatal hernia  He had a cholecystectomy in the past   He has never had an EGD  He also has a significant cardiac history with cardiac amyloidosis, CHF, s/p ICD, and Afib on Eliquis  He follows closely with Dr Holliday Alert    He had a colonoscopy in 2017 which showed diverticulosis  HPI     Past Medical History:   Diagnosis Date    Anticoagulant long-term use     Atrial fibrillation (HCC)     Cardiac defibrillator in situ     Cardiomyopathy (HCC)     Colon polyp     Congestive heart failure (CHF) (HCC)     DJD (degenerative joint disease)     HL (hearing loss)     Hyperlipidemia     Shortness of breath     Sick sinus syndrome ([address] )     Spinal stenosis        Past Surgical History:   Procedure Laterality Date    CARDIAC DEFIBRILLATOR PLACEMENT      FL RETROGRADE PYELOGRAM  11/3/2020    INSERT / REPLACE / REMOVE PACEMAKER      KNEE SURGERY Left     [facility]      in [facility]    [place] COLONOSCOPY FLX DX W/COLLJ SPEC WHEN PFRMD N/A 6/5/2017    Procedure: COLONOSCOPY;  Surgeon: Jorge Alberto Vance MD;  [facility];   Service: Gastroenterology    [place] CYSTO/URETERO W/LITHOTRIPSY &INDWELL STENT INSRT Left 11/3/2020    Procedure: CYSTOSCOPY URETEROSCOPY WITH LITHOTRIPSY HOLMIUM LASER, RETROGRADE PYELOGRAM AND INSERTION STENT URETERAL;  Surgeon: Erika Degroot Fay Greene MD;  [facility];  Service: Urology    [place] CYSTOURETHROSCOPY,URETER CATHETER Left 9/29/2020    Procedure: CYSTOSCOPY RETROGRADE PYELOGRAM WITH INSERTION STENT URETERAL;  Surgeon: Patti Hooper MD;  [facility];  Service: Urology    TONSILLECTOMY      TRANSURETHRAL RESECTION OF PROSTATE         Current Outpatient Medications   Medication Sig Dispense Refill    amiodarone 200 mg tablet 1 tab daily 30 tablet 11    apixaban (ELIQUIS) 5 mg Take 1 tablet (5 mg total) by mouth 2 (two) times a day 180 tablet 3    gabapentin (NEURONTIN) 100 mg capsule Take 2 capsules (200 mg total) by mouth daily at bedtime      melatonin 3 mg Take 3 mg by mouth daily at bedtime      metoprolol succinate (TOPROL-XL) 25 mg 24 hr tablet Take 1 tablet (25 mg total) by mouth daily 90 tablet 3    pantoprazole (PROTONIX) 40 mg tablet Take 1 tablet (40 mg total) by mouth daily in the early morning 90 tablet 3    polyethylene glycol (MIRALAX) 17 g packet Take 17 g by mouth daily 30 each 0    Tafamidis (Vyndamax) 61 MG CAPS Take 61 mg by mouth daily 30 capsule 11    torsemide (DEMADEX) 10 mg tablet Take 1 tablet (10 mg total) by mouth daily 90 tablet 3     No current facility-administered medications for this visit  Allergies   Allergen Reactions    Other Sneezing     Seasonal; pollen; reactions; congestion    Penicillin G Benzathine Rash    Penicillins Rash       REVIEW OF SYSTEMS:    CONSTITUTIONAL: Denies any fever, chills, rigors, and weight loss  HEENT: No earache or tinnitus  Denies hearing loss or visual disturbances  CARDIOVASCULAR: No chest pain or palpitations  RESPIRATORY: Denies any cough, hemoptysis; shortness of breath  GASTROINTESTINAL: As noted in the History of Present Illness  GENITOURINARY: No problems with urination  Denies any hematuria or dysuria  NEUROLOGIC: No dizziness or vertigo, denies headaches  MUSCULOSKELETAL: Denies any muscle or joint pain     SKIN: Denies skin rashes or itching  ENDOCRINE: Denies excessive thirst  Denies intolerance to heat or cold  PSYCHOSOCIAL: Denies depression or anxiety  Denies any recent memory loss  PHYSICAL EXAMINATION:  Appearance and vitals taken from home devices    General Appearance:   Alert, cooperative, no distress   HEENT:  Normocephalic, atraumatic, anicteric  Neck supple, symmetrical, trachea midline  Lungs:   Equal chest rise and unlabored breathing, normal effort, no coughing  Cardiovascular:   No visualized JVD  Abdomen:   No abdominal distension  Skin:   No jaundice, rashes, or lesions  Musculoskeletal:   Normal range of motion visualized  Psych:  Normal affect and normal insight  Neuro:  Alert and appropriate  I spent 12 minutes directly with the patient during this visit      Sania Townsend acknowledges that he has consented to an online visit or consultation  He understands that the online visit is based solely on information provided by him, and that, in the absence of a face-to-face physical evaluation by the physician, the diagnosis he receives is both limited and provisional in terms of accuracy and completeness  This is not intended to replace a full medical face-to-face evaluation by the physician  Debra Hitchcock understands and accepts these terms

## 2021-02-11 NOTE — TELEPHONE ENCOUNTER
Spoke with the Patient wife Diego Mir and gave her the information that Dr Yumiko Randhawa had indicated on the task below and Diego Mir understood  Thank you

## 2021-02-22 ENCOUNTER — IMMUNIZATIONS (OUTPATIENT)
Dept: FAMILY MEDICINE CLINIC | Facility: HOSPITAL | Age: 81
End: 2021-02-22

## 2021-02-22 DIAGNOSIS — Z23 ENCOUNTER FOR IMMUNIZATION: Primary | ICD-10-CM

## 2021-02-22 PROCEDURE — 0012A SARS-COV-2 / COVID-19 MRNA VACCINE (MODERNA) 100 MCG: CPT

## 2021-02-22 PROCEDURE — 91301 SARS-COV-2 / COVID-19 MRNA VACCINE (MODERNA) 100 MCG: CPT

## 2021-02-28 ENCOUNTER — ANESTHESIA EVENT (OUTPATIENT)
Dept: GASTROENTEROLOGY | Facility: HOSPITAL | Age: 81
End: 2021-02-28

## 2021-03-01 ENCOUNTER — HOSPITAL ENCOUNTER (OUTPATIENT)
Dept: GASTROENTEROLOGY | Facility: HOSPITAL | Age: 81
Setting detail: OUTPATIENT SURGERY
Discharge: HOME | End: 2021-03-01
Attending: INTERNAL MEDICINE | Admitting: INTERNAL MEDICINE
Payer: MEDICARE

## 2021-03-01 ENCOUNTER — ANESTHESIA (OUTPATIENT)
Dept: GASTROENTEROLOGY | Facility: HOSPITAL | Age: 81
End: 2021-03-01

## 2021-03-01 VITALS
OXYGEN SATURATION: 98 % | RESPIRATION RATE: 17 BRPM | DIASTOLIC BLOOD PRESSURE: 63 MMHG | TEMPERATURE: 97.3 F | BODY MASS INDEX: 25.33 KG/M2 | HEIGHT: 76 IN | SYSTOLIC BLOOD PRESSURE: 105 MMHG | WEIGHT: 208 LBS | HEART RATE: 70 BPM

## 2021-03-01 DIAGNOSIS — R10.13 EPIGASTRIC PAIN: ICD-10-CM

## 2021-03-01 PROCEDURE — 43239 EGD BIOPSY SINGLE/MULTIPLE: CPT | Performed by: INTERNAL MEDICINE

## 2021-03-01 PROCEDURE — 88305 TISSUE EXAM BY PATHOLOGIST: CPT | Performed by: PATHOLOGY

## 2021-03-01 RX ORDER — LIDOCAINE HYDROCHLORIDE 20 MG/ML
INJECTION, SOLUTION EPIDURAL; INFILTRATION; INTRACAUDAL; PERINEURAL AS NEEDED
Status: DISCONTINUED | OUTPATIENT
Start: 2021-03-01 | End: 2021-03-01

## 2021-03-01 RX ORDER — PROPOFOL 10 MG/ML
INJECTION, EMULSION INTRAVENOUS AS NEEDED
Status: DISCONTINUED | OUTPATIENT
Start: 2021-03-01 | End: 2021-03-01

## 2021-03-01 RX ORDER — SODIUM CHLORIDE, SODIUM LACTATE, POTASSIUM CHLORIDE, CALCIUM CHLORIDE 600; 310; 30; 20 MG/100ML; MG/100ML; MG/100ML; MG/100ML
125 INJECTION, SOLUTION INTRAVENOUS CONTINUOUS
Status: DISCONTINUED | OUTPATIENT
Start: 2021-03-01 | End: 2021-03-05 | Stop reason: HOSPADM

## 2021-03-01 RX ADMIN — PHENYLEPHRINE HYDROCHLORIDE 200 MCG: 10 INJECTION INTRAVENOUS at 09:07

## 2021-03-01 RX ADMIN — PHENYLEPHRINE HYDROCHLORIDE 100 MCG: 10 INJECTION INTRAVENOUS at 09:03

## 2021-03-01 RX ADMIN — PROPOFOL 50 MG: 10 INJECTION, EMULSION INTRAVENOUS at 08:58

## 2021-03-01 RX ADMIN — PHENYLEPHRINE HYDROCHLORIDE 100 MCG: 10 INJECTION INTRAVENOUS at 09:01

## 2021-03-01 RX ADMIN — PROPOFOL 20 MG: 10 INJECTION, EMULSION INTRAVENOUS at 09:00

## 2021-03-01 RX ADMIN — PROPOFOL 30 MG: 10 INJECTION, EMULSION INTRAVENOUS at 08:59

## 2021-03-01 RX ADMIN — LIDOCAINE HYDROCHLORIDE 100 MG: 20 INJECTION, SOLUTION EPIDURAL; INFILTRATION; INTRACAUDAL; PERINEURAL at 08:57

## 2021-03-01 RX ADMIN — SODIUM CHLORIDE, SODIUM LACTATE, POTASSIUM CHLORIDE, AND CALCIUM CHLORIDE 125 ML/HR: .6; .31; .03; .02 INJECTION, SOLUTION INTRAVENOUS at 08:18

## 2021-03-01 RX ADMIN — PHENYLEPHRINE HYDROCHLORIDE 100 MCG: 10 INJECTION INTRAVENOUS at 09:00

## 2021-03-01 RX ADMIN — PHENYLEPHRINE HYDROCHLORIDE 100 MCG: 10 INJECTION INTRAVENOUS at 09:05

## 2021-03-01 RX ADMIN — PHENYLEPHRINE HYDROCHLORIDE 100 MCG: 10 INJECTION INTRAVENOUS at 08:58

## 2021-03-01 NOTE — DISCHARGE INSTRUCTIONS
Upper Endoscopy   WHAT YOU NEED TO KNOW:   An upper endoscopy is also called an upper gastrointestinal (GI) endoscopy, or an esophagogastroduodenoscopy (EGD)  You may feel bloated, gassy, or have some abdominal discomfort after your procedure  Your throat may be sore for 24 to 36 hours  You may burp or pass gas from air that is still inside your body  DISCHARGE INSTRUCTIONS:   Call 911 if:   · You have sudden chest pain or trouble breathing  Seek care immediately if:   · You feel dizzy or faint  · You have trouble swallowing  · You have severe throat pain  · Your bowel movements are very dark or black  · Your abdomen is hard and firm and you have severe pain  · You vomit blood  Contact your healthcare provider if:   · You feel full or bloated and cannot burp or pass gas  · You have not had a bowel movement for 3 days after your procedure  · You have neck pain  · You have a fever or chills  · You have nausea or are vomiting  · You have a rash or hives  · You have questions or concerns about your endoscopy  Relieve a sore throat:  Suck on throat lozenges or crushed ice  Gargle with a small amount of warm salt water  Mix 1 teaspoon of salt and 1 cup of warm water to make salt water  Relieve gas and discomfort from bloating:  Lie on your right side with a heating pad on your abdomen  Take short walks to help pass gas  Eat small meals until bloating is relieved  Rest after your procedure:  Do not drive or make important decisions until the day after your procedure  Return to your normal activity as directed  You can usually return to work the day after your procedure  Follow up with your healthcare provider as directed:  Write down your questions so you remember to ask them during your visits  © Copyright [address] Information is for End User's use only and may not be sold, redistributed or otherwise used for commercial purposes   All illustrations and images included in CareNotes® are the copyrighted property of A D A M , Inc  or Antoinette Hassan   The above information is an  only  It is not intended as medical advice for individual conditions or treatments  Talk to your doctor, nurse or pharmacist before following any medical regimen to see if it is safe and effective for you

## 2021-03-01 NOTE — ANESTHESIA POSTPROCEDURE EVALUATION
Post-Op Assessment Note    CV Status:  Stable  Pain Score: 0    Pain management: adequate     Mental Status:  Awake   Hydration Status:  Stable   PONV Controlled:  Controlled   Airway Patency:  Patent      Post Op Vitals Reviewed: Yes      Staff: CRNA         No complications documented      BP   94/56   Temp     Pulse  71   Resp   17   SpO2   100

## 2021-03-01 NOTE — INTERVAL H&P NOTE
H&P reviewed  After examining the patient I find no changes in the patients condition since the H&P had been written    Chest clear  Cardiovascular:  S1-S2 normal, no gallop or murmur  Abdomen benign  Neck is supple  Extremities:  No edema  CNS:  Awake alert an oriented x3    Vitals:    03/01/21 0810   BP: 116/74   Pulse: 74   Resp: 16   Temp: (!) 97 °F (36 1 °C)   SpO2: 99%

## 2021-03-01 NOTE — ANESTHESIA PREPROCEDURE EVALUATION
Procedure:  EGD    Relevant Problems   CARDIO   (+) AAA (abdominal aortic aneurysm) (HCC)   (+) Atrial fibrillation   (+) Biventricular congestive heart failure (HCC)   (+) Dilation of thoracic aorta (HCC)   (+) Essential hypertension   (+) Hyperlipidemia   (+) Nonsustained ventricular tachycardia      /RENAL   (+) Kidney stone   (+) Stage 3 chronic kidney disease        Physical Exam    Airway    Mallampati score: II  TM Distance: >3 FB  Neck ROM: full     Dental   No notable dental hx     Cardiovascular      Pulmonary      Other Findings        9/27/20 TTE:  LEFT VENTRICLE:  Systolic function was moderately to markedly reduced  LVEF around 35%  Wall thickness was moderately increased  There was severe assymetrical hypertrophy of the septum  Apical hypertrophyic cannot be ruled out completely  Features were consistent with a pseudonormal left ventricular filling pattern, with concomitant abnormal relaxation and increased filling pressure (grade 2 diastolic dysfunction)      RIGHT VENTRICLE:  The ventricle was mildly dilated  Systolic function was mildly reduced      LEFT ATRIUM:  The atrium was moderately dilated      RIGHT ATRIUM:  The atrium was moderately dilated      MITRAL VALVE:  There was moderate annular calcification  There was moderate regurgitation      AORTIC VALVE:  There was trace regurgitation      TRICUSPID VALVE:  There was mild regurgitation      PULMONIC VALVE:  There was trace regurgitation      AORTA:  The root exhibited mild dilatation  4 2 cm(pt known to have aortic root dilation on previous echo)    Anesthesia Plan  ASA Score- 3     Anesthesia Type- IV sedation with anesthesia with ASA Monitors  Additional Monitors:   Airway Plan:           Plan Factors-Exercise tolerance (METS): <4 METS  Exercise comment: Limited by spinal stenosis and peripheral neuropathy  Patient is not a current smoker  Induction- intravenous      Postoperative Plan-     Informed Consent- Anesthetic plan and risks discussed with patient  I personally reviewed this patient with the CRNA  Discussed and agreed on the Anesthesia Plan with the CRNA  Romero Glover

## 2021-03-02 ENCOUNTER — OFFICE VISIT (OUTPATIENT)
Dept: CARDIOLOGY CLINIC | Facility: CLINIC | Age: 81
End: 2021-03-02
Payer: MEDICARE

## 2021-03-02 VITALS
WEIGHT: 212 LBS | BODY MASS INDEX: 25.82 KG/M2 | DIASTOLIC BLOOD PRESSURE: 76 MMHG | OXYGEN SATURATION: 99 % | HEART RATE: 87 BPM | SYSTOLIC BLOOD PRESSURE: 118 MMHG | HEIGHT: 76 IN

## 2021-03-02 DIAGNOSIS — Z79.899 ON AMIODARONE THERAPY: ICD-10-CM

## 2021-03-02 DIAGNOSIS — I42.0 DILATED CARDIOMYOPATHY (HCC): ICD-10-CM

## 2021-03-02 DIAGNOSIS — Z79.01 CHRONIC ANTICOAGULATION: ICD-10-CM

## 2021-03-02 DIAGNOSIS — I48.20 CHRONIC ATRIAL FIBRILLATION (HCC): ICD-10-CM

## 2021-03-02 DIAGNOSIS — I50.22 CHRONIC SYSTOLIC HEART FAILURE (HCC): Primary | ICD-10-CM

## 2021-03-02 PROCEDURE — 99214 OFFICE O/P EST MOD 30 MIN: CPT | Performed by: INTERNAL MEDICINE

## 2021-03-03 ENCOUNTER — TELEPHONE (OUTPATIENT)
Dept: GASTROENTEROLOGY | Facility: CLINIC | Age: 81
End: 2021-03-03

## 2021-03-03 NOTE — PROGRESS NOTES
[facility]  [address] [place] [id number]  Cardiology Follow Up    Milvia Ayala  1940  [id number]      1  Chronic systolic heart failure (HCC)  CBC and differential    Comprehensive metabolic panel   2  Chronic anticoagulation  CBC and differential    Comprehensive metabolic panel   3  On amiodarone therapy  TSH, 3rd generation with Free T4 reflex   4  Dilated cardiomyopathy ([address] )     5  Atrial fibrillation         Chief Complaint   Patient presents with    Follow-up       Interval History:   Patient presents for follow-up visit  Patient denies any chest pain  No shortness of breath out of the ordinary  No history of leg edema more than usual   Weight has been stable  Vital signs have been monitored by wife and they have been in the normal range  Patient's main issue is a severe back pain which has been going on for years  Patient was seen by Orthopedics in the past   Patient is unable to ambulate secondary to that  Patient is also followed by Heart failure Cardiology for treatment of cardiac amyloidosis with Vyndamax  He has questions regarding the efficacy and monitoring of therapy as he is paying approximately at least 1000 dollars per month for this medication as co-pay      Patient Active Problem List   Diagnosis    Cardiomyopathy ([address] )    Acute on chronic systolic heart failure (HCC)    Atrial fibrillation    Chronic anticoagulation    Dilation of thoracic aorta (HCC)    Hyperlipidemia    Essential hypertension    Presence of automatic cardioverter/defibrillator (AICD)    Spinal stenosis of lumbar region with neurogenic claudication    Biventricular congestive heart failure ([address] )    Nonsustained ventricular tachycardia    Stage 3 chronic kidney disease    Epigastric pain    AAA (abdominal aortic aneurysm) (HCC)    Enlarged prostate    Kidney stone     Past Medical History:   Diagnosis Date    Anticoagulant long-term use     Atrial fibrillation ([facility] )     Cardiac defibrillator in situ     Cardiomyopathy ([facility] )     Colon polyp     Congestive heart failure (CHF) (HCC)     DJD (degenerative joint disease)     HL (hearing loss)     Hyperlipidemia     Hypertension     Shortness of breath     Sick sinus syndrome ([facility] )     Spinal stenosis      Social History     Socioeconomic History    Marital status: /Civil Union     Spouse name: Not on file    Number of children: Not on file    Years of education: Not on file    Highest education level: Not on file   Occupational History    Not on file   Social Needs    Financial resource strain: Not on file    Food insecurity     Worry: Not on file     Inability: Not on file   [employer] needs     Medical: Not on file     Non-medical: Not on file   Tobacco Use    Smoking status: Former Smoker     Packs/day: 0 50     Years: 3 00     Pack years: 1 50     Types: Cigarettes, Cigars     Quit date: 1968     Years since quittin 9    Smokeless tobacco: Never Used   Substance and Sexual Activity    Alcohol use:  Yes     Alcohol/week: 2 0 standard drinks     Types: 2 Glasses of wine per week     Frequency: 4 or more times a week     Drinks per session: 1 or 2     Binge frequency: Never     Comment: daily    Drug use: Never    Sexual activity: Not Currently   Lifestyle    Physical activity     Days per week: 0 days     Minutes per session: 0 min    Stress: Very much   Relationships    Social connections     Talks on phone: Not on file     Gets together: Not on file     Attends [employer] service: Not on file     Active member of club or organization: Not on file     Attends meetings of clubs or organizations: Not on file     Relationship status: Not on file    Intimate partner violence     Fear of current or ex partner: Not on file     Emotionally abused: Not on file     Physically abused: Not on file     Forced sexual activity: Not on file   Other Topics Concern    Not on file   Social History Narrative    Active advance directive    Caffeine use      Family History   Problem Relation Age of Onset    Coronary artery disease Mother     Hypertension Son      Past Surgical History:   Procedure Laterality Date    CARDIAC DEFIBRILLATOR PLACEMENT      FL RETROGRADE PYELOGRAM  11/3/2020    INSERT / REPLACE / REMOVE PACEMAKER      KNEE SURGERY Left     [facility]CTOMY      in [facility] had this    MS COLONOSCOPY FLX DX W/COLLJ SPEC WHEN PFRMD N/A 6/5/2017    Procedure: COLONOSCOPY;  Surgeon: Marlene Hernandez MD;  [facility];   Service: Gastroenterology    MS CYSTO/URETERO W/LITHOTRIPSY &INDWELL STENT INSRT Left 11/3/2020    Procedure: CYSTOSCOPY URETEROSCOPY WITH LITHOTRIPSY HOLMIUM LASER, RETROGRADE PYELOGRAM AND INSERTION STENT URETERAL;  Surgeon: Lance Worthy MD;  [facility];  Service: Urology    MS CYSTOURETHROSCOPY,URETER CATHETER Left 9/29/2020    Procedure: CYSTOSCOPY RETROGRADE PYELOGRAM WITH INSERTION STENT URETERAL;  Surgeon: Essence Sifuentes MD;  [facility];  Service: Urology    TONSILLECTOMY      TRANSURETHRAL RESECTION OF PROSTATE         Current Outpatient Medications:     amiodarone 200 mg tablet, 1 tab daily, Disp: 30 tablet, Rfl: 11    apixaban (ELIQUIS) 5 mg, Take 1 tablet (5 mg total) by mouth 2 (two) times a day, Disp: 180 tablet, Rfl: 3    diphenhydrAMINE-APAP, sleep, (TYLENOL PM EXTRA STRENGTH PO), Take by mouth, Disp: , Rfl:     gabapentin (NEURONTIN) 100 mg capsule, Take 2 capsules (200 mg total) by mouth daily at bedtime, Disp: , Rfl:     metoprolol succinate (TOPROL-XL) 25 mg 24 hr tablet, Take 1 tablet (25 mg total) by mouth daily, Disp: 90 tablet, Rfl: 3    pantoprazole (PROTONIX) 40 mg tablet, Take 1 tablet (40 mg total) by mouth daily in the early morning, Disp: 90 tablet, Rfl: 3    polyethylene glycol (MIRALAX) 17 g packet, Take 17 g by mouth daily, Disp: 30 each, Rfl: 0    Tafamidis (Vyndamax) 61 MG CAPS, Take 61 mg by mouth daily, Disp: 30 capsule, Rfl: 11    torsemide (DEMADEX) 10 mg tablet, Take 1 tablet (10 mg total) by mouth daily, Disp: 90 tablet, Rfl: 3    melatonin 3 mg, Take 3 mg by mouth daily at bedtime, Disp: , Rfl:   No current facility-administered medications for this visit       Facility-Administered Medications Ordered in Other Visits:     lactated ringers infusion, 125 mL/hr, Intravenous, Continuous, Rochelle Banuelos MD, Stopped at 03/01/21 0906  Allergies   Allergen Reactions    Other Sneezing     Seasonal; pollen; reactions; congestion    Penicillin G Benzathine Rash    Penicillins Rash       Labs:  Admission on 01/31/2021, Discharged on 01/31/2021   Component Date Value    WBC 01/31/2021 5 88     RBC 01/31/2021 4 19     Hemoglobin 01/31/2021 13 4     Hematocrit 01/31/2021 41 8     MCV 01/31/2021 100*    MCH 01/31/2021 32 0     MCHC 01/31/2021 32 1     RDW 01/31/2021 14 2     Platelets 21/24/0014 499*    nRBC 01/31/2021 0     Neutrophils Relative 01/31/2021 71     Immat GRANS % 01/31/2021 0     Lymphocytes Relative 01/31/2021 22     Monocytes Relative 01/31/2021 6     Eosinophils Relative 01/31/2021 1     Basophils Relative 01/31/2021 0     Neutrophils Absolute 01/31/2021 4 21     Immature Grans Absolute 01/31/2021 0 01     Lymphocytes Absolute 01/31/2021 1 27     Monocytes Absolute 01/31/2021 0 34     Eosinophils Absolute 01/31/2021 0 04     Basophils Absolute 01/31/2021 0 01     Sodium 01/31/2021 140     Potassium 01/31/2021 4 2     Chloride 01/31/2021 101     CO2 01/31/2021 30     ANION GAP 01/31/2021 9     BUN 01/31/2021 23     Creatinine 01/31/2021 1 61*    Glucose 01/31/2021 140     Calcium 01/31/2021 10 0     AST 01/31/2021 20     ALT 01/31/2021 19     Alkaline Phosphatase 01/31/2021 79     Total Protein 01/31/2021 7 8     Albumin 01/31/2021 3 9     Total Bilirubin 01/31/2021 2 30*    eGFR 01/31/2021 40     Troponin I 01/31/2021 0 11*    NT-proBNP 01/31/2021 3,738*    Lipase 01/31/2021 240     Troponin I 01/31/2021 0 11*    Ventricular Rate 01/31/2021 75     Atrial Rate 01/31/2021 72     QRSD Interval 01/31/2021 186     QT Interval 01/31/2021 470     QTC Interval 01/31/2021 524     QRS Axis 01/31/2021 118     T Wave Axis 01/31/2021 -35      Imaging: Us Kidney And Bladder With Pvr    Result Date: 2/3/2021  Narrative: RENAL ULTRASOUND INDICATION:   N20 1: Calculus of ureter  COMPARISON: CT from 1/31/2021; ultrasound from 11/11/2020 TECHNIQUE:   Ultrasound of the retroperitoneum was performed with a curvilinear transducer utilizing volumetric sweeps and still imaging techniques  FINDINGS: KIDNEYS: Symmetric and normal size  Right kidney:  10 7 cm  Left kidney:  12 9 cm  Right kidney Normal echogenicity and contour  No suspicious masses detected  No hydronephrosis  No shadowing calculi  No perinephric fluid collections  Left kidney Normal echogenicity and contour  No suspicious masses detected  No hydronephrosis  No shadowing calculi  No perinephric fluid collections  URETERS: Nonvisualized  BLADDER: The bladder is not well distended  The prevoid volume is 55 4 mL  No focal thickening or mass lesions  2 ureteral jets were reported by the technologist although only 1 is confidently identified on the color image  There is no post void residual      Impression: No hydronephrosis is seen  No calculi are identified   Workstation performed: [id number]       Review of Systems:  Review of Systems     REVIEW OF SYSTEMS:  Constitutional:  Denies fever or chills   Eyes:  Denies change in visual acuity   HENT:  Denies nasal congestion or sore throat   Respiratory:   shortness of breath   Cardiovascular:  Denies chest pain or edema   GI:  Denies abdominal pain, nausea, vomiting, bloody stools or diarrhea   :  Denies dysuria, frequency, difficulty in micturition and nocturia  Musculoskeletal:    Chronic back pain  Neurologic:  Denies headache, focal weakness or sensory changes   Endocrine:  Denies polyuria or polydipsia   Lymphatic:  Denies swollen glands   Psychiatric:  Denies depression or anxiety     Physical Exam:    /76   Pulse 87   Ht 6' 4" (1 93 m)   Wt 96 2 kg (212 lb)   SpO2 99%   BMI 25 81 kg/m²     Physical Exam    PHYSICAL EXAM:  General:  Patient is not in acute distress   Head: Normocephalic, Atraumatic  HEENT:  Both pupils normal-size atraumatic, normocephalic, nonicteric  Neck:  JVP not raised  Trachea central  No carotid bruit  Respiratory:  normal breath sounds no crackles  no rhonchi  Cardiovascular:  Irregularly irregular  GI:  Abdomen soft nontender  No organomegaly  Lymphatic:  No cervical or inguinal lymphadenopathy  Neurologic:  Patient is awake alert, oriented   Grossly nonfocal   extremities trace edema    Discussion/Summary:   Patient with multiple medical problems who seems to be doing reasonably well from cardiac standpoint  Previous studies reviewed with patient  Medications reviewed and possible side effects discussed  concepts of cardiovascular disease , signs and symptoms of heart disease  Dietary and risk factor modification reinforced  All questions answered  Safety measures reviewed  Patient advised to report any problems prompting medical attention  patient will follow-up with some heart failure cardiology in the next 1 to 2 months for follow-up of cardiac amyloidosis and questions regarding Vyndamax  And the monitoring of therapy  Repeat blood work in the next 1 month  Patient is going to be [de-identified]years old soon  If his creatinine is more than 1 5, we will need to decrease the dosage of Eliquis  This has been discussed with patient and family  Patient will discuss with primary care physician regarding possibility of pain management consultation for his back pain  Follow-up in 3 months or earlier as needed  Importance of salt restriction reinforced

## 2021-03-08 ENCOUNTER — TELEPHONE (OUTPATIENT)
Dept: CARDIOLOGY CLINIC | Facility: CLINIC | Age: 81
End: 2021-03-08

## 2021-03-08 NOTE — TELEPHONE ENCOUNTER
S/w wife, stated that patient does not feel well with weakness, fatigue, stomach pains that he follows up with Dr Tegan Hazel for  Wife states that weight flucuates up and down but, does not exceed more than 3 lbs in a day

## 2021-03-08 NOTE — TELEPHONE ENCOUNTER
Patient sees Dr Neeraj Shelley  And they have been monitor his weight  It has been up and down  Pt's wife states in the last week he has gained 5 pounds and she is concerned       Cora Wilburn - [phone]

## 2021-03-09 ENCOUNTER — TELEPHONE (OUTPATIENT)
Dept: GASTROENTEROLOGY | Facility: CLINIC | Age: 81
End: 2021-03-09

## 2021-03-09 NOTE — TELEPHONE ENCOUNTER
If there is weight fluctuation  Of 2 lb nothing needs to be done  If patient gains 3 lb, to take an extra torsemide that day

## 2021-03-09 NOTE — TELEPHONE ENCOUNTER
Results given  Will try Gaviscon if he has the discomfort  He will continue the pantoprazole  She will keep me apprised of his progress  Seeing his cardiologist next month

## 2021-03-10 ENCOUNTER — TELEPHONE (OUTPATIENT)
Dept: UROLOGY | Facility: AMBULATORY SURGERY CENTER | Age: 81
End: 2021-03-10

## 2021-03-10 ENCOUNTER — TELEPHONE (OUTPATIENT)
Dept: INTERNAL MEDICINE CLINIC | Facility: CLINIC | Age: 81
End: 2021-03-10

## 2021-03-10 ENCOUNTER — OFFICE VISIT (OUTPATIENT)
Dept: UROLOGY | Facility: CLINIC | Age: 81
End: 2021-03-10
Payer: MEDICARE

## 2021-03-10 VITALS
WEIGHT: 210 LBS | HEART RATE: 75 BPM | BODY MASS INDEX: 25.57 KG/M2 | DIASTOLIC BLOOD PRESSURE: 62 MMHG | SYSTOLIC BLOOD PRESSURE: 114 MMHG | HEIGHT: 76 IN

## 2021-03-10 DIAGNOSIS — N13.30 HYDRONEPHROSIS OF LEFT KIDNEY: ICD-10-CM

## 2021-03-10 DIAGNOSIS — N20.0 NEPHROLITHIASIS: Primary | ICD-10-CM

## 2021-03-10 LAB
SL AMB  POCT GLUCOSE, UA: NORMAL
SL AMB LEUKOCYTE ESTERASE,UA: NORMAL
SL AMB POCT BILIRUBIN,UA: NORMAL
SL AMB POCT BLOOD,UA: NORMAL
SL AMB POCT CLARITY,UA: CLEAR
SL AMB POCT COLOR,UA: YELLOW
SL AMB POCT KETONES,UA: NORMAL
SL AMB POCT NITRITE,UA: NORMAL
SL AMB POCT PH,UA: 6.5
SL AMB POCT SPECIFIC GRAVITY,UA: 1.01
SL AMB POCT URINE PROTEIN: NORMAL
SL AMB POCT UROBILINOGEN: 0.2

## 2021-03-10 PROCEDURE — 81002 URINALYSIS NONAUTO W/O SCOPE: CPT | Performed by: PHYSICIAN ASSISTANT

## 2021-03-10 PROCEDURE — 99213 OFFICE O/P EST LOW 20 MIN: CPT | Performed by: PHYSICIAN ASSISTANT

## 2021-03-10 NOTE — TELEPHONE ENCOUNTER
Consulted with Brian JENSEN  Patient should keep appt  Called and spoke with patient's wife at this time  Advised patient should keep appt as scheduled  She verbalized understanding

## 2021-03-10 NOTE — PROGRESS NOTES
1  Nephrolithiasis  POCT urine dip   2  Hydronephrosis of left kidney  POCT urine dip       Assessment and plan:       1  Nephrolithiasis  - s/p staged left ureteroscopy fall 2020  -f/u US negative for residual obstruction  Recent CT showing small left intrarenal stones  - patient asymptomatic at this time    2  BPH with LUTS  - history of TURP with Dr Asaf Cates    3  Elevated PSA  - s/p negative biopsy previously with Dr Asaf Cates      I was happy to review with the patient that his follow-up ultrasound was negative for evidence of residual hydronephrosis or obstruction  We discussed that he has recent CT revealed small left intrarenal stones  We also reviewed his mildly elevated PSA  Patient follows closely with his primary urologist Dr Asaf Cates, [facility]  He elects for annual/continued screening through their office  Patient counseled to contact us in the future with any urologic concerns  All questions answered  [facility], PARAN[place]LL      Chief Complaint     nephrolithiasis    History of Present Illness     Dai Estrella is a 79 y o  Male patient with a history of nephrolithiasis, BPH status post TURP, and elevated PSA presenting for follow-up  Patient was initially seen in September of 2020 during his inpatient admission  Noted to have hydronephrosis on ultrasound  CT revealed a 9 mm left ureteral stone with hydronephrosis  Underwent stenting during his admission  Patient underwent subsequent left ureteroscopy November 2020  Stent removed shortly thereafter  He has since had a follow-up CT and ultrasound that is negative for evidence of residual obstruction  Small left intrarenal stones noted  Patient has been doing well over the past  Few months  Patient has been dealing with cardiac issues  Patient reports that he has been following with Dr Asaf Cates  For a long time  He has been monitoring his elevated PSA and does report a history of negative biopsy previously    Patient is not interested in further evaluation given his cardiac comorbidities understandably  stone analysis is 100% calcium oxalate in origin  Laboratory     Lab Results   Component Value Date    CREATININE 1 61 (H) 01/31/2021       Lab Results   Component Value Date    PSA 5 7 (H) 10/05/2020    PSA 4 42 (H) 01/30/2015       Review of Systems     Review of Systems   Constitutional: Negative for activity change, appetite change, chills, diaphoresis, fatigue, fever and unexpected weight change  Respiratory: Negative for chest tightness and shortness of breath  Cardiovascular: Negative for chest pain, palpitations and leg swelling  Gastrointestinal: Negative for abdominal distention, abdominal pain, constipation, diarrhea, nausea and vomiting  Genitourinary: Negative for decreased urine volume, difficulty urinating, dysuria, enuresis, flank pain, frequency, genital sores, hematuria and urgency  Musculoskeletal: Negative for back pain, gait problem and myalgias  Skin: Negative for color change, pallor, rash and wound  Psychiatric/Behavioral: Negative for behavioral problems  The patient is not nervous/anxious  Allergies     Allergies   Allergen Reactions    Other Sneezing     Seasonal; pollen; reactions; congestion    Penicillin G Benzathine Rash    Penicillins Rash       Physical Exam     Physical Exam  Constitutional:       General: He is not in acute distress  Appearance: Normal appearance  He is normal weight  He is not ill-appearing, toxic-appearing or diaphoretic  HENT:      Head: Normocephalic and atraumatic  Eyes:      General:         Right eye: No discharge  Left eye: No discharge  Conjunctiva/sclera: Conjunctivae normal    Pulmonary:      Effort: Pulmonary effort is normal  No respiratory distress  Musculoskeletal:      Comments: Ambulates with walker assistance   Skin:     General: Skin is warm and dry  Coloration: Skin is not jaundiced or pale  Neurological:      General: No focal deficit present  Mental Status: He is alert and oriented to person, place, and time  Psychiatric:         Mood and Affect: Mood normal          Behavior: Behavior normal          Thought Content:  Thought content normal            Vital Signs     Vitals:    03/10/21 1242   BP: 114/62   BP Location: Left arm   Patient Position: Sitting   Cuff Size: Standard   Pulse: 75   Weight: 95 3 kg (210 lb)   Height: 6' 4" (1 93 m)         Current Medications       Current Outpatient Medications:     amiodarone 200 mg tablet, 1 tab daily, Disp: 30 tablet, Rfl: 11    apixaban (ELIQUIS) 5 mg, Take 1 tablet (5 mg total) by mouth 2 (two) times a day, Disp: 180 tablet, Rfl: 3    diphenhydrAMINE-APAP, sleep, (TYLENOL PM EXTRA STRENGTH PO), Take by mouth as needed , Disp: , Rfl:     gabapentin (NEURONTIN) 100 mg capsule, Take 2 capsules (200 mg total) by mouth daily at bedtime, Disp: , Rfl:     melatonin 3 mg, Take 3 mg by mouth daily at bedtime, Disp: , Rfl:     metoprolol succinate (TOPROL-XL) 25 mg 24 hr tablet, Take 1 tablet (25 mg total) by mouth daily, Disp: 90 tablet, Rfl: 3    pantoprazole (PROTONIX) 40 mg tablet, Take 1 tablet (40 mg total) by mouth daily in the early morning, Disp: 90 tablet, Rfl: 3    polyethylene glycol (MIRALAX) 17 g packet, Take 17 g by mouth daily, Disp: 30 each, Rfl: 0    Tafamidis (Vyndamax) 61 MG CAPS, Take 61 mg by mouth daily, Disp: 30 capsule, Rfl: 11    torsemide (DEMADEX) 10 mg tablet, Take 1 tablet (10 mg total) by mouth daily, Disp: 90 tablet, Rfl: 3      Active Problems     Patient Active Problem List   Diagnosis    [facility]    Acute on chronic systolic heart failure (HCC)    Atrial fibrillation    Chronic anticoagulation    Dilation of thoracic aorta (HCC)    Hyperlipidemia    Essential hypertension    Presence of automatic cardioverter/defibrillator (AICD)    Spinal stenosis of lumbar region with neurogenic claudication    Biventricular congestive heart failure (HCC)    Nonsustained ventricular tachycardia    Stage 3 chronic kidney disease    Epigastric pain    AAA (abdominal aortic aneurysm) (HCC)    Enlarged prostate    Kidney stone         Past Medical History     Past Medical History:   Diagnosis Date    Anticoagulant long-term use     Atrial fibrillation (HCC)     Cardiac defibrillator in situ     Cardiomyopathy ([address] )     Colon polyp     Congestive heart failure (CHF) (HCC)     DJD (degenerative joint disease)     HL (hearing loss)     Hyperlipidemia     Hypertension     Shortness of breath     S[facility]     Spinal stenosis          Surgical History     Past Surgical History:   Procedure Laterality Date    CARDIAC DEFIBRILLATOR PLACEMENT      FL RETROGRADE PYELOGRAM  11/3/2020    INSERT / REPLACE / REMOVE PACEMAKER      KNEE SURGERY Left     M[facility]ISCECTOMY      in [facility] had this    MT COLONOSCOPY FLX DX W/COLLJ SPEC WHEN PFRMD N/A 6/5/2017    Procedure: COLONOSCOPY;  Surgeon: Trevor Deng MD;  [facility]: [facility];   Service: Gastroenterology    MT CYSTO/URETERO W/LITHOTRIPSY &INDWELL STENT INSRT Left 11/3/2020    Procedure: CYSTOSCOPY URETEROSCOPY WITH LITHOTRIPSY HOLMIUM LASER, RETROGRADE PYELOGRAM AND INSERTION STENT URETERAL;  Surgeon: Lisette Robison MD;  [facility];  Service: Urology    MT CYSTOURETHROSCOPY,URETER CATHETER Left 9/29/2020    Procedure: CYSTOSCOPY RETROGRADE PYELOGRAM WITH INSERTION STENT URETERAL;  Surgeon: Ml Armas MD;  [facility];  Service: Urology    TONSILLECTOMY      TRANSURETHRAL RESECTION OF PROSTATE           Family History     Family History   Problem Relation Age of Onset    Coronary artery disease Mother     Hypertension Son          Social History     Social History       Radiology

## 2021-03-10 NOTE — TELEPHONE ENCOUNTER
Patient has appointment with Benjamin Mcintosh today and did do US but not the kub  She would still keep his appointment today

## 2021-03-11 ENCOUNTER — TELEPHONE (OUTPATIENT)
Dept: GASTROENTEROLOGY | Facility: CLINIC | Age: 81
End: 2021-03-11

## 2021-03-11 NOTE — TELEPHONE ENCOUNTER
Does not want to take the Protonix because he is on salt restriction  Told him to try Pepcid 20 mg b i d  And call me with his progress

## 2021-03-16 ENCOUNTER — OFFICE VISIT (OUTPATIENT)
Dept: INTERNAL MEDICINE CLINIC | Facility: CLINIC | Age: 81
End: 2021-03-16
Payer: MEDICARE

## 2021-03-16 VITALS
SYSTOLIC BLOOD PRESSURE: 126 MMHG | BODY MASS INDEX: 26.24 KG/M2 | OXYGEN SATURATION: 98 % | TEMPERATURE: 97.5 F | HEART RATE: 78 BPM | WEIGHT: 215.6 LBS | DIASTOLIC BLOOD PRESSURE: 82 MMHG

## 2021-03-16 DIAGNOSIS — N18.32 STAGE 3B CHRONIC KIDNEY DISEASE (HCC): ICD-10-CM

## 2021-03-16 DIAGNOSIS — M75.91 SHOULDER LESION, RIGHT: ICD-10-CM

## 2021-03-16 DIAGNOSIS — M48.062 SPINAL STENOSIS OF LUMBAR REGION WITH NEUROGENIC CLAUDICATION: Primary | ICD-10-CM

## 2021-03-16 PROBLEM — R10.13 EPIGASTRIC PAIN: Status: RESOLVED | Noted: 2021-02-05 | Resolved: 2021-03-16

## 2021-03-16 PROCEDURE — 99214 OFFICE O/P EST MOD 30 MIN: CPT | Performed by: INTERNAL MEDICINE

## 2021-03-16 NOTE — PROGRESS NOTES
[facility] - [facility]    NAME: Margarito Alvarado  AGE: 79 y o  SEX: male  : 1940     DATE: 3/16/2021     Assessment and Plan:     1  Spinal stenosis of lumbar region with neurogenic claudication    Continue tylenol as needed  Will refer him to [facility] spine and & pain specialist  Continue use of cane for ambulation     - Ambulatory referral to Pain Management; Future    2  Stage 3b chronic kidney disease    Lab Results   Component Value Date    EGFR 40 2021    EGFR 46 2020    EGFR 41 2020    CREATININE 1 61 (H) 2021    CREATININE 1 44 (H) 2020    CREATININE 1 57 (H) 2020    CREATININE 1 00 2017    CREATININE 0 7 (L) 2015    CREATININE 0 7 (L) 2014      Avoid NSAIDs  Would agree with decreasing eliquis dose  Has underlying cardiorenal syndrome  Has upcoming labs to repeat  Will add on vitamin D level at the request of patients wife  - Vitamin D 25 hydroxy; Future    3  Shoulder lesion, right    Discussed diagnostic possibilities  Has been getting bigger since he was in the hospital in the past  Will refer him to a surgeon to consider excision     - Ambulatory referral to Plastic Surgery; Future    BMI Counseling: Body mass index is 26 24 kg/m²  The BMI is above normal  Nutrition recommendations include encouraging healthy choices of fruits and vegetables, moderation in carbohydrate intake and increasing intake of lean protein  Return in about 4 months (around 2021) for Initial AWV  Chief Complaint:     Chief Complaint   Patient presents with    Follow-up      History of Present Illness:     Patient presents for follow-up  Follows regularly with cardiology due to Afib, chronic heart failure, cardiomyopathy s/p BiV-ICD  Has underlying renal disease and have discussed with him about decreasing dose of eliquis      His biggest complains are back pain and subcutaneous mass right supraclavicular region  He has known spinal stenosis  Used to see Dr Ornelas in the past and wasn't much help  Review of Systems:     Review of Systems   Constitutional: Positive for fatigue  Negative for activity change and appetite change  Respiratory: Positive for shortness of breath (chronic with exertion)  Negative for apnea, cough, chest tightness and wheezing  Cardiovascular: Negative for chest pain, palpitations and leg swelling  Gastrointestinal: Negative for abdominal distention, abdominal pain, blood in stool, constipation, diarrhea, nausea and vomiting  Musculoskeletal: Positive for back pain and gait problem  Skin:        Subcutaneous mass right supraclavicular region   Neurological: Negative for dizziness, weakness, light-headedness, numbness and headaches  Psychiatric/Behavioral: Negative for behavioral problems, confusion, hallucinations, sleep disturbance and suicidal ideas  The patient is not nervous/anxious  Objective:     /82 (BP Location: Left arm, Patient Position: Sitting, Cuff Size: Standard)   Pulse 78   Temp 97 5 °F (36 4 °C) (Temporal) Comment: NO NSAIDS  Wt 97 8 kg (215 lb 9 6 oz) Comment: w/ shoes denied off  SpO2 98%   BMI 26 24 kg/m²     Physical Exam  Vitals signs reviewed  Constitutional:       General: He is not in acute distress  Appearance: He is well-developed  He is not diaphoretic  Neck:      Musculoskeletal: Neck supple  Thyroid: No thyromegaly  Vascular: No JVD  Cardiovascular:      Rate and Rhythm: Normal rate and regular rhythm  Heart sounds: Normal heart sounds  No murmur  Pulmonary:      Effort: Pulmonary effort is normal  No respiratory distress  Breath sounds: Normal breath sounds  No wheezing or rales  Abdominal:      General: Bowel sounds are normal  There is no distension  Palpations: Abdomen is soft  Tenderness: There is no abdominal tenderness     Musculoskeletal:         General: Deformity (lumbar paraspinal tissue hypertonicity) present  Right lower leg: No edema  Left lower leg: No edema  Lymphadenopathy:      Cervical: No cervical adenopathy  Skin:     General: Skin is warm and dry  Comments: 2 0 cm firm, rubbery, tender subcutaneous mass right supraclavicular region   Neurological:      Mental Status: He is alert  Mental status is at baseline         Brodie Almaguer DO  [facility]

## 2021-03-16 NOTE — PATIENT INSTRUCTIONS
Back Pain, Ambulatory Care   GENERAL INFORMATION:   Back pain  is common  You may feel sore or stiff on one or both sides of your back  The pain may spread to your buttocks or thighs  Back pain may be caused by an injury, lack of exercise, or obesity  Repeated bending, lifting, twisting, or lifting heavy items can also cause back pain  Seek immediate care for the following symptoms:   · Pain, numbness, or weakness in one or both legs    · Pain that is so severe, you cannot walk    · Unable to control your urine or bowel movements    · Severe back pain with chest pain    · Severe back pain, nausea, and vomiting    · Severe back pain that spreads to your side or genital area  Treatment for back pain  may include any of the following:  · NSAIDs  help decrease swelling and pain or fever  This medicine is available with or without a doctor's order  NSAIDs can cause stomach bleeding or kidney problems in certain people  If you take blood thinner medicine, always ask your healthcare provider if NSAIDs are safe for you  Always read the medicine label and follow directions  · Acetaminophen  decreases pain  It is available without a doctor's order  Ask how much to take and how often to take it  Follow directions  Acetaminophen can cause liver damage if not taken correctly  · Prescription pain medicine  may be given  Ask your healthcare provider how to take this medicine safely  Manage your back pain:   · Apply ice  on your back for 15 to 20 minutes every hour or as directed  Use an ice pack, or put crushed ice in a plastic bag  Cover it with a towel  Ice helps decrease swelling and pain  · Apply heat  on your back for 20 to 30 minutes every 2 hours for as many days as directed  Heat helps decrease pain and muscle spasms  You can alternate ice and heat  · Stay active  as much as you can without causing more pain  Bed rest could make your back pain worse  Avoid heavy lifting until your pain is gone    Follow up with your healthcare provider as directed:  Write down your questions so you remember to ask them during your visits  CARE AGREEMENT:   You have the right to help plan your care  Learn about your health condition and how it may be treated  Discuss treatment options with your caregivers to decide what care you want to receive  You always have the right to refuse treatment  The above information is an  only  It is not intended as medical advice for individual conditions or treatments  Talk to your doctor, nurse or pharmacist before following any medical regimen to see if it is safe and effective for you  © 2014 [address] is for End User's use only and may not be sold, redistributed or otherwise used for commercial purposes  All illustrations and images included in CareNotes® are the copyrighted property of [employer]  or Jacob Krause

## 2021-03-29 ENCOUNTER — CONSULT (OUTPATIENT)
Dept: PAIN MEDICINE | Facility: CLINIC | Age: 81
End: 2021-03-29
Payer: MEDICARE

## 2021-03-29 VITALS
WEIGHT: 216.4 LBS | HEIGHT: 76 IN | RESPIRATION RATE: 16 BRPM | HEART RATE: 90 BPM | SYSTOLIC BLOOD PRESSURE: 111 MMHG | DIASTOLIC BLOOD PRESSURE: 69 MMHG | BODY MASS INDEX: 26.35 KG/M2

## 2021-03-29 DIAGNOSIS — M48.062 SPINAL STENOSIS OF LUMBAR REGION WITH NEUROGENIC CLAUDICATION: ICD-10-CM

## 2021-03-29 PROCEDURE — 99204 OFFICE O/P NEW MOD 45 MIN: CPT | Performed by: STUDENT IN AN ORGANIZED HEALTH CARE EDUCATION/TRAINING PROGRAM

## 2021-03-29 NOTE — PATIENT INSTRUCTIONS
"MILD" procedure- minimally invasive lumbar decompression  Epidural Steroid Injection   AMBULATORY CARE:   What you need to know about an epidural steroid injection (LATONYA):  An LATONYA is a procedure to inject steroid medicine into the epidural space  The epidural space is between your spinal cord and vertebrae  Steroids reduce inflammation and fluid buildup in your spine that may be causing pain  You may be given pain medicine along with the steroids  How to prepare for an LATONYA:  Your healthcare provider will talk to you about how to prepare for your procedure  He or she will tell you what medicines to take or not take on the day of your procedure  You may need to stop taking blood thinners or other medicines several days before your procedure  You may need to adjust any diabetes medicine you take on the day of your procedure  Steroid medicine can increase your blood sugar level  Arrange for someone to drive you home when you are discharged  What will happen during an LATONYA:   · You will be given medicine to numb the procedure area  You will be awake for the procedure, but you will not feel pain  You may also be given medicine to help you relax  Contrast liquid will be used to help your healthcare provider see the area better  Tell the healthcare provider if you have ever had an allergic reaction to contrast liquid  · Your healthcare provider may place the needle into your neck area, middle of your back, or tailbone area  He may inject the medicine next to the nerves that are causing your pain  He may instead inject the medicine into a larger area of the epidural space  This helps the medicine spread to more nerves  Your healthcare provider will use a fluoroscope to help guide the needle to the right place  A fluoroscope is a type of x-ray  After the procedure, a bandage will be placed over the injection site to prevent infection      What will happen after an LATONYA:  You will have a bandage over the injection site to prevent infection  Your healthcare provider will tell you when you can bathe and any activity guidelines  You will be able to go home  Risks of an LATONYA:  You may have temporary or permanent nerve damage or paralysis  You may have bleeding or develop a serious infection, such as meningitis (swelling of the brain coverings)  An abscess may also develop  An abscess is a pus-filled area under the skin  You may need surgery to fix the abscess  You may have a seizure, anxiety, or trouble sleeping  If you are a man, you may have temporary erectile dysfunction (not able to have an erection)  Call your local emergency number (911 in the [address] [facility]) if:   · You have a seizure  · You have trouble moving your legs  Seek care immediately if:   · Blood soaks through your bandage  · You have a fever or chills, severe back pain, and the procedure area is sensitive to the touch  · You cannot control when you urinate or have a bowel movement  Call your doctor if:   · You have weakness or numbness in your legs  · Your wound is red, swollen, or draining pus  · You have nausea or are vomiting  · Your face or neck is red and you feel warm  · You have more pain than you had before the procedure  · You have swelling in your hands or feet  · You have questions or concerns about your condition or care  Care for your wound as directed: You may remove the bandage before you go to bed the day of your procedure  You may take a shower, but do not take a bath for at least 24 hours  Self-care:   · Do not drive,  use machines, or do strenuous activity for 24 hours after your procedure or as directed  · Continue other treatments  as directed  Steroid injections alone will not control your pain  The injections are meant to be used with other treatments, such as physical therapy  Follow up with your doctor as directed:  Write down your questions so you remember to ask them during your visits     © Copyright [address] Information is for [employer] use only and may not be sold, redistributed or otherwise used for commercial purposes  All illustrations and images included in CareNotes® are the copyrighted property of [employer] Inc  or Antoinette Bunn  The above information is an  only  It is not intended as medical advice for individual conditions or treatments  Talk to your doctor, nurse or pharmacist before following any medical regimen to see if it is safe and effective for you

## 2021-03-29 NOTE — PROGRESS NOTES
Assessment  1  Spinal stenosis of lumbar region with neurogenic claudication        Plan  This is a 71-year-old male who presents to our office with chief complaint of bilateral lower extremity radiculopathy and neurogenic claudication  His symptoms have been progressively worsening over the last 6 months  Denies bowel bladder incontinence or saddle anesthesia  No recent pertinent imaging but does have CT myelogram from 2009 showing spinal stenosis  At L3-4 and L4-5 levels due to ligamentum flavum hypertrophy  He has not responded to epidural steroid injections in the past  And declines repeat injection at this time  He is on a minimal dose of gabapentin due to chronic renal disease  He is not a candidate for spinal cord stimulator due to AICD / pacemaker  He is not a surgical candidate based on the opinion of 2 surgeons  I discussed with him that frankly our options are limited at this point  They include another opinion from surgical specialist within our Network  Additionally, given ligamentum flavum hypertrophy, patient may be candidate for MILD procedure  Which I will discuss practicing provider  If this an offered treatment  in the meantime he will let us know if he would like to repeat epidural injection  [place] Prescription Drug Monitoring Program report was reviewed and was appropriate     My impressions and treatment recommendations were discussed in detail with the patient who verbalized understanding and had no further questions  Discharge instructions were provided  I personally saw and examined the patient and I agree with the above discussed plan of care  No orders of the defined types were placed in this encounter      New Medications Ordered This Visit   Medications    Alum Hydroxide-Mag Carbonate (GAVISCON EXTRA STRENGTH PO)     Sig: Take by mouth       History of Present Illness    Referring Provider: Dennie Quail, DO Lajuana Orville is a 79 y o  male who presents with a chief complaint of low back pain and bilateral lower extremity radiculopathy  This is a chronic issue of over 20 years  Reports that in the last 6 months he has started ambulating with a rolling walker  Noticing increased weakness of the b/l LE's  pAIN IS moderate to severe  Pain is 8/10  Constant and very bad in the morning usually  Describes pain as shooting , numbness, sharp, pins and needles  Reports numbness in the waist going down both legs  Endorses b/l lower extremity weakness but denies BBI or saddle anesthesia  Increased pain with lying down, standing, bending, exercise, coughing, snezing, bowerl movement  Decreased with sitting  Reports walking short distances significantly increases his leg pain  Reports having epidural steroid injection with Dr Stacie dee in 2009  These were ineffective  Also saw Dr Denis Arreola in 2014 and had cervical facet injections which were also not helpful  Recent pertinent imaging includes lumbar spine CT myelogram from 2009 showing Multilevel degenerative disc disease and lumbar spondylosis  Moderate central stenosis L4-5 and mild-to-moderate central stenosis at L3-4 from disc bulging ligamentum flavum hypertrophy  Subarticular recess stenosis at L3-4 and L4-5  PMH includes GERD, kidney disease, arthritis, and CHF  Has pacemaker/AICD  Reports moderate relief with PT, heat/ice  No relief with nerve injection, chiro manipulation, osteopathic manipulation  No tobacco or MJ use  Mild alcohol use  Current medications include acetamionophen and gabapentin 200mg qhs which minimally help sypmotoms  Past meds include NSAID, capsaicain, lido patch without relief  I have personally reviewed and/or updated the patient's past medical history, past surgical history, family history, social history, current medications, allergies, and vital signs today  Review of Systems   Constitutional: Negative for fever and unexpected weight change  HENT: Negative for trouble swallowing  Eyes: Negative for visual disturbance  Respiratory: Positive for shortness of breath  Negative for wheezing  Cardiovascular: Negative for chest pain and palpitations  Gastrointestinal: Positive for abdominal pain  Negative for constipation, diarrhea, nausea and vomiting  Endocrine: Negative for cold intolerance, heat intolerance and polydipsia  Genitourinary: Negative for difficulty urinating and frequency  Musculoskeletal: Negative for arthralgias, gait problem, joint swelling and myalgias  Skin: Negative for rash  Neurological: Positive for dizziness and numbness  Negative for seizures, syncope, weakness and headaches  Hematological: Does not bruise/bleed easily  Psychiatric/Behavioral: Negative for dysphoric mood  All other systems reviewed and are negative        Patient Active Problem List   Diagnosis    Cardiomyopathy ([facility] [address] )    Chronic systolic heart failure (HCC)    Atrial fibrillation    Chronic anticoagulation    Dilation of thoracic aorta (HCC)    Hyperlipidemia    Essential hypertension    Presence of automatic cardioverter/defibrillator (AICD)    Spinal stenosis of lumbar region with neurogenic claudication    Biventricular congestive heart failure ([facility] [address] )    Nonsustained ventricular tachycardia    Stage 3 chronic kidney disease    AAA (abdominal aortic aneurysm) ([facility])    Enlarged prostate    Kidney stone       Past Medical History:   Diagnosis Date    Anticoagulant long-term use     Atrial fibrillation ([facility] [address] )     Cardiac defibrillator in situ     Cardiomyopathy ([facility] [address] )     Colon polyp     Congestive heart failure (CHF) ([facility])     DJD (degenerative joint disease)     HL (hearing loss)     Hyperlipidemia     Hypertension     Shortness of breath     Sick sinus syndrome ([address] )     Spinal stenosis        Past Surgical History:   Procedure Laterality Date    CARDIAC DEFIBRILLATOR PLACEMENT      FL RETROGRADE PYELOGRAM 11/3/2020    INSERT / REPLACE / REMOVE PACEMAKER      KNEE SURGERY Left     M[facility]ISCECTOMY      in [facility] had this    MO COLONOSCOPY FLX DX W/COLLJ SPEC WHEN PFRMD N/A 2017    Procedure: COLONOSCOPY;  Surgeon: Sena Oneil MD;  Location: [facility]; Service: Gastroenterology    MO CYSTO/URETERO W/LITHOTRIPSY &INDWELL STENT INSRT Left 11/3/2020    Procedure: CYSTOSCOPY URETEROSCOPY WITH LITHOTRIPSY HOLMIUM LASER, RETROGRADE PYELOGRAM AND INSERTION STENT URETERAL;  Surgeon: Elena Simms MD;  Location: [facility];  Service: Urology    MO CYSTOURETHROSCOPY,URETER CATHETER Left 2020    Procedure: CYSTOSCOPY RETROGRADE PYELOGRAM WITH INSERTION STENT URETERAL;  Surgeon: Colette Blankenship MD;  Location: [facility];  Service: Urology    TONSILLECTOMY      TRANSURETHRAL RESECTION OF PROSTATE         Family History   Problem Relation Age of Onset    Coronary artery disease Mother     Hypertension Son        Social History     Occupational History    Not on file   Tobacco Use    Smoking status: Former Smoker     Packs/day: 0 50     Years: 3 00     Pack years: 1 50     Types: Cigarettes, Cigars     Quit date: 1968     Years since quittin 9    Smokeless tobacco: Never Used   Substance and Sexual Activity    Alcohol use:  Yes     Alcohol/week: 2 0 standard drinks     Types: 2 Glasses of wine per week     Frequency: 4 or more times a week     Drinks per session: 1 or 2     Binge frequency: Never     Comment: daily    Drug use: Never    Sexual activity: Not Currently       Current Outpatient Medications on File Prior to Visit   Medication Sig    Alum Hydroxide-Mag Carbonate (GAVISCON EXTRA STRENGTH PO) Take by mouth    amiodarone 200 mg tablet 1 tab daily    apixaban (ELIQUIS) 5 mg Take 1 tablet (5 mg total) by mouth 2 (two) times a day    diphenhydrAMINE-APAP, sleep, (TYLENOL PM EXTRA STRENGTH PO) Take by mouth as needed     gabapentin (NEURONTIN) 100 mg capsule Take 2 capsules (200 mg total) by mouth daily at bedtime    melatonin 3 mg Take 3 mg by mouth daily at bedtime    pantoprazole (PROTONIX) 40 mg tablet Take 1 tablet (40 mg total) by mouth daily in the early morning    polyethylene glycol (MIRALAX) 17 g packet Take 17 g by mouth daily    Tafamidis (Vyndamax) 61 MG CAPS Take 61 mg by mouth daily    torsemide (DEMADEX) 10 mg tablet Take 1 tablet (10 mg total) by mouth daily    metoprolol succinate (TOPROL-XL) 25 mg 24 hr tablet Take 1 tablet (25 mg total) by mouth daily     No current facility-administered medications on file prior to visit  Allergies   Allergen Reactions    Other Sneezing     Seasonal; pollen; reactions; congestion    Penicillin G Benzathine Rash    Penicillins Rash       Physical Exam    /69   Pulse 90   Resp 16   Ht 6' 4" (1 93 m)   Wt 98 2 kg (216 lb 6 4 oz)   BMI 26 34 kg/m²     Constitutional: normal, well developed, well nourished, alert, in no distress and non-toxic and no overt pain behavior    Eyes: anicteric  HEENT: grossly intact  Neck: supple, symmetric, trachea midline and no masses   Pulmonary:even and unlabored  Cardiovascular:No edema or pitting edema present  Skin:Normal without rashes or lesions and well hydrated  Psychiatric:Mood and affect appropriate  Neurologic:Cranial Nerves II-XII grossly intact  Musculoskeletal:normal     Lumbar Spine Exam    Appearance:  Normal lordosis  Palpation/Tenderness:  no tenderness or spasm  Sensory:  no sensory deficits noted except: Decreased sensation to light touch in bilateral L5 dermatomes and throughout the left lower extremity in multiple dermatomes  Motor Strength:  Left hip flexion:  4/5  Left hip extension:  4/5  Right hip flexion:  4/5  Right hip extension:  4/5  Left knee flexion:  4/5  Left knee extension:  4/5  Right knee flexion:  4/5  Right knee extension:  4/5  Left foot dorsiflexion:  4/5  Left foot plantar flexion:  4/5  Right foot dorsiflexion:  4/5  Right foot plantar flexion:  5/5  Reflexes:  Left Patellar:  absent   Right Patellar:  absent   Left Achilles:  1+   Right Achilles:  1+   Special Tests:    Seated straight leg raise positive bilaterally    DIAGNOSTIC IMAGING AND TEST RESULTS:    CT myelogram 2009:  Multilevel degenerative disc disease and lumbar spondylosis  Moderate central stenosis L4-5 and mild-to-moderate central stenosis at L3-4 from disc bulging ligamentum flavum hypertrophy  Subarticular recess stenosis at L3-4 and L4-5

## 2021-03-31 ENCOUNTER — TELEPHONE (OUTPATIENT)
Dept: CARDIOLOGY CLINIC | Facility: CLINIC | Age: 81
End: 2021-03-31

## 2021-03-31 DIAGNOSIS — I48.20 CHRONIC ATRIAL FIBRILLATION (HCC): ICD-10-CM

## 2021-03-31 NOTE — TELEPHONE ENCOUNTER
Patient should hold Eliquis for 2 days  After that decrease the dosage of Eliquis to 5 mg half tablet twice a day  We were thinking of doing it starting April 20th when he turned [de-identified]  However, given the set of circumstances let us decrease the dosage as directed

## 2021-03-31 NOTE — TELEPHONE ENCOUNTER
Spoke with pt wife she verbally understood  She would like for a prescription for eliquis 2 5mg sent to pharmacy  meds pending

## 2021-03-31 NOTE — TELEPHONE ENCOUNTER
ptn is taking 2 teaspoon of gaviscon at bedtime  Ptn wife states sometimes its working and sometimes it doesn't  Ptn is still having breakthrough episodes but Some nights the medication is working and ptn has no pain  Please advise Dr Noemi Balderas

## 2021-03-31 NOTE — TELEPHONE ENCOUNTER
Pt's wife Yanique Rodriguez called and is requesting a call back from [facility]  Yanique Michael would not go into detail but stated that Mendel Jin her case  Juan Humphries

## 2021-04-05 DIAGNOSIS — M48.062 SPINAL STENOSIS OF LUMBAR REGION WITH NEUROGENIC CLAUDICATION: ICD-10-CM

## 2021-04-05 RX ORDER — GABAPENTIN 100 MG/1
CAPSULE ORAL
Qty: 180 CAPSULE | Refills: 1 | Status: SHIPPED | OUTPATIENT
Start: 2021-04-05 | End: 2021-05-21 | Stop reason: SDUPTHER

## 2021-04-05 RX ORDER — GABAPENTIN 100 MG/1
200 CAPSULE ORAL
Qty: 180 CAPSULE | Refills: 1
Start: 2021-04-05 | End: 2021-05-21

## 2021-04-05 NOTE — TELEPHONE ENCOUNTER
Pt's wife called and lvm on office phone requesting refill for pt for gabapentin (NEURONTIN) 100 mg capsule     As I looked into this medication it looks like it was prescribe by Dr Asia Pradhan      Please send to Gayle on file

## 2021-04-06 ENCOUNTER — REMOTE DEVICE CLINIC VISIT (OUTPATIENT)
Dept: CARDIOLOGY CLINIC | Facility: CLINIC | Age: 81
End: 2021-04-06
Payer: MEDICARE

## 2021-04-06 ENCOUNTER — APPOINTMENT (OUTPATIENT)
Dept: LAB | Facility: CLINIC | Age: 81
End: 2021-04-06
Payer: MEDICARE

## 2021-04-06 DIAGNOSIS — N18.32 STAGE 3B CHRONIC KIDNEY DISEASE (HCC): ICD-10-CM

## 2021-04-06 DIAGNOSIS — Z95.810 PRESENCE OF AUTOMATIC CARDIOVERTER/DEFIBRILLATOR (AICD): Primary | ICD-10-CM

## 2021-04-06 DIAGNOSIS — I50.9 CONGESTIVE HEART FAILURE, UNSPECIFIED HF CHRONICITY, UNSPECIFIED HEART FAILURE TYPE (HCC): ICD-10-CM

## 2021-04-06 LAB
25(OH)D3 SERPL-MCNC: 54.8 NG/ML (ref 30–100)
ALBUMIN SERPL BCP-MCNC: 4.3 G/DL (ref 3.5–5)
ALP SERPL-CCNC: 103 U/L (ref 46–116)
ALT SERPL W P-5'-P-CCNC: 21 U/L (ref 12–78)
ANION GAP SERPL CALCULATED.3IONS-SCNC: 10 MMOL/L (ref 4–13)
AST SERPL W P-5'-P-CCNC: 21 U/L (ref 5–45)
BACTERIA UR QL AUTO: ABNORMAL /HPF
BASOPHILS # BLD AUTO: 0.01 THOUSANDS/ΜL (ref 0–0.1)
BASOPHILS NFR BLD AUTO: 0 % (ref 0–1)
BILIRUB SERPL-MCNC: 2.47 MG/DL (ref 0.2–1)
BILIRUB UR QL STRIP: NEGATIVE
BUN SERPL-MCNC: 29 MG/DL (ref 5–25)
CALCIUM SERPL-MCNC: 9.9 MG/DL (ref 8.3–10.1)
CHLORIDE SERPL-SCNC: 101 MMOL/L (ref 100–108)
CLARITY UR: CLEAR
CO2 SERPL-SCNC: 25 MMOL/L (ref 21–32)
COLOR UR: YELLOW
CREAT SERPL-MCNC: 1.92 MG/DL (ref 0.6–1.3)
CREAT UR-MCNC: 53.2 MG/DL
EOSINOPHIL # BLD AUTO: 0.03 THOUSAND/ΜL (ref 0–0.61)
EOSINOPHIL NFR BLD AUTO: 1 % (ref 0–6)
ERYTHROCYTE [DISTWIDTH] IN BLOOD BY AUTOMATED COUNT: 15.3 % (ref 11.6–15.1)
GFR SERPL CREATININE-BSD FRML MDRD: 32 ML/MIN/1.73SQ M
GLUCOSE SERPL-MCNC: 142 MG/DL (ref 65–140)
GLUCOSE UR STRIP-MCNC: NEGATIVE MG/DL
HCT VFR BLD AUTO: 40.4 % (ref 36.5–49.3)
HGB BLD-MCNC: 12.6 G/DL (ref 12–17)
HGB UR QL STRIP.AUTO: NEGATIVE
IMM GRANULOCYTES # BLD AUTO: 0.02 THOUSAND/UL (ref 0–0.2)
IMM GRANULOCYTES NFR BLD AUTO: 0 % (ref 0–2)
KETONES UR STRIP-MCNC: NEGATIVE MG/DL
LEUKOCYTE ESTERASE UR QL STRIP: NEGATIVE
LYMPHOCYTES # BLD AUTO: 1.35 THOUSANDS/ΜL (ref 0.6–4.47)
LYMPHOCYTES NFR BLD AUTO: 21 % (ref 14–44)
MCH RBC QN AUTO: 32.5 PG (ref 26.8–34.3)
MCHC RBC AUTO-ENTMCNC: 31.2 G/DL (ref 31.4–37.4)
MCV RBC AUTO: 104 FL (ref 82–98)
MICROALBUMIN UR-MCNC: 90.7 MG/L (ref 0–20)
MICROALBUMIN/CREAT 24H UR: 170 MG/G CREATININE (ref 0–30)
MONOCYTES # BLD AUTO: 0.36 THOUSAND/ΜL (ref 0.17–1.22)
MONOCYTES NFR BLD AUTO: 6 % (ref 4–12)
NEUTROPHILS # BLD AUTO: 4.54 THOUSANDS/ΜL (ref 1.85–7.62)
NEUTS SEG NFR BLD AUTO: 72 % (ref 43–75)
NITRITE UR QL STRIP: NEGATIVE
NON-SQ EPI CELLS URNS QL MICRO: ABNORMAL /HPF
NRBC BLD AUTO-RTO: 0 /100 WBCS
PH UR STRIP.AUTO: 6 [PH]
PLATELET # BLD AUTO: 162 THOUSANDS/UL (ref 149–390)
PMV BLD AUTO: 10.7 FL (ref 8.9–12.7)
POTASSIUM SERPL-SCNC: 4.7 MMOL/L (ref 3.5–5.3)
PROT SERPL-MCNC: 7.5 G/DL (ref 6.4–8.2)
PROT UR STRIP-MCNC: NEGATIVE MG/DL
RBC # BLD AUTO: 3.88 MILLION/UL (ref 3.88–5.62)
RBC #/AREA URNS AUTO: ABNORMAL /HPF
SODIUM SERPL-SCNC: 136 MMOL/L (ref 136–145)
SP GR UR STRIP.AUTO: 1.01 (ref 1–1.03)
TSH SERPL DL<=0.05 MIU/L-ACNC: 1.29 UIU/ML (ref 0.36–3.74)
UROBILINOGEN UR QL STRIP.AUTO: 0.2 E.U./DL
WBC # BLD AUTO: 6.31 THOUSAND/UL (ref 4.31–10.16)
WBC #/AREA URNS AUTO: ABNORMAL /HPF

## 2021-04-06 PROCEDURE — 80053 COMPREHEN METABOLIC PANEL: CPT | Performed by: INTERNAL MEDICINE

## 2021-04-06 PROCEDURE — 81001 URINALYSIS AUTO W/SCOPE: CPT

## 2021-04-06 PROCEDURE — 82570 ASSAY OF URINE CREATININE: CPT

## 2021-04-06 PROCEDURE — 85025 COMPLETE CBC W/AUTO DIFF WBC: CPT

## 2021-04-06 PROCEDURE — 93295 DEV INTERROG REMOTE 1/2/MLT: CPT | Performed by: INTERNAL MEDICINE

## 2021-04-06 PROCEDURE — 82306 VITAMIN D 25 HYDROXY: CPT

## 2021-04-06 PROCEDURE — 82043 UR ALBUMIN QUANTITATIVE: CPT

## 2021-04-06 PROCEDURE — 93296 REM INTERROG EVL PM/IDS: CPT | Performed by: INTERNAL MEDICINE

## 2021-04-06 PROCEDURE — 84443 ASSAY THYROID STIM HORMONE: CPT | Performed by: INTERNAL MEDICINE

## 2021-04-06 PROCEDURE — 36415 COLL VENOUS BLD VENIPUNCTURE: CPT

## 2021-04-06 NOTE — PROGRESS NOTES
Results for orders placed or performed in visit on 04/06/21   Cardiac EP device report    Narrative    MDT CRT-D (VVIR 70)  CARELINK TRANSMISSION: BATTERY VOLTAGE ADEQUATE  (13 MONTHS)  BVP 99%  ALL AVAILABLE LEAD PARAMETERS WITHIN NORMAL LIMITS  2 NSVT EPISODES DETECTED 5 BEATS @ 380ms  PATIENT IS ON ELIQUIS AND METOPROLOL SUCC  VENTRICULAR SENSE EPISODES DETECTED  OPTI-VOL FLUID THRESHOLD CROSSED 2/19/21  NO SIGNIFICANT HIGH RATE EPISODES  ---[place]

## 2021-04-07 ENCOUNTER — TELEPHONE (OUTPATIENT)
Dept: CARDIOLOGY CLINIC | Facility: CLINIC | Age: 81
End: 2021-04-07

## 2021-04-07 DIAGNOSIS — N18.9 ACUTE KIDNEY INJURY SUPERIMPOSED ON CHRONIC KIDNEY DISEASE (HCC): Primary | ICD-10-CM

## 2021-04-07 DIAGNOSIS — N17.9 ACUTE KIDNEY INJURY SUPERIMPOSED ON CHRONIC KIDNEY DISEASE (HCC): Primary | ICD-10-CM

## 2021-04-07 NOTE — TELEPHONE ENCOUNTER
Spoke with patient and his wife on the phone  Informed him that the patient's Serum creatinine is elevated from baseline  Patient to hold off on torsemide for 2 days  After that restart and take it every other day  Repeat BMP next week    Patient and wife understood results and directions

## 2021-04-07 NOTE — TELEPHONE ENCOUNTER
Serum creatinine is elevated from baseline  Patient to hold off on torsemide for 2 days  After that restart and take it every other day  Repeat BMP next week  Thank you

## 2021-04-13 ENCOUNTER — TELEPHONE (OUTPATIENT)
Dept: CARDIOLOGY CLINIC | Facility: CLINIC | Age: 81
End: 2021-04-13

## 2021-04-13 DIAGNOSIS — I50.22 CHRONIC SYSTOLIC HEART FAILURE (HCC): Primary | ICD-10-CM

## 2021-04-13 NOTE — TELEPHONE ENCOUNTER
Pt spouse called with monthly weight gain     February from 207- to 209 March from 209 - to 215  April from 215 - to 218

## 2021-04-13 NOTE — TELEPHONE ENCOUNTER
Spoke with patients wife  Barb Osman stated that Pily Yu has never gained 3 lbs in a day or 5 lbs in a week  His weight is constantly fluctuating  She stated that he is occasionally getting SOB on exertion, stated that oxygen is always in the 90's  Has some swelling in his left ankle and looks like he has "pockets of fluid" on his waist     She stated that his torsemide was stopped for a few days, and then recently restarted at 10 mg every other day  She stated that she has not noticed any changes in his symptoms from when he was off the medication until now  Please review and advise, thanks

## 2021-04-15 ENCOUNTER — TELEPHONE (OUTPATIENT)
Dept: CARDIOLOGY CLINIC | Facility: CLINIC | Age: 81
End: 2021-04-15

## 2021-04-15 NOTE — TELEPHONE ENCOUNTER
Pt's wife called and stated that pt has been using a digital scale and just notice that in the manual it states if you have a defibrillator to consult with your doctor if it's ok to use   Please Advise

## 2021-04-19 ENCOUNTER — APPOINTMENT (OUTPATIENT)
Dept: LAB | Facility: CLINIC | Age: 81
End: 2021-04-19
Payer: MEDICARE

## 2021-04-19 LAB
ANION GAP SERPL CALCULATED.3IONS-SCNC: 6 MMOL/L (ref 4–13)
BUN SERPL-MCNC: 26 MG/DL (ref 5–25)
CALCIUM SERPL-MCNC: 9.9 MG/DL (ref 8.3–10.1)
CHLORIDE SERPL-SCNC: 103 MMOL/L (ref 100–108)
CO2 SERPL-SCNC: 28 MMOL/L (ref 21–32)
CREAT SERPL-MCNC: 1.81 MG/DL (ref 0.6–1.3)
GFR SERPL CREATININE-BSD FRML MDRD: 35 ML/MIN/1.73SQ M
GLUCOSE SERPL-MCNC: 99 MG/DL (ref 65–140)
POTASSIUM SERPL-SCNC: 4.7 MMOL/L (ref 3.5–5.3)
SODIUM SERPL-SCNC: 137 MMOL/L (ref 136–145)

## 2021-04-19 PROCEDURE — 80048 BASIC METABOLIC PNL TOTAL CA: CPT | Performed by: INTERNAL MEDICINE

## 2021-04-19 PROCEDURE — 36415 COLL VENOUS BLD VENIPUNCTURE: CPT | Performed by: INTERNAL MEDICINE

## 2021-04-20 ENCOUNTER — TELEPHONE (OUTPATIENT)
Dept: NEPHROLOGY | Facility: CLINIC | Age: 81
End: 2021-04-20

## 2021-04-20 ENCOUNTER — TELEPHONE (OUTPATIENT)
Dept: CARDIOLOGY CLINIC | Facility: CLINIC | Age: 81
End: 2021-04-20

## 2021-04-20 DIAGNOSIS — R60.9 EDEMA, UNSPECIFIED TYPE: Primary | ICD-10-CM

## 2021-04-20 NOTE — TELEPHONE ENCOUNTER
Returned phone call and discussed overall case with patient and his wife today  Reviewed recent blood work  According to patient his weight is around 220 lb  Advised to wear compression stocking during the daytime  Plan to repeat BMP few days before upcoming visit (ordered in [facility])  All the questions were answered      Zack Rebolledo

## 2021-04-20 NOTE — TELEPHONE ENCOUNTER
Patient had blood work done yesterday and would like the results  They would like to speak to Dr Will Lynne      [phone]

## 2021-04-20 NOTE — TELEPHONE ENCOUNTER
Patient's wife Matt Mccray called  She stated the Pham Velez got his blood work done yesterday and would like Dr Marrian Severe to call her back to discuss them  They can be reached at [phone]

## 2021-04-22 ENCOUNTER — TELEPHONE (OUTPATIENT)
Dept: NEPHROLOGY | Facility: CLINIC | Age: 81
End: 2021-04-22

## 2021-04-22 NOTE — TELEPHONE ENCOUNTER
Called and informed that ACE bandages would be fine to use and that Dr Leonardo Albright recommens to wrap both legs from am till pm and to elevate legs at nigh time while in bad  Pt's wife understood and was ok with it

## 2021-04-22 NOTE — TELEPHONE ENCOUNTER
Patient's wife Jess Fisher called  She stated that Dr Lorenzo Smith had recommended support hose for the patient  She stated that she got them as directed, but it is way too painful for him  She wants to know if she can use ace bandages instead  Please give her a call at [phone]

## 2021-04-22 NOTE — TELEPHONE ENCOUNTER
Please advise that Ace bandages would be fine and would recommend wrapping both legs with Ace bandages from morning till night and elevate the leg at nighttime while in the bed

## 2021-04-23 ENCOUNTER — TELEPHONE (OUTPATIENT)
Dept: CARDIOLOGY CLINIC | Facility: CLINIC | Age: 81
End: 2021-04-23

## 2021-04-23 DIAGNOSIS — I50.22 CHRONIC SYSTOLIC HEART FAILURE (HCC): Primary | ICD-10-CM

## 2021-04-23 NOTE — TELEPHONE ENCOUNTER
S/w Lieutenant Milner, she stated that patient has not been feeling well and noticed pockets of fluid above waist band  Per wife, patient's stomach looks swollen and experienced weight increase over night  Dr Brandon Tavarez was contacted, nformed patient to use compression stockings but could not use due to legs having pain  Lieutenant Milner used ACE bandages for patient's legs from the foot to the knee starting this morning  Patient had dinner with children last night and asked about salt intake which Lieutenant Milner stated that they do monitor amounts of salt  Takes Torsemide 10 mg daily and has been feeling depressed, as well as anxious  Patient does not want to go to the ER

## 2021-04-23 NOTE — TELEPHONE ENCOUNTER
Pt's wife called and stated that pt has gained 2 pounds over night  Pt was at 221 and today pt is 223  Pt has not been feeling well for the a couple of weeks now   Call transferred to [facility]

## 2021-04-23 NOTE — TELEPHONE ENCOUNTER
Patient to go on torsemide 20 mg daily today tomorrow and Sunday  Back on torsemide 10 mg daily starting Monday  He may be scheduled for blood work next week  if not he needs a repeat BMP next week

## 2021-04-26 ENCOUNTER — APPOINTMENT (OUTPATIENT)
Dept: LAB | Facility: CLINIC | Age: 81
End: 2021-04-26
Payer: MEDICARE

## 2021-04-26 LAB
ANION GAP SERPL CALCULATED.3IONS-SCNC: 6 MMOL/L (ref 4–13)
BUN SERPL-MCNC: 31 MG/DL (ref 5–25)
CALCIUM SERPL-MCNC: 10 MG/DL (ref 8.3–10.1)
CHLORIDE SERPL-SCNC: 104 MMOL/L (ref 100–108)
CO2 SERPL-SCNC: 29 MMOL/L (ref 21–32)
CREAT SERPL-MCNC: 1.99 MG/DL (ref 0.6–1.3)
GFR SERPL CREATININE-BSD FRML MDRD: 31 ML/MIN/1.73SQ M
GLUCOSE P FAST SERPL-MCNC: 101 MG/DL (ref 65–99)
POTASSIUM SERPL-SCNC: 4.6 MMOL/L (ref 3.5–5.3)
SODIUM SERPL-SCNC: 139 MMOL/L (ref 136–145)

## 2021-04-26 PROCEDURE — 80048 BASIC METABOLIC PNL TOTAL CA: CPT

## 2021-04-26 PROCEDURE — 36415 COLL VENOUS BLD VENIPUNCTURE: CPT

## 2021-04-27 ENCOUNTER — TELEPHONE (OUTPATIENT)
Dept: CARDIOLOGY CLINIC | Facility: CLINIC | Age: 81
End: 2021-04-27

## 2021-04-27 NOTE — TELEPHONE ENCOUNTER
----- Message from Mandi Hernández MD sent at 4/26/2021 10:31 PM EDT -----  Please call the patient  Blood work shows potassium to be normal at 4 6  Creatinine abnormal but at his baseline  Creatinine 1 99  Continue present medications

## 2021-04-28 DIAGNOSIS — R06.02 SHORTNESS OF BREATH: Primary | ICD-10-CM

## 2021-04-28 NOTE — TELEPHONE ENCOUNTER
S/w Prudence Short, she stated that patient's current weight is 222 lbs and still has the pockets of fluid on his sides  Patient also has an occasional cough and still feels weak  Per wife patient has been sleeping more than usual  Wife stated that after speaking with a friend (whose father was in a similar situation) went to [facility] for a treatment to remove fluid  Prudence Noni would like to know if that is an option for patient or what the next steps can be for patient to be comfortable

## 2021-04-28 NOTE — TELEPHONE ENCOUNTER
Patient to have chest x-ray  Order in the chart  Patient does have an appointment next week  Will discuss further  In case  He has worsening symptoms, he should go to the emergency room

## 2021-04-28 NOTE — TELEPHONE ENCOUNTER
S/w [facility] and verbally understood  Central scheduling number provided

## 2021-04-29 ENCOUNTER — HOSPITAL ENCOUNTER (OUTPATIENT)
Dept: RADIOLOGY | Facility: HOSPITAL | Age: 81
Discharge: HOME | End: 2021-04-29
Attending: INTERNAL MEDICINE
Payer: MEDICARE

## 2021-04-29 DIAGNOSIS — R06.02 SHORTNESS OF BREATH: ICD-10-CM

## 2021-04-29 PROCEDURE — 71046 X-RAY EXAM CHEST 2 VIEWS: CPT

## 2021-05-03 ENCOUNTER — OFFICE VISIT (OUTPATIENT)
Dept: CARDIOLOGY CLINIC | Facility: CLINIC | Age: 81
End: 2021-05-03
Payer: MEDICARE

## 2021-05-03 VITALS
WEIGHT: 225.8 LBS | BODY MASS INDEX: 27.5 KG/M2 | DIASTOLIC BLOOD PRESSURE: 78 MMHG | SYSTOLIC BLOOD PRESSURE: 110 MMHG | OXYGEN SATURATION: 96 % | HEART RATE: 76 BPM | HEIGHT: 76 IN

## 2021-05-03 DIAGNOSIS — E85.4 CARDIAC AMYLOIDOSIS (HCC): ICD-10-CM

## 2021-05-03 DIAGNOSIS — I43 CARDIAC AMYLOIDOSIS (HCC): ICD-10-CM

## 2021-05-03 DIAGNOSIS — I48.20 CHRONIC ATRIAL FIBRILLATION (HCC): ICD-10-CM

## 2021-05-03 DIAGNOSIS — Z79.01 CHRONIC ANTICOAGULATION: ICD-10-CM

## 2021-05-03 DIAGNOSIS — I42.0 DILATED CARDIOMYOPATHY (HCC): ICD-10-CM

## 2021-05-03 DIAGNOSIS — I50.22 CHRONIC SYSTOLIC HEART FAILURE (HCC): Primary | ICD-10-CM

## 2021-05-03 DIAGNOSIS — I50.23 ACUTE ON CHRONIC SYSTOLIC HEART FAILURE (HCC): ICD-10-CM

## 2021-05-03 PROCEDURE — 99214 OFFICE O/P EST MOD 30 MIN: CPT | Performed by: INTERNAL MEDICINE

## 2021-05-03 RX ORDER — TORSEMIDE 10 MG/1
TABLET ORAL
Qty: 90 TABLET | Refills: 3
Start: 2021-05-03 | End: 2021-05-17 | Stop reason: SDUPTHER

## 2021-05-03 NOTE — PROGRESS NOTES
[facility]  [address] [place] [id number]  Cardiology Follow Up    Sasha Law  1940  [id number]      1  Chronic systolic heart failure (HCC)  Basic metabolic panel   2  Atrial fibrillation     3  Chronic anticoagulation     4  Dilated cardiomyopathy ([address] )  Basic metabolic panel   5  Cardiac amyloidosis ([address] )     6  Acute on chronic systolic heart failure (HCC)  torsemide (DEMADEX) 10 mg tablet       Chief Complaint   Patient presents with    Follow-up     2 month follow up       Interval History:   Patient presents for follow-up visit  Patient has nonischemic cardiomyopathy and chronic systolic heart failure status post CRT D  Patient also has history of atrial fibrillation  Patient was diagnosed with cardiac amyloidosis and has been on Tafamidis  Patient has not had a  Follow-up with heart failure cardiology  Patient denies any bleeding issues  Patient has fatigue and shortness of breath with minimal exertion  Weight is up approximately 4 to 6 lb from baseline  Patient does have some leg edema  Patient also has chronic back issues  No history of ICD discharges      Patient Active Problem List   Diagnosis    Cardiomyopathy [facility]    Chronic systolic heart failure (HCC)    Atrial fibrillation    Chronic anticoagulation    Dilation of thoracic aorta (HCC)    Hyperlipidemia    Essential hypertension    Presence of automatic cardioverter/defibrillator (AICD)    Spinal stenosis of lumbar region with neurogenic claudication    Biventricular congestive heart failure ([address] )    Nonsustained ventricular tachycardia    Stage 3 chronic kidney disease ([address] )    AAA (abdominal aortic aneurysm) (HCC)    Enlarged prostate    Kidney stone     Past Medical History:   Diagnosis Date    Anticoagulant long-term use     Atrial fibrillation ([address] )     Cardiac defibrillator in situ     Cardiomyopathy ([facility] )     Colon polyp     Congestive heart failure (CHF) ([facility] )     DJD (degenerative joint disease)     HL (hearing loss)     Hyperlipidemia     Hypertension     Shortness of breath     Sick sinus syndrome ([facility] )     Spinal stenosis      Social History     Socioeconomic History    Marital status: /Civil Union     Spouse name: Not on file    Number of children: Not on file    Years of education: Not on file    Highest education level: Not on file   Occupational History    Not on file   Social Needs    Financial resource strain: Not on file    Food insecurity     Worry: Not on file     Inability: Not on file   [employer] needs     Medical: Not on file     Non-medical: Not on file   Tobacco Use    Smoking status: Former Smoker     Packs/day: 0 50     Years: 3 00     Pack years: 1 50     Types: Cigarettes, Cigars     Quit date: 1968     Years since quittin 0    Smokeless tobacco: Never Used   Substance and Sexual Activity    Alcohol use:  Yes     Alcohol/week: 2 0 standard drinks     Types: 2 Glasses of wine per week     Frequency: 4 or more times a week     Drinks per session: 1 or 2     Binge frequency: Never     Comment: daily    Drug use: Never    Sexual activity: Not Currently   Lifestyle    Physical activity     Days per week: 0 days     Minutes per session: 0 min    Stress: Very much   Relationships    Social connections     Talks on phone: Not on file     Gets together: Not on file     Attends Anabaptist service: Not on file     Active member of club or organization: Not on file     Attends meetings of clubs or organizations: Not on file     Relationship status: Not on file    Intimate partner violence     Fear of current or ex partner: Not on file     Emotionally abused: Not on file     Physically abused: Not on file     Forced sexual activity: Not on file   Other Topics Concern    Not on file   Social History Narrative    Active advance directive    Caffeine use      Family History   Problem Relation Age of Onset    Coronary artery disease Mother     Hypertension Son      Past Surgical History:   Procedure Laterality Date    CARDIAC DEFIBRILLATOR PLACEMENT      FL RETROGRADE PYELOGRAM  11/3/2020    INSERT / REPLACE / REMOVE PACEMAKER      KNEE SURGERY Left     MENISCECTOMY      in [facility] had this    NV COLONOSCOPY FLX DX W/COLLJ SPEC WHEN PFRMD N/A 6/5/2017    Procedure: COLONOSCOPY;  Surgeon: Martha Modi MD;  Location: [facility];   Service: Gastroenterology    NV CYSTO/URETERO W/LITHOTRIPSY &INDWELL STENT INSRT Left 11/3/2020    Procedure: CYSTOSCOPY URETEROSCOPY WITH LITHOTRIPSY HOLMIUM LASER, RETROGRADE PYELOGRAM AND INSERTION STENT URETERAL;  Surgeon: Jil Recinos MD;  Location: [facility];  Service: Urology    NV CYSTOURETHROSCOPY,URETER CATHETER Left 9/29/2020    Procedure: CYSTOSCOPY RETROGRADE PYELOGRAM WITH INSERTION STENT URETERAL;  Surgeon: Dima Christianson MD;  Location: [facility];  Service: Urology    TONSILLECTOMY      TRANSURETHRAL RESECTION OF PROSTATE         Current Outpatient Medications:     Alum Hydroxide-Mag Carbonate (GAVISCON EXTRA STRENGTH PO), Take by mouth, Disp: , Rfl:     amiodarone 200 mg tablet, 1 tab daily, Disp: 30 tablet, Rfl: 11    apixaban (ELIQUIS) 2 5 mg, Take 1 tablet (2 5 mg total) by mouth 2 (two) times a day, Disp: 60 tablet, Rfl: 3    diphenhydrAMINE-APAP, sleep, (TYLENOL PM EXTRA STRENGTH PO), Take by mouth as needed , Disp: , Rfl:     gabapentin (NEURONTIN) 100 mg capsule, Take 2 capsules (200 mg total) by mouth daily at bedtime, Disp: 180 capsule, Rfl: 1    melatonin 3 mg, Take 3 mg by mouth daily at bedtime, Disp: , Rfl:     metoprolol succinate (TOPROL-XL) 25 mg 24 hr tablet, Take 1 tablet (25 mg total) by mouth daily, Disp: 90 tablet, Rfl: 3    pantoprazole (PROTONIX) 40 mg tablet, Take 1 tablet (40 mg total) by mouth daily in the early morning, Disp: 90 tablet, Rfl: 3    polyethylene glycol (MIRALAX) 17 g packet, Take 17 g by mouth daily, Disp: 30 each, Rfl: 0    Tafamidis (Vyndamax) 61 MG CAPS, Take 61 mg by mouth daily, Disp: 30 capsule, Rfl: 11    torsemide (DEMADEX) 10 mg tablet, 1 and 2 tabs on alternate days, Disp: 90 tablet, Rfl: 3    gabapentin (NEURONTIN) 100 mg capsule, TAKE TWO CAPSULES BY MOUTH DAILY  (Patient not taking: Reported on 5/3/2021), Disp: 180 capsule, Rfl: 1  Allergies   Allergen Reactions    Other Sneezing     Seasonal; pollen; reactions; congestion    Penicillin G Benzathine Rash    Penicillins Rash       Labs:  Telephone on 04/23/2021   Component Date Value    Sodium 04/26/2021 139     Potassium 04/26/2021 4 6     Chloride 04/26/2021 104     CO2 04/26/2021 29     ANION GAP 04/26/2021 6     BUN 04/26/2021 31*    Creatinine 04/26/2021 1 99*    Glucose, Fasting 04/26/2021 101*    Calcium 04/26/2021 10 0     eGFR 04/26/2021 31    Telephone on 04/07/2021   Component Date Value    Sodium 04/19/2021 137     Potassium 04/19/2021 4 7     Chloride 04/19/2021 103     CO2 04/19/2021 28     ANION GAP 04/19/2021 6     BUN 04/19/2021 26*    Creatinine 04/19/2021 1 81*    Glucose 04/19/2021 99     Calcium 04/19/2021 9 9     eGFR 04/19/2021 35    Appointment on 04/06/2021   Component Date Value    Vit D, 25-Hydroxy 04/06/2021 54 8    Office Visit on 03/10/2021   Component Date Value    LEUKOCYTE ESTERASE,UA 03/10/2021 moderate     NITRITE,UA 03/10/2021 -     [facility] POCT UROBILINOGEN 03/10/2021 0 2     POCT URINE PROTEIN 03/10/2021 -      PH,UA 03/10/2021 6 5     BLOOD,UA 03/10/2021 ,moderate     SPECIFIC GRAVITY,UA 03/10/2021 1 010     KETONES,UA 03/10/2021 -     BILIRUBIN,UA 03/10/2021 -     GLUCOSE, UA 03/10/2021 -      COLOR,UA 03/10/2021 yellow     CLARITY,UA 03/10/2021 clear      Imaging: No results found      Review of Systems:  Review of Systems     REVIEW OF SYSTEMS:  Constitutional:  Denies fever or chills   Eyes:  Denies change in visual acuity   HENT:  Denies nasal congestion or sore throat Respiratory:   shortness of breath   Cardiovascular:  Denies chest pain or edema   GI:  Denies abdominal pain, nausea, vomiting, bloody stools or diarrhea   :  Denies dysuria, frequency, difficulty in micturition and nocturia  Musculoskeletal:  DJD  Neurologic:  Denies headache, focal weakness or sensory changes   Endocrine:  Denies polyuria or polydipsia   Lymphatic:  Denies swollen glands   Psychiatric:  Denies depression or anxiety     Physical Exam:    /78 (BP Location: Left arm, Patient Position: Sitting, Cuff Size: Standard)   Pulse 76   Ht 6' 4" (1 93 m)   Wt 102 kg (225 lb 12 8 oz)   SpO2 96%   BMI 27 49 kg/m²     Physical Exam   PHYSICAL EXAM:  General:  Patient is not in acute distress   Head: Normocephalic, Atraumatic  HEENT:  Both pupils normal-size atraumatic, normocephalic, nonicteric  Neck:  JVP not raised  Trachea central  No carotid bruit  Respiratory:   decreased breath sounds bilateral  Cardiovascular:   Irregularly irregular  GI:  Abdomen soft nontender  No organomegaly  Lymphatic:  No cervical or inguinal lymphadenopathy  Neurologic:  Patient is awake alert, oriented   Grossly nonfocal    Extremities 1+ edema       ICD interrogation data reviewed  Normally functioning device  No ICD discharges  Underlying atrial fibrillation  Discussion/Summary:    Patient with multiple medical problems who seems to be doing reasonably well from cardiac standpoint  Previous studies reviewed with patient  Medications reviewed and possible side effects discussed  concepts of cardiovascular disease , signs and symptoms of heart disease  Dietary and risk factor modification reinforced  All questions answered  Safety measures reviewed  Patient advised to report any problems prompting medical attention  Given weight gain and lower extremity edema, patient will take torsemide 10 mg and 20 mg on alternate days  Continue to follow renal functions    Patient does have CKD stage 4 followed by Nephrology  Patient's symptoms are predominantly secondary to cardiorenal syndrome  Patient has been encouraged to follow up with  Heart failure Clinic at least virtually since he is on Vyndamax  follow-up in 3 months or earlier as needed  Follow-up with primary care physician

## 2021-05-10 ENCOUNTER — TELEPHONE (OUTPATIENT)
Dept: CARDIOLOGY CLINIC | Facility: CLINIC | Age: 81
End: 2021-05-10

## 2021-05-10 ENCOUNTER — TELEPHONE (OUTPATIENT)
Dept: GASTROENTEROLOGY | Facility: CLINIC | Age: 81
End: 2021-05-10

## 2021-05-10 DIAGNOSIS — R10.13 EPIGASTRIC PAIN: Primary | ICD-10-CM

## 2021-05-10 RX ORDER — FAMOTIDINE 20 MG/1
20 TABLET, FILM COATED ORAL
Qty: 30 TABLET | Refills: 0 | Status: SHIPPED | OUTPATIENT
Start: 2021-05-10 | End: 2021-06-08 | Stop reason: SDUPTHER

## 2021-05-10 NOTE — TELEPHONE ENCOUNTER
Spoke with patient  History of epigastric pain    Patient c/o upper abdominal gas/bloating  He is taking pantoprazole 40mg daily  He stopped the gaviscon  Feels it was ineffective  Never started pepcid BID  Taking miralax daily and passing 2-3 small soft formed BM daily  Denies nausea, vomiting, fever, chills  Patient is retaining fluid  He is in heart and kidney failure  +SOB, and +Weakness  Any suggestions?

## 2021-05-10 NOTE — TELEPHONE ENCOUNTER
Recommend to add Pepcid 20mg at bedtime x 1 month  Sent to pharmacy  Also, if continued epigastric pain, I would want to further evaluate with a gastric emptying study to make sure we are know missing gastroparesis - order in  Thanks!

## 2021-05-10 NOTE — TELEPHONE ENCOUNTER
pts wife called and wants to know if Dr Bel Delvalle is ok with pt using Voltaren topical creme for his spinal stenosis   Please advise

## 2021-05-11 NOTE — TELEPHONE ENCOUNTER
Spoke to patient's wife and we discussed the GES  She started him on the Pepcid last night and it seems to be helping    She would like to try the Pepcid first and hold off on the GES but will schedule it if symptoms persist with the Pepcid trial

## 2021-05-12 ENCOUNTER — APPOINTMENT (OUTPATIENT)
Dept: LAB | Facility: CLINIC | Age: 81
End: 2021-05-12
Payer: MEDICARE

## 2021-05-12 DIAGNOSIS — R60.9 EDEMA, UNSPECIFIED TYPE: ICD-10-CM

## 2021-05-12 LAB
ANION GAP SERPL CALCULATED.3IONS-SCNC: 3 MMOL/L (ref 4–13)
BUN SERPL-MCNC: 31 MG/DL (ref 5–25)
CALCIUM SERPL-MCNC: 10.1 MG/DL (ref 8.3–10.1)
CHLORIDE SERPL-SCNC: 104 MMOL/L (ref 100–108)
CO2 SERPL-SCNC: 32 MMOL/L (ref 21–32)
CREAT SERPL-MCNC: 1.8 MG/DL (ref 0.6–1.3)
GFR SERPL CREATININE-BSD FRML MDRD: 35 ML/MIN/1.73SQ M
GLUCOSE SERPL-MCNC: 113 MG/DL (ref 65–140)
POTASSIUM SERPL-SCNC: 4.4 MMOL/L (ref 3.5–5.3)
SODIUM SERPL-SCNC: 139 MMOL/L (ref 136–145)

## 2021-05-12 PROCEDURE — 36415 COLL VENOUS BLD VENIPUNCTURE: CPT | Performed by: INTERNAL MEDICINE

## 2021-05-12 PROCEDURE — 80048 BASIC METABOLIC PNL TOTAL CA: CPT | Performed by: INTERNAL MEDICINE

## 2021-05-13 ENCOUNTER — TELEPHONE (OUTPATIENT)
Dept: CARDIOLOGY CLINIC | Facility: CLINIC | Age: 81
End: 2021-05-13

## 2021-05-13 DIAGNOSIS — R18.8 OTHER ASCITES: Primary | ICD-10-CM

## 2021-05-13 NOTE — TELEPHONE ENCOUNTER
Pt wife  Wants to know if the pt should continue to alternate lasix 10 mg and 20 mg   Pt still having swelling in the belly, hips from his knees to his feet  Pt is having gastric distress also  Dr Jaime Bailon was notified pt was given pepcid at night time  Pt wife wants to know if the pt should go to the hospital to get drained  Pt complaining about weakness   Pt gets sob walking around the his home

## 2021-05-13 NOTE — TELEPHONE ENCOUNTER
Continue the present dosage of diuretics  I have ordered abdominal ultrasound to assess for ascites  If there is significant fluid,  Will consider paracentesis

## 2021-05-13 NOTE — TELEPHONE ENCOUNTER
Spoke with the pt and his wife they verbally understood to remain on the current diuretics and to have abdominal ultrasound done  They both understood

## 2021-05-17 DIAGNOSIS — I50.23 ACUTE ON CHRONIC SYSTOLIC HEART FAILURE (HCC): ICD-10-CM

## 2021-05-17 DIAGNOSIS — I48.20 CHRONIC ATRIAL FIBRILLATION (HCC): ICD-10-CM

## 2021-05-17 RX ORDER — TORSEMIDE 10 MG/1
TABLET ORAL
Qty: 135 TABLET | Refills: 3 | Status: SHIPPED | OUTPATIENT
Start: 2021-05-17 | End: 2021-06-28 | Stop reason: SDUPTHER

## 2021-05-17 NOTE — TELEPHONE ENCOUNTER
Pt's wife called to request refill for pt for the following medications  Yemi Ingles apixaban (ELIQUIS) 2 5 mg  torsemide (DEMADEX) 10 mg tablet      Please send to pharmacy on file for a 90 day supply

## 2021-05-18 ENCOUNTER — HOSPITAL ENCOUNTER (OUTPATIENT)
Dept: ULTRASOUND IMAGING | Facility: CLINIC | Age: 81
Discharge: HOME | End: 2021-05-18
Payer: MEDICARE

## 2021-05-18 ENCOUNTER — TELEPHONE (OUTPATIENT)
Dept: CARDIOLOGY CLINIC | Facility: CLINIC | Age: 81
End: 2021-05-18

## 2021-05-18 DIAGNOSIS — R18.8 OTHER ASCITES: ICD-10-CM

## 2021-05-18 PROCEDURE — 76700 US EXAM ABDOM COMPLETE: CPT

## 2021-05-18 NOTE — TELEPHONE ENCOUNTER
----- Message from Jasmeet Perry MD sent at 5/18/2021  1:29 PM EDT -----  Please call the wife  Abdominal ultrasound shows small collection of ascites and small bilateral pleural effusion  This is related to his congestive heart failure  But not enough to remove by drainage

## 2021-05-18 NOTE — TELEPHONE ENCOUNTER
[facility] called & said the pt just had an U/S & there are abnormal findings; I was asked to task it & I tiger texted Dr Jon Chadwick

## 2021-05-19 ENCOUNTER — OFFICE VISIT (OUTPATIENT)
Dept: NEPHROLOGY | Facility: CLINIC | Age: 81
End: 2021-05-19
Payer: MEDICARE

## 2021-05-19 VITALS
HEIGHT: 74 IN | TEMPERATURE: 97.1 F | RESPIRATION RATE: 16 BRPM | SYSTOLIC BLOOD PRESSURE: 112 MMHG | HEART RATE: 79 BPM | WEIGHT: 216.8 LBS | BODY MASS INDEX: 27.82 KG/M2 | DIASTOLIC BLOOD PRESSURE: 74 MMHG

## 2021-05-19 DIAGNOSIS — R80.8 OTHER PROTEINURIA: ICD-10-CM

## 2021-05-19 DIAGNOSIS — N18.32 STAGE 3B CHRONIC KIDNEY DISEASE (HCC): Primary | ICD-10-CM

## 2021-05-19 PROCEDURE — 99213 OFFICE O/P EST LOW 20 MIN: CPT | Performed by: INTERNAL MEDICINE

## 2021-05-19 NOTE — PROGRESS NOTES
NEPHROLOGY OFFICE FOLLOW UP  Elder Lopez [de-identified] y o  male Date of Birth: @ MRN: [id number]    Encounter: [id number] DATE: 5/19/2021    REASON FOR VISIT: Elder Lopez is a [de-identified] y o  male who is here on 5/19/2021 for Follow-up and Hyponatremia      HPI:    This is 28-year-old male with past medical history significant for AFib,  Cardiomyopathy, hypertension, hyperlipidemia, Kidney stone, returns to Nephrology office for further management of CKD stage 3   Upon review of old medical records, baseline creatinine seems to be fluctuating around 1 8-1 9   In the past patient was found to have obstructive uropathy with left hydronephrosis and had left ureteral stent placed  Now patient is been followed by [place] [address] urologist    Renal ultrasound done on 2/3/2021 showed no hydronephrosis   Patient also have underlying cardiomyopathy and also currently been followed by cardiologist at [place] [address]  Currently patient is on torsemide 10 mg alternating with 20 mg   According to patient his slowly losing weight with current weight of 216 lb   Patient also complained about abdominal discomfort and now been followed by GI with [facility]   Scheduled to undergo gastric emptying study in next few weeks   Patient's wife-Brenda was also present at the time of consultation and history was also obtained from her and all the questions were answered  Patient also have a AFib and currently on Eliquis  Patient takes all the medications as prescribed and denies use of NSAIDs   REVIEW OF SYSTEMS:    Review of Systems   Constitutional: Negative for chills and fever  HENT: Negative for nosebleeds and sore throat  Eyes: Negative for photophobia and pain  Respiratory: Negative for choking and wheezing  Cardiovascular: Negative for chest pain and palpitations  Gastrointestinal: Negative for abdominal pain and blood in stool  Endocrine: Negative for heat intolerance     Genitourinary: Negative for flank pain and hematuria  Musculoskeletal: Negative for joint swelling and neck pain  Skin: Negative for color change and pallor  Neurological: Negative for seizures and syncope  Psychiatric/Behavioral: Negative for confusion and suicidal ideas  PAST MEDICAL HISTORY:  Past Medical History:   Diagnosis Date    Anticoagulant long-term use     Atrial [facility] [address]     Cardiac defibrillator in situ     Cardiomyopathy (HCC)     Colon polyp     Congestive heart failure (CHF) (HCC)     DJD (degenerative joint disease)     HL (hearing loss)     Hyperlipidemia     Hypertension     Shortness of breath     Sick sinus syndrome ([facility] [address] )     Spinal stenosis        PAST SURGICAL HISTORY:  Past Surgical History:   Procedure Laterality Date    CARDIAC DEFIBRILLATOR PLACEMENT      FL RETROGRADE PYELOGRAM  11/3/2020    INSERT / REPLACE / REMOVE PACEMAKER      KNEE SURGERY Left     M[facility]ISCECTOMY      in [facility] had this    OR COLONOSCOPY FLX DX W/COLLJ SPEC WHEN PFRMD N/A 6/5/2017    Procedure: COLONOSCOPY;  Surgeon: Victoriano Martinez MD;  [facility];   Service: Gastroenterology    OR CYSTO/URETERO W/LITHOTRIPSY &INDWELL STENT INSRT Left 11/3/2020    Procedure: CYSTOSCOPY URETEROSCOPY WITH LITHOTRIPSY HOLMIUM LASER, RETROGRADE PYELOGRAM AND INSERTION STENT URETERAL;  Surgeon: Verdell Gilford, MD;  [facility];  Service: Urology    OR CYSTOURETHROSCOPY,URETER CATHETER Left 9/29/2020    Procedure: CYSTOSCOPY RETROGRADE PYELOGRAM WITH INSERTION STENT URETERAL;  Surgeon: Elliot Nava MD;  [facility];  Service: Urology    TONSILLECTOMY      TRANSURETHRAL RESECTION OF PROSTATE         SOCIAL HISTORY:  Social History     Substance and Sexual Activity   Alcohol Use Yes    Alcohol/week: 2 0 standard drinks    Types: 2 Glasses of wine per week    Frequency: 4 or more times a week    Drinks per session: 1 or 2    Binge frequency: Never    Comment: daily     Social History Substance and Sexual Activity   Drug Use Never     Social History     Tobacco Use   Smoking Status Former Smoker    Packs/day: 0 50    Years: 3 00    Pack years: 1 50    Types: Cigarettes, Cigars    Quit date: 1968    Years since quittin 1   Smokeless Tobacco Never Used       FAMILY HISTORY:  Family History   Problem Relation Age of Onset    Coronary artery disease Mother     Hypertension Son        ALLERGY:  Allergies   Allergen Reactions    Other Sneezing     Seasonal; pollen; reactions; congestion    Penicillin G Benzathine Rash    Penicillins Rash       MEDICATIONS:    Current Outpatient Medications:     amiodarone 200 mg tablet, 1 tab daily, Disp: 30 tablet, Rfl: 11    apixaban (ELIQUIS) 2 5 mg, Take 1 tablet (2 5 mg total) by mouth 2 (two) times a day, Disp: 180 tablet, Rfl: 3    diphenhydrAMINE-APAP, sleep, (TYLENOL PM EXTRA STRENGTH PO), Take by mouth as needed , Disp: , Rfl:     famotidine (PEPCID) 20 mg tablet, Take 1 tablet (20 mg total) by mouth daily at bedtime, Disp: 30 tablet, Rfl: 0    gabapentin (NEURONTIN) 100 mg capsule, Take 2 capsules (200 mg total) by mouth daily at bedtime, Disp: 180 capsule, Rfl: 1    melatonin 3 mg, Take 3 mg by mouth daily at bedtime, Disp: , Rfl:     metoprolol succinate (TOPROL-XL) 25 mg 24 hr tablet, Take 1 tablet (25 mg total) by mouth daily, Disp: 90 tablet, Rfl: 3    pantoprazole (PROTONIX) 40 mg tablet, Take 1 tablet (40 mg total) by mouth daily in the early morning, Disp: 90 tablet, Rfl: 3    polyethylene glycol (MIRALAX) 17 g packet, Take 17 g by mouth daily, Disp: 30 each, Rfl: 0    Tafamidis (Vyndamax) 61 MG CAPS, Take 61 mg by mouth daily, Disp: 30 capsule, Rfl: 11    torsemide (DEMADEX) 10 mg tablet, 1 and 2 tabs on alternate days, Disp: 135 tablet, Rfl: 3    Alum Hydroxide-Mag Carbonate (GAVISCON EXTRA STRENGTH PO), Take by mouth, Disp: , Rfl:     gabapentin (NEURONTIN) 100 mg capsule, TAKE TWO CAPSULES BY MOUTH DAILY (Patient not taking: Reported on 5/3/2021), Disp: 180 capsule, Rfl: 1    PHYSICAL EXAM:  Vitals:    05/19/21 1550   BP: 112/74   BP Location: Left arm   Patient Position: Sitting   Cuff Size: Standard   Pulse: 79   Resp: 16   Temp: (!) 97 1 °F (36 2 °C)   TempSrc: Temporal   Weight: 98 3 kg (216 lb 12 8 oz)   Height: 6' 1 5" (1 867 m)     Body mass index is 28 22 kg/m²  Physical Exam  Constitutional:       General: He is not in acute distress  HENT:      Head: Normocephalic and atraumatic  Eyes:      General: No scleral icterus  Conjunctiva/sclera: Conjunctivae normal    Neck:      Musculoskeletal: Neck supple  Vascular: No JVD  Cardiovascular:      Rate and Rhythm: Normal rate  Heart sounds: S1 normal and S2 normal    Pulmonary:      Effort: No accessory muscle usage or respiratory distress  Breath sounds: No wheezing  Abdominal:      General: There is distension  Palpations: Abdomen is soft  Tenderness: There is no abdominal tenderness  Musculoskeletal:      Right ankle: He exhibits swelling  Left ankle: He exhibits swelling  Comments: Moving all extremities   Skin:     General: Skin is warm  Comments: No Jaundice   Neurological:      Mental Status: He is alert  He is not disoriented  Comments: Facial symmetry  Speech is clear   Psychiatric:         Speech: He is communicative  Behavior: Behavior is not combative  LAB RESULTS:  Results for orders placed or performed in visit on 04/99/26   Basic metabolic panel   Result Value Ref Range    Sodium 139 136 - 145 mmol/L    Potassium 4 4 3 5 - 5 3 mmol/L    Chloride 104 100 - 108 mmol/L    CO2 32 21 - 32 mmol/L    ANION GAP 3 (L) 4 - 13 mmol/L    BUN 31 (H) 5 - 25 mg/dL    Creatinine 1 80 (H) 0 60 - 1 30 mg/dL    Glucose 113 65 - 140 mg/dL    Calcium 10 1 8 3 - 10 1 mg/dL    eGFR 35 ml/min/1 73sq m       ASSESSMENT and PLAN:  Fatmata Golden was seen today for follow-up and hyponatremia      Diagnoses and all orders for this visit:    Stage 3b chronic kidney disease ([facility] [address] )  -     CBC and differential; Future  -     Basic metabolic panel; Future  -     Urinalysis with microscopic; Future  -     Microalbumin / creatinine urine ratio; Future  -     Phosphorus; Future  -     Uric acid; Future  -     PTH, intact; Future  -     Vitamin D 25 hydroxy; Future    Other proteinuria  -     Urinalysis with microscopic; Future  -     Microalbumin / creatinine urine ratio; Future      1  CKD stage 3   Multifactorial, suspected due to underlying cardiorenal syndrome on top of hypertensive nephrosclerosis and advanced age   Patient was also earlier found to have obstructive uropathy with left hydronephrosis and had ureteral stent placed and also been followed by urologist  Renal ultrasound done on 2/3/2021 showed no hydronephrosis   Upon review of medical records, new baseline creatinine seems to be fluctuating around 1 8-1 9 with current creatinine of 1 8 with EGFR of 35   Patient has underlying cardiomyopathy and also been followed by cardiologist at [place] [address]  Now taking torsemide  10 mg alternating with 20 mg on daily basis  Current weight is 216 lb and according to patient he has been slowly losing weight   Defer further management of diuretics to Cardiology team      Plan to avoid NSAIDs and recheck renal function before next visit   2  Hypertension in chronic kidney disease   Currently blood pressure is under well control and monitor hypertension with metoprolol XL 25 mg PO daily  Also advised to follow low-salt diet   3  Proteinuria  Multifactorial with current urine microalbumin : creatinine ratio of 170 mg   Monitor proteinuria without ACE-I/ ARB   Last known urine analysis showed no dysuria or hematuria   Returns to Nephrology office in 4 months   Future labs ordered   Labs ( reviewed on 9/9/2021 )   Creatinine 1 35 with EGFR of 49  Hemoglobin 10 5 -> monitor    Urine analysis showed no dysuria  Urine microalbumin creatinine ratio of 72 mg  PTH 98 -> monitor  Normal vitamin- D ( 63 ), uric acid ( 5 9 ), sodium, potassium, bicarb, calcium and phosphorus   Portions of the record may have been created with voice recognition software  Occasional wrong word or "sound a like" substitutions may have occurred due to the inherent limitations of voice recognition software  Read the chart carefully and recognize, using context, where substitutions have occurred  If you have any questions, please contact the dictating provider

## 2021-05-19 NOTE — LETTER
May 19, 2021     [facility], DO  [address] [address]    Patient: Bimal Pina   YOB: 1940   Date of Visit: 5/19/2021       Dear Dr Dionisio Washburn:    Thank you for referring Zuleyma Cintron to me for evaluation  Below are my notes for this consultation  If you have questions, please do not hesitate to call me  I look forward to following your patient along with you  Sincerely,        Orville Bryan MD        CC: No Recipients  Orville Bryan MD  5/19/2021  4:38 PM  Sign when Signing Visit  NEPHROLOGY OFFICE FOLLOW UP  Bimal Pina [de-identified] y o  male Date of Birth: @ MRN: [id number]    Encounter: [id number] DATE: 5/19/2021    REASON FOR VISIT: Bimal Pina is a [de-identified] y o  male who is here on 5/19/2021 for Follow-up and Hyponatremia      HPI:    This is 81-year-old male with past medical history significant for AFib,  Cardiomyopathy, hypertension, hyperlipidemia, Kidney stone, returns to Nephrology office for further management of CKD stage 3   Upon review of old medical records, baseline creatinine seems to be fluctuating around 1 8-1 9   In the past patient was found to have obstructive uropathy with left hydronephrosis and had left ureteral stent placed  Now patient is been followed by [place] [address] urologist    Renal ultrasound done on 2/3/2021 showed no hydronephrosis   Patient also have underlying cardiomyopathy and also currently been followed by cardiologist at [place] [address]  Currently patient is on torsemide 10 mg alternating with 20 mg   According to patient his slowly losing weight with current weight of 216 lb   Patient also complained about abdominal discomfort and now been followed by GI with [facility]   Scheduled to undergo gastric emptying study in next few weeks   Patient's wife-Brenda was also present at the time of consultation and history was also obtained from her and all the questions were answered      Patient also have a AFib and currently on Eliquis  Patient takes all the medications as prescribed and denies use of NSAIDs   REVIEW OF SYSTEMS:    Review of Systems   Constitutional: Negative for chills and fever  HENT: Negative for nosebleeds and sore throat  Eyes: Negative for photophobia and pain  Respiratory: Negative for choking and wheezing  Cardiovascular: Negative for chest pain and palpitations  Gastrointestinal: Negative for abdominal pain and blood in stool  Endocrine: Negative for heat intolerance  Genitourinary: Negative for flank pain and hematuria  Musculoskeletal: Negative for joint swelling and neck pain  Skin: Negative for color change and pallor  Neurological: Negative for seizures and syncope  Psychiatric/Behavioral: Negative for confusion and suicidal ideas  PAST MEDICAL HISTORY:  Past Medical History:   Diagnosis Date    Anticoagulant long-term use     Atrial fibrillation ([facility] [address]     Cardiac defibrillator in situ     Cardiomyopathy (HCC)     Colon polyp     Congestive heart failure (CHF) (HCC)     DJD (degenerative joint disease)     HL (hearing loss)     Hyperlipidemia     Hypertension     Shortness of breath     Sick sinus syndrome ([facility] [address] [facility]     Spinal stenosis        PAST SURGICAL HISTORY:  Past Surgical History:   Procedure Laterality Date    CARDIAC DEFIBRILLATOR PLACEMENT      FL RETROGRADE PYELOGRAM  11/3/2020    INSERT / REPLACE / REMOVE PACEMAKER      KNEE SURGERY Left     [facility]CECTOMY      in [facility] had this    AR COLONOSCOPY FLX DX W/COLLJ SPEC WHEN PFRMD N/A 6/5/2017    Procedure: COLONOSCOPY;  Surgeon: Radha Cedillo MD;  Location: [facility];   Service: Gastroenterology    AR CYSTO/URETERO W/LITHOTRIPSY &INDWELL STENT INSRT Left 11/3/2020    Procedure: CYSTOSCOPY URETEROSCOPY WITH LITHOTRIPSY HOLMIUM LASER, RETROGRADE PYELOGRAM AND INSERTION STENT URETERAL;  Surgeon: Kelby Melton MD;  [facility];  Service: Urology    AR CYSTOURETHROSCOPY,URETER CATHETER Left 2020    Procedure: CYSTOSCOPY RETROGRADE PYELOGRAM WITH INSERTION STENT URETERAL;  Surgeon: Marisa Martinez MD;  [facility];  Service: Urology    TONSILLECTOMY      TRANSURETHRAL RESECTION OF PROSTATE         SOCIAL HISTORY:  Social History     Substance and Sexual Activity   Alcohol Use Yes    Alcohol/week: 2 0 standard drinks    Types: 2 Glasses of wine per week    Frequency: 4 or more times a week    Drinks per session: 1 or 2    Binge frequency: Never    Comment: daily     Social History     Substance and Sexual Activity   Drug Use Never     Social History     Tobacco Use   Smoking Status Former Smoker    Packs/day: 0 50    Years: 3 00    Pack years: 1 50    Types: Cigarettes, Cigars    Quit date: 1968    Years since quittin 1   Smokeless Tobacco Never Used       FAMILY HISTORY:  Family History   Problem Relation Age of Onset    Coronary artery disease Mother     Hypertension Son        ALLERGY:  Allergies   Allergen Reactions    Other Sneezing     Seasonal; pollen; reactions; congestion    Penicillin G Benzathine Rash    Penicillins Rash       MEDICATIONS:    Current Outpatient Medications:     amiodarone 200 mg tablet, 1 tab daily, Disp: 30 tablet, Rfl: 11    apixaban (ELIQUIS) 2 5 mg, Take 1 tablet (2 5 mg total) by mouth 2 (two) times a day, Disp: 180 tablet, Rfl: 3    diphenhydrAMINE-APAP, sleep, (TYLENOL PM EXTRA STRENGTH PO), Take by mouth as needed , Disp: , Rfl:     famotidine (PEPCID) 20 mg tablet, Take 1 tablet (20 mg total) by mouth daily at bedtime, Disp: 30 tablet, Rfl: 0    gabapentin (NEURONTIN) 100 mg capsule, Take 2 capsules (200 mg total) by mouth daily at bedtime, Disp: 180 capsule, Rfl: 1    melatonin 3 mg, Take 3 mg by mouth daily at bedtime, Disp: , Rfl:     metoprolol succinate (TOPROL-XL) 25 mg 24 hr tablet, Take 1 tablet (25 mg total) by mouth daily, Disp: 90 tablet, Rfl: 3    pantoprazole (PROTONIX) 40 mg tablet, Take 1 tablet (40 mg total) by mouth daily in the early morning, Disp: 90 tablet, Rfl: 3    polyethylene glycol (MIRALAX) 17 g packet, Take 17 g by mouth daily, Disp: 30 each, Rfl: 0    Tafamidis (Vyndamax) 61 MG CAPS, Take 61 mg by mouth daily, Disp: 30 capsule, Rfl: 11    torsemide (DEMADEX) 10 mg tablet, 1 and 2 tabs on alternate days, Disp: 135 tablet, Rfl: 3    Alum Hydroxide-Mag Carbonate (GAVISCON EXTRA STRENGTH PO), Take by mouth, Disp: , Rfl:     gabapentin (NEURONTIN) 100 mg capsule, TAKE TWO CAPSULES BY MOUTH DAILY  (Patient not taking: Reported on 5/3/2021), Disp: 180 capsule, Rfl: 1    PHYSICAL EXAM:  Vitals:    05/19/21 1550   BP: 112/74   BP Location: Left arm   Patient Position: Sitting   Cuff Size: Standard   Pulse: 79   Resp: 16   Temp: (!) 97 1 °F (36 2 °C)   TempSrc: Temporal   Weight: 98 3 kg (216 lb 12 8 oz)   Height: 6' 1 5" (1 867 m)     Body mass index is 28 22 kg/m²  Physical Exam  Constitutional:       General: He is not in acute distress  HENT:      Head: Normocephalic and atraumatic  Eyes:      General: No scleral icterus  Conjunctiva/sclera: Conjunctivae normal    Neck:      Musculoskeletal: Neck supple  Vascular: No JVD  Cardiovascular:      Rate and Rhythm: Normal rate  Heart sounds: S1 normal and S2 normal    Pulmonary:      Effort: No accessory muscle usage or respiratory distress  Breath sounds: No wheezing  Abdominal:      General: There is distension  Palpations: Abdomen is soft  Tenderness: There is no abdominal tenderness  Musculoskeletal:      Right ankle: He exhibits swelling  Left ankle: He exhibits swelling  Comments: Moving all extremities   Skin:     General: Skin is warm  Comments: No Jaundice   Neurological:      Mental Status: He is alert  He is not disoriented  Comments: Facial symmetry  Speech is clear   Psychiatric:         Speech: He is communicative           Behavior: Behavior is not combative  LAB RESULTS:  Results for orders placed or performed in visit on 60/09/49   Basic metabolic panel   Result Value Ref Range    Sodium 139 136 - 145 mmol/L    Potassium 4 4 3 5 - 5 3 mmol/L    Chloride 104 100 - 108 mmol/L    CO2 32 21 - 32 mmol/L    ANION GAP 3 (L) 4 - 13 mmol/L    BUN 31 (H) 5 - 25 mg/dL    Creatinine 1 80 (H) 0 60 - 1 30 mg/dL    Glucose 113 65 - 140 mg/dL    Calcium 10 1 8 3 - 10 1 mg/dL    eGFR 35 ml/min/1 73sq m       ASSESSMENT and PLAN:  Shruthi Rodarte was seen today for follow-up and hyponatremia  Diagnoses and all orders for this visit:    Stage 3b chronic kidney disease ([facility] [address] )  -     CBC and differential; Future  -     Basic metabolic panel; Future  -     Urinalysis with microscopic; Future  -     Microalbumin / creatinine urine ratio; Future  -     Phosphorus; Future  -     Uric acid; Future  -     PTH, intact; Future  -     Vitamin D 25 hydroxy; Future    Other proteinuria  -     Urinalysis with microscopic; Future  -     Microalbumin / creatinine urine ratio; Future      1  CKD stage 3   Multifactorial, suspected due to underlying cardiorenal syndrome on top of hypertensive nephrosclerosis and advanced age   Patient was also earlier found to have obstructive uropathy with left hydronephrosis and had ureteral stent placed and also been followed by urologist  Renal ultrasound done on 2/3/2021 showed no hydronephrosis   Upon review of medical records, new baseline creatinine seems to be fluctuating around 1 8-1 9 with current creatinine of 1 8 with EGFR of 35   Patient has underlying cardiomyopathy and also been followed by cardiologist at [place] [address]  Now taking torsemide  10 mg alternating with 20 mg on daily basis  Current weight is 216 lb and according to patient he has been slowly losing weight   Defer further management of diuretics to Cardiology team      Plan to avoid NSAIDs and recheck renal function before next visit   2    Hypertension in chronic kidney disease   Currently blood pressure is under well control and monitor hypertension with metoprolol XL 25 mg PO daily  Also advised to follow low-salt diet   3  Proteinuria  Multifactorial with current urine microalbumin : creatinine ratio of 170 mg   Monitor proteinuria without ACE-I/ ARB   Last known urine analysis showed no dysuria or hematuria   Returns to Nephrology office in 4 months   Future labs ordered   Portions of the record may have been created with voice recognition software  Occasional wrong word or "sound a like" substitutions may have occurred due to the inherent limitations of voice recognition software  Read the chart carefully and recognize, using context, where substitutions have occurred  If you have any questions, please contact the dictating provider

## 2021-05-20 ENCOUNTER — TELEPHONE (OUTPATIENT)
Dept: INTERNAL MEDICINE CLINIC | Facility: CLINIC | Age: 81
End: 2021-05-20

## 2021-05-20 DIAGNOSIS — M48.062 SPINAL STENOSIS OF LUMBAR REGION WITH NEUROGENIC CLAUDICATION: Primary | ICD-10-CM

## 2021-05-20 NOTE — TELEPHONE ENCOUNTER
PT  WIFE  DROPPED  OFF  A  LETTER  FOR  [facility]  I  PUT  IT  IN  [facility]   BASKET

## 2021-05-21 DIAGNOSIS — M48.062 SPINAL STENOSIS OF LUMBAR REGION WITH NEUROGENIC CLAUDICATION: ICD-10-CM

## 2021-05-21 RX ORDER — DOCUSATE SODIUM 100 MG/1
100 CAPSULE, LIQUID FILLED ORAL 2 TIMES DAILY
Qty: 60 CAPSULE | Refills: 5 | Status: SHIPPED | OUTPATIENT
Start: 2021-05-21 | End: 2021-05-21

## 2021-05-21 RX ORDER — DOCUSATE SODIUM 100 MG/1
100 CAPSULE, LIQUID FILLED ORAL 2 TIMES DAILY
Qty: 60 CAPSULE | Refills: 5 | Status: SHIPPED | OUTPATIENT
Start: 2021-05-21 | End: 2021-05-21 | Stop reason: SDUPTHER

## 2021-05-21 RX ORDER — OXYCODONE HYDROCHLORIDE 5 MG/1
5 CAPSULE ORAL EVERY 8 HOURS PRN
Qty: 90 CAPSULE | Refills: 0 | Status: SHIPPED | OUTPATIENT
Start: 2021-05-21 | End: 2021-05-21 | Stop reason: CLARIF

## 2021-05-21 RX ORDER — DOCUSATE SODIUM 100 MG/1
100 CAPSULE, LIQUID FILLED ORAL 2 TIMES DAILY
Qty: 60 CAPSULE | Refills: 5 | Status: SHIPPED | OUTPATIENT
Start: 2021-05-21 | End: 2021-01-01 | Stop reason: ALTCHOICE

## 2021-05-21 RX ORDER — OXYCODONE HYDROCHLORIDE 5 MG/1
5 CAPSULE ORAL EVERY 8 HOURS PRN
Qty: 90 CAPSULE | Refills: 0 | Status: SHIPPED | OUTPATIENT
Start: 2021-05-21 | End: 2021-05-21

## 2021-05-21 RX ORDER — OXYCODONE HYDROCHLORIDE 5 MG/1
5 CAPSULE ORAL EVERY 8 HOURS PRN
Qty: 90 CAPSULE | Refills: 0 | Status: SHIPPED | OUTPATIENT
Start: 2021-05-21 | End: 2021-06-09 | Stop reason: ALTCHOICE

## 2021-05-21 RX ORDER — OXYCODONE HYDROCHLORIDE 5 MG/1
5 CAPSULE ORAL EVERY 8 HOURS PRN
Qty: 90 CAPSULE | Refills: 0 | Status: SHIPPED | OUTPATIENT
Start: 2021-05-21 | End: 2021-05-21 | Stop reason: SDUPTHER

## 2021-05-21 RX ORDER — DOCUSATE SODIUM 100 MG/1
100 CAPSULE, LIQUID FILLED ORAL 2 TIMES DAILY
Qty: 60 CAPSULE | Refills: 5 | Status: SHIPPED | OUTPATIENT
Start: 2021-05-21 | End: 2021-05-21 | Stop reason: CLARIF

## 2021-05-21 NOTE — TELEPHONE ENCOUNTER
Read through wife's detailed letter going through what patient has been going through past several months  He has advanced age and multiple medical co-morbidities  Did an extensive chart review looking at past office visits and all the testing he has completed in the past several months  My recommendation is the following:  · Continue protonix/pepcid  · Eat small meals and give himself time to digest the meals  · Ok to continue miralax  · Will add on stool softener twice daily  · Get gastric emptying study performed  · Significant pain and disability from back pain  Reviewed side effects of gabapentin and I don't feel he is getting much benefit from it  There are risks associated with the use of opiates, but I have no concern for abuse and feel he may get a benefit in regards to pain relief and improved quality of life  · [place] PDMP was reviewed and will start on small dose of oxycodone   Do have to watch out for constipation, lethargy, increased falling, nausea, vomiting side effects just to name a few  · Gave him instructions for titrating off gabapentin    Patient was in agreement with the above plan and was thankful for the phone call

## 2021-05-21 NOTE — TELEPHONE ENCOUNTER
Controlled Substance Review    [place] PDMP or [place]  reviewed: No red flags were identified; safe to proceed with prescription      PDMP Review       Value Time User    PDMP Reviewed  Yes 5/21/2021 12:28 PM Khang Munoz DO

## 2021-05-24 ENCOUNTER — TELEPHONE (OUTPATIENT)
Dept: INTERNAL MEDICINE CLINIC | Facility: CLINIC | Age: 81
End: 2021-05-24

## 2021-05-27 ENCOUNTER — TELEPHONE (OUTPATIENT)
Dept: CARDIOLOGY CLINIC | Facility: CLINIC | Age: 81
End: 2021-05-27

## 2021-05-27 DIAGNOSIS — I50.23 ACUTE ON CHRONIC SYSTOLIC HEART FAILURE (HCC): Primary | ICD-10-CM

## 2021-06-05 ENCOUNTER — HOSPITAL ENCOUNTER (OUTPATIENT)
Dept: NUCLEAR MEDICINE | Facility: HOSPITAL | Age: 81
Discharge: HOME | End: 2021-06-05
Payer: MEDICARE

## 2021-06-05 DIAGNOSIS — R10.13 EPIGASTRIC PAIN: ICD-10-CM

## 2021-06-05 PROCEDURE — A9541 TC99M SULFUR COLLOID: HCPCS

## 2021-06-05 PROCEDURE — 78264 GASTRIC EMPTYING IMG STUDY: CPT

## 2021-06-05 PROCEDURE — G1004 CDSM NDSC: HCPCS

## 2021-06-08 ENCOUNTER — TELEPHONE (OUTPATIENT)
Dept: NEPHROLOGY | Facility: CLINIC | Age: 81
End: 2021-06-08

## 2021-06-08 ENCOUNTER — TELEPHONE (OUTPATIENT)
Dept: INTERNAL MEDICINE CLINIC | Facility: CLINIC | Age: 81
End: 2021-06-08

## 2021-06-08 DIAGNOSIS — R10.13 EPIGASTRIC PAIN: ICD-10-CM

## 2021-06-08 DIAGNOSIS — K31.84 GASTROPARESIS: Primary | ICD-10-CM

## 2021-06-08 RX ORDER — FAMOTIDINE 20 MG/1
20 TABLET, FILM COATED ORAL
Qty: 90 TABLET | Refills: 0 | Status: SHIPPED | OUTPATIENT
Start: 2021-06-08 | End: 2021-06-28 | Stop reason: SDUPTHER

## 2021-06-08 NOTE — TELEPHONE ENCOUNTER
I would schedule a virtual visit  Without seeing what it looks like, would be hard for give any advice

## 2021-06-08 NOTE — TELEPHONE ENCOUNTER
Wife Kris Born) called  Due to patients health difficulties, he is unable to come in  Would Like a call from either Dr Derek Arreaga or Liza Dunne to discuss issue with a redness on skin along his spine  Wife has been putting a spray bandage and lotion on but it has not completely disappeared  Skin IS NOT open  Would like some advise  Should she schedule a virtual visit since he is unable to travel?     Please call [phone]

## 2021-06-09 ENCOUNTER — OFFICE VISIT (OUTPATIENT)
Dept: CARDIOLOGY CLINIC | Facility: CLINIC | Age: 81
End: 2021-06-09
Payer: MEDICARE

## 2021-06-09 ENCOUNTER — TELEPHONE (OUTPATIENT)
Dept: CARDIOLOGY CLINIC | Facility: CLINIC | Age: 81
End: 2021-06-09

## 2021-06-09 VITALS
BODY MASS INDEX: 26.05 KG/M2 | DIASTOLIC BLOOD PRESSURE: 80 MMHG | RESPIRATION RATE: 18 BRPM | SYSTOLIC BLOOD PRESSURE: 126 MMHG | OXYGEN SATURATION: 97 % | WEIGHT: 203 LBS | HEART RATE: 73 BPM | HEIGHT: 74 IN

## 2021-06-09 DIAGNOSIS — Z95.810 PRESENCE OF AUTOMATIC CARDIOVERTER/DEFIBRILLATOR (AICD): Primary | ICD-10-CM

## 2021-06-09 DIAGNOSIS — I73.9 INTERMITTENT CLAUDICATION (HCC): ICD-10-CM

## 2021-06-09 DIAGNOSIS — Z79.899 ON AMIODARONE THERAPY: ICD-10-CM

## 2021-06-09 PROCEDURE — 99214 OFFICE O/P EST MOD 30 MIN: CPT | Performed by: INTERNAL MEDICINE

## 2021-06-09 PROCEDURE — 93000 ELECTROCARDIOGRAM COMPLETE: CPT | Performed by: INTERNAL MEDICINE

## 2021-06-09 NOTE — TELEPHONE ENCOUNTER
If patient is not more short of breath than before,  Would recommend staying on the same dosage of diuretics    Will also wait and see what Dr Vianey Latif has to say

## 2021-06-09 NOTE — TELEPHONE ENCOUNTER
S/w patient, stated that SOB is about the same and was informed by Dr Gala Andrade to wait to hear from us

## 2021-06-09 NOTE — PROGRESS NOTES
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  Now on amiodarone w/o recurrence  TSH normal APril 2021, LFT normal except elevated bili APirl 2020  Also on toprol XL 25mg daily    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, 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, has f/u w heart failure clinic    3) CKD maybe amyloid related CR 1 6      4) persistent afib and Med[employer] VVIR BIV ICD paced 99%, QRS is 182ms  Paced cmplex  ON Eliquis 2 5mg bid      5) Gastroparesis    PLAN:  1  COntinue amiodarone, no recurrent VT  Went over risks of side effect  Check TSH/LFT in October fo monitoring  Yearly eye/lung exam      Follow up w Dr Nicole/CHF service and EP prn    Orders Placed This Encounter   Procedures    Comprehensive metabolic panel    TSH, 3rd generation with Free T4 reflex    POCT ECG     Medications Discontinued During This Encounter   Medication Reason    oxyCODONE (OXY-IR) 5 MG capsule Therapy completed             HPI/Subjective:         [de-identified] yo male  1) 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  Now on amiodarone w/o recurrence  TSH normal APril 2021, LFT normal except elevated bili APirl 2020   Also on toprol XL 25mg daily    2) Amyloid- cardiac and renal, 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, has f/u w heart failure clinic    3) CKD maybe amyloid related CR 1 6      4) persistent afib and Med[employer] VVIR BIV ICD paced 99%, QRS is 182ms  Paced cmplex  ON Eliquis 2 5mg bid      5) Gastroparesis    Mr Judie Rubi is always tired, short of breath, complains of stomach discomfort w eating from gastroparesis  NO further VT/palpittions  Has some NSVT on device checks but nothing warranting therapy  Please note HPI is listed by problem with with update following it, it is copied again in the assessment above and reflects medical decision making as well  Complete 12 point ROS reviewed and otherwise non pertinent or negative except as per HPI pertinent positives in Cardiovascular and Respiratory emphasized  Please see paper chart for outpatient clinic patients where the patient completed the 12 point ROS survey  Past Medical History:   Diagnosis Date    Anticoagulant long-term use     Atrial fibrillation ([address] )     Cardiac defibrillator in situ     Cardiomyopathy (HCC)     Colon polyp     Congestive heart failure (CHF) (HCC)     DJD (degenerative joint disease)     HL (hearing loss)     Hyperlipidemia     Hypertension     Shortness of breath     Sick sinus syndrome (HCC)     Spinal stenosis        Allergies   Allergen Reactions    Other Sneezing     Seasonal; pollen; reactions; congestion    Penicillin G Benzathine Rash    Penicillins Rash     I reviewed the Home Medication list and Allergies in the chart     Scheduled Meds:  Current Outpatient Medications   Medication Sig Dispense Refill    Alum Hydroxide-Mag Carbonate (GAVISCON EXTRA STRENGTH PO) Take by mouth      amiodarone 200 mg tablet 1 tab daily 30 tablet 11    apixaban (ELIQUIS) 2 5 mg Take 1 tablet (2 5 mg total) by mouth 2 (two) times a day 180 tablet 3    diphenhydrAMINE-APAP, sleep, (TYLENOL PM EXTRA STRENGTH PO) Take by mouth as needed       famotidine (PEPCID) 20 mg tablet Take 1 tablet (20 mg total) by mouth daily at bedtime 90 tablet 0    melatonin 3 mg Take 3 mg by mouth daily at bedtime      metoprolol succinate (TOPROL-XL) 25 mg 24 hr tablet Take 1 tablet (25 mg total) by mouth daily 90 tablet 3    pantoprazole (PROTONIX) 40 mg tablet Take 1 tablet (40 mg total) by mouth daily in the early morning 90 tablet 3    polyethylene glycol (MIRALAX) 17 g packet Take 17 g by mouth daily 30 each 0    Tafamidis (Vyndamax) 61 MG CAPS Take 61 mg by mouth daily 30 capsule 11    torsemide (DEMADEX) 10 mg tablet 1 and 2 tabs on alternate days (Patient taking differently: 10 mg 1 and 2 tabs on alternate days) 135 tablet 3    docusate sodium (COLACE) 100 mg capsule Take 1 capsule (100 mg total) by mouth 2 (two) times a day (Patient not taking: Reported on 2021) 60 capsule 5     No current facility-administered medications for this visit  PRN Meds:         Family History   Problem Relation Age of Onset    Coronary artery disease Mother     Hypertension Son        Social History     Socioeconomic History    Marital status: /Civil Union     Spouse name: Not on file    Number of children: Not on file    Years of education: Not on file    Highest education level: Not on file   Occupational History    Not on file   Social Needs    Financial resource strain: Not on file    Food insecurity     Worry: Not on file     Inability: Not on file   [employer] needs     Medical: Not on file     Non-medical: Not on file   Tobacco Use    Smoking status: Former Smoker     Packs/day: 0 50     Years: 3 00     Pack years: 1 50     Types: Cigarettes, Cigars     Quit date: 1968     Years since quittin 1    Smokeless tobacco: Never Used   Substance and Sexual Activity    Alcohol use:  Yes     Alcohol/week: 2 0 standard drinks     Types: 2 Glasses of wine per week     Frequency: 4 or more times a week     Drinks per session: 1 or 2     Binge frequency: Never     Comment: daily    Drug use: Never    Sexual activity: Not Currently Lifestyle    Physical activity     Days per week: 0 days     Minutes per session: 0 min    Stress: Very much   Relationships    Social connections     Talks on phone: Not on file     Gets together: Not on file     Attends [employer] service: Not on file     Active member of club or organization: Not on file     Attends meetings of clubs or organizations: Not on file     Relationship status: Not on file    Intimate partner violence     Fear of current or ex partner: Not on file     Emotionally abused: Not on file     Physically abused: Not on file     Forced sexual activity: Not on file   Other Topics Concern    Not on file   Social History Narrative    Active advance directive    Caffeine use         OBJECTIVE:    /80   Pulse 73   Resp 18   Ht 6' 1 5" (1 867 m)   Wt 92 1 kg (203 lb)   SpO2 97%   BMI 26 42 kg/m²   Vitals:    06/09/21 1106   Weight: 92 1 kg (203 lb)     GEN: No acute distress, Alert and oriented, chronically ill appearing  HEENT:External ears normal, wearing a mask  EYES: Pupils equal, sclera anicteric, midline, normal conjuctiva  NECK: No JVD, supple, no obvious masses or thryomegaly or goiter  CARDIOVASCULAR:  RRR, No murmur, rub, gallops S1,S2  LUNGS: Clear To auscultation bilaterally, normal effort, no rales, rhonchi, crackles   ABDOMEN: mildly distended,  without obvious organomegaly or ascites  EXTREMITIES/VASCULAR: Trace kodi  edema  warm an well perfused  PSYCH: Normal Affect,  linear speech pattern without evidence of psychosis  NEURO: Grossly intact, moving all extremiteis equal, face symmetric, alert and responsive, no obvious focal defecits   GAIT:  Ambulates slowly w walker   HEME: No bleeding, bruising, petechia, purpura   SKIN: No significant rashes on visibile skin, warm, no diaphoresis or pallor       Lab Results:       LABS:      Chemistry        Component Value Date/Time     01/17/2017 0929    K 4 4 05/12/2021 1411    K 4 5 03/16/2020 1338     05/12/2021 1411    CL 98 03/16/2020 1338    CO2 32 05/12/2021 1411    CO2 26 11/09/2020 0243    CO2 22 03/16/2020 1338    BUN 31 (H) 05/12/2021 1411    BUN 17 03/16/2020 1338    CREATININE 1 80 (H) 05/12/2021 1411    CREATININE 1 00 01/17/2017 0929        Component Value Date/Time    CALCIUM 10 1 05/12/2021 1411    CALCIUM 9 6 01/17/2017 0929    ALKPHOS 103 04/06/2021 1335    ALKPHOS 61 01/17/2017 0929    AST 21 04/06/2021 1335    AST 30 03/16/2020 1338    ALT 21 04/06/2021 1335    ALT 19 03/16/2020 1338    BILITOT 1 4 (H) 01/17/2017 0929            Lab Results   Component Value Date    CHOL 162 01/17/2017    CHOL 174 03/06/2015     Lab Results   Component Value Date    HDL 31 (L) 10/05/2020    HDL 59 08/29/2019    HDL 63 09/24/2018     Lab Results   Component Value Date    LDLCALC 46 10/05/2020    LDLCALC 74 08/29/2019    LDLCALC 86 09/24/2018     Lab Results   Component Value Date    TRIG 89 10/05/2020    TRIG 72 08/29/2019    TRIG 91 09/24/2018     No results found for: CHOLHDL    IMAGING: Nm Gastric Emptying    Result Date: 6/7/2021  Narrative: GASTRIC EMPTYING STUDY INDICATION: R10 13: Epigastric pain COMPARISON:  None available TECHNIQUE:   The study was performed following the oral administration of 0 9 mCi Tc-99m sulfur colloid combined with scrambled eggs, as part of a standard meal   Following the meal, one minute anterior and posterior images were obtained immediately and at 0 25 hours, 0 5 hour, 1 hour, 1 5 hour, 2 hour, 3 hour and 4 hour intervals from the time of ingestion  Geometric mean analyses were then performed  As of March 1, 2016, this gastric emptying protocol has been modified and updated  The gastric emptying times and the normal values reported below reflect the change in protocol   FINDINGS: Gastric emptying at 0 5 hours = 8 (N < 30%) Gastric emptying at 1 hour = 17 % (N = 10 - 70%) Gastric emptying at 2 hours = 35 % (N = > 40%) Gastric emptying at 3 hours = 52 % (N = > 70%) Gastric emptying at 4 hours = 70 % (N = >90%) Linear T-1/2 = 172 minutes     Impression: Severely decreased rate of gastric emptying  Workstation performed: [id number]     Ultrasound Abdomen Complete    Result Date: 5/18/2021  Narrative: ABDOMEN ULTRASOUND, COMPLETE INDICATION:   R18 8: Other ascites  COMPARISON: CT chest abdomen and pelvis 1/31/2021 TECHNIQUE:   Real-time ultrasound of the abdomen was performed with a curvilinear transducer with both volumetric sweeps and still imaging techniques  FINDINGS: PANCREAS:  Visualized portions of the pancreas are within normal limits  AORTA AND IVC:  Visualized portions are normal for patient age  LIVER: Size:  Within normal range  The liver measures 16 9 cm in the midclavicular line  Contour:  Surface contour is lobulated  Parenchyma:  Echogenicity and echotexture are within normal limits  No evidence of suspicious mass  Limited imaging of the main portal vein shows it to be patent and hepatopetal  Prominent portal venous flow  BILIARY: Post cholecystectomy  No intrahepatic biliary dilatation  CBD measures 7 mm  This is within normal limits for patient's age and prior cholecystectomy  No choledocholithiasis  KIDNEY: Right kidney measures 11 1 x 5 7 cm  Within normal limits  Left kidney measures 11 4 x 6 4 cm  6 mm lower pole calculus  Otherwise unremarkable  SPLEEN: Measures 12 2 x 12 x 4 1 cm  Within normal limits  ASCITES:  New small volume left upper quadrant and bilateral lower quadrant ascites  Small bilateral pleural effusions  Impression: New small volume abdominopelvic ascites and small bilateral pleural effusions  New borderline splenomegaly  6 mm nonobstructing left renal calculus  Postcholecystectomy  This study demonstrates a significant  finding and was documented as such in [facility] for liaison and referring practitioner notification   Workstation performed: [id number]        Cardiac testing:   Results for orders placed during the hospital encounter of 09/26/20 Echo complete with contrast if indicated    Narrative [address] [place] [address] Ricardo Santos [place], [place] [address]  [phone]    Transthoracic Echocardiogram  2D, M-mode, Doppler, and Color Doppler    Study date:  27-Sep-2020    Patient: Saira Christy  MR number: [id number]  Account number: [de-identified]  : 1940  Age: 79 years  Gender: Male  Status: Inpatient  Location: Bedside  Height: 73 in  Weight: 228 6 lb  BP: 96/ 57 mmHg    Indications: Shortness of Breath    Diagnoses: R06 02 - Shortness of breath    Sonographer:  Larissa Farrar RDCS  Interpreting Physician:  Alejandrina Najera MD  Primary Physician:  Emigdio Myers DO  Referring Physician:  Nery Garibay PA-C  Group:  [facility]    SUMMARY    LEFT VENTRICLE:  Systolic function was moderately to markedly reduced  LVEF around 35%  Wall thickness was moderately increased  There was severe assymetrical hypertrophy of the septum  Apical hypertrophyic cannot be ruled out completely  Features were consistent with a pseudonormal left ventricular filling pattern, with concomitant abnormal relaxation and increased filling pressure (grade 2 diastolic dysfunction)  RIGHT VENTRICLE:  The ventricle was mildly dilated  Systolic function was mildly reduced  LEFT ATRIUM:  The atrium was moderately dilated  RIGHT ATRIUM:  The atrium was moderately dilated  MITRAL VALVE:  There was moderate annular calcification  There was moderate regurgitation  AORTIC VALVE:  There was trace regurgitation  TRICUSPID VALVE:  There was mild regurgitation  PULMONIC VALVE:  There was trace regurgitation  AORTA:  The root exhibited mild dilatation  4 2 cm(pt known to have aortic root dilation on previous echo)    HISTORY: PRIOR HISTORY: Afib, CM, ICD, CHF, Hyperlipidemia, SOB, SSS    PROCEDURE: The procedure was performed at the bedside  This was a routine study  The transthoracic approach was used   The study included complete 2D imaging, M-mode, complete spectral Doppler, and color Doppler  The heart rate was 70 bpm,  at the start of the study  Images were obtained from the parasternal, apical, subcostal, and suprasternal notch acoustic windows  Image quality was adequate  LEFT VENTRICLE: Size was normal  Systolic function was moderately to markedly reduced  LVEF around 35% There was hypokinesis with regional variations  Wall thickness was moderately increased  There was severe assymetrical hypertrophy of  the septum  Apical hypertrophyic cannot be ruled out completely DOPPLER: Features were consistent with a pseudonormal left ventricular filling pattern, with concomitant abnormal relaxation and increased filling pressure (grade 2 diastolic  dysfunction)  RIGHT VENTRICLE: The ventricle was mildly dilated  Systolic function was mildly reduced  LEFT ATRIUM: The atrium was moderately dilated  RIGHT ATRIUM: The atrium was moderately dilated  MITRAL VALVE: There was moderate annular calcification  DOPPLER: There was no evidence for stenosis  There was moderate regurgitation  AORTIC VALVE: The valve was trileaflet  Leaflets exhibited mild calcification  DOPPLER: There was no evidence for stenosis  There was trace regurgitation  TRICUSPID VALVE: The valve structure was normal  There was normal leaflet separation  DOPPLER: The transtricuspid velocity was within the normal range  There was no evidence for stenosis  There was mild regurgitation  Pulmonary artery  systolic pressure was mildly increased  PULMONIC VALVE: Not well visualized  DOPPLER: There was trace regurgitation  PERICARDIUM: There was no pericardial effusion  AORTA: The root exhibited mild dilatation  4 2 cm(pt known to have aortic root dilation on previous echo)    SYSTEMIC VEINS: IVC: The inferior vena cava was not well visualized      SYSTEM MEASUREMENT TABLES    2D mode  AoR Diam (2D): 42 mm  AoR Diam; Mean (2D): 42 mm  Asc Aorta Diam (2D): 37 mm  Asc Aorta Diam; Mean (2D): 37 mm  LA Dimension (2D): 44 mm  LA Dimension; Mean (2D): 44 mm  LA/Ao (2D): 1 1  EDV (2D-Cubed): 672169 mm3  EDV (BP): 309634 mm3  EF (2D-Cubed): 32 9 %  ESV (2D-Cubed): 612139 mm3  ESV (BP): 06093 mm3  FS (2D-Cubed): 12 5 %  FS (2D-Teich): 12 5 %  IVS/LVPW (2D): 1 4  IVSd (2D): 20 8 mm  IVSd; Mean chosen (2D): 20 8 mm  LVIDd (2D): 53 4 mm  LVIDd; Mean (2D): 53 4 mm  LVIDs (2D): 46 7 mm  LVIDs; Mean (2D): 46 7 mm  LVOT Area (2D): 314 mm2  LVPWd (2D): 15 3 mm  LVPWd; Mean (2D): 15 3 mm  Left Ventricular Ejection Fraction; Method of Disks, Biplane; 2D mode;: 40 8 %  Left Ventricular Ejection Fraction; Teichholz; 2D mode;: 26 8 %  Left Ventricular End Diastolic Volume; Teichholz; 2D mode;: 832925 mm3  Left Ventricular End Systolic Volume; Teichholz; 2D mode;: 153903 mm3  SI (2D-Cubed): 21 9 ml/m2  SI (BP): 29 7 ml/m2  SV (2D-Cubed): 48110 mm3  SV (BP): 24927 mm3  Stroke Index; Teichholz; 2D mode;: 16 2 ml/m2  Stroke Volume; Teichholz; 2D mode;: 70730 mm3    Apical four chamber  Left Atrium MOD Diam; Most recent value chosen; End Systole; Apical four chamber;: 37 4 mm  Left Atrium MOD Diam; Most recent value chosen; End Systole; Apical four chamber;: 35 mm  Left Atrium MOD Diam; Most recent value chosen; End Systole; Apical four chamber;: 29 4 mm  Left Atrium MOD Diam; Most recent value chosen; End Systole; Apical four chamber;: 17 5 mm  Left Atrium MOD Diam; Most recent value chosen; End Systole; Apical four chamber;: 27 6 mm  Left Atrium MOD Diam; Most recent value chosen; End Systole; Apical four chamber;: 36 mm  Left Atrium MOD Diam; Most recent value chosen; End Systole; Apical four chamber;: 41 3 mm  Left Atrium MOD Diam; Most recent value chosen; End Systole; Apical four chamber;: 45 5 mm  Left Atrium MOD Diam; Most recent value chosen; End Systole; Apical four chamber;: 48 7 mm  Left Atrium MOD Diam; Most recent value chosen; End Systole;  Apical four chamber;: 51 4 mm  Left Atrium MOD Diam; Most recent value chosen; End Systole; Apical four chamber;: 52 2 mm  Left Atrium MOD Diam; Most recent value chosen; End Systole; Apical four chamber;: 52 5 mm  Left Atrium MOD Diam; Most recent value chosen; End Systole; Apical four chamber;: 53 2 mm  Left Atrium MOD Diam; Most recent value chosen; End Systole; Apical four chamber;: 52 5 mm  Left Atrium MOD Diam; Most recent value chosen; End Systole; Apical four chamber;: 50 8 mm  Left Atrium MOD Diam; Most recent value chosen; End Systole; Apical four chamber;: 49 7 mm  Left Atrium MOD Diam; Most recent value chosen; End Systole; Apical four chamber;: 47 3 mm  Left Atrium MOD Diam; Most recent value chosen; End Systole; Apical four chamber;: 45 8 mm  Left Atrium MOD Diam; Most recent value chosen; End Systole; Apical four chamber;: 43 1 mm  Left Atrium MOD Diam; Most recent value chosen; End Systole; Apical four chamber;: 39 9 mm  Left Atrium Systolic Area; Most recent value chosen; Method of Disks, Single Plane; 2D mode; Apical four chamber;: 3120 mm2  Left Atrium Systolic Volume Index; Method of Disks, Single Plane; 2D mode; Apical four chamber;: 47 8 ml/m2  Left Atrium Systolic Volume; Most recent value chosen; Method of Disks, Single Plane; 2D mode; Apical four chamber;: 303827 mm3  Left Atrium systolic major axis; Most recent value chosen; Method of Disks, Single Plane; 2D mode; Apical four chamber;: 72 1 mm  EF (A4C): 37 5 %  LV MOD Diam; Recent value; End Diastole (A4C): 62 8 mm  LV MOD Diam; Recent value; End Diastole (A4C): 64 3 mm  LV MOD Diam; Recent value; End Diastole (A4C): 32 5 mm  LV MOD Diam; Recent value; End Diastole (A4C): 20 2 mm  LV MOD Diam; Recent value; End Diastole (A4C): 29 3 mm  LV MOD Diam; Recent value; End Diastole (A4C): 35 3 mm  LV MOD Diam; Recent value; End Diastole (A4C): 39 2 mm  LV MOD Diam; Recent value; End Diastole (A4C): 42 4 mm  LV MOD Diam; Recent value;  End Diastole (A4C): 45 2 mm  LV MOD Diam; Recent value; End Diastole (A4C): 46 6 mm  LV MOD Diam; Recent value; End Diastole (A4C): 46 6 mm  LV MOD Diam; Recent value; End Diastole (A4C): 48 mm  LV MOD Diam; Recent value; End Diastole (A4C): 50 1 mm  LV MOD Diam; Recent value; End Diastole (A4C): 52 6 mm  LV MOD Diam; Recent value; End Diastole (A4C): 56 1 mm  LV MOD Diam; Recent value; End Diastole (A4C): 58 9 mm  LV MOD Diam; Recent value; End Diastole (A4C): 61 4 mm  LV MOD Diam; Recent value; End Diastole (A4C): 63 2 mm  LV MOD Diam; Recent value; End Diastole (A4C): 63 9 mm  LV MOD Diam; Recent value; End Diastole (A4C): 49 8 mm  LV MOD Diam; Recent value; End Systole (A4C): 29 mm  LV MOD Diam; Recent value; End Systole (A4C): 54 6 mm  LV MOD Diam; Recent value; End Systole (A4C): 52 8 mm  LV MOD Diam; Recent value; End Systole (A4C): 17 mm  LV MOD Diam; Recent value; End Systole (A4C): 23 4 mm  LV MOD Diam; Recent value; End Systole (A4C): 28 7 mm  LV MOD Diam; Recent value; End Systole (A4C): 33 3 mm  LV MOD Diam; Recent value; End Systole (A4C): 36 5 mm  LV MOD Diam; Recent value; End Systole (A4C): 39 mm  LV MOD Diam; Recent value; End Systole (A4C): 41 1 mm  LV MOD Diam; Recent value; End Systole (A4C): 42 5 mm  LV MOD Diam; Recent value; End Systole (A4C): 43 2 mm  LV MOD Diam; Recent value; End Systole (A4C): 44 6 mm  LV MOD Diam; Recent value; End Systole (A4C): 46 1 mm  LV MOD Diam; Recent value; End Systole (A4C): 47 4 mm  LV MOD Diam; Recent value; End Systole (A4C): 48 5 mm  LV MOD Diam; Recent value; End Systole (A4C): 50 6 mm  LV MOD Diam; Recent value; End Systole (A4C): 53 2 mm  LV MOD Diam; Recent value; End Systole (A4C): 54 5 mm  LV MOD Diam; Recent value; End Systole (A4C): 55 3 mm  Left Ventricle diastolic major axis; Most recent value chosen; Method of Disks, Single Plane; 2D mode; Apical four chamber;: 102 mm  Left Ventricle systolic major axis; Most recent value chosen; Method of Disks, Single Plane; 2D mode;  Apical four chamber;: 84 7 mm  Left Ventricular Diastolic Area; Most recent value chosen; Method of Disks, Single Plane; 2D mode; Apical four chamber;: 5030 mm2  Left Ventricular End Diastolic Volume; Most recent value chosen; Method of Disks, Single Plane; 2D mode; Apical four chamber;: 363674 mm3  Left Ventricular End Systolic Volume; Most recent value chosen; Method of Disks, Single Plane; 2D mode; Apical four chamber;: 829524 mm3  Left Ventricular Systolic Area; Most recent value chosen; Method of Disks, Single Plane; 2D mode; Apical four chamber;: 3570 mm2  SI (A4C): 32 9 ml/m2  SV (A4C): 58924 mm3  Right Atrium MOD Diam; Most recent value chosen; Method of Disks, Single Plane; End Systole; 2D mode; Apical four chamber;: 16 9 mm  Right Atrium MOD Diam; Most recent value chosen; Method of Disks, Single Plane; End Systole; 2D mode; Apical four chamber;: 27 9 mm  Right Atrium MOD Diam; Most recent value chosen; Method of Disks, Single Plane; End Systole; 2D mode; Apical four chamber;: 55 3 mm  Right Atrium MOD Diam; Most recent value chosen; Method of Disks, Single Plane; End Systole; 2D mode; Apical four chamber;: 58 3 mm  Right Atrium MOD Diam; Most recent value chosen; Method of Disks, Single Plane; End Systole; 2D mode; Apical four chamber;: 58 3 mm  Right Atrium MOD Diam; Most recent value chosen; Method of Disks, Single Plane; End Systole; 2D mode; Apical four chamber;: 26 4 mm  Right Atrium MOD Diam; Most recent value chosen; Method of Disks, Single Plane; End Systole; 2D mode; Apical four chamber;: 31 8 mm  Right Atrium MOD Diam; Most recent value chosen; Method of Disks, Single Plane; End Systole; 2D mode; Apical four chamber;: 37 7 mm  Right Atrium MOD Diam; Most recent value chosen; Method of Disks, Single Plane; End Systole; 2D mode; Apical four chamber;: 42 2 mm  Right Atrium MOD Diam; Most recent value chosen; Method of Disks, Single Plane; End Systole; 2D mode;  Apical four chamber;: 48 4 mm  Right Atrium MOD Diam; Most recent value chosen; Method of Disks, Single Plane; End Systole; 2D mode; Apical four chamber;: 51 7 mm  Right Atrium MOD Diam; Most recent value chosen; Method of Disks, Single Plane; End Systole; 2D mode; Apical four chamber;: 53 9 mm  Right Atrium MOD Diam; Most recent value chosen; Method of Disks, Single Plane; End Systole; 2D mode; Apical four chamber;: 54 2 mm  Right Atrium MOD Diam; Most recent value chosen; Method of Disks, Single Plane; End Systole; 2D mode; Apical four chamber;: 54 2 mm  Right Atrium MOD Diam; Most recent value chosen; Method of Disks, Single Plane; End Systole; 2D mode; Apical four chamber;: 54 6 mm  Right Atrium MOD Diam; Most recent value chosen; Method of Disks, Single Plane; End Systole; 2D mode; Apical four chamber;: 56 1 mm  Right Atrium MOD Diam; Most recent value chosen; Method of Disks, Single Plane; End Systole; 2D mode; Apical four chamber;: 57 8 mm  Right Atrium MOD Diam; Most recent value chosen; Method of Disks, Single Plane; End Systole; 2D mode; Apical four chamber;: 58 9 mm  Right Atrium MOD Diam; Most recent value chosen; Method of Disks, Single Plane; End Systole; 2D mode; Apical four chamber;: 59 7 mm  Right Atrium MOD Diam; Most recent value chosen; Method of Disks, Single Plane; End Systole; 2D mode; Apical four chamber;: 59 1 mm  Right Atrium Systolic Area; Most recent value chosen; Method of Disks, Single Plane; End Systole; 2D mode; Apical four chamber;: 3690 mm2  Right Atrium Systolic Major Axis; Most recent value chosen; Method of Disks, Single Plane; End Systole; 2D mode; Apical four chamber;: 76 6 mm  Right Atrium Systolic Volume Index; Method of Disks, Single Plane; End Systole; 2D mode; Apical four chamber;: 65 4 ml/m2  Right Atrium Systolic Volume; Most recent value chosen; Method of Disks, Single Plane; End Systole; 2D mode; Apical four chamber;: 894380 mm3  Right Ventricle Basal Diameter; 2D mode;  Apical four chamber;: 53 7 mm  Right Ventricle Basal Diameter; Mean; Mean value chosen; 2D mode; Apical four chamber;: 53 7 mm    Apical two chamber  EF (A2C): 33 6 %  LV MOD Diam; Recent value; End Diastole (A2C): 47 7 mm  LV MOD Diam; Recent value; End Diastole (A2C): 28 mm  LV MOD Diam; Recent value; End Diastole (A2C): 53 6 mm  LV MOD Diam; Recent value; End Diastole (A2C): 53 6 mm  LV MOD Diam; Recent value; End Diastole (A2C): 53 6 mm  LV MOD Diam; Recent value; End Diastole (A2C): 53 6 mm  LV MOD Diam; Recent value; End Diastole (A2C): 52 9 mm  LV MOD Diam; Recent value; End Diastole (A2C): 50 1 mm  LV MOD Diam; Recent value; End Diastole (A2C): 45 9 mm  LV MOD Diam; Recent value; End Diastole (A2C): 43 1 mm  LV MOD Diam; Recent value; End Diastole (A2C): 40 3 mm  LV MOD Diam; Recent value; End Diastole (A2C): 38 2 mm  LV MOD Diam; Recent value; End Diastole (A2C): 36 8 mm  LV MOD Diam; Recent value; End Diastole (A2C): 36 4 mm  LV MOD Diam; Recent value; End Diastole (A2C): 35 4 mm  LV MOD Diam; Recent value; End Diastole (A2C): 34 mm  LV MOD Diam; Recent value; End Diastole (A2C): 31 2 mm  LV MOD Diam; Recent value; End Diastole (A2C): 27 mm  LV MOD Diam; Recent value; End Diastole (A2C): 22 1 mm  LV MOD Diam; Recent value; End Diastole (A2C): 15 1 mm  LV MOD Diam; Recent value; End Systole (A2C): 26 1 mm  LV MOD Diam; Recent value; End Systole (A2C): 47 mm  LV MOD Diam; Recent value; End Systole (A2C): 9 2 mm  LV MOD Diam; Recent value; End Systole (A2C): 49 4 mm  LV MOD Diam; Recent value; End Systole (A2C): 50 1 mm  LV MOD Diam; Recent value; End Systole (A2C): 49 8 mm  LV MOD Diam; Recent value; End Systole (A2C): 48 3 mm  LV MOD Diam; Recent value; End Systole (A2C): 45 9 mm  LV MOD Diam; Recent value; End Systole (A2C): 41 6 mm  LV MOD Diam; Recent value; End Systole (A2C): 36 8 mm  LV MOD Diam; Recent value; End Systole (A2C): 33 2 mm  LV MOD Diam; Recent value; End Systole (A2C): 30 8 mm  LV MOD Diam; Recent value;  End Systole (A2C): 28 2 mm  LV MOD Diam; Recent value; End Systole (A2C): 26 8 mm  LV MOD Diam; Recent value; End Systole (A2C): 24 7 mm  LV MOD Diam; Recent value; End Systole (A2C): 22 9 mm  LV MOD Diam; Recent value; End Systole (A2C): 20 4 mm  LV MOD Diam; Recent value; End Systole (A2C): 18 4 mm  LV MOD Diam; Recent value; End Systole (A2C): 16 3 mm  LV MOD Diam; Recent value; End Systole (A2C): 13 5 mm  Left Ventricle diastolic major axis; Most recent value chosen; Method of Disks, Single Plane; 2D mode; Apical two chamber;: 84 1 mm  Left Ventricle systolic major axis; Most recent value chosen; Method of Disks, Single Plane; 2D mode; Apical two chamber;: 80 9 mm  Left Ventricular Diastolic Area; Most recent value chosen; Method of Disks, Single Plane; 2D mode; Apical two chamber;: 3390 mm2  Left Ventricular End Diastolic Volume; Most recent value chosen; Method of Disks, Single Plane; 2D mode; Apical two chamber;: 275462 mm3  Left Ventricular End Systolic Volume; Most recent value chosen; Method of Disks, Single Plane; 2D mode; Apical two chamber;: 85433 mm3  Left Ventricular Systolic Area; Most recent value chosen; Method of Disks, Single Plane; 2D mode; Apical two chamber;: 2600 mm2  SI (A2C): 16 8 ml/m2  SV (A2C): 37565 mm3    Tissue Doppler Imaging  LV Peak Early Isabel Tissue Jean-Pierre; Mean; Medial MA (TDI): 35 3 mm/s  LV Peak Early Isabel Tissue Jean-Pierre; Medial MA (TDI): 35 3 mm/s    Unspecified Scan Mode  LVOT CV Orifice Diam; Mean: 20 mm  LVOT Diam: 20 mm  MV Peak Jean-Pierre/LV Peak Tissue Jean-Pierre E-Wave; Medial MA: 22 8  DT; Antegrade Flow: 271 ms  DT; Mean; Antegrade Flow: 271 ms  Dec Long; Antegrade Flow: 2970 mm/s2  Dec Long; Mean; Antegrade Flow: 2970 mm/s2  MV E/A: 3 2  MV Peak A Jean-Pierre: 252 mm/s  MV Peak A Jean-Pierre; Mean: 252 mm/s  MV Peak E Jean-Pierre; Antegrade Flow: 805 mm/s  MV Peak E Jean-Pierre; Mean; Antegrade Flow: 805 mm/s  MVA (PHT): 278 mm2  PHT: 79 ms  PHT;  Mean: 79 ms  Peak Grad; Mean; Regurgitant Flow: 31 mm[Hg]  Vmax; Mean; Regurgitant Flow: 2780 mm/s  Vmax; Regurgitant Flow: 2770 mm/s  Vmax; Regurgitant Flow: 2740 mm/s  Vmax; Regurgitant Flow: 2840 mm/s    [facility]    Prepared and electronically signed by    Ana Mccloud MD  Signed 27-Sep-2020 15:11:56       No results found for this or any previous visit  No results found for this or any previous visit  No results found for this or any previous visit          I reviewed and interpreted the following LABS/EKG/TELE/IMAGING and below is summary of my interpretation (if data available):    Current EKG and Rhythm Strip: Afib w BIV pacing QRS 182ms    Interrogation of Pacemaker/Defibrillator/Loop (prior recorded events), etc: NO VT event on last check  Nov 2020 shows VT a appropriate ICD marisabel, 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

## 2021-06-09 NOTE — TELEPHONE ENCOUNTER
Spoke with pt wife she stated he still has swelling on his feet and up his legs  She would like to know if he should continue his alternating dose of 10 mg /20 mg of torsemide  Pt recently diagnosed with Gastroparesis and has lost lot of weight  Pt will be in office today to see Dr Andrés Garcia  Please advise

## 2021-06-09 NOTE — TELEPHONE ENCOUNTER
Pt's wife Noreen Marshall called & stated that pt has recently been diagnose with Gastroparesis and since then pt has lost a lot of weight  Noreen Marshall would like to know should pt keep taking his Torsemide as directed   Please Advise

## 2021-06-11 ENCOUNTER — TELEMEDICINE (OUTPATIENT)
Dept: INTERNAL MEDICINE CLINIC | Facility: CLINIC | Age: 81
End: 2021-06-11
Payer: MEDICARE

## 2021-06-11 DIAGNOSIS — R21 RASH: Primary | ICD-10-CM

## 2021-06-11 PROCEDURE — 99213 OFFICE O/P EST LOW 20 MIN: CPT | Performed by: INTERNAL MEDICINE

## 2021-06-11 NOTE — PROGRESS NOTES
Virtual Regular Visit      Assessment/Plan:    Problem List Items Addressed This Visit     None      Visit Diagnoses     Rash    -  Primary        Patient was reassured  Continue OTC topical therapy, but no concerning features at this time  Discussed features to watch for  Reason for visit is   Chief Complaint   Patient presents with    Virtual Regular Visit        Encounter provider Aicha Cook DO    Provider located at [address] [facility] [address]      Recent Visits  Date Type Provider Dept   06/08/21 Telephone Tim LejuliaLayton grovera recent visits within past 7 days and meeting all other requirements     Future Appointments  No visits were found meeting these conditions  Showing future appointments within next 150 days and meeting all other requirements        The patient was identified by name and date of birth  Norma Aragon was informed that this is a telemedicine visit and that the visit is being conducted through [address] Now and patient was informed that this is a secure, HIPAA-compliant platform  He agrees to proceed     My office door was closed  No one else was in the room  He acknowledged consent and understanding of privacy and security of the video platform  The patient has agreed to participate and understands they can discontinue the visit at any time  Patient is aware this is a billable service  Subjective  Norma Aragon is a [de-identified] y o  male with multiple medical co-morbities  Has been struggling with his health in terms of chronic GI symptoms and back problems from spinal stenosis  He was finally able to get gastric emptying study done which confirmed gastroparesis  He was told to eat frequent, small meals from GI  Wife was concerned about discoloration/rash to the left of thoracic spinous process  Doesn't bother the patient  Not itchy or flaky  Not raised   No drainage  No change in appearance in past couple months  Past Medical History:   Diagnosis Date    Anticoagulant long-term use     Atrial fibrillation (HCC)     Cardiac defibrillator in situ     Cardiomyopathy (HCC)     Colon polyp     Congestive heart failure (CHF) (HCC)     DJD (degenerative joint disease)     HL (hearing loss)     Hyperlipidemia     Hypertension     Shortness of breath     Sick sinus syndrome ([address] )     Spinal stenosis        Past Surgical History:   Procedure Laterality Date    CARDIAC DEFIBRILLATOR PLACEMENT      FL RETROGRADE PYELOGRAM  11/3/2020    INSERT / REPLACE / REMOVE PACEMAKER      KNEE SURGERY Left     [facility]CTOM[facility]      in [facility] had [facility]    RI COLONOSCOPY FLX DX W/COLLJ SPEC WHEN PFRMD N/A 6/5/2017    Procedure: COLONOSCOPY;  Surgeon: Mj Mcintosh MD;  [facility];   Service: Gastroenterology    RI CYSTO/URETERO W/LITHOTRIPSY &INDWELL STENT INSRT Left 11/3/2020    Procedure: CYSTOSCOPY URETEROSCOPY WITH LITHOTRIPSY HOLMIUM LASER, RETROGRADE PYELOGRAM AND INSERTION STENT URETERAL;  Surgeon: Odalys Gamboa MD;  [facility];  Service: Urology    RI CYSTOURETHROSCOPY,URETER CATHETER Left 9/29/2020    Procedure: CYSTOSCOPY RETROGRADE PYELOGRAM WITH INSERTION STENT URETERAL;  Surgeon: Serafin Okeefe MD;  [facility];  Service: Urology    TONSILLECTOMY      TRANSURETHRAL RESECTION OF PROSTATE         Current Outpatient Medications   Medication Sig Dispense Refill    Alum Hydroxide-Mag Carbonate (GAVISCON EXTRA STRENGTH PO) Take by mouth      amiodarone 200 mg tablet 1 tab daily 30 tablet 11    apixaban (ELIQUIS) 2 5 mg Take 1 tablet (2 5 mg total) by mouth 2 (two) times a day 180 tablet 3    diphenhydrAMINE-APAP, sleep, (TYLENOL PM EXTRA STRENGTH PO) Take by mouth as needed       docusate sodium (COLACE) 100 mg capsule Take 1 capsule (100 mg total) by mouth 2 (two) times a day (Patient not taking: Reported on 6/9/2021) 60 capsule 5    famotidine (PEPCID) 20 mg tablet Take 1 tablet (20 mg total) by mouth daily at bedtime 90 tablet 0    melatonin 3 mg Take 3 mg by mouth daily at bedtime      metoprolol succinate (TOPROL-XL) 25 mg 24 hr tablet Take 1 tablet (25 mg total) by mouth daily 90 tablet 3    pantoprazole (PROTONIX) 40 mg tablet Take 1 tablet (40 mg total) by mouth daily in the early morning 90 tablet 3    polyethylene glycol (MIRALAX) 17 g packet Take 17 g by mouth daily 30 each 0    Tafamidis (Vyndamax) 61 MG CAPS Take 61 mg by mouth daily 30 capsule 11    torsemide (DEMADEX) 10 mg tablet 1 and 2 tabs on alternate days (Patient taking differently: 10 mg 1 and 2 tabs on alternate days) 135 tablet 3     No current facility-administered medications for this visit  Allergies   Allergen Reactions    Other Sneezing     Seasonal; pollen; reactions; congestion    Penicillin G Benzathine Rash    Penicillins Rash     Review of Systems   Constitutional: Positive for fatigue  Gastrointestinal: Positive for abdominal pain and constipation  Musculoskeletal: Positive for back pain and gait problem  Skin: Positive for rash  Video Exam    Physical Exam  Constitutional:       General: He is not in acute distress  Appearance: He is not ill-appearing  Skin:     Findings: Rash (macular rash along left side of thoracic spine; not raised; no drainage noted) present  Neurological:      Mental Status: He is alert  I spent 10 minutes directly with the patient during this visit      Sania 10 acknowledges that he has consented to an online visit or consultation  He understands that the online visit is based solely on information provided by him, and that, in the absence of a face-to-face physical evaluation by the physician, the diagnosis he receives is both limited and provisional in terms of accuracy and completeness   This is not intended to replace a full medical face-to-face evaluation by the physician  Bimal Pina understands and accepts these terms      Eulis Govern, DO

## 2021-06-22 ENCOUNTER — TELEMEDICINE (OUTPATIENT)
Dept: CARDIOLOGY CLINIC | Facility: CLINIC | Age: 81
End: 2021-06-22
Payer: MEDICARE

## 2021-06-22 VITALS
SYSTOLIC BLOOD PRESSURE: 105 MMHG | HEART RATE: 76 BPM | WEIGHT: 200 LBS | BODY MASS INDEX: 25.67 KG/M2 | DIASTOLIC BLOOD PRESSURE: 59 MMHG | HEIGHT: 74 IN

## 2021-06-22 DIAGNOSIS — I50.20 SYSTOLIC CONGESTIVE HEART FAILURE, UNSPECIFIED HF CHRONICITY (HCC): Primary | ICD-10-CM

## 2021-06-22 PROCEDURE — 99214 OFFICE O/P EST MOD 30 MIN: CPT | Performed by: NURSE PRACTITIONER

## 2021-06-22 NOTE — PROGRESS NOTES
Virtual Regular Visit      Assessment/Plan:    Problem List Items Addressed This Visit     None               Reason for visit is   Chief Complaint   Patient presents with    Virtual Regular Visit        Encounter provider LEWIS Donnelly    Provider located at [address] [address]      Recent Visits  No visits were found meeting these conditions  Showing recent visits within past 7 days and meeting all other requirements  Future Appointments  No visits were found meeting these conditions  Showing future appointments within next 150 days and meeting all other requirements       The patient was identified by name and date of birth  Param Wallace was informed that this is a telemedicine visit and that the visit is being conducted through [address] [place] [address] Road Now and patient was informed that this is a secure, HIPAA-compliant platform  He agrees to proceed     My office door was closed  No one else was in the room  He acknowledged consent and understanding of privacy and security of the video platform  The patient has agreed to participate and understands they can discontinue the visit at any time  Patient is aware this is a billable service  Assessment/Plan:     Chronic HFrEF, LVEF 35%, LV not dilated  Etiology: C with only mild nonobstructive disease   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  given positive PYP scan  Mohsen Packer Has features consistent with this diagnosis including increased biventricular wall thickness and  IVSd, lower voltage EKG, hypotension, arrhythmia/conduction disease, h/o spinal stenosis, carpal tunnel syndrome, glossal changes/dyphagia   Does have elevated Ig Kappa, Kappa/Lambda fluid ratio however without monoclonal banding  Now having GI dysmotility with e/o gastroparesis on gastric emptying study  Seen by hem/onc who concurred with this diagnosis   He has started on Tafamidis 61 mg daily  Escalation of GDMT has been limited by hypotension and elevated creat  Does not wish to consider any additional amyloid specific therapies  See below  Weights: 225 lbs, 203 lbs, today,     5/18/21: Abdominal US, small volume ascites with small BL pleural effusions       [facility] Testing 12/8/20: negative for any pathogenic variant    LHC 11/17/20: mild nonobstructive disease, 40% stenosis of mid LAD  Otherwise only minor luminal irregularities      Cardiac Amyloidosis scan 11/13/20: IMPRESSION:   1   Strongly suggestive of TTR amyloidosis      Serologies:  RF positive  SPEP- no monoclonal bands  UPEP- selective proteinuria  No monoclonal bands  Immunoglobulin free LT chains- Ig Bowden elevated at 85 1, Ig Lambda normal, Kappa/Lambda fluid ratio elevated at 5 46  Hepatitis C- not detected    Iron Panel- normal                      Lab Results   Component Value Date     NTBNP 6,129 (H) 11/09/2020       TTE 9/27/20: LVEF 35%, LVIDd 5 34 cm, moderately increased wall thickness, grade II DD, RV mildly dilated with mildly reduced function, moderate MR, mild TR, severe assymetrical hypertrophy of the septum,  no effusions, IVC not visualized, IVSd 2 8 cm     Suppression of TTR:  Antisense oligonucleotides (Inotersen): RNAi (patisiran):      TTR tetramer stabilizers   Tafamidis 61 mg daily  Diflunisal (suggested 250 mg BID):   Epigallocatechin? 3?gallate (EGCG) (suggested 550 mg daily):      Decrease production of amyloid fibrils   Doxycycline (suggested 100 mg QD)  TUDCA (suggested 750 mg QD)         Neurohormonal Blockade:  --Beta-Blocker: Metoprolol succinate 25 mg once daily  --ACEi, ARB or ARNi: NA, Entresto stopped for hypotn, creat now 1 8    (or SVR reduction)  --Aldosterone Receptor Blocker: Not at this time  --Diuretic: Torsemide 20 mg  alternating with 10 mg every other day    Sudden Cardiac Death Risk Reduction:  --ICD: NOT MRI COMPATIBLE  MDT BiV AICD   Interrogation    Electrical Resynchronization:  --BiV AICD in situ  Interrogation: BVP 99%  ALL AVAILABLE LEAD PARAMETERS WITHIN NORMAL LIMITS  2 NSVT EPISODES DETECTED 5 BEATS @ 380ms  Optivol crossed         Advanced Therapies (If appropriate): Continue to monitor  Age precludes OHT  --Inotrope:  --LVAD/Transplant Candidacy:    VT storm  S/P ICD shock x 3 and one successful ATP   Currently on Amio PO load    Continue Metoprolol 25 mg once daily  24 beat run of NSVT seen on 11/14/20  Follows with EP  Permanent atrial fib  On Eliquis  Rate controlled  YORDAN on CKD  Likely r/t amyloid, +/- congestion  Last creat 1 8 in May  Alexis Kiran repeat now since on higher diuretic dosing  HTN  Has been hypotensive  HLD  Urosepsis 9/2020  Management per renal/urology  Spinal stenosis    Unilateral carpal tunnel syndrome    Dysphagia  Possible glossal changes r/t amyloid ? Gastroparesis  Likely r/t cardiac amyloid  HPI:  Inpatient HF consult 11/11/20:  "79year old with PMH as described above who was transferred here from the [facility] following 3 ICD shocks at home  Started on Amiodarone gtt along with Lidocaine  Has had no further events since  Lidocaine recently needed to be discontinued due to onset of severe tremors  This is now improving  Plan is for left heart cath tomorrow AM      Heart failure team is being asked to see patient for evaluation of his chronic systolic heart failure  He routinely follows with Dr Letty Ordaz  Was recently seen by him in early October for follow up after admission for urosepsis  At that time Entresto and diuretics were kept on hold for YORDAN and hypotension      On my examination today, patient is in no distress  His hemodynamics are stable  Labs show a worsening YORDAN, elevated NT Pro BNP, with elevated T Bili  CXR showing mild vascular congestion  "     Left heart cath showed non-obstructive disease  Sent for PYP scan given concern for infiltrative process   Results strongly suggestive of cardiac amyloidosis  Serologic workup demonstrated elevated Ig Kappa but with normal kappa/lambda fluid ratio  No monoclonal banding on SPEP/UPEP  Was seen by hem/onc who concurred with a diagnosis of aTTR amyloid       In terms of his ventricular arrhythmia, he was ultimately placed on an oral Amiodarone load  His Metoprolol was increased to 50 mg BID  He had no further sustained VT events  He did require several doses of IV diuretic  His renal function improved with diuresis       Today, 11/25/20 patient presents for post hospital follow up  He is feeling well  Biggest obstacle for him is his back pain from spinal stenosis  His post discharge BMP did show a mild YORDAN with creat of 1 5  Tells me today that he is only drinking 1 2 Liters per day  He otherwise denies chest pain, SOB, palpitations, dizziness/LH, or syncope  No shocks from his device  No orthopnea or PND  He is waiting to hear back on financial assistance for his Tafamidis  His RUE near his radial cath site is ecchymotic all the way up to the elbow  He held 2 doses of his Eliquis and called Dr Migdalia Guerrero  Per his instructions, patient ok to resume anticoagulation now  He has had no pain, loss of sensation or movement in that area  Today, 6/22/21 patient is being evaluated via telephone as did not receive email link  Has been routinely following with Dr Migdalia Guerrero and Dr Ambriz  Has had no further shocks from his AICD  BP has been on the low side for which his Metoprolol was decreased to 25 mg once daily  Has also developed gastroparesis and subsequently has had significant weight loss  Abdominal US showing small volume ascites and small BL pleural effusions  Otherwise feels his SOB is at baseline and no worse than usual  Doing well with the current Torsemide regimen        Past Medical History:   Diagnosis Date    Anticoagulant long-term use     Atrial fibrillation ([address] )     Cardiac defibrillator in situ     Cardiomyopathy ([address]     Colon polyp     Congestive heart failure (CHF) (HCC)     DJD (degenerative joint disease)     HL (hearing loss)     Hyperlipidemia     Hypertension     Shortness of breath     Sick sinus syndrome ([address] )     Spinal stenosis        Past Surgical History:   Procedure Laterality Date    CARDIAC DEFIBRILLATOR PLACEMENT      FL RETROGRADE PYELOGRAM  11/3/2020    INSERT / REPLACE / REMOVE PACEMAKER      KNEE SURGERY Left     [facility]CTOM[facility]      in [facility] this    WV COLONOSCOPY FLX DX W/COLLJ SPEC WHEN PFRMD N/A 6/5/2017    Procedure: COLONOSCOPY;  Surgeon: Mj Mcintosh MD;  [facility];   Service: Gastroenterology    WV CYSTO/URETERO W/LITHOTRIPSY &INDWELL STENT INSRT Left 11/3/2020    Procedure: CYSTOSCOPY URETEROSCOPY WITH LITHOTRIPSY HOLMIUM LASER, RETROGRADE PYELOGRAM AND INSERTION STENT URETERAL;  Surgeon: Odalys Gamboa MD;  [facility];  Service: Urology    WV CYSTOURETHROSCOPY,URETER CATHETER Left 9/29/2020    Procedure: CYSTOSCOPY RETROGRADE PYELOGRAM WITH INSERTION STENT URETERAL;  Surgeon: Serafin Okeefe MD;  [facility];  Service: Urology    TONSILLECTOMY      TRANSURETHRAL RESECTION OF PROSTATE         Current Outpatient Medications   Medication Sig Dispense Refill    Alum Hydroxide-Mag Carbonate (GAVISCON EXTRA STRENGTH PO) Take by mouth      amiodarone 200 mg tablet 1 tab daily 30 tablet 11    apixaban (ELIQUIS) 2 5 mg Take 1 tablet (2 5 mg total) by mouth 2 (two) times a day 180 tablet 3    diphenhydrAMINE-APAP, sleep, (TYLENOL PM EXTRA STRENGTH PO) Take by mouth as needed       docusate sodium (COLACE) 100 mg capsule Take 1 capsule (100 mg total) by mouth 2 (two) times a day (Patient not taking: Reported on 6/9/2021) 60 capsule 5    famotidine (PEPCID) 20 mg tablet Take 1 tablet (20 mg total) by mouth daily at bedtime 90 tablet 0    melatonin 3 mg Take 3 mg by mouth daily at bedtime      metoprolol succinate (TOPROL-XL) 25 mg 24 hr tablet Take 1 tablet (25 mg total) by mouth daily 90 tablet 3    pantoprazole (PROTONIX) 40 mg tablet Take 1 tablet (40 mg total) by mouth daily in the early morning 90 tablet 3    polyethylene glycol (MIRALAX) 17 g packet Take 17 g by mouth daily 30 each 0    Tafamidis (Vyndamax) 61 MG CAPS Take 61 mg by mouth daily 30 capsule 11    torsemide (DEMADEX) 10 mg tablet 1 and 2 tabs on alternate days (Patient taking differently: 10 mg 1 and 2 tabs on alternate days) 135 tablet 3     No current facility-administered medications for this visit  Allergies   Allergen Reactions    Other Sneezing     Seasonal; pollen; reactions; congestion    Penicillin G Benzathine Rash    Penicillins Rash       Review of Systems   All other systems reviewed and are negative  Video Exam    /59   Pulse 76   Ht 6' 1 5" (1 867 m)   Wt 90 7 kg (200 lb)   BMI 26 03 kg/m²       Physical Exam unable to perform, telephone visit  I spent 30 minutes directly with the patient during this visit      C/Zachery 10 acknowledges that he has consented to an online visit or consultation  He understands that the online visit is based solely on information provided by him, and that, in the absence of a face-to-face physical evaluation by the physician, the diagnosis he receives is both limited and provisional in terms of accuracy and completeness  This is not intended to replace a full medical face-to-face evaluation by the physician  Yared Chandra understands and accepts these terms

## 2021-06-23 ENCOUNTER — APPOINTMENT (OUTPATIENT)
Dept: LAB | Facility: CLINIC | Age: 81
End: 2021-06-23
Payer: MEDICARE

## 2021-06-23 LAB
ALBUMIN SERPL BCP-MCNC: 3.7 G/DL (ref 3.5–5)
ALP SERPL-CCNC: 155 U/L (ref 46–116)
ALT SERPL W P-5'-P-CCNC: 26 U/L (ref 12–78)
ANION GAP SERPL CALCULATED.3IONS-SCNC: 4 MMOL/L (ref 4–13)
AST SERPL W P-5'-P-CCNC: 22 U/L (ref 5–45)
BILIRUB SERPL-MCNC: 3.13 MG/DL (ref 0.2–1)
BUN SERPL-MCNC: 26 MG/DL (ref 5–25)
CALCIUM SERPL-MCNC: 9.8 MG/DL (ref 8.3–10.1)
CHLORIDE SERPL-SCNC: 103 MMOL/L (ref 100–108)
CO2 SERPL-SCNC: 31 MMOL/L (ref 21–32)
CREAT SERPL-MCNC: 1.58 MG/DL (ref 0.6–1.3)
GFR SERPL CREATININE-BSD FRML MDRD: 41 ML/MIN/1.73SQ M
GLUCOSE SERPL-MCNC: 98 MG/DL (ref 65–140)
POTASSIUM SERPL-SCNC: 4.8 MMOL/L (ref 3.5–5.3)
PROT SERPL-MCNC: 7.3 G/DL (ref 6.4–8.2)
SODIUM SERPL-SCNC: 138 MMOL/L (ref 136–145)
TSH SERPL DL<=0.05 MIU/L-ACNC: 0.71 UIU/ML (ref 0.36–3.74)

## 2021-06-23 PROCEDURE — 84443 ASSAY THYROID STIM HORMONE: CPT | Performed by: INTERNAL MEDICINE

## 2021-06-23 PROCEDURE — 36415 COLL VENOUS BLD VENIPUNCTURE: CPT | Performed by: INTERNAL MEDICINE

## 2021-06-23 PROCEDURE — 80053 COMPREHEN METABOLIC PANEL: CPT | Performed by: INTERNAL MEDICINE

## 2021-06-24 ENCOUNTER — TELEPHONE (OUTPATIENT)
Dept: CARDIOLOGY CLINIC | Facility: CLINIC | Age: 81
End: 2021-06-24

## 2021-06-24 NOTE — TELEPHONE ENCOUNTER
Spoke with Valentina Verma,      Provided the results of his lab work  He acknowledged  ===View-only below this line===  ----- Message -----  From: LEWIS Bull  Sent: 6/24/2021   7:44 AM EDT  To: KIRBY Dixon Caro  Can you let patient know that his kidney function is stable       Thanks    aravind

## 2021-06-25 ENCOUNTER — TELEPHONE (OUTPATIENT)
Dept: CARDIOLOGY CLINIC | Facility: CLINIC | Age: 81
End: 2021-06-25

## 2021-06-30 ENCOUNTER — TELEPHONE (OUTPATIENT)
Dept: INTERNAL MEDICINE CLINIC | Facility: CLINIC | Age: 81
End: 2021-06-30

## 2021-06-30 NOTE — TELEPHONE ENCOUNTER
Patient is scheduled on 7/19 for and ANGELIV  His wife is asking if appointment can be changed to Virtual due to the patient's mobility issues  He uses a walker and states that due to the narrow doorways and hallways here at our facility, the patient has issues maneuvering      Please advise Johnnie Kearney CB# [phone]

## 2021-07-09 ENCOUNTER — IN-CLINIC DEVICE VISIT (OUTPATIENT)
Dept: CARDIOLOGY CLINIC | Facility: CLINIC | Age: 81
End: 2021-07-09
Payer: MEDICARE

## 2021-07-09 DIAGNOSIS — Z95.810 PRESENCE OF IMPLANTABLE CARDIOVERTER-DEFIBRILLATOR (ICD): Primary | ICD-10-CM

## 2021-07-09 PROCEDURE — 93284 PRGRMG EVAL IMPLANTABLE DFB: CPT | Performed by: INTERNAL MEDICINE

## 2021-07-09 NOTE — PROGRESS NOTES
MDT CRT-D (VVIR 70)   DEVICE INTERROGATED IN THE [place] OFFICE:  BATTERY VOLTAGE NEARING ADDY (10 MONTHS)  WILL SCHEDULE MONTHLY BATTERY CHECKS   BP 99 3% VSRP 0 7%    ALL AVAILABLE LEAD PARAMETERS WITHIN NORMAL LIMITS   NO NEW HIGH RATE OR V SENSE EPISODES   OPTI-VOL WITHIN NORMAL LIMITS   NO PROGRAMMING CHANGES MADE TO DEVICE PARAMETERS   NORMAL DEVICE FUNCTION   RG

## 2021-07-12 NOTE — PROGRESS NOTES
[facility] [address] [address]  Cardiology Follow Up    Lori Almanza  1940  [id number]      1  Dilated cardiomyopathy ([address] )     2  Chronic systolic HF (heart failure) (HCC)     3  Cardiac amyloidosis ([facility] )     4  Atrial fibrillation         Chief Complaint   Patient presents with    Follow-up       Interval History:   Patient presents for follow-up visit  Patient has multiple medical problems including nonischemic cardiomyopathy and chronic systolic heart failure as well as chronic atrial fibrillation on rate control on anticoagulation  Patient also diagnosed to have cardiac amyloidosis and has been on Tafamidis  patient is bothered with chronic back  No history of bleeding issues  Patient denies any history of ICD discharges      Patient Active Problem List   Diagnosis    Cardiomyopathy ([facility] )    Chronic systolic heart failure (HCC)    Atrial fibrillation    Chronic anticoagulation    Dilation of thoracic aorta (HCC)    Hyperlipidemia    Essential hypertension    Presence of automatic cardioverter/defibrillator (AICD)    Spinal stenosis of lumbar region with neurogenic claudication    Biventricular congestive heart failure (HCC)    Nonsustained ventricular tachycardia    Stage 3 chronic kidney disease ([facility] )    AAA (abdominal aortic aneurysm) (HCC)    Enlarged prostate    Kidney stone    Other proteinuria    Gastroparesis     Past Medical History:   Diagnosis Date    Anticoagulant long-term use     Atrial fibrillation ([address] )     Cardiac defibrillator in situ     Cardiomyopathy ([facility] )     Colon polyp     Congestive heart failure (CHF) (HCC)     DJD (degenerative joint disease)     HL (hearing loss)     Hyperlipidemia     Hypertension     Shortness of breath     Sick sinus syndrome ([address] )     Spinal stenosis      Social History     Socioeconomic History    Marital status: /Civil Union     Spouse name: Not on file    Number of children: Not on file    Years of education: Not on file    Highest education level: Not on file   Occupational History    Not on file   Tobacco Use    Smoking status: Former Smoker     Packs/day: 0 50     Years: 3 00     Pack years: 1 50     Types: Cigarettes, Cigars     Quit date: 1968     Years since quittin 4    Smokeless tobacco: Never Used   Vaping Use    Vaping Use: Never used   Substance and Sexual Activity    Alcohol use: Yes     Alcohol/week: 2 0 standard drinks     Types: 2 Glasses of wine per week     Comment: daily    Drug use: Never    Sexual activity: Not Currently   Other Topics Concern    Not on file   Social History Narrative    Active advance directive    Caffeine use     Social Determinants of Health     Financial Resource Strain:     Difficulty of Paying Living Expenses:    Food Insecurity:     Worried About Running Out of Food in the Last Year:     Ran Out of Food in the Last Year:    Transportation Needs:     Lack of Transportation (Medical):      Lack of Transportation (Non-Medical):    Physical Activity: Inactive    Days of Exercise per Week: 0 days    Minutes of Exercise per Session: 0 min   Stress: Stress Concern Present    Feeling of Stress : Very much   Social Connections:     Frequency of Communication with Friends and Family:     Frequency of Social Gatherings with Friends and Family:     Attends Latter-day Services:     Active Member of Clubs or Organizations:     Attends Club or Organization Meetings:     Marital Status:    Intimate Partner Violence:     Fear of Current or Ex-Partner:     Emotionally Abused:     Physically Abused:     Sexually Abused:       Family History   Problem Relation Age of Onset    Coronary artery disease Mother     Hypertension Son      Past Surgical History:   Procedure Laterality Date    CARDIAC DEFIBRILLATOR PLACEMENT      [facility] RETROGRADE PYELOGRAM  11/3/2020    Judd Yang / ADENIKE / Mary Braswell  KNEE SURGERY Left     M[facility]ISCECTOMY      in [facility] had this    [place] COLONOSCOPY FLX DX W/COLLJ SPEC WHEN PFRMD N/A 6/5/2017    Procedure: COLONOSCOPY;  Surgeon: Griselda Rodriguez MD;  [facility];   Service: Gastroenterology    [place] CYSTO/URETERO W/LITHOTRIPSY &INDWELL STENT INSRT Left 11/3/2020    Procedure: CYSTOSCOPY URETEROSCOPY WITH LITHOTRIPSY HOLMIUM LASER, RETROGRADE PYELOGRAM AND INSERTION STENT URETERAL;  Surgeon: Audie Hickey MD;  [facility];  Service: Urology    [place] CYSTOURETHROSCOPY,URETER CATHETER Left 9/29/2020    Procedure: CYSTOSCOPY RETROGRADE PYELOGRAM WITH INSERTION STENT URETERAL;  Surgeon: Renato Narvaez MD;  [facility];  Service: Urology    TONSILLECTOMY      TRANSURETHRAL RESECTION OF PROSTATE         Current Outpatient Medications:     amiodarone 200 mg tablet, 1 tab daily, Disp: 90 tablet, Rfl: 3    apixaban (ELIQUIS) 2 5 mg, Take 1 tablet (2 5 mg total) by mouth 2 (two) times a day, Disp: 180 tablet, Rfl: 3    diphenhydrAMINE-APAP, sleep, (TYLENOL PM EXTRA STRENGTH PO), Take by mouth as needed , Disp: , Rfl:     famotidine (PEPCID) 20 mg tablet, Take 1 tablet (20 mg total) by mouth daily at bedtime, Disp: 90 tablet, Rfl: 3    melatonin 3 mg, Take 3 mg by mouth daily at bedtime, Disp: , Rfl:     metoprolol succinate (TOPROL-XL) 25 mg 24 hr tablet, Take 1 tablet (25 mg total) by mouth daily, Disp: 90 tablet, Rfl: 3    pantoprazole (PROTONIX) 40 mg tablet, Take 1 tablet (40 mg total) by mouth daily in the early morning, Disp: 90 tablet, Rfl: 3    polyethylene glycol (MIRALAX) 17 g packet, Take 17 g by mouth daily, Disp: 30 each, Rfl: 0    Tafamidis (Vyndamax) 61 MG CAPS, Take 61 mg by mouth daily, Disp: 30 capsule, Rfl: 11    torsemide (DEMADEX) 10 mg tablet, 1 and 2 tabs on alternate days, Disp: 90 tablet, Rfl: 3    Alum Hydroxide-Mag Carbonate (GAVISCON EXTRA STRENGTH PO), Take by mouth, Disp: , Rfl:     docusate sodium (COLACE) 100 mg capsule, Take 1 capsule (100 mg total) by mouth 2 (two) times a day (Patient not taking: Reported on 6/9/2021), Disp: 60 capsule, Rfl: 5  Allergies   Allergen Reactions    Other Sneezing     Seasonal; pollen; reactions; congestion    Penicillin G Benzathine Rash    Penicillins Rash       Labs:  Office Visit on 06/09/2021   Component Date Value    Sodium 06/23/2021 138     Potassium 06/23/2021 4 8     Chloride 06/23/2021 103     CO2 06/23/2021 31     ANION GAP 06/23/2021 4     BUN 06/23/2021 26*    Creatinine 06/23/2021 1 58*    Glucose 06/23/2021 98     Calcium 06/23/2021 9 8     AST 06/23/2021 22     ALT 06/23/2021 26     Alkaline Phosphatase 06/23/2021 155*    Total Protein 06/23/2021 7 3     Albumin 06/23/2021 3 7     Total Bilirubin 06/23/2021 3 13*    eGFR 06/23/2021 41     TSH 3RD GENERATON 06/23/2021 0 715      Imaging: Cardiac EP device report    Result Date: 7/9/2021  Narrative: MDT CRT-D (VVIR 70) DEVICE INTERROGATED IN THE [place] OFFICE:  BATTERY VOLTAGE NEARING ADDY (10 MONTHS)  WILL SCHEDULE MONTHLY BATTERY CHECKS  BP 99 3% VSRP 0 7%  ALL AVAILABLE LEAD PARAMETERS WITHIN NORMAL LIMITS  NO NEW HIGH RATE OR V SENSE EPISODES  OPTI-VOL WITHIN NORMAL LIMITS  NO PROGRAMMING CHANGES MADE TO DEVICE PARAMETERS  NORMAL DEVICE FUNCTION    RG       Review of Systems:  Review of Systems     REVIEW OF SYSTEMS:  Constitutional:  Denies fever or chills   Eyes:  Denies change in visual acuity   HENT:  Denies nasal congestion or sore throat   Respiratory:   shortness of breath   Cardiovascular:  Denies chest pain or edema   GI:  Denies abdominal pain, nausea, vomiting, bloody stools or diarrhea   :  Denies dysuria, frequency, difficulty in micturition and nocturia  Musculoskeletal:    Back pain  Neurologic:  Denies headache, focal weakness or sensory changes   Endocrine:  Denies polyuria or polydipsia   Lymphatic:  Denies swollen glands   Psychiatric:  Denies depression or anxiety Physical Exam:    /70   Pulse 77   Ht 6' 1 5" (1 867 m)   Wt 89 4 kg (197 lb)   SpO2 99%   BMI 25 64 kg/m²     Physical Exam    PHYSICAL EXAM:  General:  Patient is not in acute distress   Head: Normocephalic, Atraumatic  HEENT:  Both pupils normal-size atraumatic, normocephalic, nonicteric  Neck:  JVP not raised  Trachea central  No carotid bruit  Respiratory:  Decreased breath sounds bilateral  Cardiovascular:   Irregularly irregular  GI:  Abdomen soft nontender  No organomegaly  Lymphatic:  No cervical or inguinal lymphadenopathy  Neurologic:  Patient is awake alert, oriented   Grossly nonfocal   extremities no edema    Discussion/Summary:   Patient with multiple medical problems who seems to be doing reasonably well from cardiac standpoint  Previous studies reviewed with patient  Medications reviewed and possible side effects discussed  concepts of cardiovascular disease , signs and symptoms of heart disease  Dietary and risk factor modification reinforced  All questions answered  Safety measures reviewed  Patient advised to report any problems prompting medical attention  patient understands the risks and benefits of anticoagulation to prevent thromboembolic risk  Patient  To report any bleeding issues  Patient will continue to follow up with device Clinic  Patient had some GI issues which have been addressed by Gastroenterology  Patient did have gastric emptying studies which were abnormal and has been recommended frequent small meals  Patient will continue to follow-up with heart failure cardiology for cardiac amyloidosis  Importance of salt restriction reinforced  Patient does have chronic kidney disease and needs periodically monitoring of renal functions  Follow-up in 3 months or earlier as needed  Patient is agreeable with the plan of care

## 2021-07-19 PROBLEM — I13.0 HYPERTENSIVE HEART AND KIDNEY DISEASE WITH CHRONIC SYSTOLIC CONGESTIVE HEART FAILURE AND STAGE 3B CHRONIC KIDNEY DISEASE (HCC): Chronic | Status: ACTIVE | Noted: 2021-01-01

## 2021-07-19 PROBLEM — N18.32 HYPERTENSIVE HEART AND KIDNEY DISEASE WITH CHRONIC SYSTOLIC CONGESTIVE HEART FAILURE AND STAGE 3B CHRONIC KIDNEY DISEASE (HCC): Chronic | Status: ACTIVE | Noted: 2021-01-01

## 2021-07-19 PROBLEM — I50.22 HYPERTENSIVE HEART AND KIDNEY DISEASE WITH CHRONIC SYSTOLIC CONGESTIVE HEART FAILURE AND STAGE 3B CHRONIC KIDNEY DISEASE (HCC): Chronic | Status: ACTIVE | Noted: 2021-01-01

## 2021-07-19 PROBLEM — I50.22 HYPERTENSIVE HEART AND KIDNEY DISEASE WITH CHRONIC SYSTOLIC CONGESTIVE HEART FAILURE AND STAGE 3B CHRONIC KIDNEY DISEASE (HCC): Status: ACTIVE | Noted: 2021-01-01

## 2021-07-19 PROBLEM — N18.32 HYPERTENSIVE HEART AND KIDNEY DISEASE WITH CHRONIC SYSTOLIC CONGESTIVE HEART FAILURE AND STAGE 3B CHRONIC KIDNEY DISEASE (HCC): Status: ACTIVE | Noted: 2021-01-01

## 2021-07-19 PROBLEM — I13.0 HYPERTENSIVE HEART AND KIDNEY DISEASE WITH CHRONIC SYSTOLIC CONGESTIVE HEART FAILURE AND STAGE 3B CHRONIC KIDNEY DISEASE (HCC): Status: ACTIVE | Noted: 2021-01-01

## 2021-07-19 NOTE — PROGRESS NOTES
Virtual Regular Visit    Verification of patient location:    Patient is currently located in the state 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 ([address] )    BP recently was controlled at cardiologist office  Continue to monitor renal function closely  Has upcoming labs to get completed for nephrology  Avoid nephrotoxins like oral NSAIDs  Heart failure is stable  He is on appropriate medical therapy and follows with heart failure specialist  He watches salt and fluid intake  Monitor weight  2  Spinal stenosis of lumbar region with neurogenic claudication    Continue tylenol as needed  Ok to use voltaren gel as needed  - Diclofenac Sodium (VOLTAREN) 1 %; Apply 2 g topically 4 (four) times a day  Dispense: 350 g; Refill: 3         Reason for visit is   Chief Complaint   Patient presents with   [address] Virtual Regular Visit      Encounter provider Ignacia Lieberman DO    Provider located at [address] [place] [address]      Recent Visits  No visits were found meeting these conditions  Showing recent visits within past 7 days and meeting all other requirements  Today's Visits  Date Type Provider Dept   07/19/21 Telemedicine Layton Lange today's visits and meeting all other requirements  Future Appointments  No visits were found meeting these conditions  Showing future appointments within next 150 days and meeting all other requirements     The patient was identified by name and date of birth  Sulma Rodriguez was informed that this is a telemedicine visit and that the visit is being conducted through [address] [facility] and patient was informed that this is a secure, HIPAA-compliant platform  He agrees to proceed  Stephanie Wilson My office door was closed  No one else was in the room  He acknowledged consent and understanding of privacy and security of the video platform  The patient has agreed to participate and understands they can discontinue the visit at any time  Patient is aware this is a billable service  Subjective  Baltazar Kilgore is a [de-identified] y o  male with multiple medical problems  Renal function has been stable as of late  Saw cardiology recently and no medications were changed  He struggles most with fatigue, back pain, and gastroparesis  Vyndamax (which is for his amyloidosis) is known to cause problems with gastroparesis  It was confirmed on nuclear medicine gastric emptying study  He is going to make f/u with Dr Felipa Petersen  No recent falls  He wants to try voltaren gel on his back  He can't tolerate opiates  He does tolerate tylenol  Past Medical History:   Diagnosis Date    Anticoagulant long-term use     Atrial fibrillation (HCC)     Cardiac defibrillator in situ     Cardiomyopathy (HCC)     Colon polyp     Congestive heart failure (CHF) (HCC)     DJD (degenerative joint disease)     HL (hearing loss)     Hyperlipidemia     Hypertension     Shortness of breath     Sick sinus syndrome ([address] )     Spinal stenosis        Past Surgical History:   Procedure Laterality Date    CARDIAC DEFIBRILLATOR PLACEMENT      [place] RETROGRADE PYELOGRAM  11/3/2020    INSERT / REPLACE / REMOVE PACEMAKER      KNEE SURGERY Left     [facility]CTOMY      in [facility]    [place] COLONOSCOPY FLX DX W/COLLJ SPEC WHEN PFRMD N/A 6/5/2017    Procedure: COLONOSCOPY;  Surgeon: Blake Gray MD;  [facility];   Service: Gastroenterology    [place] CYSTO/URETERO W/LITHOTRIPSY &INDWELL STENT INSRT Left 11/3/2020    Procedure: CYSTOSCOPY URETEROSCOPY WITH LITHOTRIPSY HOLMIUM LASER, RETROGRADE PYELOGRAM AND INSERTION STENT URETERAL;  Surgeon: Og Orr MD;  [facility];  Service: Urology    [place] CYSTOURETHROSCOPY,URETER CATHETER Left 9/29/2020    Procedure: CYSTOSCOPY RETROGRADE PYELOGRAM WITH INSERTION STENT URETERAL;  Surgeon: Lina Walker MD;  [facility];  Service: Urology    TONSILLECTOMY      TRANSURETHRAL RESECTION OF PROSTATE         Current Outpatient Medications   Medication Sig Dispense Refill    Alum Hydroxide-Mag Carbonate (GAVISCON EXTRA STRENGTH PO) Take by mouth      amiodarone 200 mg tablet 1 tab daily 90 tablet 3    apixaban (ELIQUIS) 2 5 mg Take 1 tablet (2 5 mg total) by mouth 2 (two) times a day 180 tablet 3    Diclofenac Sodium (VOLTAREN) 1 % Apply 2 g topically 4 (four) times a day 350 g 3    diphenhydrAMINE-APAP, sleep, (TYLENOL PM EXTRA STRENGTH PO) Take by mouth as needed       docusate sodium (COLACE) 100 mg capsule Take 1 capsule (100 mg total) by mouth 2 (two) times a day (Patient not taking: Reported on 6/9/2021) 60 capsule 5    famotidine (PEPCID) 20 mg tablet Take 1 tablet (20 mg total) by mouth daily at bedtime 90 tablet 3    melatonin 3 mg Take 3 mg by mouth daily at bedtime      metoprolol succinate (TOPROL-XL) 25 mg 24 hr tablet Take 1 tablet (25 mg total) by mouth daily 90 tablet 3    pantoprazole (PROTONIX) 40 mg tablet Take 1 tablet (40 mg total) by mouth daily in the early morning 90 tablet 3    polyethylene glycol (MIRALAX) 17 g packet Take 17 g by mouth daily 30 each 0    Tafamidis (Vyndamax) 61 MG CAPS Take 61 mg by mouth daily 30 capsule 11    torsemide (DEMADEX) 10 mg tablet 1 and 2 tabs on alternate days 90 tablet 3     No current facility-administered medications for this visit  Allergies   Allergen Reactions    Other Sneezing     Seasonal; pollen; reactions; congestion    Penicillin G Benzathine Rash    Penicillins Rash       Review of Systems   Constitutional: Positive for fatigue  Negative for chills and fever  Respiratory: Negative  Cardiovascular: Negative      Gastrointestinal: Positive for abdominal pain, constipation and nausea  Negative for diarrhea  Musculoskeletal: Positive for back pain and gait problem  Neurological: Positive for weakness  Video Exam    There were no vitals filed for this visit  Physical Exam  Constitutional:       General: He is not in acute distress  Appearance: He is well-developed  He is not diaphoretic  Eyes:      General: No scleral icterus  Right eye: No discharge  Left eye: No discharge  Conjunctiva/sclera: Conjunctivae normal    Pulmonary:      Effort: Pulmonary effort is normal  No respiratory distress  Neurological:      Mental Status: He is alert and oriented to person, place, and time  Psychiatric:         Behavior: Behavior normal         I spent 20 minutes directly with the patient during this visit    Adriantrazaynab 9 verbally agrees to participate in [employer]  Pt is aware that [employer] could be limited without vital signs or the ability to perform a full hands-on physical Tasneem Donell understands he or the provider may request at any time to terminate the video visit and request the patient to seek care or treatment in person

## 2021-07-19 NOTE — PROGRESS NOTES
Virtual AWV Consent    Verification of patient location:    Patient is currently located in the state of [place]  Patient is currently located in a state in which I am licensed    Reason for visit is virtual AWV    Encounter provider Ke Conte DO    Provider located at [address] [place] [address]    After connecting through [employer], the patient was identified by name and date of birth  Priyanka Rea was informed that this is a telemedicine visit and that the visit is being conducted through [address] [facility] and patient was informed that this is a secure, HIPAA-compliant platform  He agrees to proceed  My office door was closed  No one else was in the room  He acknowledged consent and understanding of privacy and security of the video platform  Priyanka Rea verbally agrees to participate in [employer]  Pt is aware that [employer] could be limited without vital signs or the ability to perform a full hands-on physical Alayna Daft understands he or the provider may request at any time to terminate the video visit and request the patient to seek care or treatment in person  Patient is aware this is a billable service  Assessment and Plan:     1  Medicare annual wellness visit, subsequent       Preventive health issues were discussed with patient, and age appropriate screening tests were ordered as noted in patient's After Visit Summary  Personalized health advice and appropriate referrals for health education or preventive services given if needed, as noted in patient's After Visit Summary       History of Present Illness:     Patient presents for Medicare Annual Wellness visit    Patient Care Team:  Ke Conte DO as PCP - MD Brad King MD as Endoscopist  Jeevan Briseno MD (Nephrology)     Problem List:     Patient Active Problem List   Diagnosis    Cardiomyopathy ([facility] )    Chronic systolic heart failure (HCC)    Atrial fibrillation    Chronic anticoagulation    Dilation of thoracic aorta (HCC)    Hyperlipidemia    Essential hypertension    Presence of automatic cardioverter/defibrillator (AICD)    Spinal stenosis of lumbar region with neurogenic claudication    Biventricular congestive heart failure (HCC)    Nonsustained ventricular tachycardia    Stage 3 chronic kidney disease ([facility] [address] [facility] )    AAA (abdominal aortic aneurysm) (HCC)    Enlarged prostate    Kidney stone    Other proteinuria    Gastroparesis    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)      Past Medical and Surgical History:     Past Medical History:   Diagnosis Date    Anticoagulant long-term use     Atrial fibrillation (HCC)     Cardiac defibrillator in situ     Cardiomyopathy (HCC)     Colon polyp     Congestive heart failure (CHF) (HCC)     DJD (degenerative joint disease)     HL (hearing loss)     Hyperlipidemia     Hypertension     Shortness of breath     Sick sinus syndrome (HCC)     Spinal stenosis      Past Surgical History:   Procedure Laterality Date    CARDIAC DEFIBRILLATOR PLACEMENT      FL RETROGRADE PYELOGRAM  11/3/2020    INSERT / REPLACE / REMOVE PACEMAKER      KNEE SURGERY Left     MENISCECTOMY      in [facility] had this    AZ COLONOSCOPY FLX DX W/COLLJ SPEC WHEN PFRMD N/A 6/5/2017    Procedure: COLONOSCOPY;  Surgeon: Simón Alatorre MD;  Location: [facility];   Service: Gastroenterology    AZ CYSTO/URETERO W/LITHOTRIPSY &INDWELL STENT INSRT Left 11/3/2020    Procedure: CYSTOSCOPY URETEROSCOPY WITH LITHOTRIPSY HOLMIUM LASER, RETROGRADE PYELOGRAM AND INSERTION STENT URETERAL;  Surgeon: Rina Soares MD;  Location: [facility];  Service: Urology    AZ CYSTOURETHROSCOPY,URETER CATHETER Left 9/29/2020    Procedure: CYSTOSCOPY RETROGRADE PYELOGRAM WITH INSERTION STENT URETERAL;  Surgeon: Mitali Schmitt MD;  [facility];  Service: Urology    TONSILLECTOMY      TRANSURETHRAL RESECTION OF PROSTATE        Family History:     Family History   Problem Relation Age of Onset    Coronary artery disease Mother     Hypertension Son       Social History:     Social History     Socioeconomic History    Marital status: /Civil Union     Spouse name: None    Number of children: None    Years of education: None    Highest education level: None   Occupational History    None   Tobacco Use    Smoking status: Former Smoker     Packs/day: 0 50     Years: 3 00     Pack years: 1 50     Types: Cigarettes, Cigars     Quit date: 1968     Years since quittin 3    Smokeless tobacco: Never Used   Vaping Use    Vaping Use: Never used   Substance and Sexual Activity    Alcohol use: Yes     Alcohol/week: 2 0 standard drinks     Types: 2 Glasses of wine per week     Comment: daily    Drug use: Never    Sexual activity: Not Currently   Other Topics Concern    None   Social History Narrative    Active advance directive    Caffeine use     Social Determinants of Health     Financial Resource Strain:     Difficulty of Paying Living Expenses:    Food Insecurity:     Worried About Running Out of Food in the Last Year:     Ran Out of Food in the Last Year:    Transportation Needs:     Lack of Transportation (Medical):      Lack of Transportation (Non-Medical):    Physical Activity: Inactive    Days of Exercise per Week: 0 days    Minutes of Exercise per Session: 0 min   Stress: Stress Concern Present    Feeling of Stress : Very much   Social Connections:     Frequency of Communication with Friends and Family:     Frequency of Social Gatherings with Friends and Family:     Attends Islam Services:     Active Member of Clubs or Organizations:     Attends Club or Organization Meetings:     Marital Status:    Intimate Partner Violence:     Fear of Current or Ex-Partner:     Emotionally Abused:     Physically Abused:     Sexually Abused:       Medications and Allergies:     Current Outpatient Medications   Medication Sig Dispense Refill    Alum Hydroxide-Mag Carbonate (GAVISCON EXTRA STRENGTH PO) Take by mouth      amiodarone 200 mg tablet 1 tab daily 90 tablet 3    apixaban (ELIQUIS) 2 5 mg Take 1 tablet (2 5 mg total) by mouth 2 (two) times a day 180 tablet 3    diphenhydrAMINE-APAP, sleep, (TYLENOL PM EXTRA STRENGTH PO) Take by mouth as needed       docusate sodium (COLACE) 100 mg capsule Take 1 capsule (100 mg total) by mouth 2 (two) times a day (Patient not taking: Reported on 6/9/2021) 60 capsule 5    famotidine (PEPCID) 20 mg tablet Take 1 tablet (20 mg total) by mouth daily at bedtime 90 tablet 3    melatonin 3 mg Take 3 mg by mouth daily at bedtime      metoprolol succinate (TOPROL-XL) 25 mg 24 hr tablet Take 1 tablet (25 mg total) by mouth daily 90 tablet 3    pantoprazole (PROTONIX) 40 mg tablet Take 1 tablet (40 mg total) by mouth daily in the early morning 90 tablet 3    polyethylene glycol (MIRALAX) 17 g packet Take 17 g by mouth daily 30 each 0    Tafamidis (Vyndamax) 61 MG CAPS Take 61 mg by mouth daily 30 capsule 11    torsemide (DEMADEX) 10 mg tablet 1 and 2 tabs on alternate days 90 tablet 3     No current facility-administered medications for this visit       Allergies   Allergen Reactions    Other Sneezing     Seasonal; pollen; reactions; congestion    Penicillin G Benzathine Rash    Penicillins Rash      Immunizations:     Immunization History   Administered Date(s) Administered    INFLUENZA 09/01/2013    Influenza Split High Dose Preservative Free IM 10/04/2017    Influenza, high dose seasonal 0 7 mL 10/02/2018, 09/17/2019, 11/09/2020    Influenza, seasonal, injectable 10/07/2014, 10/05/2016    Pneumococcal Conjugate 13-Valent 01/14/2020    Pneumococcal Polysaccharide PPV23 10/12/2016    SARS-CoV-2 / COVID-19 mRNA IM (Moderna) 01/26/2021, 02/22/2021    Tdap 11/16/2015    Zoster 01/01/2010    Zoster Vaccine Recombinant 07/30/2019, 10/01/2019      Health Maintenance:         Topic Date Due    Colorectal Cancer Screening  06/05/2027    Hepatitis C Screening  Completed         Topic Date Due    Influenza Vaccine (1) 09/01/2021      Medicare Health Risk Assessment:     There were no vitals taken for this visit  Last Medicare Wellness visit information reviewed, patient interviewed and updates made to the record today  Health Risk Assessment:   Patient rates overall health as poor  Patient feels that their physical health rating is much worse  Patient is satisfied with their life  Eyesight was rated as same  Hearing was rated as same  Patient feels that their emotional and mental health rating is slightly worse  Patients states they are never, rarely angry  Patient states they are always unusually tired/fatigued  Pain experienced in the last 7 days has been some  Patient's pain rating has been 8/10  Patient states that he has experienced no weight loss or gain in last 6 months  Depression Screening:   PHQ-2 Score: 0      Fall Risk Screening: In the past year, patient has experienced: no history of falling in past year      Home Safety:  Patient has trouble with stairs inside or outside of their home  Patient has working smoke alarms and has working carbon monoxide detector  Home safety hazards include: none  Nutrition:   Current diet is No Added Salt and Regular  Medications:   Patient is currently taking over-the-counter supplements  OTC medications include: see medication list  Patient is not able to manage medications  Activities of Daily Living (ADLs)/Instrumental Activities of Daily Living (IADLs):   Walk and transfer into and out of bed and chair?: Yes  Dress and groom yourself?: Yes    Bathe or shower yourself?: No    Feed yourself?  Yes  Do your laundry/housekeeping?: No  Manage your money, pay your bills and track your expenses?: Yes  Make your own meals?: Yes    Do your own shopping?: No    Durable Medical Equipment Suppliers  none    Previous Hospitalizations:   Any hospitalizations or ED visits within the last 12 months?: No      Advance Care Planning:   Living will: Yes    Durable POA for healthcare: Yes    Advanced directive: Yes    Five wishes given: No      Cognitive Screening:   Provider or family/friend/caregiver concerned regarding cognition?: No    PREVENTIVE SCREENINGS      Cardiovascular Screening:    General: Screening Not Indicated and History Lipid Disorder      Diabetes Screening:     General: Screening Current      Colorectal Cancer Screening:     General: Screening Current      Prostate Cancer Screening:    General: Screening Not Indicated      Osteoporosis Screening:    General: Screening Not Indicated      Abdominal Aortic Aneurysm (AAA) Screening:    Risk factors include: tobacco use        General: Screening Not Indicated and History AAA      Lung Cancer Screening:     General: Screening Not Indicated      Hepatitis C Screening:    General: Screening Current    Screening, Brief Intervention, and Referral to Treatment (SBIRT)    Screening  Typical number of drinks in a day: 1  Typical number of drinks in a week: 3  Interpretation: Low risk drinking behavior  AUDIT-C Screenin) How often did you have a drink containing alcohol in the past year? 4 or more times a week  2) How many drinks did you have on a typical day when you were drinking in the past year? 1 to 2  3) How often did you have 6 or more drinks on one occasion in the past year? never    AUDIT-C Score: 4  Interpretation: Score 4-12 (male): POSITIVE screen for alcohol misuse    AUDIT Screenin) How often during the last year have you found that you were not able to stop drinking once you had started?  0 - never  5) How often during the last year have you failed to do what was normally expected from you because of drinking? 0 - never  6) How often during the last year have you needed a first drink in the morning to get yourself going after a heavy drinking session? 0 - never  7) How often during the last year have you had a feeling of guilt or remorse after drinking? 0 - never  8) How often during the last year have you been unable to remember what happened the night before because you had been drinking? 0 - never  9) Have you or someone else been injured as a result of your drinking? 0 - no  10) Has a relative or friend or a doctor or another health worker been concerned about your drinking or suggested you cut down? 0 - no    AUDIT Score: 4  Interpretation: Low risk alcohol consumption    Single Item Drug Screening:  How often have you used an illegal drug (including marijuana) or a prescription medication for non-medical reasons in the past year? never    Single Item Drug Screen Score: 0  Interpretation: Negative screen for possible drug use disorder    Brief Intervention  Alcohol & drug use screenings were reviewed  No concerns regarding substance use disorder identified  Other Counseling Topics:   Car/seat belt/driving safety, skin self-exam, sunscreen and regular weightbearing exercise       Ke Conte, DO

## 2021-07-23 NOTE — TELEPHONE ENCOUNTER
Informed patient's spouse that it is ok for the patient to use Diuclofenac Sodium 1% gel  Spouse understood

## 2021-07-23 NOTE — TELEPHONE ENCOUNTER
Patient's wife Igor Delgadillo, came into the office today  Igor Delgadillo stated that patient reecently saw PCP and was prescribed Diclofenac Sodium 1% gel  After reading side effects, would like to know is patient okay to take to use primarily on the back but, also on legs and arms?

## 2021-08-12 NOTE — TELEPHONE ENCOUNTER
Patient wife called and stated that her  has been hospitalized in [facility]  from a fall since Monday  She stated that she is concerned about her  because the doctors do not know his history  She is requesting a phone on how to handle the matter  Requested to speak with Raamn Gonzalez but was unavailable

## 2021-08-17 NOTE — TELEPHONE ENCOUNTER
ptn wife called stating that ptn is taking protonix on the am and pepcid in the evening and it is only somewhat helping him  Also she read that a ptn should not be on protonix for more than 2 months and he has been on this medication since Dec  2020, she wants to know if this is safe for him? ptn is being transported to a rehab facility since ptn was in the hospital for falling so he cannot come in for an appt   Olivia please advise

## 2021-08-17 NOTE — TELEPHONE ENCOUNTER
We can try stopping the Pantoprazole and start taking the Pepcid BID  I faxed the Pepcid for BID to the pharmacy

## 2021-08-18 PROBLEM — R26.2 AMBULATORY DYSFUNCTION: Status: ACTIVE | Noted: 2021-01-01

## 2021-08-18 PROBLEM — S72.141D CLOSED COMMINUTED INTERTROCHANTERIC FRACTURE OF RIGHT FEMUR WITH ROUTINE HEALING: Status: ACTIVE | Noted: 2021-01-01

## 2021-08-18 PROBLEM — R77.8 ELEVATED TROPONIN: Status: ACTIVE | Noted: 2021-01-01

## 2021-08-18 PROBLEM — K59.01 SLOW TRANSIT CONSTIPATION: Status: ACTIVE | Noted: 2021-01-01

## 2021-08-18 PROBLEM — R79.89 ELEVATED TROPONIN: Status: ACTIVE | Noted: 2021-01-01

## 2021-08-18 PROBLEM — D64.9 ANEMIA: Status: ACTIVE | Noted: 2021-01-01

## 2021-08-18 NOTE — ASSESSMENT & PLAN NOTE
Lab Results   Component Value Date    EGFR 34 07/22/2021    EGFR 41 06/23/2021    EGFR 35 05/12/2021    CREATININE 1 84 (H) 07/22/2021    CREATININE 1 58 (H) 06/23/2021    CREATININE 1 80 (H) 05/12/2021     · Pt's creatinine has remained elevated but stable and near baseline while in the hospital    · Continue to monitor

## 2021-08-18 NOTE — ASSESSMENT & PLAN NOTE
· 8/10/21 echo report  · Overall LV size is normal with thickened septum and posterior wall measured at 2 1 cm  · LV EF of 10-15%  Global hypokinesis  · Moderately dilated left atrium  · Mildly dilated RV  ICD catheter present  · Mildly dilated RA  · Mild aortic regurgitation  · Mild-moderate TR  · Moderate MR with a centrally directed jet  · IVC/SVC: The inferior vena cava demonstrates a diameter of >21 mm and collapses <50%; therefore, the right atrial pressure is estimated at greater than 15 mmHg  · Pulmonic Valve: The estimated pulmonary artery systolic pressure is 30 4 mmHg  · No pericardial effusion  Left pleural effusion  · Pt was stable in the hospital from a cardiomyopathy perspective  · Cleared by cardiology for ORIF by ortho w/o intervention beforehand  · Placed in the ICU afterward because of high-risk cardiac hx  No complications  · Continue current dosing of torsemide  · Monitor volume status  · Continue diet  · Daily weights

## 2021-08-18 NOTE — ASSESSMENT & PLAN NOTE
· Chronic for the patient  He is s/p pacemaker and AICD placement  · Volume status was stable in the hospital  Cardiology saw him then and cleared him without other intervention before repair of the hip fx  · Imaging did show some pleural effusions and his BNP was 549, but pt was not c/o shortness of breath and his BNP has been in the several-thousands range previously  · Continue his torsemide as currently prescribed  His blood pressures tend toward the lower side, so aggressive diuresis could be harmful

## 2021-08-18 NOTE — ASSESSMENT & PLAN NOTE
· Pt has chronic spinal stenosis which contributes to numbness in his lower extremities and pain down his left leg  · Pt reports frequent falls as a result  · States that he has tried gabapentin in the past without relief  · Continue PT/OT

## 2021-08-18 NOTE — TELEPHONE ENCOUNTER
Please get a list of updated medications  There may have been changes made  depending on the clinical changes for  patient while he was hospitalized

## 2021-08-18 NOTE — ASSESSMENT & PLAN NOTE
· In the hospital, pt's troponins were 0 17 and 0 23  ECG was unchanged when compared with previous ones  · Cardiology did not comment on the values in their note  · Could be 2/2 demand ischemia from the fall/trauma and subsequent surgery  · Monitor for chest pain or shortness of breath

## 2021-08-18 NOTE — TELEPHONE ENCOUNTER
S/w Shannon Wilburn, she wanted to make you aware that patient is currently at [facility] for physical therapy and while at [facility] there were all new doctors which concerned her about patient's medications  Also Shannon Wilburn stated that since patient has been at [facility] that he is not getting his usual dosing schedule as if he were at home

## 2021-08-18 NOTE — PROGRESS NOTES
[address]  [address] [facility]  [phone]    [facility]  HISTORY & PHYSICAL        NAME: Sherry Plan  AGE: [de-identified] y o  SEX: male  : 1940  DATE OF ENCOUNTER: 21  POS: 31 (SNF)  PCP: Jessica Winslow DO    Chief Complaint     Seen and examined today for admission to short term rehabilitation unit at [facility]  History of Present Illness     Raymond Cap is an [de-identified] y/o male with underlying cardiomyopathy s/p pacemaker placement and AICD, CHF, cardiac amyloidosis, CKD 3, Afib on Eliquis, spinal stenosis, and peripheral neuropathy  Pt was brought to the ED on  after a fall; he tripped while walking and landed on his R hip  Denies loss of consciousness and hitting his head  Abrasions and contusions were noted to his R hip, R knee, L knee, R elbow, and L AC regions  CT head and neck were negative for acute changes; the only thing of note on the neck CT was a soft tissue mass on the right posterior neck, possibly a sebaceous cyst  No acute changes on AKIRA knee x-rays  CT of the thoracic and lumbar spines were negative for fx  CT abd/pel negative  Pelvic x-ray was notable for a comminuted minimally displaced intertrochanteric fracture of the right femur  The pt was stabilized, then he underwent ORIF by ortho w/o complication  However, since pt was high-risk for cardiac complications, he was brought to the ICU postop  PT/OT saw and recommended rehab for the pt  Though CXR showed cardiomegaly and raised suspicion for pleural effusions, it was confirmed by a CT of the chest; this showed small to moderate AKIRA layering effusions w/ dependent atelectasis  ECGs showed no change from previous, though pt did have elevated troponins  BNP was 549  Echo from 8/10 showed an EF of 10-15% and globally dilated heart  Cardiology was consulted, who cleared the patient for surgery   No recommendations were made for changes in management from a cardiology perspective  Seen today for admission to [facility] for subacute rehab  He is lying in his recliner in no acute distress, answering questions appropriately  Pt understands why he is in rehab and will participate in PT/OT to get stronger  He is c/o 6/10 pain in his R hip currently  Only reports constipation  Denies chest pain, shortness of breath, fever, chills, lightheadedness, urinary sx, diarrhea  Pt is WBAT to his RLE  He can ambulate about 20 ft w/ RW minimal assist of 1  Gait slow and antalgic  Transfers w/ RW assist of 2 w/ cueing  Toileting min-mod assist of 2  UB ADLs minimal assist, LB ADLs minimal assist of 2  Eating abut % of his meals, NASEEM diet with regular texture and thin liquids  Last bowel movement was several days ago  Review of Systems     Review of Systems   Constitutional: Negative for chills and fever  HENT: Negative for ear pain and sore throat  Eyes: Negative for pain and visual disturbance  Respiratory: Negative for cough and shortness of breath  Cardiovascular: Negative for chest pain and palpitations  Gastrointestinal: Positive for constipation  Negative for abdominal pain and vomiting  Genitourinary: Negative for difficulty urinating, dysuria and hematuria  Musculoskeletal: Negative for arthralgias and back pain  Skin: Negative for color change and rash  Neurological: Negative for seizures and syncope  All other systems reviewed and are negative        History     Allergies   Allergen Reactions    Other Sneezing     Seasonal; pollen; reactions; congestion    Penicillin G Benzathine Rash    Penicillins Rash       Past Medical History:   Diagnosis Date    Anticoagulant long-term use     Atrial fibrillation ([facility] [address] )     Cardiac defibrillator in situ     Cardiomyopathy (HCC)     Colon polyp     Congestive heart failure (CHF) (HCC)     DJD (degenerative joint disease)     HL (hearing loss)     Hyperlipidemia     Hypertension     Shortness of breath  Sick sinus syndrome ([facility] [address] )     Spinal stenosis      Past Surgical History:   Procedure Laterality Date    CARDIAC DEFIBRILLATOR PLACEMENT      FL RETROGRADE PYELOGRAM  11/3/2020    INSERT / REPLACE / REMOVE PACEMAKER      KNEE SURGERY Left     MENISCECTOMY      in [facility] had this    AR COLONOSCOPY FLX DX W/COLLJ SPEC WHEN PFRMD N/A 2017    Procedure: COLONOSCOPY;  Surgeon: Apollo Ball MD;  Location: [facility]; Service: Gastroenterology    AR CYSTO/URETERO W/LITHOTRIPSY &INDWELL STENT INSRT Left 11/3/2020    Procedure: CYSTOSCOPY URETEROSCOPY WITH LITHOTRIPSY HOLMIUM LASER, RETROGRADE PYELOGRAM AND INSERTION STENT URETERAL;  Surgeon: Jaime Mccall MD;  Location: [facility];  Service: Urology    AR CYSTOURETHROSCOPY,URETER CATHETER Left 2020    Procedure: CYSTOSCOPY RETROGRADE PYELOGRAM WITH INSERTION STENT URETERAL;  Surgeon: Jayesh Kuhn MD;  Location: [facility];  Service: Urology    TONSILLECTOMY      TRANSURETHRAL RESECTION OF PROSTATE         Family History: non-contributory  Social History     Tobacco Use    Smoking status: Former Smoker     Packs/day: 0 50     Years: 3 00     Pack years: 1 50     Types: Cigarettes, Cigars     Quit date: 1968     Years since quittin 3    Smokeless tobacco: Never Used   Vaping Use    Vaping Use: Never used   Substance Use Topics    Alcohol use: Yes     Alcohol/week: 2 0 standard drinks     Types: 2 Glasses of wine per week     Comment: daily    Drug use: Never         He lives in a 2-story home with a first-floor setup, 4 half steps to enter  Lives w/ his wife  She does the home care and care of the patient  Assistive devices in the home  Objective   Vital Signs   BP: 110/54    HR:76    T:97 6 F    RR:18    O2Sat:97% RA     W:pending    Physical Exam  Constitutional:       General: He is not in acute distress  Appearance: He is well-developed  He is ill-appearing (chronically)  He is not diaphoretic     HENT: Head: Normocephalic and atraumatic  Right Ear: External ear normal       Left Ear: External ear normal       Mouth/Throat:      Mouth: Mucous membranes are moist       Pharynx: Oropharynx is clear  No oropharyngeal exudate  Eyes:      General: No scleral icterus  Conjunctiva/sclera: Conjunctivae normal       Pupils: Pupils are equal, round, and reactive to light  Neck:      Thyroid: No thyromegaly  Vascular: No JVD  Cardiovascular:      Rate and Rhythm: Normal rate and regular rhythm  Heart sounds: Normal heart sounds  No murmur heard  Comments: Pt has pacemaker in place, as well as AICD  Pulmonary:      Effort: Pulmonary effort is normal  No respiratory distress  Breath sounds: Normal breath sounds  Abdominal:      General: Bowel sounds are normal  There is no distension  Palpations: Abdomen is soft  Tenderness: There is no abdominal tenderness  Musculoskeletal:         General: No tenderness or deformity  Normal range of motion  Right lower leg: Edema (trace) present  Skin:     General: Skin is warm and dry  Coloration: Skin is not pale  Findings: No erythema or rash  Neurological:      General: No focal deficit present  Mental Status: He is alert and oriented to person, place, and time  Cranial Nerves: No cranial nerve deficit  Motor: No abnormal muscle tone  Deep Tendon Reflexes: Reflexes are normal and symmetric  Reflexes normal    Psychiatric:         Mood and Affect: Mood normal          Behavior: Behavior normal          Thought Content:  Thought content normal          Judgment: Judgment normal            Pertinent Laboratory/Diagnostic Studies:     Lab Results   Component Value Date    WBC 6 31 04/06/2021    HGB 12 6 04/06/2021    HCT 40 4 04/06/2021     (H) 04/06/2021     04/06/2021     Lab Results   Component Value Date    GLUCOSE 147 (H) 11/09/2020    CALCIUM 9 7 07/22/2021     01/17/2017    K 4 7 07/22/2021    CO2 27 07/22/2021     07/22/2021    BUN 29 (H) 07/22/2021    CREATININE 1 84 (H) 07/22/2021     Lab Results   Component Value Date    XOR7MJMCNJNW 0 715 06/23/2021     No results found for: HGBA1C      Current Medications   All medications reviewed and updated in EMR/Chart  Assessment and Plan     Closed comminuted intertrochanteric fracture of right femur with routine healing  · Pt sustained a fall at home on 8/9  He tripped while walking at home and fell onto his R hip  He was taken to the ED c/o right hip pain  · On exam, R leg was shortened and externally rotated  · Pelvic x-ray showed a closed, comminuted, minimally displaced right intertrochanteric fx of the femur  No other fractures on imaging  · Pt underwent ORIF on 8/12  No complications  Pt was placed in the ICU afterward as a precautionary measure given his high-risk cardiac hx  · Control pt's pain with oxycodone and acetaminophen  Reassess often to consider other measures  Continue to use ice and heat as needed  · Monitor wound for signs of infection or dehiscence  · Begin PT and OT at [facility] subacute rehab  Biventricular congestive heart failure ([address] )  · Chronic for the patient  He is s/p pacemaker and AICD placement  · Volume status was stable in the hospital  Cardiology saw him then and cleared him without other intervention before repair of the hip fx  · Imaging did show some pleural effusions and his BNP was 549, but pt was not c/o shortness of breath and his BNP has been in the several-thousands range previously  · Continue his torsemide as currently prescribed  His blood pressures tend toward the lower side, so aggressive diuresis could be harmful  Cardiomyopathy ([address] )  · 8/10/21 echo report  · Overall LV size is normal with thickened septum and posterior wall measured at 2 1 cm  · LV EF of 10-15%  Global hypokinesis  · Moderately dilated left atrium  · Mildly dilated RV  ICD catheter present  · Mildly dilated RA  · Mild aortic regurgitation  · Mild-moderate TR  · Moderate MR with a centrally directed jet  · IVC/SVC: The inferior vena cava demonstrates a diameter of >21 mm and collapses <50%; therefore, the right atrial pressure is estimated at greater than 15 mmHg  · Pulmonic Valve: The estimated pulmonary artery systolic pressure is 08 1 mmHg  · No pericardial effusion  Left pleural effusion  · Pt was stable in the hospital from a cardiomyopathy perspective  · Cleared by cardiology for ORIF by ortho w/o intervention beforehand  · Placed in the ICU afterward because of high-risk cardiac hx  No complications  · Continue current dosing of torsemide  · Monitor volume status  · Continue diet  · Daily weights  Atrial fibrillation  · Eliquis was held in the hospital pending surgery but was restarted before discharge  · On Eliquis for AC, metoprolol for rate control, and amiodarone for rhythm control  Continue these medications  · S/p pacemaker and AICD  · Sees cardiology outpatient, continue to do so  Stage 3 chronic kidney disease  Lab Results   Component Value Date    EGFR 34 07/22/2021    EGFR 41 06/23/2021    EGFR 35 05/12/2021    CREATININE 1 84 (H) 07/22/2021    CREATININE 1 58 (H) 06/23/2021    CREATININE 1 80 (H) 05/12/2021     · Pt's creatinine has remained elevated but stable and near baseline while in the hospital    · Continue to monitor  Spinal stenosis of lumbar region with neurogenic claudication  · Pt has chronic spinal stenosis which contributes to numbness in his lower extremities and pain down his left leg  · Pt reports frequent falls as a result  · States that he has tried gabapentin in the past without relief  · Continue PT/OT  Ambulatory dysfunction  · 2/2 pt's peripheral neuropathy and spinal stenosis  · Per old notes, pt had reported frequent falls d/t numbness in his LEs   He denies this today, states that it had been a while since he had last fell  · Use assistive devices as needed  · PT/OT at [address] [place] [address]  Elevated troponin  · In the hospital, pt's troponins were 0 17 and 0 23  ECG was unchanged when compared with previous ones  · Cardiology did not comment on the values in their note  · Could be 2/2 demand ischemia from the fall/trauma and subsequent surgery  · Monitor for chest pain or shortness of breath  Slow transit constipation  · Pt reports not having moved his bowels in several days  · Likely multifactorial from inactivity post-surgery to pain medications  · Continue Colace, increase Miralax to BID  Anemia  · Pt's hemoglobin has dropped since admission and his surgery from 10 7 to 8 5  Asymptomatic  · Monitor with CBC on 8/20  · Pt will see ortho on 8/27  Unable to reach wife over the phone for update  Jeanine Poole MD  Geriatric Medicine  08/18/21    Please note:  Voice-recognition software may have been used in the preparation of this document  Occasional wrong word or "sound-alike" substitutions may have occurred due to the inherent limitations of voice recognition software  Interpretation should be guided by context

## 2021-08-18 NOTE — ASSESSMENT & PLAN NOTE
· Eliquis was held in the hospital pending surgery but was restarted before discharge  · On Eliquis for AC, metoprolol for rate control, and amiodarone for rhythm control  Continue these medications  · S/p pacemaker and AICD  · Sees cardiology outpatient, continue to do so

## 2021-08-18 NOTE — ASSESSMENT & PLAN NOTE
· Pt sustained a fall at home on 8/9  He tripped while walking at home and fell onto his R hip  He was taken to the ED c/o right hip pain  · On exam, R leg was shortened and externally rotated  · Pelvic x-ray showed a closed, comminuted, minimally displaced right intertrochanteric fx of the femur  No other fractures on imaging  · Pt underwent ORIF on 8/12  No complications  Pt was placed in the ICU afterward as a precautionary measure given his high-risk cardiac hx  · Control pt's pain with oxycodone and acetaminophen  Adjust Tylenol to be scheduled: 975 mg q8h  Continue to use ice and heat as needed  · Monitor wound for signs of infection or dehiscence  · Begin PT and OT at [facility]acute rehab

## 2021-08-18 NOTE — ASSESSMENT & PLAN NOTE
· Pt's hemoglobin has dropped since admission and his surgery from 10 7 to 8 5  Asymptomatic  · Monitor with CBC on 8/20  · Pt will see ortho on 8/27

## 2021-08-18 NOTE — ASSESSMENT & PLAN NOTE
· Pt reports not having moved his bowels in several days  · Likely multifactorial from inactivity post-surgery to pain medications  · Continue Colace, increase Miralax to BID

## 2021-08-18 NOTE — ASSESSMENT & PLAN NOTE
· 2/2 pt's peripheral neuropathy and spinal stenosis  · Per old notes, pt had reported frequent falls d/t numbness in his LEs  He denies this today, states that it had been a while since he had last fell  · Use assistive devices as needed  · PT/OT at [address] [place] [address]

## 2021-08-25 NOTE — TELEPHONE ENCOUNTER
Matt Jimenez  She wanted us to know that the patient is currently at [facility] in [place] for P/T and Rehab  She wanted to know if he can get his blood work done today or tomorrow along with Dr Christiane Gonzalez  After checking w/clinical I let her know that is would be okay, clinical updated the lab orders and I am waiting for Lilibeth Khoury to call me back with the fax # for [facility] so I can fax them over  Faxed updated lab orders to the number received ([phone]) for [facility]

## 2021-08-25 NOTE — PROGRESS NOTES
[address]  [phone]  [address] [facility]   [address]  Progress Note        NAME: Gianna Alejandro  AGE: [de-identified] y o  SEX: male  :  1940  DATE OF ENCOUNTER:2021    Chief Complaint   Patient seen and examined for follow up on chronic conditions  History of Present Illness     Gianna Alejandro is an [de-identified] yo male with multiple cardiac comorbidities; CHF, cardiomyopathy s/p AICD pacemaker placement, Afib, and cardiac amyloidosis  Pt also has a hx of CKD 3 spinal stenosis, and peripheral neuropathy  Patient had a recent fall at home that resulted in multiple abrasions/contusions and a comminuted minimally displaced intertrochanteric fracture of the right  femur, of which the patient underwent ORIF by ortho w/out complication  Patient was admitted to [facility] for further rehab  Today patient is sitting in his recliner and reports he is doing okay but has new swelling in his Left hand due to unknown origin  Patient denies injury, trauma, or pain  Mild swelling noted with ice given and elevation instructed by nursing staff which did show improvement  Patient reports moderate pain in Right hip but notes improvement with ice and current Tylenol regimen  Patient states he has follow up with Surgeon on Friday  Patient reports he has had some trouble with sleeping, sleeps in recliner  Patient reports appetite is fair and he feels this is due to all of the medication he takes and that food just doesn't taste the same anymore  The following portions of the patient's history were reviewed and updated as appropriate: allergies, current medications, past family history, past medical history, past social history, past surgical history and problem list     Review of Systems     A review of systems was performed  All negative, except as per HPI      History     Past Medical History:   Diagnosis Date    Anticoagulant long-term use     Atrial fibrillation ([facility] )     Cardiac defibrillator in situ     Cardiomyopathy ([facility] )     Colon polyp     Congestive heart failure (CHF) (HCC)     DJD (degenerative joint disease)     HL (hearing loss)     Hyperlipidemia     Hypertension     Insomnia, unspecified 2021    Shortness of breath     Sick sinus syndrome ([facility] )     Spinal stenosis      Past Surgical History:   Procedure Laterality Date    CARDIAC DEFIBRILLATOR PLACEMENT      FL RETROGRADE PYELOGRAM  11/3/2020    INSERT / REPLACE / REMOVE PACEMAKER      KNEE SURGERY Left     MENISCECTOMY      in [facility] had this    IL COLONOSCOPY FLX DX W/COLLJ SPEC WHEN PFRMD N/A 2017    Procedure: COLONOSCOPY;  Surgeon: Zohaib Dasilva MD;  Location: [facility];   Service: Gastroenterology    IL CYSTO/URETERO W/LITHOTRIPSY &INDWELL STENT INSRT Left 11/3/2020    Procedure: CYSTOSCOPY URETEROSCOPY WITH LITHOTRIPSY HOLMIUM LASER, RETROGRADE PYELOGRAM AND INSERTION STENT URETERAL;  Surgeon: Diandra Pina MD;  Location: [facility];  Service: Urology    IL CYSTOURETHROSCOPY,URETER CATHETER Left 2020    Procedure: CYSTOSCOPY RETROGRADE PYELOGRAM WITH INSERTION STENT URETERAL;  Surgeon: Aleja Redding MD;  [facility]: [facility];  Service: Urology    TONSILLECTOMY      TRANSURETHRAL RESECTION OF PROSTATE       Family History   Problem Relation Age of Onset    Coronary artery disease Mother     Hypertension Son      Social History     Socioeconomic History    Marital status: /Civil Union     Spouse name: Not on file    Number of children: Not on file    Years of education: Not on file    Highest education level: Not on file   Occupational History    Not on file   Tobacco Use    Smoking status: Former Smoker     Packs/day: 0 50     Years: 3 00     Pack years: 1 50     Types: Cigarettes, Cigars     Quit date: 1968     Years since quittin 4    Smokeless tobacco: Never Used   Vaping Use    Vaping Use: Never used   Substance and Sexual Activity    Alcohol use: Yes     Alcohol/week: 2 0 standard drinks     Types: 2 Glasses of wine per week     Comment: daily    Drug use: Never    Sexual activity: Not Currently   Other Topics Concern    Not on file   Social History Narrative    Active advance directive    Caffeine use     Social Determinants of Health     Financial Resource Strain:     Difficulty of Paying Living Expenses:    Food Insecurity:     Worried About Running Out of Food in the Last Year:     Ran Out of Food in the Last Year:    Transportation Needs:     Lack of Transportation (Medical):  Lack of Transportation (Non-Medical):    Physical Activity: Inactive    Days of Exercise per Week: 0 days    Minutes of Exercise per Session: 0 min   Stress: Stress Concern Present    Feeling of Stress : Very much   Social Connections:     Frequency of Communication with Friends and Family:     Frequency of Social Gatherings with Friends and Family:     Attends Congregation Services:     Active Member of Clubs or Organizations:     Attends Club or Organization Meetings:     Marital Status:    Intimate Partner Violence:     Fear of Current or Ex-Partner:     Emotionally Abused:     Physically Abused:     Sexually Abused:       Allergies   Allergen Reactions    Other Sneezing     Seasonal; pollen; reactions; congestion    Penicillin G Benzathine Rash    Penicillins Rash       Objective     Vital Signs  BP: 96/58    HR:70 T:97    RR:20 O2Sat: 98% Wt: 211 2  General: NAD, ill appearing  Oral: Oropharynx Moist and Clear  Neck: Supple, +ROM  CV: S1, S2, normal rate, regular rhythm, no murmur appreciated  Pulmonary: Lung sounds clear to air, no wheezing, rhonchi, rales  Abdominal:BS + x4 in all quadrants, soft, no mass, no tenderness  Extremities: RLE and LLE edema with RLE worse than left, Left Hand mild edema present with no warmth or erythema present  Skin: Warm, Dry, no lesions, no rash, pale  Neurological: Negative for seizures and syncope  Psych: Alert and oriented times 3,  good judgement    Pertinent Laboratory/Diagnostic Studies:  8/25/21:  HGB/HCT  HEMATOCRIT 25 4 % 37 0-48 0 L Final  HEMOGLOBIN 8 8 g/dL 12 5-17 0 L Final  BASIC METABOLIC PNL  GLUCOSE 69 mg/dL 65-99 Final  BUN 32 mg/dL 7-28 H Final  CREATININE 1 62 mg/dL 0 53-1 30 H Final  SODIUM 142 mmol/L 135-145 Final  POTASSIUM 4 1 mmol/L 3 5-5 2 Final  CHLORIDE 110 mmol/L 100-109 H Final  CARBON DIOXIDE 23 mmol/L 23-31 Final  CALCIUM 9 0 mg/dL 8 5-10 1 Final  ANION GAP 9 3-11 Final  eGFR, NON  AM 39 >60 L Final  eGFR,  AMER 45 >60 L Final      Current Medications     Current Medications Reviewed and updated in Nursing Home EMR  Assessment and Plan     Closed comminuted intertrochanteric fracture of right femur with routine healing  · Patient with fall at home on 8/9/21 resulting in a comminuted, minimally displaced right intertrochanteric fx of the right femur  · Pt  Underwent ORIF 5/53/18 without complication, placed in ICU as a precaution due to cardiac anomalies  · Pt pain is currently under control with ice and Tylenol  · Pt incision healing without complication  · Pt continue to work with PT and OT with ambulation 55 feet with RW and CG and transferring with CG  · ADLS requiring UB min assist LB moderate assist and Toileting moderate assist   · Patient has Follow up appt with Ortho 8/27/21  Biventricular congestive heart failure ([facility] [address] )  · Patient has chronic disease process with pacemaker and AICD placement  · Weight stable at 210  · Pt denies sob, chest pain, or abnormal edema  · Moderate edema noted in ble  · LV EF of 10-15% per Echo report 8/10/21  · Pt follow with Dr Ekaterina Bingham cardiology  · Continue Torsemide 10 mg daily  · Continue to monitor volume status  · Continue cardiac prudent diet  · Continue to monitor daily weights  · Continue pacemaker interrogation as scheduled        Cardiomyopathy ([facility] [address] )  · Upon dc from hospital to [facility] pt cleared by cardiology and deemed stable  · 8/10/21 echo report  ? Overall LV size is normal with thickened septum and posterior wall measured at 2 1 cm    ? LV EF of 10-15%  Global hypokinesis  ? Moderately dilated left atrium  ? Mildly dilated RV  ICD catheter present  ? Mildly dilated RA  ? Mild aortic regurgitation  ? Mild-moderate TR    ? Moderate MR with a centrally directed jet  ? IVC/SVC: The inferior vena cava demonstrates a diameter of >21 mm and collapses <50%; therefore, the right atrial pressure is estimated at greater than 15 mmHg  ? Pulmonic Valve: The estimated pulmonary artery systolic pressure is 76 3 mmHg  ? No pericardial effusion  Left pleural effusion  · Continue all cardiac medications  · Continue to monitor volume status  · Continue cardiac prudent diet  · Continue to monitor daily weights  · Continue pacemaker interrogation as scheduled  · Follow up with Dr Ney Maharaj  Atrial fibrillation  · Patient currently on Eliquis for Afib, Metoprolol for rate control, and Amiodarone for rhythm control and to continue all medications at this time  · Patient has pacemaker and AICD  · Continue pacemaker interrogation as scheduled  · Follow up with Dr Ney Maharaj  Stage 3 chronic kidney disease  5/97/09:  BASIC METABOLIC PNL  GLUCOSE 69 mg/dL 65-99 Final  BUN 32 mg/dL 7-28 H Final  CREATININE 1 62 mg/dL 0 53-1 30 H Final  SODIUM 142 mmol/L 135-145 Final  POTASSIUM 4 1 mmol/L 3 5-5 2 Final  CHLORIDE 110 mmol/L 100-109 H Final  CARBON DIOXIDE 23 mmol/L 23-31 Final  CALCIUM 9 0 mg/dL 8 5-10 1 Final  ANION GAP 9 3-11 Final  eGFR, NON  AM 39 >60 L Final  eGFR,  AMER 45 >60 L Final     · Pt creatinine remains elevated but within his norm baseline  · Continue to monitor  · Follow up with Dr Rosa Reyes      Spinal stenosis of lumbar region with neurogenic claudication  · Pt spinal stenosis is chronic in nature which is also a contributing factor to pain and numbness in his legs  · Pt reports spinal stenosis is a contributing factor to frequency of falls  · Pt reports he has been sleeping in a recliner for years and plans on getting a lift chair when he goes home  · Pt asked about medical marijuana and may consult PCP upon dc for further information  · Patient to continue with PT/OT for further ambulation, transfer, and functional mobility improvement  Ambulatory dysfunction  · Patient with fall at home on 8/9/21 resulting in a comminuted, minimally displaced right intertrochanteric fx of the right femur with ORIF 8/12/21  · Pt  Neuropathy and spinal stenosis are contributing factors to frequent falls  · Pt continue to work with PT and OT with ambulation 55 feet with RW and CG and transferring with CG  · ADLS requiring UB min assist LB moderate assist and Toileting moderate assist   · Continue safety and fall precautions  Slow transit constipation  · Patient constipation appears chronic condition after discussion  · Continue colace, Miralax BID  Anemia  · Patient Hgb stable at 8 8 on 8 25 21   · Continue to Monitor  · Patient has Follow up appt with Ortho 8/27/21  Insomnia, unspecified  · Patient continues to complain of insomnia  · Patient reports he used to take Tylenol PM to sleep every night at home  · Currently taking Melatonin 3 mg PO HS since admission to [facility]  Will increase dose to 5 mg PO HS and reassess  Presence of automatic cardioverter/defibrillator (AICD)  · Patient has pacemaker and AICD  · Patient has monthly pacemaker interrogation, remote check  · Patient reports he will require battery change in upcoming months  · Follow up with Dr Mik St  Localized swelling on left hand  · Noted swelling of left hand 8/25/21  · Patient denies pain, injury, or trauma  · Continue ice and elevation  · Continue to evaluate for increase in swelling or erythema        Romina Rankin, [address] [place] [address]  8/25/2021

## 2021-08-25 NOTE — TELEPHONE ENCOUNTER
Pt is now at [facility] for rehab and physical therapy  They are requesting dates for various vaccines  Did the pt have Prevnar 13? Please give Dottie Vaughan a call back     Didn't see it on injection list on snapshot page

## 2021-08-26 PROBLEM — R22.32 LOCALIZED SWELLING ON LEFT HAND: Status: ACTIVE | Noted: 2021-01-01

## 2021-08-26 PROBLEM — G47.00 INSOMNIA, UNSPECIFIED: Status: ACTIVE | Noted: 2021-01-01

## 2021-08-26 NOTE — ASSESSMENT & PLAN NOTE
· Patient Hgb stable at 8 8 on 8 25 21   · Continue to Monitor  · Patient has Follow up appt with Ortho 8/27/21

## 2021-08-26 NOTE — ASSESSMENT & PLAN NOTE
· Upon dc from hospital to [facility] pt cleared by cardiology and deemed stable  · 8/10/21 echo report  ? Overall LV size is normal with thickened septum and posterior wall measured at 2 1 cm    ? LV EF of 10-15%  Global hypokinesis  ? Moderately dilated left atrium  ? Mildly dilated RV  ICD catheter present  ? Mildly dilated RA  ? Mild aortic regurgitation  ? Mild-moderate TR    ? Moderate MR with a centrally directed jet  ? IVC/SVC: The inferior vena cava demonstrates a diameter of >21 mm and collapses <50%; therefore, the right atrial pressure is estimated at greater than 15 mmHg  ? Pulmonic Valve: The estimated pulmonary artery systolic pressure is 14 3 mmHg  ? No pericardial effusion  Left pleural effusion  · Continue all cardiac medications  · Continue to monitor volume status  · Continue cardiac prudent diet  · Continue to monitor daily weights  · Continue pacemaker interrogation as scheduled  · Follow up with Dr Lux Code

## 2021-08-26 NOTE — ASSESSMENT & PLAN NOTE
9/80/97:  BASIC METABOLIC PNL  GLUCOSE 69 mg/dL 65-99 Final  BUN 32 mg/dL 7-28 H Final  CREATININE 1 62 mg/dL 0 53-1 30 H Final  SODIUM 142 mmol/L 135-145 Final  POTASSIUM 4 1 mmol/L 3 5-5 2 Final  CHLORIDE 110 mmol/L 100-109 H Final  CARBON DIOXIDE 23 mmol/L 23-31 Final  CALCIUM 9 0 mg/dL 8 5-10 1 Final  ANION GAP 9 3-11 Final  eGFR, NON  AM 39 >60 L Final  eGFR,  AMER 45 >60 L Final     · Pt creatinine remains elevated but within his norm baseline  · Continue to monitor  · Follow up with Dr Mecca Larios

## 2021-08-26 NOTE — ASSESSMENT & PLAN NOTE
· Patient has chronic disease process with pacemaker and AICD placement  · Weight stable at 210  · Pt denies sob, chest pain, or abnormal edema  · Moderate edema noted in ble  · LV EF of 10-15% per Echo report 8/10/21  · Pt follow with Dr Gillis Oppenheim cardiology  · Continue Torsemide 10 mg daily  · Continue to monitor volume status  · Continue cardiac prudent diet  · Continue to monitor daily weights  · Continue pacemaker interrogation as scheduled

## 2021-08-26 NOTE — ASSESSMENT & PLAN NOTE
· Patient with fall at home on 8/9/21 resulting in a comminuted, minimally displaced right intertrochanteric fx of the right femur  · Pt  Underwent ORIF 2/13/68 without complication, placed in ICU as a precaution due to cardiac anomalies  · Pt pain is currently under control with ice and Tylenol  · Pt incision healing without complication  · Pt continue to work with PT and OT with ambulation 55 feet with RW and CG and transferring with CG  · ADLS requiring UB min assist LB moderate assist and Toileting moderate assist   · Patient has Follow up appt with Ortho 8/27/21

## 2021-08-26 NOTE — ASSESSMENT & PLAN NOTE
· Patient has pacemaker and AICD  · Patient has monthly pacemaker interrogation, remote check  · Patient reports he will require battery change in upcoming months  · Follow up with Dr Geoff Campos

## 2021-08-26 NOTE — ASSESSMENT & PLAN NOTE
· Pt spinal stenosis is chronic in nature which is also a contributing factor to pain and numbness in his legs  · Pt reports spinal stenosis is a contributing factor to frequency of falls  · Pt reports he has been sleeping in a recliner for years and plans on getting a lift chair when he goes home  · Pt asked about medical marijuana and may consult PCP upon dc for further information  · Patient to continue with PT/OT for further ambulation, transfer, and functional mobility improvement

## 2021-08-26 NOTE — ASSESSMENT & PLAN NOTE
· Patient continues to complain of insomnia  · Patient reports he used to take Tylenol PM to sleep every night at home  · Currently taking Melatonin 3 mg PO HS since admission to [facility]  Will increase dose to 5 mg PO HS and reassess

## 2021-08-26 NOTE — ASSESSMENT & PLAN NOTE
· Noted swelling of left hand 8/25/21  · Patient denies pain, injury, or trauma  · Continue ice and elevation  · Continue to evaluate for increase in swelling or erythema

## 2021-08-26 NOTE — ASSESSMENT & PLAN NOTE
· Patient with fall at home on 8/9/21 resulting in a comminuted, minimally displaced right intertrochanteric fx of the right femur with ORIF 8/12/21  · Pt  Neuropathy and spinal stenosis are contributing factors to frequent falls  · Pt continue to work with PT and OT with ambulation 55 feet with RW and CG and transferring with CG  · ADLS requiring UB min assist LB moderate assist and Toileting moderate assist   · Continue safety and fall precautions

## 2021-08-31 NOTE — PROGRESS NOTES
[address]  [address] [facility]  [phone] 2558 [facility]  Progress Note           NAME: Zeny Richey  AGE: [de-identified] y o  SEX: male  : 1940  DATE OF ENCOUNTER: 21  POS: 31 (SNF)  PCP: Luciana Ruggiero DO     ASSESSMENT AND PLAN:  1  Closed comminuted intertrochanteric fracture of right femur with routine healing  Assessment & Plan:  · Status post ORIF on 2021, stable  · Continue PT/OT services  · Continue pain management  · Continue Eliquis for [facility]  · Follow-up with Ortho      2  Essential hypertension  Assessment & Plan:  · BP stable and controlled at 116/72  · Continue current bp medication regime  · Will monitor electrolytes and renal function      3  Ambulatory dysfunction  Assessment & Plan:  · Related to fall with right femur fracture  · Patient is ambulating 150 feet with RW and distant supervision  · Continue PT/OT services  · Maintain fall precautions      4  Insomnia, unspecified type  Assessment & Plan:  · Patient currently on Melatonin 5 mg and states that it is not working for him  · He is refusing a increase in Melatonin  · Will order Trazodone 25 mg at HS for sleep  · Continue to encourage good sleep hygiene            HPI:    Lamont Best is a [de-identified]year old male patient with underlying cardiomyopathy s/p pacemaker placement and AICD, CHF, cardiac amyloidosis, CKD 3, Afib on Eliquis, spinal stenosis, peripheral neuropathy seen and examined to follow-up on acute and chronic medical conditions in short term rehab at [facility]  He is status post hospitalization after suffering a fall and suffered a minimally displaced intertrochanteric fracture of the right femur and is s/p ORIF  He is receiving comprehensive rehab services here at [facility]  He is currently ambulating 150 feet with his roller walker with distant supervision and is a set-up for upper and lower body ADLS, MI for toileting        Upon examination, patient is awake, alert and in no acute distress  Leslie Hernandez He states that he is experiencing mild pain in his right leg that is controlled with his current pain medication regime  He also states that he is having difficulty sleeping at night, the melatonin is not working for him and he states that he needs something to help him sleep  ROS: Review of Systems   Constitutional: Negative for chills, diaphoresis, fatigue and fever  HENT: Negative for congestion  Respiratory: Negative for cough, chest tightness, shortness of breath and wheezing  Cardiovascular: Positive for leg swelling  Negative for chest pain and palpitations  Gastrointestinal: Negative for abdominal distention, abdominal pain, constipation, diarrhea and vomiting  Genitourinary: Negative for difficulty urinating, dysuria, frequency and hematuria  Musculoskeletal: Positive for arthralgias, gait problem and myalgias  Neurological: Negative for dizziness, weakness, numbness and headaches  Psychiatric/Behavioral: Positive for sleep disturbance  Negative for behavioral problems, confusion and dysphoric mood         Allergies   Allergen Reactions    Other Sneezing     Seasonal; pollen; reactions; congestion    Penicillin G Benzathine Rash    Penicillins Rash       Medications:    Current Outpatient Medications on File Prior to Visit   Medication Sig Dispense Refill    Alum Hydroxide-Mag Carbonate (GAVISCON EXTRA STRENGTH PO) Take by mouth      amiodarone 200 mg tablet 1 tab daily 90 tablet 3    apixaban (ELIQUIS) 2 5 mg Take 1 tablet (2 5 mg total) by mouth 2 (two) times a day 180 tablet 3    Diclofenac Sodium (VOLTAREN) 1 % Apply 2 g topically 4 (four) times a day 350 g 3    diphenhydrAMINE-APAP, sleep, (TYLENOL PM EXTRA STRENGTH PO) Take by mouth as needed       docusate sodium (COLACE) 100 mg capsule Take 1 capsule (100 mg total) by mouth 2 (two) times a day (Patient not taking: Reported on 6/9/2021) 60 capsule 5    famotidine (PEPCID) 20 mg tablet Take 1 tablet (20 mg total) by mouth 2 (two) times a day 180 tablet 0    melatonin 3 mg Take 3 mg by mouth daily at bedtime      metoprolol succinate (TOPROL-XL) 25 mg 24 hr tablet Take 1 tablet (25 mg total) by mouth daily 90 tablet 3    pantoprazole (PROTONIX) 40 mg tablet Take 1 tablet (40 mg total) by mouth daily in the early morning 90 tablet 3    polyethylene glycol (MIRALAX) 17 g packet Take 17 g by mouth daily 30 each 0    Tafamidis (Vyndamax) 61 MG CAPS Take 61 mg by mouth daily 30 capsule 11    torsemide (DEMADEX) 10 mg tablet 1 and 2 tabs on alternate days 90 tablet 3     No current facility-administered medications on file prior to visit  History:  Past Medical History:   Diagnosis Date    Anticoagulant long-term use     Atrial fibrillation (HCC)     Cardiac defibrillator in situ     Cardiomyopathy (HCC)     Colon polyp     Congestive heart failure (CHF) (HCC)     DJD (degenerative joint disease)     HL (hearing loss)     Hyperlipidemia     Hypertension     Insomnia, unspecified 8/25/2021    Shortness of breath     Sick sinus syndrome ([address] )     Spinal stenosis      Past Surgical History:   Procedure Laterality Date    CARDIAC DEFIBRILLATOR PLACEMENT      FL RETROGRADE PYELOGRAM  11/3/2020    INSERT / REPLACE / REMOVE PACEMAKER      KNEE SURGERY Left     MENISCECTOMY      in [facility] had this    VT COLONOSCOPY FLX DX W/COLLJ SPEC WHEN PFRMD N/A 6/5/2017    Procedure: COLONOSCOPY;  Surgeon: Brad Vallejo MD;  Location: [facility];   Service: Gastroenterology    VT CYSTO/URETERO W/LITHOTRIPSY &INDWELL STENT INSRT Left 11/3/2020    Procedure: CYSTOSCOPY URETEROSCOPY WITH LITHOTRIPSY HOLMIUM LASER, RETROGRADE PYELOGRAM AND INSERTION STENT URETERAL;  Surgeon: Alecia Huitron MD;  [facility];  Service: Urology    VT CYSTOURETHROSCOPY,URETER CATHETER Left 9/29/2020    Procedure: CYSTOSCOPY RETROGRADE PYELOGRAM WITH INSERTION STENT URETERAL;  Surgeon: Nay López MD;  [facility];  Service: Urology    TONSILLECTOMY      TRANSURETHRAL RESECTION OF PROSTATE       Family History   Problem Relation Age of Onset    Coronary artery disease Mother     Hypertension Son      Social History     Socioeconomic History    Marital status: /Civil Union     Spouse name: Not on file    Number of children: Not on file    Years of education: Not on file    Highest education level: Not on file   Occupational History    Not on file   Tobacco Use    Smoking status: Former Smoker     Packs/day: 0 50     Years: 3 00     Pack years: 1 50     Types: Cigarettes, Cigars     Quit date: 1968     Years since quittin 4    Smokeless tobacco: Never Used   Vaping Use    Vaping Use: Never used   Substance and Sexual Activity    Alcohol use: Yes     Alcohol/week: 2 0 standard drinks     Types: 2 Glasses of wine per week     Comment: daily    Drug use: Never    Sexual activity: Not Currently   Other Topics Concern    Not on file   Social History Narrative    Active advance directive    Caffeine use     Social Determinants of Health     Financial Resource Strain:     Difficulty of Paying Living Expenses:    Food Insecurity:     Worried About Running Out of Food in the Last Year:     Ran Out of Food in the Last Year:    Transportation Needs:     Lack of Transportation (Medical):      Lack of Transportation (Non-Medical):    Physical Activity: Inactive    Days of Exercise per Week: 0 days    Minutes of Exercise per Session: 0 min   Stress: Stress Concern Present    Feeling of Stress : Very much   Social Connections:     Frequency of Communication with Friends and Family:     Frequency of Social Gatherings with Friends and Family:     Attends [employer] Services:     Active Member of Clubs or Organizations:     Attends Club or Organization Meetings:     Marital Status:    Intimate Partner Violence:     Fear of Current or Ex-Partner:     Emotionally Abused:     Physically Abused:     Sexually Abused:      Past Surgical History:   Procedure Laterality Date    CARDIAC DEFIBRILLATOR PLACEMENT      FL RETROGRADE PYELOGRAM  11/3/2020    INSERT / REPLACE / REMOVE PACEMAKER      KNEE SURGERY Left     [facility]    PA COLONOSCOPY FLX DX W/COLLJ SPEC WHEN PFRMD N/A 6/5/2017    Procedure: COLONOSCOPY;  Surgeon: Neil Holguin MD;  [facility]: [facility]; Service: Gastroenterology    PA CYSTO/URETERO W/LITHOTRIPSY &INDWELL STENT INSRT Left 11/3/2020    Procedure: CYSTOSCOPY URETEROSCOPY WITH LITHOTRIPSY HOLMIUM LASER, RETROGRADE PYELOGRAM AND INSERTION STENT URETERAL;  Surgeon: Alicia Jacobs MD;  [facility];  Service: Urology    PA CYSTOURETHROSCOPY,URETER CATHETER Left 9/29/2020    Procedure: CYSTOSCOPY RETROGRADE PYELOGRAM WITH INSERTION STENT URETERAL;  Surgeon: Oscar Rogers MD;  [facility];  Service: Urology    TONSILLECTOMY      TRANSURETHRAL RESECTION OF PROSTATE         OBJECTIVE:  There were no vitals filed for this visit  There is no height or weight on file to calculate BMI  Physical Exam  Constitutional:       General: He is not in acute distress  Appearance: Normal appearance  He is normal weight  He is not ill-appearing, toxic-appearing or diaphoretic  HENT:      Head: Normocephalic and atraumatic  Right Ear: External ear normal       Nose: Nose normal  No congestion or rhinorrhea  Cardiovascular:      Rate and Rhythm: Normal rate and regular rhythm  Pulses: Normal pulses  Heart sounds: Normal heart sounds  Pulmonary:      Effort: Pulmonary effort is normal  No respiratory distress  Breath sounds: Normal breath sounds  No wheezing, rhonchi or rales  Abdominal:      General: Bowel sounds are normal       Palpations: Abdomen is soft  There is no mass  Tenderness: There is no abdominal tenderness  There is no guarding     Musculoskeletal: General: No tenderness or deformity  Cervical back: Normal range of motion and neck supple  No rigidity  Right lower leg: Edema present  Left lower leg: Edema present  Skin:     General: Skin is warm and dry  Coloration: Skin is not jaundiced  Findings: No bruising, erythema or lesion  Neurological:      General: No focal deficit present  Mental Status: He is alert and oriented to person, place, and time  Motor: No weakness  Gait: Gait abnormal    Psychiatric:         Mood and Affect: Mood normal          Behavior: Behavior normal          Thought Content: Thought content normal      MoCA Score: 22/30 on 08/18/2021    Labs & Imaging:  Lab Results   Component Value Date    WBC 6 31 04/06/2021    HGB 12 6 04/06/2021    HCT 40 4 04/06/2021     (H) 04/06/2021     04/06/2021     Lab Results   Component Value Date    SODIUM 137 07/22/2021    K 4 7 07/22/2021     07/22/2021    CO2 27 07/22/2021    BUN 29 (H) 07/22/2021    CREATININE 1 84 (H) 07/22/2021    GLUC 102 07/22/2021    CALCIUM 9 7 07/22/2021     Lab Results   Component Value Date    HRESVLBQ79 428 09/09/2014     Lab Results   Component Value Date    FAZ4IMNCDLWU 0 715 06/23/2021     Lab Results   Component Value Date    VSMP42JWYVDF 54 8 04/06/2021      No results found for this or any previous visit

## 2021-09-02 NOTE — TELEPHONE ENCOUNTER
Patient is being discharged tomorrow 9/3 from rehab-has his days & nights mixed up    They have been requesting at rehab to get a sleeping pill-and have not received anything    He has taken melatonin 5mg and that only relaxes him    Requesting that you call in a sleeping pill-he does have Heart & kidney failure & Gastro paresis--wants you to be aware of this when prescribing    Call back  #[phone] (wife has a lot going on, and if we call to please lvm on her machine)    [facility] # [phone]

## 2021-09-02 NOTE — TELEPHONE ENCOUNTER
Pt's wife Brenda called & stated that pt has been at [facility] for a couple of weeks & pt will be coming home tomorrow. Brenda states that pt has his nights & days mixed up & would like to know if Dr. BIRMINGHAM will prescribe pt a sleeping pill. I advised Brenda that Dr. BIRMINGHAM is out of the office & that I would send the message out for the covering provider to look at.       Brenda would like a cb...

## 2021-09-02 NOTE — ASSESSMENT & PLAN NOTE
· hgb dropped during hospital stay  · Slowly improving  Last hgb done 9/1/21 improved to 9 5  · Continue to monitor  · Follow up with PCP

## 2021-09-02 NOTE — ASSESSMENT & PLAN NOTE
· Patient currently on Melatonin 5 mg and states that it is not working for him  · He is refusing a increase in Melatonin  · Will order Trazodone 25 mg at HS for sleep  · Continue to encourage good sleep hygiene

## 2021-09-02 NOTE — ASSESSMENT & PLAN NOTE
· BP stable and well controlled  · Continue torsemide 10 mg and 20 mg daily on alternating days  · Continue metoprolol 12 5 mg daily

## 2021-09-02 NOTE — ASSESSMENT & PLAN NOTE
· Rate controlled, continue amiodarone and metoprolol  · S/p ppm and AICD  · Continue Eliquis for anticoagulation

## 2021-09-02 NOTE — ASSESSMENT & PLAN NOTE
· Related to fall with right femur fracture  · Patient is ambulating 150 feet with RW and distant supervision  · Continue PT/OT services  · Maintain fall precautions

## 2021-09-02 NOTE — ASSESSMENT & PLAN NOTE
· Status post ORIF on 08/12/2021, stable  · Continue PT/OT services  · Continue pain management  · Continue Eliquis for [facility]  · Follow-up with Ortho

## 2021-09-02 NOTE — ASSESSMENT & PLAN NOTE
· Sustained from mechanical fall 8/9/21  · On 8/10, he underwent ORIF, procedure was uncomplicated and well tolerated  · Continue WBAT  · Home PT/OT through [facility]  · Continue Eliquis  · Follow up with Ortho  · Continue adequate pain control, recommend to continue scheduled tylenol 975 mg po Q8hrs and Oxycodone 5 mg po Q6h PRN for pain

## 2021-09-02 NOTE — ASSESSMENT & PLAN NOTE
· In the setting of recent fracture s/p ORIF  · Completed a course of PT/OT at [facility]  · Currently, able to ambulate 125 ft with RW and distant supervision  · Continue PT/OT services through [facility]  · Continue Fall precautions

## 2021-09-02 NOTE — TELEPHONE ENCOUNTER
S/w wife, advised per Dr. ADDISON that she should reach out to pt's PCP for sleeping pills. Verbally understood.

## 2021-09-02 NOTE — ASSESSMENT & PLAN NOTE
· Previously on melatonin without improvement  · Started on Trazodone 25 mg po QHS during his stay at [address] [place] [address]  · Continue sleep hygiene

## 2021-09-02 NOTE — ASSESSMENT & PLAN NOTE
Lab Results   Component Value Date    EGFR 34 07/22/2021    EGFR 41 06/23/2021    EGFR 35 05/12/2021    CREATININE 1 84 (H) 07/22/2021    CREATININE 1 58 (H) 06/23/2021    CREATININE 1 80 (H) 05/12/2021   · Creatinine improved to 1 25 on 9/1/21  · Continue to avoid nephrotoxins and hypotension  · Follow up with Nephrology  · Monitor renal function and electrolytes

## 2021-09-02 NOTE — PROGRESS NOTES
[address]  [address] [facility]  [address] [facility]  [phone]    [facility]   DISCHARGE SUMMARY      NAME: Suzi Lanes  AGE: [de-identified] y o  SEX: male  :  1940  DATE OF ENCOUNTER:  2021  POS: 31 (SNF)   DOA: 2021  DOD: 2021  DISPO: Home with wife  PCP: Dr Rojas Factor and Plan     Closed comminuted intertrochanteric fracture of right femur with routine healing  · Sustained from mechanical fall 21  · On 8/10, he underwent ORIF, procedure was uncomplicated and well tolerated  · Continue WBAT  · Home PT/OT through [address] [place] [address]  · Continue Eliquis  · Follow up with Ortho  · Continue adequate pain control, recommend to continue scheduled tylenol 975 mg po Q8hrs and Oxycodone 5 mg po Q6h PRN for pain  Ambulatory dysfunction  · In the setting of recent fracture s/p ORIF  · Completed a course of PT/OT at [facility]  · Currently, able to ambulate 125 ft with RW and distant supervision  · Continue PT/OT services through [address] [place] [address] 70  · Continue Fall precautions  Insomnia, unspecified  · Previously on melatonin without improvement  · Started on Trazodone 25 mg po QHS during his stay at [facility]  · Continue sleep hygiene  Biventricular congestive heart failure (HCC)  · Status post PPM and AICD placement  · Weight has remained fairly stable  · No complaints of sob/chest pain or edema  · LVEF 10-15% per recent ECHO done 8/10/21  · Continue Torsemide 10 mg daily  · Continue low sodium diet  · Monitor daily weights  · PPM interrogation as scheduled  Essential hypertension  · BP stable and well controlled  · Continue torsemide 10 mg and 20 mg daily on alternating days  · Continue metoprolol 12 5 mg daily  Atrial fibrillation  · Rate controlled, continue amiodarone and metoprolol  · S/p ppm and AICD  · Continue Eliquis for anticoagulation      Stage 3 chronic kidney disease  Lab Results   Component Value Date    EGFR 34 07/22/2021    EGFR 41 06/23/2021    EGFR 35 05/12/2021    CREATININE 1 84 (H) 07/22/2021    CREATININE 1 58 (H) 06/23/2021    CREATININE 1 80 (H) 05/12/2021   · Creatinine improved to 1 25 on 9/1/21  · Continue to avoid nephrotoxins and hypotension  · Follow up with Nephrology  · Monitor renal function and electrolytes  Anemia  · hgb dropped during hospital stay  · Slowly improving  Last hgb done 9/1/21 improved to 9 5  · Continue to monitor  · Follow up with PCP  I spent 35 minutes for this encounter  Greater than 50% of the total time was spent on coordination of care and direct patient care during a face-to-face visit with patient  Discharge instructions provided  All quesitons were answered  Jeanine Poole MD  Geriatric Medicine   09/02/2021       Chief Complaint   Patient seen and examined for follow up on chronic conditions  History of Present Illness     Mr Renato Lombardo is an 16-year-old gentleman with underlying cardiomyopathy status post pacemaker placement and AICD, CHF, cardiac amyloidosis, CKD 3, atrial fibrillation on Eliquis, spinal stenosis and peripheral neuropathy, admitted to [facility] 08/17/2021-09/03/2021 for subacute rehab services following hospitalization  He was  originally admitted to [facility] 08/09/2021-08/17/2021 after mechanical fall  He sustained a comminuted minimally displaced intertrochanteric fracture of the right femur and on 08/10/2021 he underwent ORIF per ortho  Procedure was uncomplicated, given underlying cardiac history and high risk for cardiac complications, he was monitored in the ICU in the postop period  Chest x-ray on admission showed cardiomegaly and raise suspicion for pleural effusions which were confirmed by CT of the chest, which showed small to moderate bilateral layering effusions with dependent atelectasis    EKG on admission showed no change from previous exam  His troponins were elevated on admission  BNP was 549  A recent echo from 08/10/2021 showed an ejection fraction of 10-15% and globally dilated heart  Patient was evaluated by Cardiology during this hospitalization for medical evaluation prior to surgical intervention  Patient was evaluated by PT/OT during this hospital stay, recommendation was made for subacute rehab services  On 08/17/2021 he was discharged to [facility] where he completed a course of therapy with adequate gains  He is currently modified independent for transfers and able to ambulate 125 ft with rolling walker and distant supervision  He is able to climb up a 4 in step with contact guard  During his stay at [facility], patient complained of insomnia  Tried melatonin without improvement  Trazodone 25 mg daily was started  Last seen by Ortho 8/27/21, cleared to shower  He was advised to try Voltaren gel to help with pain  Will order  At this time, he is considered stable to return home with wife   on board for safe discharge planning  Arrangements have been made for patient to continue home PT/OT through [facility]  He will require ambulance transport back home, due to inability to climb up 4 steps to enter home  He reports feeling well  States his L hip pain is currently stable and controlled  Pain is mild at rest and made worse by movement and weight bearing  Otherwise no pain reported  Denies any fever/chills, chest pain/shortness of breath, abdominal discomfort, nausea/vomiting, diarrhea/constipation or dysuria  The following portions of the patient's history were reviewed and updated as appropriate: allergies, current medications, past family history, past medical history, past social history, past surgical history and problem list     Review of Systems     A complete review of systems was performed  All negative, except as per HPI      History     Past Medical History:   Diagnosis Date    Anticoagulant long-term use     Atrial fibrillation (HCC)     Cardiac defibrillator in situ     Cardiomyopathy ([address] )     Colon polyp     Congestive heart failure (CHF) (HCC)     DJD (degenerative joint disease)     HL (hearing loss)     Hyperlipidemia     Hypertension     Insomnia, unspecified 8/25/2021    Shortness of breath     Sick sinus syndrome (HCC)     Spinal stenosis      Allergies   Allergen Reactions    Other Sneezing     Seasonal; pollen; reactions; congestion    Penicillin G Benzathine Rash    Penicillins Rash       Objective     Vital Signs  BP:  119/69       HR: 70 T: 97 8° F    RR: 18 O2Sat: 97% on RA W: 216 lb    Physical Exam  Vitals reviewed  Constitutional:       General: He is not in acute distress  Appearance: He is well-developed  He is ill-appearing (chronically)  He is not diaphoretic  HENT:      Head: Normocephalic and atraumatic  Right Ear: External ear normal       Left Ear: External ear normal       Mouth/Throat:      Pharynx: No oropharyngeal exudate  Eyes:      General: No scleral icterus  Conjunctiva/sclera: Conjunctivae normal       Pupils: Pupils are equal, round, and reactive to light  Neck:      Thyroid: No thyromegaly  Vascular: No JVD  Cardiovascular:      Rate and Rhythm: Normal rate and regular rhythm  Heart sounds: Normal heart sounds  No murmur heard  Pulmonary:      Effort: Pulmonary effort is normal  No respiratory distress  Breath sounds: Normal breath sounds  Abdominal:      General: Bowel sounds are normal  There is no distension  Palpations: Abdomen is soft  Tenderness: There is no abdominal tenderness  Musculoskeletal:         General: No tenderness or deformity  Normal range of motion  Cervical back: Normal range of motion and neck supple  Left lower leg: Edema present  Lymphadenopathy:      Cervical: No cervical adenopathy  Skin:     General: Skin is warm and dry  Coloration: Skin is not pale  Findings: No erythema or rash  Neurological:      Mental Status: He is alert and oriented to person, place, and time  Cranial Nerves: No cranial nerve deficit  Motor: No abnormal muscle tone  Deep Tendon Reflexes: Reflexes are normal and symmetric  Reflexes normal    Psychiatric:         Behavior: Behavior normal          Thought Content: Thought content normal          Judgment: Judgment normal            Pertinent Laboratory/Diagnostic Studies      09/01/2021:  CBC WITH DIFF  HEMOGLOBIN 9 5 g/dL 12 5-17 0 L Final  HEMATOCRIT 28 0 % 37 0-48 0 L Final  WBC 5 4 thou/cmm 4 0-10 5 Final  RBC 2 65 mill/cmm 4 00-5 40 L Final  PLATELET COUNT 840 thou/cmm 140-350 Final  MPV 7 6 fL 7 5-11 3 Final   fL  H Final  MCH 35 8 pg 27 0-36 0 Final  MCHC 33 8 g/dL 32 0-37 0 Final  RDW 16 9 % 12 0-16 0 H Final  DIFFERENTIAL TYPE AUTO Final  ABSOLUTE NEUT 4 4 thou/cmm 1 8-7 8 Final  ABSOLUTE LYMPH 0 6 thou/cmm 1 0-3 0 L Final  ABSOLUTE MONO 0 4 thou/cmm 0 3-1 0 Final  ABSOLUTE EOS 0 0 thou/cmm 0 0-0 5 Final  ABSOLUTE BASO 0 0 thou/cmm 0 0-0 1 Final  NEUTROPHILS 81 % Final  LYMPHOCYTES 10 % Final  MONOCYTES 7 % Final  EOSINOPHILS 1 % Final  BASOPHILS 1 % Final    54/28/6039:  BASIC METABOLIC PNL  GLUCOSE 78 mg/dL 65-99 Final  BUN 30 mg/dL 7-28 H Final  CREATININE 1 25 mg/dL 0 53-1 30 Final  SODIUM 141 mmol/L 135-145 Final  POTASSIUM 3 9 mmol/L 3 5-5 2 Final  CHLORIDE 108 mmol/L 100-109 Final  CARBON DIOXIDE 23 mmol/L 23-31 Final  CALCIUM 8 7 mg/dL 8 5-10 1 Final  ANION GAP 10 3-11 Final  eGFR, NON  AM 54 >60 L Final   09/01/2021:URIC ACID 5 6 mg/dL 3 5-7 0 Final  VITAMIN D, 25-OH 57 ng/mL  Final  Interpretive information: Total Vitamin D, 25-Hydroxy   This assay quantifies the sum of vitamin D3,   25-hydroxy and vitamin D2, 25-hydroxy     <10 ng/mL Deficiency   10-30 ng/mL Insufficiency    ng/mL Sufficiency   >100 ng/mL Toxicity     Current Medications Current Medications Reviewed and updated in EMR  Monthly orders reviewed and signed in chart  Please note:  Voice-recognition software may have been used in the preparation of this document  Occasional wrong word or "sound-alike" substitutions may have occurred due to the inherent limitations of voice recognition software  Interpretation should be guided by context

## 2021-09-02 NOTE — TELEPHONE ENCOUNTER
Patient's wife called and stated that her  will be coming home from rehab tomorrow and she is having the mobile lab come and draw labs  She stated that the phlebotomist stated that the lab orders were not in the system  I checked his chart and the orders were there  I informed her that if they need any further assistance that to give us a call      Db Ward called back and stated that the phlebotomist called and found the orders

## 2021-09-02 NOTE — ASSESSMENT & PLAN NOTE
· Status post PPM and AICD placement  · Weight has remained fairly stable  · No complaints of sob/chest pain or edema  · LVEF 10-15% per recent ECHO done 8/10/21  · Continue Torsemide 10 mg daily  · Continue low sodium diet  · Monitor daily weights  · PPM interrogation as scheduled

## 2021-09-02 NOTE — ASSESSMENT & PLAN NOTE
· BP stable and controlled at 116/72  · Continue current bp medication regime  · Will monitor electrolytes and renal function

## 2021-09-03 NOTE — TELEPHONE ENCOUNTER
S/w Sherice and wanted to make office aware that patient came home today from [facility] rehab. Patient was experiencing SOB and weakness. Sherice stated that while at rehab patient was not given Metoprolol Succ25 mg if BP was below 110. Also patient will be having BW done on 9/8 and will be admitted for [facility] visiting nurse on 9/10.

## 2021-09-03 NOTE — TELEPHONE ENCOUNTER
Patient of Dr Gail Montes wife Gina Lazar called stating that the patient is home from the "[facility] recovering from broken Right Femur  Patient stated about 2 weeks ago with frequent urination along with some burning when it first started  UA was done at the "[facility]  Dr Carolyn Carrington put the patient on Torsemide 10mg every other day and then alternat days he takes 20mg, because the patient was gaining weight  Patient is going to be having blood work done on 9/8/2021 by Sandra Granados  Please call patient wife Gina Lazar @ [phone] to advise her about the patient   Wash Citizen,

## 2021-09-04 NOTE — TELEPHONE ENCOUNTER
Called patient's wife and instructed to hold torsemide today and administer 10 mg daily from tomorrow     Dr Gaston Child

## 2021-09-07 NOTE — TELEPHONE ENCOUNTER
Spoke to wife again, she now states she forgot to tell you that he is barely walking as he broke his leg can not get into a car to come have EGD with botox, she is suppose to be finding out when he will be able to getinto a car from PT  But he has been taking miralax BID and has not been having regular Bms, they are small, he has had a lot of bloating and gas pain  Please advise   Pt will not get on the phone, he relates all info to her in background which makes this more complicated to find out everything at once

## 2021-09-07 NOTE — TELEPHONE ENCOUNTER
Spoke with patient and spouse.  Patient is having a lot of problems with his gastroparesis and contacted Dr Mnotalvo's office.  He is setting the patient up for Botox for the problem.    Spouse stated that she spoke with someone from the [place] office and she stated that it is possible that some of the cardiac medications can be making gastroparesis worse.  Patient and spouse would like to know if this can be possible.    Patient states he is still having a lot of SOB, I informed him that Dr Tiwari recommended he go to the ED for evaluation.  Patient stated that he just got home from rehab for hip surgery and is homebound.  He stated he will not be going to the hospital.

## 2021-09-07 NOTE — TELEPHONE ENCOUNTER
Having severe bloating and abdominal discomfort  Diet is not working  Does not want to consider Reglan because of the black box warning  Please set him up for EGD with Botox as soon as possible  He will do Ensure in the meanwhile

## 2021-09-07 NOTE — TELEPHONE ENCOUNTER
As per our discussion, have them call me in 48 hours with his progress , we may start Reglan at that point

## 2021-09-07 NOTE — TELEPHONE ENCOUNTER
PTS WIFE WAS TOLD TO NOT STOP ANY MEDICATIONS  SHE WANTED TO KNOW ABOUT STOPPING PEPCID NOW  I ADVISED NOT TOO  DO CLEAR LIQUIDS AND CALL Thursday WITH AN UPDATE, SHE IS ALSO SUPPOSE TO CALL PT ABOUT WHEN HE CAN GET INTO A CAR

## 2021-09-07 NOTE — TELEPHONE ENCOUNTER
Pt has appt with Charan Bautista on Friday 9/10/2021 in the mean time Malvin Michaela would like a cough suppressant called in to [facility] [address] [facility], he has a dry cough with a lot of mucus  He had this last year and it really helped

## 2021-09-13 PROBLEM — R77.8 ELEVATED TROPONIN: Status: RESOLVED | Noted: 2021-01-01 | Resolved: 2021-01-01

## 2021-09-13 PROBLEM — R79.89 ELEVATED TROPONIN: Status: RESOLVED | Noted: 2021-01-01 | Resolved: 2021-01-01

## 2021-09-13 NOTE — PROGRESS NOTES
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) (Chronic)       Musculoskeletal and Integument    Closed comminuted intertrochanteric fracture of right femur with routine healing - Primary       Other    Anemia        Hospital records reviewed  Continue working with home PT to improve strength  He will have EGD with botox performed which will hopefully improve some of the GI issues he has been struggling with  If he is able to eat more calories that can certainly help him rehab in the right direction  Blood pressure and kidney function have been stable  Most recent kidney function was actually improved from previous  Cardiology managing his torsemide dose  He is alternating 10 + 20mg  His Hgb levels are coming up since surgery  Most recent Hgb of 10 5  Reason for visit is   Chief Complaint   Patient presents with    Virtual Regular Visit        Encounter provider Ke Conte DO    Provider located at [address] [place] [phone]      Recent Visits  Date Type Provider Dept   09/07/21 Telephone [address] [place] recent visits within past 7 days and meeting all other requirements  Future Appointments  No visits were found meeting these conditions  Showing future appointments within next 150 days and meeting all other requirements       The patient was identified by name and date of birth   Priyanka Rea was informed that this is a telemedicine visit and that the visit is being conducted through [address] [place] [address] Road [facility] and patient was informed that this is a secure, HIPAA-compliant platform  He agrees to proceed     My office door was closed  No one else was in the room  He acknowledged consent and understanding of privacy and security of the video platform  The patient has agreed to participate and understands they can discontinue the visit at any time  Patient is aware this is a billable service  Subjective  Betty Dupree is a [de-identified] y o  male who sustained a fall back in Aug 2021  He broke his right femur and underwent ORIF on 8/12/2021  He was then sent to nursing home for rehab  He was able to ambulate 150mg with rolling walker  He is home now getting home services  Still feeling weak and trying to get his strength up  Continues to have problems with eating  Has been found to have gastroparesis  Has EGD with botox scheduled this week  Feels he is losing weight as he is pureeing all of his food  Past Medical History:   Diagnosis Date    Anticoagulant long-term use     Atrial fibrillation (HCC)     Cardiac defibrillator in situ     Cardiomyopathy (HCC)     Colon polyp     Congestive heart failure (CHF) (HCC)     DJD (degenerative joint disease)     HL (hearing loss)     Hyperlipidemia     Hypertension     Insomnia, unspecified 8/25/2021    Shortness of breath     Sick sinus syndrome ([address] )     Spinal stenosis        Past Surgical History:   Procedure Laterality Date    CARDIAC DEFIBRILLATOR PLACEMENT      FL RETROGRADE PYELOGRAM  11/3/2020    INSERT / REPLACE / REMOVE PACEMAKER      KNEE SURGERY Left     MENISCECTOMY      in [facility] had this    WA COLONOSCOPY FLX DX W/COLLJ SPEC WHEN PFRMD N/A 6/5/2017    Procedure: COLONOSCOPY;  Surgeon: Neil Holguin MD;  Location: [facility];   Service: Gastroenterology    WA CYSTO/URETERO W/LITHOTRIPSY &INDWELL STENT INSRT Left 11/3/2020    Procedure: CYSTOSCOPY URETEROSCOPY WITH LITHOTRIPSY HOLMIUM LASER, RETROGRADE PYELOGRAM AND INSERTION STENT URETERAL;  Surgeon: Alicia Jacobs MD; Location: [facility];  Service: Urology    LA CYSTOURETHROSCOPY,URETER CATHETER Left 9/29/2020    Procedure: CYSTOSCOPY RETROGRADE PYELOGRAM WITH INSERTION STENT URETERAL;  Surgeon: Jon Bullock MD;  Location: [facility];  Service: Urology    TONSILLECTOMY      TRANSURETHRAL RESECTION OF PROSTATE         Current Outpatient Medications   Medication Sig Dispense Refill    Alum Hydroxide-Mag Carbonate (GAVISCON EXTRA STRENGTH PO) Take by mouth      amiodarone 200 mg tablet 1 tab daily 90 tablet 3    apixaban (ELIQUIS) 2 5 mg Take 1 tablet (2 5 mg total) by mouth 2 (two) times a day 180 tablet 3    benzonatate (TESSALON PERLES) 100 mg capsule Take 1 capsule (100 mg total) by mouth 3 (three) times a day as needed for cough 20 capsule 0    Diclofenac Sodium (VOLTAREN) 1 % Apply 2 g topically 4 (four) times a day 350 g 3    diphenhydrAMINE-APAP, sleep, (TYLENOL PM EXTRA STRENGTH PO) Take by mouth as needed       docusate sodium (COLACE) 100 mg capsule Take 1 capsule (100 mg total) by mouth 2 (two) times a day (Patient not taking: Reported on 6/9/2021) 60 capsule 5    famotidine (PEPCID) 20 mg tablet Take 1 tablet (20 mg total) by mouth 2 (two) times a day 180 tablet 0    melatonin 3 mg Take 3 mg by mouth daily at bedtime      metoprolol succinate (TOPROL-XL) 25 mg 24 hr tablet Take 1 tablet (25 mg total) by mouth daily 90 tablet 3    pantoprazole (PROTONIX) 40 mg tablet Take 1 tablet (40 mg total) by mouth daily in the early morning 90 tablet 3    polyethylene glycol (MIRALAX) 17 g packet Take 17 g by mouth daily 30 each 0    Tafamidis (Vyndamax) 61 MG CAPS Take 61 mg by mouth daily 30 capsule 11    torsemide (DEMADEX) 10 mg tablet 1 and 2 tabs on alternate days 90 tablet 3    traZODone (DESYREL) 50 mg tablet Take 1-2 tablets ( mg total) by mouth daily at bedtime as needed for sleep 60 tablet 1     No current facility-administered medications for this visit          Allergies   Allergen Reactions    Other Sneezing     Seasonal; pollen; reactions; congestion    Penicillin G Benzathine Rash    Penicillins Rash       Review of Systems   Constitutional: Positive for fatigue  Negative for diaphoresis and fever  Respiratory: Negative  Cardiovascular: Negative  Gastrointestinal: Positive for abdominal pain and constipation  Negative for diarrhea  Musculoskeletal: Positive for arthralgias, back pain and gait problem  Video Exam    There were no vitals filed for this visit  Physical Exam  Constitutional:       General: He is not in acute distress  Appearance: He is not ill-appearing  Eyes:      General: No scleral icterus  Right eye: No discharge  Left eye: No discharge  Neurological:      Mental Status: He is alert  Psychiatric:         Mood and Affect: Mood normal          Behavior: Behavior normal         I spent 15 minutes directly with the patient during this visit    [address] verbally agrees to participate in [employer]  Pt is aware that [employer] could be limited without vital signs or the ability to perform a full hands-on physical Kevin Blush understands he or the provider may request at any time to terminate the video visit and request the patient to seek care or treatment in person

## 2021-09-13 NOTE — TELEPHONE ENCOUNTER
Pt's wife called wanting to know if the pt's procedure can be moved up sooner as the pt is struggling with his symptoms

## 2021-09-13 NOTE — TELEPHONE ENCOUNTER
MOVED EGD UP, REMINDED PTS WIFE HE NEEDS TO BE ABLE TO GET IN AND OUT OF A CAR   AND TO HOLD ELIQUIS 3 DAYS

## 2021-09-13 NOTE — PROGRESS NOTES
Results for orders placed or performed in visit on 09/13/21   Cardiac EP device report    Narrative    MDT CRT-D (VVIR 70)  NON-BILLABLE CARELINK TRANSMISSION: BATTERY STATUS: BATTERY VOLTAGE NEARING ADDY (7 MOS)  WILL SCHEDULE MONTHLY BATTERY CHECKS  AP: 0%  : 98 9% + VSRP: 1 1%  ALL AVAILABLE LEAD PARAMETERS WITHIN NORMAL LIMITS  NO SIGNIFICANT HIGH RATE EPISODES  OPTI-VOL FLUID THRESHOLD CROSSED SINCE 8/28 AND IS ONGOING  PT TAKES DEMADEX, ELIQUIS, METOPROLOL SUCC, AMIODARONE  EF: 35% (ECHO 9/27/20)  TASK TO HF TEAM  APPROPRIATELY FUNCTIONING  BI-V ICD    [address]

## 2021-09-16 NOTE — H&P
History and Physical - [facility]  Home Herndon [de-identified] y o  male MRN: [id number]                  HPI: Home Herndon is a [de-identified]y o  year old male who presents for EGD with Botox injection for symptomatic gastroparesis abdominal pain, nausea, weight loss      REVIEW OF SYSTEMS: Per the HPI, and otherwise unremarkable  Historical Information   Past Medical History:   Diagnosis Date    Anticoagulant long-term use     Atrial fibrillation ([facility] [address] )     Cardiac defibrillator in situ     Cardiomyopathy (HCC)     Colon polyp     Congestive heart failure (CHF) (HCC)     DJD (degenerative joint disease)     HL (hearing loss)     Hyperlipidemia     Hypertension     Insomnia, unspecified 8/25/2021    Shortness of breath     Sick sinus syndrome ([facility] [address] )     Spinal stenosis      Past Surgical History:   Procedure Laterality Date    CARDIAC DEFIBRILLATOR PLACEMENT      FL RETROGRADE PYELOGRAM  11/03/2020    INSERT / REPLACE / REMOVE PACEMAKER      KNEE SURGERY Left     M[facility]CTOMY      in [facility] had this    ORIF FEMUR FRACTURE Right 08/12/2021    KS COLONOSCOPY FLX DX W/COLLJ SPEC WHEN PFRMD N/A 06/05/2017    Procedure: COLONOSCOPY;  Surgeon: Freddy Terry MD;  [facility];   Service: Gastroenterology    KS CYSTO/URETERO W/LITHOTRIPSY &INDWELL STENT INSRT Left 11/03/2020    Procedure: CYSTOSCOPY URETEROSCOPY WITH LITHOTRIPSY HOLMIUM LASER, RETROGRADE PYELOGRAM AND INSERTION STENT URETERAL;  Surgeon: Ai Clark MD;  [facility];  Service: Urology    KS CYSTOURETHROSCOPY,URETER CATHETER Left 09/29/2020    Procedure: CYSTOSCOPY RETROGRADE PYELOGRAM WITH INSERTION STENT URETERAL;  Surgeon: Shree Allen MD;  [facility];  Service: Urology    TONSILLECTOMY      TRANSURETHRAL RESECTION OF PROSTATE       Social History   Social History     Substance and Sexual Activity   Alcohol Use Yes    Alcohol/week: 2 0 standard drinks    Types: 2 Glasses of wine per week    Comment: daily     Social History     Substance and Sexual Activity   Drug Use Never     Social History     Tobacco Use   Smoking Status Former Smoker    Packs/day: 0 50    Years: 3 00    Pack years: 1 50    Types: Cigarettes, Cigars    Quit date: 1968    Years since quittin 4   Smokeless Tobacco Never Used     Family History   Problem Relation Age of Onset    Coronary artery disease Mother     Hypertension Son        Meds/Allergies     (Not in a hospital admission)      Allergies   Allergen Reactions    Other Sneezing     Seasonal; pollen; reactions; congestion    Penicillin G Benzathine Rash    Penicillins Rash       Objective     There were no vitals taken for this visit        PHYSICAL EXAM    Gen: NAD  CV: RRR  CHEST: Clear  ABD: soft, NT/ND  EXT: no edema  Neuro: AAO      ASSESSMENT/PLAN:  This is a [de-identified]y o  year old male here for symptomatic gastroparesis    PLAN:   Procedure:  EGD with Botox

## 2021-09-16 NOTE — ANESTHESIA POSTPROCEDURE EVALUATION
Post-Op Assessment Note    CV Status:  Stable    Pain management: adequate     Mental Status:  Sleepy and arousable   Hydration Status:  Euvolemic   PONV Controlled:  Controlled   Airway Patency:  Patent      Post Op Vitals Reviewed: Yes      Staff: CRNA         No complications documented      BP 99/56 (09/16/21 1158)    Temp 97 6 °F (36 4 °C) (09/16/21 1158)    Pulse 71 (09/16/21 1158)   Resp 14 (09/16/21 1158)    SpO2 100 % (09/16/21 1158)

## 2021-09-17 NOTE — TELEPHONE ENCOUNTER
Pt has lump back side of upper arm above elbow  VN could not see but it can be found if palpated  PT complains that it is starting bother him and is getting tender  States it began when he was doing physical Therapy  Gregg Barney asked if he had mentioned it to them or any dr's - he said yes, but they didn't do anything      Gregg Barney # [phone]

## 2021-09-17 NOTE — TELEPHONE ENCOUNTER
Appointment was confirmed and went over registration details with patient wife Chapito Nesbitt for the patient virtual visit   Jazlyn Monday,

## 2021-09-20 PROBLEM — N25.81 SECONDARY HYPERPARATHYROIDISM OF RENAL ORIGIN (HCC): Status: ACTIVE | Noted: 2021-01-01

## 2021-09-20 PROBLEM — I12.9 HYPERTENSIVE CKD (CHRONIC KIDNEY DISEASE): Status: ACTIVE | Noted: 2021-01-01

## 2021-09-20 NOTE — PROGRESS NOTES
Virtual Regular Visit    Verification of patient location:    Patient is located in the following state in which I hold an active license [place]      Assessment/Plan:    Problem List Items Addressed This Visit     Stage 3 chronic kidney disease ([facility] [address] ) - Primary    Relevant Orders    CBC and differential    Basic metabolic panel    Urinalysis with microscopic    Microalbumin / creatinine urine ratio    Phosphorus    Uric acid    PTH, intact    Vitamin D 25 hydroxy    Hypertensive CKD (chronic kidney disease)    Relevant Orders    Basic metabolic panel    Urinalysis with microscopic    Microalbumin / creatinine urine ratio    Secondary hyperparathyroidism of renal origin ([facility] [address] )    Relevant Orders    Basic metabolic panel    Phosphorus    PTH, intact    Vitamin D 25 hydroxy               Reason for visit is   Chief Complaint   Patient presents with    Chronic Kidney Disease    Follow-up    Virtual Regular Visit        Encounter provider Kalyani Mejía MD    Provider located at [address] [address] [facility] [address] [facility] [address]  [address] [facility] [address] [address]  [phone]      Recent Visits  Date Type Provider Dept   [facility]   09/17/21 Telephone Kalyani Mejía MD [address] [facility]   09/13/21 Telemedicine [facility] recent visits within past 7 days and meeting all other requirements  Today's Visits  Date Type Provider Dept   09/20/21 [address] [facility] [address] today's visits and meeting all other requirements  Future Appointments  No visits were found meeting these conditions  Showing future appointments within next 150 days and meeting all other requirements       The patient was identified by name and date of birth   Gloriadickson Aldrichhanane was informed that this is a telemedicine visit and that the visit is being conducted through [address] [place] [address] Now and patient was informed that this is a secure, HIPAA-compliant platform  He agrees to proceed     My office door was closed  No one else was in the room  He acknowledged consent and understanding of privacy and security of the video platform  The patient has agreed to participate and understands they can discontinue the visit at any time  Patient is aware this is a billable service  Subjective  Davi Ruano is a [de-identified] y o  male denies nausea, vomiting, headache or dizziness today  This is 28-year-old male with past medical history significant for AFib,  Cardiomyopathy, hypertension, hyperlipidemia, Kidney stone,  had virtual visit today for further management of CKD stage 3       Upon review of old medical records, baseline creatinine seems to be fluctuating around 1 8-1 9        In the past patient was found to have obstructive uropathy with left hydronephrosis and had left ureteral stent placed  Now patient is been followed by [place] [address] urologist        Patient also have underlying cardiomyopathy and also currently been followed by cardiologist at [place] [address]  Currently patient is on torsemide 10 mg alternating with 20 mg   According to patient his slowly losing weight with current weight of 190 lb       Patient's wife-Brenda was also present at the time of consultation and history was also obtained from her and all the questions were answered  In the interim patient had underwent gastric empty study and found to have gastroparesis  Currently also been followed by GI at [place] [address]  According to patient he got Botox injection earlier       Patient also have a AFib and currently on Eliquis      Patient takes all the medications as prescribed and denies use of NSAIDs          Past Medical History:   Diagnosis Date    Anticoagulant long-term use     Atrial fibrillation ([address] )     Cardiac defibrillator in situ     Cardiomyopathy ([facility] [address] [facility]     Colon polyp     [facility]     DJD (degenerative joint disease)     HL (hearing loss)     Hyperlipidemia     Hypertension     Insomnia, unspecified 8/25/2021    Shortness of breath     Sick sinus syndrome ([address] )     Spinal stenosis        Past Surgical History:   Procedure Laterality Date    CARDIAC DEFIBRILLATOR PLACEMENT      FL RETROGRADE PYELOGRAM  11/03/2020    INSERT / REPLACE / REMOVE PACEMAKER      KNEE SURGERY Left     [facility]      in [facility] had this    ORIF FEMUR FRACTURE Right 08/12/2021    TN COLONOSCOPY FLX DX W/COLLJ SPEC WHEN PFRMD N/A 06/05/2017    Procedure: COLONOSCOPY;  Surgeon: Cullen Morris MD;  [facility];   Service: Gastroenterology    TN CYSTO/URETERO W/LITHOTRIPSY &INDWELL STENT INSRT Left 11/03/2020    Procedure: CYSTOSCOPY URETEROSCOPY WITH LITHOTRIPSY HOLMIUM LASER, RETROGRADE PYELOGRAM AND INSERTION STENT URETERAL;  Surgeon: Anam Bay MD;  [facility];  Service: Urology    TN CYSTOURETHROSCOPY,URETER CATHETER Left 09/29/2020    Procedure: CYSTOSCOPY RETROGRADE PYELOGRAM WITH INSERTION STENT URETERAL;  Surgeon: Kayleen Mccray MD;  [facility];  Service: Urology    TONSILLECTOMY      TRANSURETHRAL RESECTION OF PROSTATE         Current Outpatient Medications   Medication Sig Dispense Refill    amiodarone 200 mg tablet 1 tab daily 90 tablet 3    apixaban (ELIQUIS) 2 5 mg Take 1 tablet (2 5 mg total) by mouth 2 (two) times a day 180 tablet 3    benzonatate (TESSALON PERLES) 100 mg capsule Take 1 capsule (100 mg total) by mouth 3 (three) times a day as needed for cough 20 capsule 0    Diclofenac Sodium (VOLTAREN) 1 % Apply 2 g topically 4 (four) times a day 350 g 3    diphenhydrAMINE-APAP, sleep, (TYLENOL PM EXTRA STRENGTH PO) Take by mouth as needed       famotidine (PEPCID) 20 mg tablet Take 1 tablet (20 mg total) by mouth 2 (two) times a day (Patient taking differently: Take 20 mg by mouth daily ) 180 tablet 0    melatonin 3 mg Take 3 mg by mouth daily at bedtime      metoprolol succinate (TOPROL-XL) 25 mg 24 hr tablet Take 1 tablet (25 mg total) by mouth daily (Patient taking differently: Take 12 5 mg by mouth daily ) 90 tablet 3    pantoprazole (PROTONIX) 40 mg tablet Take 1 tablet (40 mg total) by mouth daily in the early morning 90 tablet 3    polyethylene glycol (MIRALAX) 17 g packet Take 17 g by mouth daily 30 each 0    Tafamidis (Vyndamax) 61 MG CAPS Take 61 mg by mouth daily 30 capsule 11    torsemide (DEMADEX) 10 mg tablet 1 and 2 tabs on alternate days 90 tablet 3    Alum Hydroxide-Mag Carbonate (GAVISCON EXTRA STRENGTH PO) Take by mouth (Patient not taking: Reported on 9/20/2021)      docusate sodium (COLACE) 100 mg capsule Take 1 capsule (100 mg total) by mouth 2 (two) times a day (Patient not taking: Reported on 6/9/2021) 60 capsule 5    traZODone (DESYREL) 50 mg tablet Take 1-2 tablets ( mg total) by mouth daily at bedtime as needed for sleep (Patient not taking: Reported on 9/20/2021) 60 tablet 1     No current facility-administered medications for this visit  Allergies   Allergen Reactions    Other Sneezing     Seasonal; pollen; reactions; congestion    Penicillin G Benzathine Rash    Penicillins Rash       Review of Systems   Constitutional: Negative for chills and fever  HENT: Negative for nosebleeds and sore throat  Eyes: Negative for photophobia and pain  Respiratory: Negative for choking and wheezing  Cardiovascular: Negative for chest pain and palpitations  Gastrointestinal: Negative for abdominal pain and blood in stool  Endocrine: Negative for heat intolerance  Genitourinary: Negative for flank pain and hematuria  Musculoskeletal: Negative for joint swelling and neck pain  Skin: Negative for color change and pallor  Neurological: Negative for seizures and syncope  Psychiatric/Behavioral: Negative for confusion and suicidal ideas         Video Exam    Vitals:    09/20/21 1325   BP: 108/65 BP Location: Left arm   Patient Position: Sitting   Cuff Size: Standard   Pulse: 75   Resp: 16   Temp: 98 2 °F (36 8 °C)   TempSrc: Temporal   Weight: 86 2 kg (190 lb)   Height: 6' 5" (1 956 m)       Physical Exam  Constitutional:       General: He is not in acute distress  Comments: I have directly observed patient during video visit   Eyes:      Comments: No exophthalmos   Neck:      Comments: Normal range of motion of neck    No visible thyromegaly  Pulmonary:      Effort: No respiratory distress  Abdominal:      General: There is no distension  Musculoskeletal:      Comments: Able to move upper extremity   Skin:     Comments: No yellow scan/no jaundice   Neurological:      Mental Status: He is alert and oriented to person, place, and time  Psychiatric:         Speech: He is communicative  Behavior: Behavior is not agitated  Assessment and plan    1  CKD stage 3   Multifactorial, suspected due to underlying cardiorenal syndrome on top of hypertensive nephrosclerosis and advanced age       Patient was also earlier found to have obstructive uropathy with left hydronephrosis and had ureteral stent placed and also been followed by urologist  Renal ultrasound done on 2/3/2021 showed no hydronephrosis       Upon review of medical records, new baseline creatinine seems to be fluctuating around 1 8-1 9 with current creatinine of 1 35 with EGFR of 49 which is better and below to previously known baseline creatinine       Patient has underlying cardiomyopathy and also been followed by cardiologist at [place] [address]  Now taking torsemide  10 mg alternating with 20 mg on daily basis  Current weight is 190 lb and according to patient he has been slowly losing weight   Defer further management of diuretics to Cardiology team       Plan to avoid NSAIDs and recheck renal function before next visit       2  Hypertension in chronic kidney disease     According to patient his blood pressure is currently under well control and for time being plan to monitor hypertension with metoprolol XL 25 mg PO daily  Also advised to follow low-salt diet       3   Proteinuria  Multifactorial with current urine microalbumin : creatinine ratio of 72 mg   Monitor proteinuria without ACE-I/ ARB   Recent urine analysis showed no hematuria   4  Secondary hyperparathyroidism of renal origin   Current PTH level is 98   Calcium, phosphorus and vitamin-D levels are at goal  For time being monitor PTH level without initiation of calcitriol       Returns to Nephrology office in 6 months   Future labs ordered   Labs (done on 3/15/2022)  Creatinine 1 33 with EGFR of 50  Hemoglobin 11 0  Urine analysis showed no dysuria  Urine microalbumin:  Creatinine ratio of 27 mg  PTH 87-> monitor  Normal vitamin-D (51), uric acid (6 2), magnesium (2 3), sodium, potassium, bicarb, calcium and phosphorus  Labs (done on 4/15/2022)  Creatinine 1 55 with EGFR of 41 (new baseline creatinine seems to be around 1 4-1 5)  Hemoglobin 10 6  Urine analysis showed no dysuria  Urine microalbumin:  Creatinine ratio of 29 mg  Corrected calcium 10 3 +PTH 87-> monitor  Normal uric acid (6 7), vitamin-D (57), sodium, potassium, bicarb and phosphorus  I spent 15 minutes with patient today in which greater than 50% of the time was spent in counseling/coordination of care regarding  management of chronic kidney disease    VIRTUAL VISIT [address] [facility],  verbally agrees to participate in [employer]  Pt is aware that [employer] could be limited without vital signs or the ability to perform a full hands-on physical Kevin Rojasush understands he or the provider may request at any time to terminate the video visit and request the patient to seek care or treatment in person

## 2021-09-20 NOTE — LETTER
September 20, 2021     Jason Carty DO  [address] [facility] [address]    Patient: Baltazar Kilgore   YOB: 1940   Date of Visit: 9/20/2021       Dear Dr Evelin Howe:    Thank you for referring Salomon Guerrero to me for evaluation  Below are my notes for this consultation  If you have questions, please do not hesitate to call me  I look forward to following your patient along with you           Sincerely,        Anibal Vazquez MD        CC: No Recipients  Anibal Vazquez MD  9/20/2021  1:40 PM  Sign when Signing Visit    Virtual Regular Visit    Verification of patient location:    Patient is located in the following state in which I hold an active license [place]      Assessment/Plan:    Problem List Items Addressed This Visit     Stage 3 chronic kidney disease ([facility] [address] ) - Primary    Relevant Orders    CBC and differential    Basic metabolic panel    Urinalysis with microscopic    Microalbumin / creatinine urine ratio    Phosphorus    Uric acid    PTH, intact    Vitamin D 25 hydroxy    Hypertensive CKD (chronic kidney disease)    Relevant Orders    Basic metabolic panel    Urinalysis with microscopic    Microalbumin / creatinine urine ratio    Secondary hyperparathyroidism of renal origin ([facility] [address] )    Relevant Orders    Basic metabolic panel    Phosphorus    PTH, intact    Vitamin D 25 hydroxy               Reason for visit is   Chief Complaint   Patient presents with    Chronic Kidney Disease    Follow-up    Virtual Regular Visit        Encounter provider Anibal Vazquez MD    Provider located at [address] [facility] [address] [place] [address]  [address] [facility] [address] [place] [address] [address] [facility] [address]  [phone]      Recent Visits  Date Type Provider Dept   09/17/21 Telephone Charles Kelley   09/17/21 Telephone Anibal Vazquez MD [address] [facility]   09/13/21 Telemedicine Jason Carty [facility] eric visits within past 7 days and meeting all other requirements  Today's Visits  Date Type Provider Dept   09/20/21 Telemedicine Amy Avila MD [facility] [address] today's visits and meeting all other requirements  Future Appointments  No visits were found meeting these conditions  Showing future appointments within next 150 days and meeting all other requirements       The patient was identified by name and date of birth  Judd Ugarte was informed that this is a telemedicine visit and that the visit is being conducted through [address] [employer] and patient was informed that this is a secure, HIPAA-compliant platform  He agrees to proceed     My office door was closed  No one else was in the room  He acknowledged consent and understanding of privacy and security of the video platform  The patient has agreed to participate and understands they can discontinue the visit at any time  Patient is aware this is a billable service  Subjective  Judd Ugarte is a [de-identified] y o  male denies nausea, vomiting, headache or dizziness today  This is 27-year-old male with past medical history significant for AFib,  Cardiomyopathy, hypertension, hyperlipidemia, Kidney stone,  had virtual visit today for further management of CKD stage 3       Upon review of old medical records, baseline creatinine seems to be fluctuating around 1 8-1 9        In the past patient was found to have obstructive uropathy with left hydronephrosis and had left ureteral stent placed  Now patient is been followed by [place] [address] urologist        Patient also have underlying cardiomyopathy and also currently been followed by cardiologist at [place] [address]  Currently patient is on torsemide 10 mg alternating with 20 mg     According to patient his slowly losing weight with current weight of 190 lb       Patient's wife-Brenda was also present at the time of consultation and history was also obtained from her and all the questions were answered  In the interim patient had underwent gastric empty study and found to have gastroparesis  Currently also been followed by GI at [facility]  According to patient he got Botox injection earlier       Patient also have a AFib and currently on Eliquis      Patient takes all the medications as prescribed and denies use of NSAIDs   Past Medical History:   Diagnosis Date    Anticoagulant long-term use     Atrial fibrillation (HCC)     Cardiac defibrillator in situ     Cardiomyopathy (HCC)     Colon polyp     Congestive heart failure (CHF) (HCC)     DJD (degenerative joint disease)     HL (hearing loss)     Hyperlipidemia     Hypertension     Insomnia, unspecified 8/25/2021    Shortness of breath     Sick sinus syndrome ([facility] [address] )     Spinal stenosis        Past Surgical History:   Procedure Laterality Date    CARDIAC DEFIBRILLATOR PLACEMENT      FL RETROGRADE PYELOGRAM  11/03/2020    INSERT / REPLACE / REMOVE PACEMAKER      KNEE SURGERY Left     MENISCECTOMY      in [facility] had this    ORIF FEMUR FRACTURE Right 08/12/2021    MT COLONOSCOPY FLX DX W/COLLJ SPEC WHEN PFRMD N/A 06/05/2017    Procedure: COLONOSCOPY;  Surgeon: Dawit Pham MD;  Location: [facility];   Service: Gastroenterology    MT CYSTO/URETERO W/LITHOTRIPSY &INDWELL STENT INSRT Left 11/03/2020    Procedure: CYSTOSCOPY URETEROSCOPY WITH LITHOTRIPSY HOLMIUM LASER, RETROGRADE PYELOGRAM AND INSERTION STENT URETERAL;  Surgeon: Opal Zhou MD;  Location: [facility];  Service: Urology    MT CYSTOURETHROSCOPY,URETER CATHETER Left 09/29/2020    Procedure: CYSTOSCOPY RETROGRADE PYELOGRAM WITH INSERTION STENT URETERAL;  Surgeon: Liz Morejon MD;  Location: [facility];  Service: Urology    TONSILLECTOMY      TRANSURETHRAL RESECTION OF PROSTATE         Current Outpatient Medications   Medication Sig Dispense Refill    amiodarone 200 mg tablet 1 tab daily 90 tablet 3    apixaban (ELIQUIS) 2 5 mg Take 1 tablet (2 5 mg total) by mouth 2 (two) times a day 180 tablet 3    benzonatate (TESSALON PERLES) 100 mg capsule Take 1 capsule (100 mg total) by mouth 3 (three) times a day as needed for cough 20 capsule 0    Diclofenac Sodium (VOLTAREN) 1 % Apply 2 g topically 4 (four) times a day 350 g 3    diphenhydrAMINE-APAP, sleep, (TYLENOL PM EXTRA STRENGTH PO) Take by mouth as needed       famotidine (PEPCID) 20 mg tablet Take 1 tablet (20 mg total) by mouth 2 (two) times a day (Patient taking differently: Take 20 mg by mouth daily ) 180 tablet 0    melatonin 3 mg Take 3 mg by mouth daily at bedtime      metoprolol succinate (TOPROL-XL) 25 mg 24 hr tablet Take 1 tablet (25 mg total) by mouth daily (Patient taking differently: Take 12 5 mg by mouth daily ) 90 tablet 3    pantoprazole (PROTONIX) 40 mg tablet Take 1 tablet (40 mg total) by mouth daily in the early morning 90 tablet 3    polyethylene glycol (MIRALAX) 17 g packet Take 17 g by mouth daily 30 each 0    Tafamidis (Vyndamax) 61 MG CAPS Take 61 mg by mouth daily 30 capsule 11    torsemide (DEMADEX) 10 mg tablet 1 and 2 tabs on alternate days 90 tablet 3    Alum Hydroxide-Mag Carbonate (GAVISCON EXTRA STRENGTH PO) Take by mouth (Patient not taking: Reported on 9/20/2021)      docusate sodium (COLACE) 100 mg capsule Take 1 capsule (100 mg total) by mouth 2 (two) times a day (Patient not taking: Reported on 6/9/2021) 60 capsule 5    traZODone (DESYREL) 50 mg tablet Take 1-2 tablets ( mg total) by mouth daily at bedtime as needed for sleep (Patient not taking: Reported on 9/20/2021) 60 tablet 1     No current facility-administered medications for this visit  Allergies   Allergen Reactions    Other Sneezing     Seasonal; pollen; reactions; congestion    Penicillin G Benzathine Rash    Penicillins Rash       Review of Systems   Constitutional: Negative for chills and fever  HENT: Negative for nosebleeds and sore throat      Eyes: Negative for photophobia and pain  Respiratory: Negative for choking and wheezing  Cardiovascular: Negative for chest pain and palpitations  Gastrointestinal: Negative for abdominal pain and blood in stool  Endocrine: Negative for heat intolerance  Genitourinary: Negative for flank pain and hematuria  Musculoskeletal: Negative for joint swelling and neck pain  Skin: Negative for color change and pallor  Neurological: Negative for seizures and syncope  Psychiatric/Behavioral: Negative for confusion and suicidal ideas  Video Exam    Vitals:    09/20/21 1325   BP: 108/65   BP Location: Left arm   Patient Position: Sitting   Cuff Size: Standard   Pulse: 75   Resp: 16   Temp: 98 2 °F (36 8 °C)   TempSrc: Temporal   Weight: 86 2 kg (190 lb)   Height: 6' 5" (1 956 m)       Physical Exam  Constitutional:       General: He is not in acute distress  Comments: I have directly observed patient during video visit   Eyes:      Comments: No exophthalmos   Neck:      Comments: Normal range of motion of neck    No visible thyromegaly  Pulmonary:      Effort: No respiratory distress  Abdominal:      General: There is no distension  Musculoskeletal:      Comments: Able to move upper extremity   Skin:     Comments: No yellow scan/no jaundice   Neurological:      Mental Status: He is alert and oriented to person, place, and time  Psychiatric:         Speech: He is communicative  Behavior: Behavior is not agitated  Assessment and plan    1  CKD stage 3    Multifactorial, suspected due to underlying cardiorenal syndrome on top of hypertensive nephrosclerosis and advanced age       Patient was also earlier found to have obstructive uropathy with left hydronephrosis and had ureteral stent placed and also been followed by urologist  Renal ultrasound done on 2/3/2021 showed no hydronephrosis       Upon review of medical records, new baseline creatinine seems to be fluctuating around 1 8-1 9 with current creatinine of 1 35 with EGFR of 49 which is better and below to previously known baseline creatinine       Patient has underlying cardiomyopathy and also been followed by cardiologist at [facility]  Now taking torsemide  10 mg alternating with 20 mg on daily basis  Current weight is 190 lb and according to patient he has been slowly losing weight   Defer further management of diuretics to Cardiology team       Plan to avoid NSAIDs and recheck renal function before next visit       2  Hypertension in chronic kidney disease   According to patient his blood pressure is currently under well control and for time being plan to monitor hypertension with metoprolol XL 25 mg PO daily  Also advised to follow low-salt diet       3   Proteinuria  Multifactorial with current urine microalbumin : creatinine ratio of 72 mg   Monitor proteinuria without ACE-I/ ARB   Recent urine analysis showed no hematuria   4  Secondary hyperparathyroidism of renal origin   Current PTH level is 98   Calcium, phosphorus and vitamin-D levels are at goal  For time being monitor PTH level without initiation of calcitriol       Returns to Nephrology office in 6 months   Future labs ordered   I spent 15 minutes with patient today in which greater than 50% of the time was spent in counseling/coordination of care regarding  management of chronic kidney disease    VIRTUAL VISIT [facility] verbally agrees to participate in [employer]  Pt is aware that [employer] could be limited without vital signs or the ability to perform a full hands-on physical Alayna Zaldivart understands he or the provider may request at any time to terminate the video visit and request the patient to seek care or treatment in person

## 2021-09-22 NOTE — TELEPHONE ENCOUNTER
ptn wife states ptn had an EGD with botox on 9/16/21 and he is not doing any better  ptn is reporting a lot of pain and a lot of gas  He is eating small meals consisting or soft foods after his liquid diet and hes still having problems eating  He having a lot of pain in his stomach and keeps loosing weight   Dr Magy Shay please advise

## 2021-09-24 NOTE — TELEPHONE ENCOUNTER
Pancho worked yesterday not today as much - did not take protonix today - told to take it and if pain persists go to er

## 2021-11-19 PROBLEM — I50.23 ACUTE ON CHRONIC SYSTOLIC CHF (CONGESTIVE HEART FAILURE) (HCC): Status: ACTIVE | Noted: 2021-01-01

## 2021-11-19 PROBLEM — D69.6 PLATELETS DECREASED (HCC): Status: ACTIVE | Noted: 2021-01-01

## 2021-11-21 PROBLEM — K59.00 CONSTIPATION: Status: ACTIVE | Noted: 2021-01-01

## 2021-11-22 PROBLEM — L03.115 CELLULITIS OF RIGHT LOWER EXTREMITY: Status: ACTIVE | Noted: 2021-01-01

## 2021-11-29 NOTE — TELEPHONE ENCOUNTER
----- Message from Jorge Jean Baptiste MD sent at 11/25/2021  9:40 AM EST -----  Please arrange for 1 week follow up with this patient  Heart failure follow up

## 2022-01-01 ENCOUNTER — HOSPITAL ENCOUNTER (OUTPATIENT)
Dept: NON INVASIVE DIAGNOSTICS | Facility: HOSPITAL | Age: 82
Discharge: HOME | End: 2022-02-17
Attending: INTERNAL MEDICINE | Admitting: RADIOLOGY
Payer: MEDICARE

## 2022-01-01 ENCOUNTER — APPOINTMENT (OUTPATIENT)
Dept: LAB | Facility: CLINIC | Age: 82
End: 2022-01-01
Payer: MEDICARE

## 2022-01-01 ENCOUNTER — TELEPHONE (OUTPATIENT)
Dept: CARDIOLOGY CLINIC | Facility: CLINIC | Age: 82
End: 2022-01-01

## 2022-01-01 ENCOUNTER — TELEPHONE (OUTPATIENT)
Dept: INTERVENTIONAL RADIOLOGY/VASCULAR | Facility: CLINIC | Age: 82
End: 2022-01-01

## 2022-01-01 ENCOUNTER — HOME CARE VISIT (OUTPATIENT)
Dept: HOME HEALTH SERVICES | Facility: HOME HEALTHCARE | Age: 82
End: 2022-01-01
Payer: MEDICARE

## 2022-01-01 ENCOUNTER — HOSPITAL ENCOUNTER (OUTPATIENT)
Dept: NON INVASIVE DIAGNOSTICS | Facility: HOSPITAL | Age: 82
Discharge: HOME | End: 2022-05-27
Payer: MEDICARE

## 2022-01-01 ENCOUNTER — TELEPHONE (OUTPATIENT)
Dept: INTERNAL MEDICINE CLINIC | Facility: CLINIC | Age: 82
End: 2022-01-01

## 2022-01-01 ENCOUNTER — HOME HEALTH ADMISSION (OUTPATIENT)
Dept: HOME HEALTH SERVICES | Facility: HOME HEALTHCARE | Age: 82
End: 2022-01-01
Payer: MEDICARE

## 2022-01-01 ENCOUNTER — CONSULT (OUTPATIENT)
Dept: PULMONOLOGY | Facility: CLINIC | Age: 82
End: 2022-01-01
Payer: MEDICARE

## 2022-01-01 ENCOUNTER — APPOINTMENT (OUTPATIENT)
Dept: LAB | Facility: HOSPITAL | Age: 82
End: 2022-01-01
Payer: MEDICARE

## 2022-01-01 ENCOUNTER — HOSPITAL ENCOUNTER (OUTPATIENT)
Dept: NON INVASIVE DIAGNOSTICS | Facility: HOSPITAL | Age: 82
Discharge: HOME | End: 2022-04-21
Attending: RADIOLOGY | Admitting: RADIOLOGY
Payer: MEDICARE

## 2022-01-01 ENCOUNTER — HOSPITAL ENCOUNTER (OUTPATIENT)
Dept: NON INVASIVE DIAGNOSTICS | Facility: HOSPITAL | Age: 82
Discharge: HOME | End: 2022-01-06
Attending: INTERNAL MEDICINE
Payer: MEDICARE

## 2022-01-01 ENCOUNTER — TELEPHONE (OUTPATIENT)
Dept: GASTROENTEROLOGY | Facility: CLINIC | Age: 82
End: 2022-01-01

## 2022-01-01 ENCOUNTER — TELEPHONE (OUTPATIENT)
Dept: NEPHROLOGY | Facility: CLINIC | Age: 82
End: 2022-01-01

## 2022-01-01 ENCOUNTER — HOSPITAL ENCOUNTER (OUTPATIENT)
Facility: HOSPITAL | Age: 82
Setting detail: OUTPATIENT SURGERY
Discharge: HOME | End: 2022-06-13
Attending: INTERNAL MEDICINE | Admitting: INTERNAL MEDICINE
Payer: MEDICARE

## 2022-01-01 ENCOUNTER — PATIENT OUTREACH (OUTPATIENT)
Dept: CASE MANAGEMENT | Facility: HOSPITAL | Age: 82
End: 2022-01-01

## 2022-01-01 ENCOUNTER — OFFICE VISIT (OUTPATIENT)
Dept: CARDIOLOGY CLINIC | Facility: CLINIC | Age: 82
End: 2022-01-01
Payer: MEDICARE

## 2022-01-01 ENCOUNTER — LAB REQUISITION (OUTPATIENT)
Dept: LAB | Facility: HOSPITAL | Age: 82
End: 2022-01-01
Payer: MEDICARE

## 2022-01-01 ENCOUNTER — IN-CLINIC DEVICE VISIT (OUTPATIENT)
Dept: CARDIOLOGY CLINIC | Facility: CLINIC | Age: 82
End: 2022-01-01

## 2022-01-01 ENCOUNTER — ANESTHESIA EVENT (OUTPATIENT)
Dept: NON INVASIVE DIAGNOSTICS | Facility: HOSPITAL | Age: 82
End: 2022-01-01
Payer: MEDICARE

## 2022-01-01 ENCOUNTER — HOSPITAL ENCOUNTER (OUTPATIENT)
Dept: CT IMAGING | Facility: CLINIC | Age: 82
Discharge: HOME | End: 2022-06-10
Payer: MEDICARE

## 2022-01-01 ENCOUNTER — HOSPITAL ENCOUNTER (OUTPATIENT)
Dept: NON INVASIVE DIAGNOSTICS | Facility: HOSPITAL | Age: 82
Discharge: HOME | End: 2022-05-20
Attending: RADIOLOGY | Admitting: RADIOLOGY
Payer: MEDICARE

## 2022-01-01 ENCOUNTER — TELEPHONE (OUTPATIENT)
Dept: LAB | Facility: HOSPITAL | Age: 82
End: 2022-01-01

## 2022-01-01 ENCOUNTER — TELEPHONE (OUTPATIENT)
Dept: INTERVENTIONAL RADIOLOGY/VASCULAR | Facility: HOSPITAL | Age: 82
End: 2022-01-01

## 2022-01-01 ENCOUNTER — HOME CARE VISIT (OUTPATIENT)
Dept: HOME HOSPICE | Facility: HOSPICE | Age: 82
End: 2022-01-01
Payer: MEDICARE

## 2022-01-01 ENCOUNTER — TELEPHONE (OUTPATIENT)
Dept: RADIOLOGY | Facility: HOSPITAL | Age: 82
End: 2022-01-01

## 2022-01-01 ENCOUNTER — HOSPICE ADMISSION (OUTPATIENT)
Dept: HOME HOSPICE | Facility: HOSPICE | Age: 82
End: 2022-01-01
Payer: MEDICARE

## 2022-01-01 ENCOUNTER — ANESTHESIA (OUTPATIENT)
Dept: NON INVASIVE DIAGNOSTICS | Facility: HOSPITAL | Age: 82
End: 2022-01-01
Payer: MEDICARE

## 2022-01-01 ENCOUNTER — HOSPITAL ENCOUNTER (OUTPATIENT)
Dept: NON INVASIVE DIAGNOSTICS | Facility: HOSPITAL | Age: 82
Discharge: HOME | End: 2022-04-14
Attending: INTERNAL MEDICINE | Admitting: RADIOLOGY
Payer: MEDICARE

## 2022-01-01 ENCOUNTER — TELEMEDICINE (OUTPATIENT)
Dept: CARDIOLOGY CLINIC | Facility: CLINIC | Age: 82
End: 2022-01-01
Payer: MEDICARE

## 2022-01-01 ENCOUNTER — CONSULT (OUTPATIENT)
Dept: INTERVENTIONAL RADIOLOGY/VASCULAR | Facility: CLINIC | Age: 82
End: 2022-01-01
Payer: MEDICARE

## 2022-01-01 ENCOUNTER — PREP FOR PROCEDURE (OUTPATIENT)
Dept: INTERVENTIONAL RADIOLOGY/VASCULAR | Facility: CLINIC | Age: 82
End: 2022-01-01

## 2022-01-01 ENCOUNTER — HOSPITAL ENCOUNTER (OUTPATIENT)
Dept: NON INVASIVE DIAGNOSTICS | Facility: HOSPITAL | Age: 82
Discharge: HOME | End: 2022-05-10
Payer: MEDICARE

## 2022-01-01 ENCOUNTER — HOSPITAL ENCOUNTER (OUTPATIENT)
Dept: NON INVASIVE DIAGNOSTICS | Facility: HOSPITAL | Age: 82
Discharge: HOME | End: 2022-01-28
Attending: INTERNAL MEDICINE
Payer: MEDICARE

## 2022-01-01 ENCOUNTER — REMOTE DEVICE CLINIC VISIT (OUTPATIENT)
Dept: CARDIOLOGY CLINIC | Facility: CLINIC | Age: 82
End: 2022-01-01
Payer: MEDICARE

## 2022-01-01 ENCOUNTER — HOSPITAL ENCOUNTER (OUTPATIENT)
Dept: CT IMAGING | Facility: CLINIC | Age: 82
Discharge: HOME | End: 2022-05-09
Payer: MEDICARE

## 2022-01-01 ENCOUNTER — HOSPITAL ENCOUNTER (OUTPATIENT)
Dept: NON INVASIVE DIAGNOSTICS | Facility: HOSPITAL | Age: 82
Discharge: HOME | End: 2022-03-11
Attending: INTERNAL MEDICINE | Admitting: RADIOLOGY
Payer: MEDICARE

## 2022-01-01 ENCOUNTER — IN-CLINIC DEVICE VISIT (OUTPATIENT)
Dept: CARDIOLOGY CLINIC | Facility: CLINIC | Age: 82
End: 2022-01-01
Payer: MEDICARE

## 2022-01-01 ENCOUNTER — HOME CARE VISIT (OUTPATIENT)
Dept: HOME HEALTH SERVICES | Facility: HOME HEALTHCARE | Age: 82
End: 2022-01-01

## 2022-01-01 ENCOUNTER — TELEPHONE (OUTPATIENT)
Dept: PULMONOLOGY | Facility: CLINIC | Age: 82
End: 2022-01-01

## 2022-01-01 ENCOUNTER — TRANSCRIBE ORDERS (OUTPATIENT)
Dept: HOME HEALTH SERVICES | Facility: HOME HEALTHCARE | Age: 82
End: 2022-01-01

## 2022-01-01 ENCOUNTER — REMOTE DEVICE CLINIC VISIT (OUTPATIENT)
Dept: CARDIOLOGY CLINIC | Facility: CLINIC | Age: 82
End: 2022-01-01

## 2022-01-01 ENCOUNTER — HOSPITAL ENCOUNTER (OUTPATIENT)
Dept: NON INVASIVE DIAGNOSTICS | Facility: HOSPITAL | Age: 82
Discharge: HOME | End: 2022-03-31
Attending: INTERNAL MEDICINE | Admitting: RADIOLOGY
Payer: MEDICARE

## 2022-01-01 VITALS
DIASTOLIC BLOOD PRESSURE: 64 MMHG | WEIGHT: 163 LBS | BODY MASS INDEX: 19.85 KG/M2 | HEIGHT: 76 IN | HEART RATE: 70 BPM | SYSTOLIC BLOOD PRESSURE: 104 MMHG

## 2022-01-01 VITALS
HEART RATE: 69 BPM | SYSTOLIC BLOOD PRESSURE: 93 MMHG | BODY MASS INDEX: 19.85 KG/M2 | TEMPERATURE: 97.3 F | DIASTOLIC BLOOD PRESSURE: 51 MMHG | WEIGHT: 163 LBS | RESPIRATION RATE: 14 BRPM | HEIGHT: 76 IN | OXYGEN SATURATION: 100 %

## 2022-01-01 VITALS
OXYGEN SATURATION: 100 % | TEMPERATURE: 96.8 F | BODY MASS INDEX: 21.49 KG/M2 | WEIGHT: 182 LBS | HEART RATE: 71 BPM | SYSTOLIC BLOOD PRESSURE: 102 MMHG | HEIGHT: 77 IN | DIASTOLIC BLOOD PRESSURE: 64 MMHG

## 2022-01-01 VITALS
OXYGEN SATURATION: 97 % | SYSTOLIC BLOOD PRESSURE: 102 MMHG | TEMPERATURE: 97.7 F | WEIGHT: 163.44 LBS | BODY MASS INDEX: 19.89 KG/M2 | DIASTOLIC BLOOD PRESSURE: 70 MMHG | RESPIRATION RATE: 20 BRPM | HEART RATE: 70 BPM

## 2022-01-01 VITALS
OXYGEN SATURATION: 99 % | HEART RATE: 68 BPM | SYSTOLIC BLOOD PRESSURE: 86 MMHG | RESPIRATION RATE: 20 BRPM | DIASTOLIC BLOOD PRESSURE: 51 MMHG

## 2022-01-01 VITALS
SYSTOLIC BLOOD PRESSURE: 100 MMHG | BODY MASS INDEX: 20.12 KG/M2 | TEMPERATURE: 97.6 F | HEART RATE: 68 BPM | DIASTOLIC BLOOD PRESSURE: 70 MMHG | WEIGHT: 165.31 LBS | RESPIRATION RATE: 20 BRPM | OXYGEN SATURATION: 97 %

## 2022-01-01 VITALS
SYSTOLIC BLOOD PRESSURE: 98 MMHG | OXYGEN SATURATION: 98 % | TEMPERATURE: 97 F | HEART RATE: 70 BPM | DIASTOLIC BLOOD PRESSURE: 60 MMHG | RESPIRATION RATE: 20 BRPM

## 2022-01-01 VITALS — DIASTOLIC BLOOD PRESSURE: 54 MMHG | OXYGEN SATURATION: 99 % | SYSTOLIC BLOOD PRESSURE: 92 MMHG

## 2022-01-01 VITALS
SYSTOLIC BLOOD PRESSURE: 98 MMHG | TEMPERATURE: 97.4 F | OXYGEN SATURATION: 97 % | RESPIRATION RATE: 20 BRPM | WEIGHT: 166.38 LBS | DIASTOLIC BLOOD PRESSURE: 64 MMHG | HEART RATE: 72 BPM | BODY MASS INDEX: 20.25 KG/M2

## 2022-01-01 VITALS
DIASTOLIC BLOOD PRESSURE: 62 MMHG | RESPIRATION RATE: 20 BRPM | SYSTOLIC BLOOD PRESSURE: 100 MMHG | HEART RATE: 74 BPM | OXYGEN SATURATION: 97 % | TEMPERATURE: 97.4 F

## 2022-01-01 VITALS
TEMPERATURE: 97.3 F | HEART RATE: 68 BPM | DIASTOLIC BLOOD PRESSURE: 60 MMHG | SYSTOLIC BLOOD PRESSURE: 98 MMHG | RESPIRATION RATE: 20 BRPM | OXYGEN SATURATION: 97 %

## 2022-01-01 VITALS
RESPIRATION RATE: 20 BRPM | SYSTOLIC BLOOD PRESSURE: 104 MMHG | HEART RATE: 70 BPM | TEMPERATURE: 97.3 F | OXYGEN SATURATION: 96 % | DIASTOLIC BLOOD PRESSURE: 64 MMHG

## 2022-01-01 VITALS
RESPIRATION RATE: 20 BRPM | HEART RATE: 78 BPM | DIASTOLIC BLOOD PRESSURE: 55 MMHG | SYSTOLIC BLOOD PRESSURE: 94 MMHG | OXYGEN SATURATION: 98 %

## 2022-01-01 VITALS
HEART RATE: 69 BPM | TEMPERATURE: 97.6 F | DIASTOLIC BLOOD PRESSURE: 64 MMHG | SYSTOLIC BLOOD PRESSURE: 121 MMHG | RESPIRATION RATE: 15 BRPM | WEIGHT: 180.7 LBS | HEIGHT: 77 IN | OXYGEN SATURATION: 95 % | BODY MASS INDEX: 21.33 KG/M2

## 2022-01-01 VITALS
SYSTOLIC BLOOD PRESSURE: 112 MMHG | DIASTOLIC BLOOD PRESSURE: 70 MMHG | BODY MASS INDEX: 19.57 KG/M2 | HEART RATE: 72 BPM | TEMPERATURE: 97.7 F | HEIGHT: 77 IN

## 2022-01-01 VITALS
SYSTOLIC BLOOD PRESSURE: 116 MMHG | HEART RATE: 72 BPM | TEMPERATURE: 97.6 F | OXYGEN SATURATION: 97 % | RESPIRATION RATE: 20 BRPM | DIASTOLIC BLOOD PRESSURE: 72 MMHG

## 2022-01-01 VITALS
SYSTOLIC BLOOD PRESSURE: 93 MMHG | OXYGEN SATURATION: 97 % | HEART RATE: 78 BPM | DIASTOLIC BLOOD PRESSURE: 51 MMHG | RESPIRATION RATE: 20 BRPM

## 2022-01-01 VITALS
HEART RATE: 74 BPM | OXYGEN SATURATION: 99 % | RESPIRATION RATE: 16 BRPM | SYSTOLIC BLOOD PRESSURE: 93 MMHG | DIASTOLIC BLOOD PRESSURE: 51 MMHG

## 2022-01-01 VITALS
DIASTOLIC BLOOD PRESSURE: 62 MMHG | TEMPERATURE: 97.1 F | OXYGEN SATURATION: 97 % | HEART RATE: 68 BPM | SYSTOLIC BLOOD PRESSURE: 100 MMHG | RESPIRATION RATE: 20 BRPM

## 2022-01-01 VITALS
DIASTOLIC BLOOD PRESSURE: 60 MMHG | SYSTOLIC BLOOD PRESSURE: 103 MMHG | OXYGEN SATURATION: 100 % | BODY MASS INDEX: 20.56 KG/M2 | HEIGHT: 76 IN | HEART RATE: 69 BPM | WEIGHT: 168.87 LBS | TEMPERATURE: 98 F | RESPIRATION RATE: 20 BRPM

## 2022-01-01 VITALS
WEIGHT: 165 LBS | BODY MASS INDEX: 20.08 KG/M2 | RESPIRATION RATE: 20 BRPM | DIASTOLIC BLOOD PRESSURE: 62 MMHG | OXYGEN SATURATION: 97 % | SYSTOLIC BLOOD PRESSURE: 100 MMHG | HEART RATE: 68 BPM | TEMPERATURE: 97.3 F

## 2022-01-01 VITALS
SYSTOLIC BLOOD PRESSURE: 120 MMHG | DIASTOLIC BLOOD PRESSURE: 70 MMHG | OXYGEN SATURATION: 98 % | BODY MASS INDEX: 21.34 KG/M2 | WEIGHT: 180 LBS | HEART RATE: 71 BPM

## 2022-01-01 VITALS — HEART RATE: 70 BPM | SYSTOLIC BLOOD PRESSURE: 96 MMHG | DIASTOLIC BLOOD PRESSURE: 62 MMHG

## 2022-01-01 VITALS
TEMPERATURE: 97.2 F | HEART RATE: 72 BPM | OXYGEN SATURATION: 97 % | RESPIRATION RATE: 20 BRPM | SYSTOLIC BLOOD PRESSURE: 116 MMHG | DIASTOLIC BLOOD PRESSURE: 60 MMHG

## 2022-01-01 VITALS
SYSTOLIC BLOOD PRESSURE: 118 MMHG | WEIGHT: 190.4 LBS | HEART RATE: 74 BPM | HEIGHT: 75 IN | OXYGEN SATURATION: 93 % | BODY MASS INDEX: 23.67 KG/M2 | DIASTOLIC BLOOD PRESSURE: 72 MMHG

## 2022-01-01 VITALS
DIASTOLIC BLOOD PRESSURE: 70 MMHG | TEMPERATURE: 97.3 F | RESPIRATION RATE: 20 BRPM | OXYGEN SATURATION: 98 % | HEART RATE: 78 BPM | SYSTOLIC BLOOD PRESSURE: 104 MMHG

## 2022-01-01 VITALS
OXYGEN SATURATION: 100 % | HEART RATE: 70 BPM | RESPIRATION RATE: 18 BRPM | SYSTOLIC BLOOD PRESSURE: 109 MMHG | DIASTOLIC BLOOD PRESSURE: 62 MMHG

## 2022-01-01 VITALS
WEIGHT: 165 LBS | TEMPERATURE: 97.9 F | BODY MASS INDEX: 19.48 KG/M2 | DIASTOLIC BLOOD PRESSURE: 61 MMHG | SYSTOLIC BLOOD PRESSURE: 97 MMHG | OXYGEN SATURATION: 95 % | HEART RATE: 68 BPM | RESPIRATION RATE: 16 BRPM | HEIGHT: 77 IN

## 2022-01-01 VITALS
DIASTOLIC BLOOD PRESSURE: 65 MMHG | BODY MASS INDEX: 21.61 KG/M2 | HEIGHT: 77 IN | OXYGEN SATURATION: 97 % | RESPIRATION RATE: 16 BRPM | SYSTOLIC BLOOD PRESSURE: 105 MMHG | WEIGHT: 183 LBS | HEART RATE: 71 BPM

## 2022-01-01 VITALS
DIASTOLIC BLOOD PRESSURE: 68 MMHG | OXYGEN SATURATION: 97 % | HEART RATE: 68 BPM | TEMPERATURE: 97.7 F | SYSTOLIC BLOOD PRESSURE: 100 MMHG | RESPIRATION RATE: 20 BRPM

## 2022-01-01 VITALS — DIASTOLIC BLOOD PRESSURE: 70 MMHG | SYSTOLIC BLOOD PRESSURE: 116 MMHG | RESPIRATION RATE: 20 BRPM | HEART RATE: 72 BPM

## 2022-01-01 VITALS
TEMPERATURE: 97.2 F | HEART RATE: 70 BPM | SYSTOLIC BLOOD PRESSURE: 96 MMHG | DIASTOLIC BLOOD PRESSURE: 60 MMHG | OXYGEN SATURATION: 96 % | RESPIRATION RATE: 20 BRPM

## 2022-01-01 VITALS — HEART RATE: 71 BPM | SYSTOLIC BLOOD PRESSURE: 105 MMHG | DIASTOLIC BLOOD PRESSURE: 71 MMHG

## 2022-01-01 VITALS
WEIGHT: 182.7 LBS | HEIGHT: 75 IN | HEART RATE: 69 BPM | DIASTOLIC BLOOD PRESSURE: 74 MMHG | BODY MASS INDEX: 22.72 KG/M2 | OXYGEN SATURATION: 99 % | SYSTOLIC BLOOD PRESSURE: 108 MMHG

## 2022-01-01 VITALS
SYSTOLIC BLOOD PRESSURE: 102 MMHG | HEART RATE: 68 BPM | RESPIRATION RATE: 20 BRPM | WEIGHT: 165.25 LBS | TEMPERATURE: 97.4 F | DIASTOLIC BLOOD PRESSURE: 68 MMHG | OXYGEN SATURATION: 95 % | BODY MASS INDEX: 20.11 KG/M2

## 2022-01-01 VITALS
RESPIRATION RATE: 20 BRPM | HEART RATE: 68 BPM | TEMPERATURE: 97.2 F | DIASTOLIC BLOOD PRESSURE: 64 MMHG | SYSTOLIC BLOOD PRESSURE: 100 MMHG | OXYGEN SATURATION: 97 %

## 2022-01-01 DIAGNOSIS — J90 RECURRENT PLEURAL EFFUSION: ICD-10-CM

## 2022-01-01 DIAGNOSIS — I50.22 CHRONIC SYSTOLIC HEART FAILURE (HCC): ICD-10-CM

## 2022-01-01 DIAGNOSIS — K31.84 GASTROPARESIS: ICD-10-CM

## 2022-01-01 DIAGNOSIS — I42.0 DILATED CARDIOMYOPATHY (HCC): ICD-10-CM

## 2022-01-01 DIAGNOSIS — I43 CARDIAC AMYLOIDOSIS (HCC): ICD-10-CM

## 2022-01-01 DIAGNOSIS — J90 BILATERAL PLEURAL EFFUSION: ICD-10-CM

## 2022-01-01 DIAGNOSIS — I50.22 HYPERTENSIVE HEART AND KIDNEY DISEASE WITH CHRONIC SYSTOLIC CONGESTIVE HEART FAILURE AND STAGE 3B CHRONIC KIDNEY DISEASE (HCC): ICD-10-CM

## 2022-01-01 DIAGNOSIS — J90 BILATERAL PLEURAL EFFUSION: Primary | ICD-10-CM

## 2022-01-01 DIAGNOSIS — R63.4 WEIGHT LOSS: ICD-10-CM

## 2022-01-01 DIAGNOSIS — R10.13 EPIGASTRIC PAIN: ICD-10-CM

## 2022-01-01 DIAGNOSIS — I50.22 CHRONIC SYSTOLIC HEART FAILURE (HCC): Chronic | ICD-10-CM

## 2022-01-01 DIAGNOSIS — K31.84 GASTROPARESIS: Primary | ICD-10-CM

## 2022-01-01 DIAGNOSIS — I50.23 ACUTE ON CHRONIC SYSTOLIC CHF (CONGESTIVE HEART FAILURE) (HCC): Primary | ICD-10-CM

## 2022-01-01 DIAGNOSIS — E85.4 CARDIAC AMYLOIDOSIS (HCC): ICD-10-CM

## 2022-01-01 DIAGNOSIS — I48.20 CHRONIC ATRIAL FIBRILLATION (HCC): ICD-10-CM

## 2022-01-01 DIAGNOSIS — I50.22 CHRONIC SYSTOLIC HEART FAILURE (HCC): Primary | Chronic | ICD-10-CM

## 2022-01-01 DIAGNOSIS — Z95.810 PRESENCE OF AUTOMATIC CARDIOVERTER/DEFIBRILLATOR (AICD): Primary | ICD-10-CM

## 2022-01-01 DIAGNOSIS — I50.22 CHRONIC SYSTOLIC HEART FAILURE (HCC): Primary | ICD-10-CM

## 2022-01-01 DIAGNOSIS — N25.81 SECONDARY HYPERPARATHYROIDISM OF RENAL ORIGIN (HCC): ICD-10-CM

## 2022-01-01 DIAGNOSIS — N18.32 STAGE 3B CHRONIC KIDNEY DISEASE (HCC): ICD-10-CM

## 2022-01-01 DIAGNOSIS — I48.20 CHRONIC ATRIAL FIBRILLATION (HCC): Primary | ICD-10-CM

## 2022-01-01 DIAGNOSIS — Z79.01 CHRONIC ANTICOAGULATION: Chronic | ICD-10-CM

## 2022-01-01 DIAGNOSIS — M48.062 SPINAL STENOSIS OF LUMBAR REGION WITH NEUROGENIC CLAUDICATION: ICD-10-CM

## 2022-01-01 DIAGNOSIS — I47.2 VENTRICULAR TACHYCARDIA (PAROXYSMAL) (HCC): Primary | ICD-10-CM

## 2022-01-01 DIAGNOSIS — N18.4 CHRONIC KIDNEY DISEASE, STAGE 4 (SEVERE) (HCC): ICD-10-CM

## 2022-01-01 DIAGNOSIS — I13.0 HYPERTENSIVE HEART AND KIDNEY DISEASE WITH CHRONIC SYSTOLIC CONGESTIVE HEART FAILURE AND STAGE 3B CHRONIC KIDNEY DISEASE (HCC): ICD-10-CM

## 2022-01-01 DIAGNOSIS — G89.4 CHRONIC PAIN SYNDROME: ICD-10-CM

## 2022-01-01 DIAGNOSIS — N18.30 HYPERTENSIVE KIDNEY DISEASE WITH STAGE 3 CHRONIC KIDNEY DISEASE, UNSPECIFIED WHETHER STAGE 3A OR 3B CKD (HCC): ICD-10-CM

## 2022-01-01 DIAGNOSIS — R06.02 SOB (SHORTNESS OF BREATH): Primary | ICD-10-CM

## 2022-01-01 DIAGNOSIS — I13.0 HYPERTENSIVE HEART AND KIDNEY DISEASE WITH CHRONIC SYSTOLIC CONGESTIVE HEART FAILURE AND STAGE 3B CHRONIC KIDNEY DISEASE (HCC): Chronic | ICD-10-CM

## 2022-01-01 DIAGNOSIS — Z95.810 PRESENCE OF IMPLANTABLE CARDIOVERTER-DEFIBRILLATOR (ICD): Primary | ICD-10-CM

## 2022-01-01 DIAGNOSIS — J90 RECURRENT PLEURAL EFFUSION: Primary | ICD-10-CM

## 2022-01-01 DIAGNOSIS — I50.22 CHRONIC SYSTOLIC (CONGESTIVE) HEART FAILURE (HCC): ICD-10-CM

## 2022-01-01 DIAGNOSIS — I50.22 HYPERTENSIVE HEART AND KIDNEY DISEASE WITH CHRONIC SYSTOLIC CONGESTIVE HEART FAILURE AND STAGE 3B CHRONIC KIDNEY DISEASE (HCC): Chronic | ICD-10-CM

## 2022-01-01 DIAGNOSIS — G89.4 CHRONIC PAIN SYNDROME: Primary | ICD-10-CM

## 2022-01-01 DIAGNOSIS — N18.32 HYPERTENSIVE HEART AND KIDNEY DISEASE WITH CHRONIC SYSTOLIC CONGESTIVE HEART FAILURE AND STAGE 3B CHRONIC KIDNEY DISEASE (HCC): ICD-10-CM

## 2022-01-01 DIAGNOSIS — I48.0 PAROXYSMAL ATRIAL FIBRILLATION (HCC): ICD-10-CM

## 2022-01-01 DIAGNOSIS — I47.2 VENTRICULAR TACHYCARDIA (HCC): ICD-10-CM

## 2022-01-01 DIAGNOSIS — N18.32 CHRONIC KIDNEY DISEASE, STAGE 3B (HCC): ICD-10-CM

## 2022-01-01 DIAGNOSIS — E85.4 CARDIAC AMYLOIDOSIS (HCC): Primary | ICD-10-CM

## 2022-01-01 DIAGNOSIS — M48.062 SPINAL STENOSIS OF LUMBAR REGION WITH NEUROGENIC CLAUDICATION: Primary | ICD-10-CM

## 2022-01-01 DIAGNOSIS — I12.9 HYPERTENSIVE KIDNEY DISEASE WITH STAGE 3 CHRONIC KIDNEY DISEASE, UNSPECIFIED WHETHER STAGE 3A OR 3B CKD (HCC): ICD-10-CM

## 2022-01-01 DIAGNOSIS — Z51.5 PALLIATIVE CARE PATIENT: ICD-10-CM

## 2022-01-01 DIAGNOSIS — N18.32 HYPERTENSIVE HEART AND KIDNEY DISEASE WITH CHRONIC SYSTOLIC CONGESTIVE HEART FAILURE AND STAGE 3B CHRONIC KIDNEY DISEASE (HCC): Chronic | ICD-10-CM

## 2022-01-01 DIAGNOSIS — I47.2 VENTRICULAR TACHYCARDIA (PAROXYSMAL) (HCC): ICD-10-CM

## 2022-01-01 DIAGNOSIS — I43 CARDIAC AMYLOIDOSIS (HCC): Primary | ICD-10-CM

## 2022-01-01 DIAGNOSIS — L03.90 CELLULITIS: ICD-10-CM

## 2022-01-01 DIAGNOSIS — I50.82 BIVENTRICULAR CONGESTIVE HEART FAILURE (HCC): ICD-10-CM

## 2022-01-01 DIAGNOSIS — I13.0 HYPERTENSIVE HEART AND CHRONIC KIDNEY DISEASE WITH HEART FAILURE AND STAGE 1 THROUGH STAGE 4 CHRONIC KIDNEY DISEASE, OR UNSPECIFIED CHRONIC KIDNEY DISEASE (HCC): Primary | ICD-10-CM

## 2022-01-01 DIAGNOSIS — J90 CHRONIC BILATERAL PLEURAL EFFUSIONS: Primary | ICD-10-CM

## 2022-01-01 DIAGNOSIS — R10.84 GENERALIZED ABDOMINAL PAIN: Primary | ICD-10-CM

## 2022-01-01 DIAGNOSIS — Z51.5 HOSPICE CARE: Primary | ICD-10-CM

## 2022-01-01 DIAGNOSIS — Z45.02 IMPLANTABLE CARDIOVERTER-DEFIBRILLATOR (ICD) AT END OF BATTERY LIFE: ICD-10-CM

## 2022-01-01 DIAGNOSIS — Z79.01 CHRONIC ANTICOAGULATION: ICD-10-CM

## 2022-01-01 DIAGNOSIS — N18.32 STAGE 3B CHRONIC KIDNEY DISEASE (HCC): Primary | ICD-10-CM

## 2022-01-01 LAB
25(OH)D3 SERPL-MCNC: 51.6 NG/ML (ref 30–100)
25(OH)D3 SERPL-MCNC: 57.3 NG/ML (ref 30–100)
ALBUMIN SERPL BCP-MCNC: 2.5 G/DL (ref 3.5–5)
ALBUMIN SERPL BCP-MCNC: 2.9 G/DL (ref 3.5–5)
ALBUMIN SERPL BCP-MCNC: 3 G/DL (ref 3.5–5)
ALP SERPL-CCNC: 171 U/L (ref 46–116)
ALP SERPL-CCNC: 188 U/L (ref 46–116)
ALP SERPL-CCNC: 196 U/L (ref 46–116)
ALT SERPL W P-5'-P-CCNC: 16 U/L (ref 12–78)
ALT SERPL W P-5'-P-CCNC: 16 U/L (ref 12–78)
ALT SERPL W P-5'-P-CCNC: 17 U/L (ref 12–78)
ANION GAP SERPL CALCULATED.3IONS-SCNC: 3 MMOL/L (ref 4–13)
ANION GAP SERPL CALCULATED.3IONS-SCNC: 5 MMOL/L (ref 4–13)
ANION GAP SERPL CALCULATED.3IONS-SCNC: 6 MMOL/L (ref 4–13)
ANION GAP SERPL CALCULATED.3IONS-SCNC: 7 MMOL/L (ref 4–13)
AST SERPL W P-5'-P-CCNC: 15 U/L (ref 5–45)
AST SERPL W P-5'-P-CCNC: 19 U/L (ref 5–45)
AST SERPL W P-5'-P-CCNC: 20 U/L (ref 5–45)
ATRIAL RATE: 63 BPM
ATRIAL RATE: 66 BPM
BACTERIA UR QL AUTO: ABNORMAL /HPF
BASOPHILS # BLD AUTO: 0.02 THOUSANDS/ΜL (ref 0–0.1)
BASOPHILS NFR BLD AUTO: 0 % (ref 0–1)
BILIRUB SERPL-MCNC: 1.87 MG/DL (ref 0.2–1)
BILIRUB SERPL-MCNC: 2.29 MG/DL (ref 0.2–1)
BILIRUB SERPL-MCNC: 2.31 MG/DL (ref 0.2–1)
BILIRUB UR QL STRIP: NEGATIVE
BUN SERPL-MCNC: 26 MG/DL (ref 5–25)
BUN SERPL-MCNC: 32 MG/DL (ref 5–25)
BUN SERPL-MCNC: 33 MG/DL (ref 5–25)
BUN SERPL-MCNC: 38 MG/DL (ref 5–25)
CALCIUM ALBUM COR SERPL-MCNC: 10 MG/DL (ref 8.3–10.1)
CALCIUM ALBUM COR SERPL-MCNC: 10.1 MG/DL (ref 8.3–10.1)
CALCIUM ALBUM COR SERPL-MCNC: 10.3 MG/DL (ref 8.3–10.1)
CALCIUM SERPL-MCNC: 8.9 MG/DL (ref 8.3–10.1)
CALCIUM SERPL-MCNC: 9.2 MG/DL (ref 8.3–10.1)
CALCIUM SERPL-MCNC: 9.4 MG/DL (ref 8.3–10.1)
CALCIUM SERPL-MCNC: 9.4 MG/DL (ref 8.3–10.1)
CAOX CRY URNS QL MICRO: ABNORMAL /HPF
CHLORIDE SERPL-SCNC: 101 MMOL/L (ref 100–108)
CHLORIDE SERPL-SCNC: 103 MMOL/L (ref 100–108)
CHLORIDE SERPL-SCNC: 104 MMOL/L (ref 100–108)
CHLORIDE SERPL-SCNC: 97 MMOL/L (ref 100–108)
CHOLEST SERPL-MCNC: 113 MG/DL
CHOLEST SERPL-MCNC: 122 MG/DL
CHOLEST SERPL-MCNC: 97 MG/DL
CLARITY UR: CLEAR
CO2 SERPL-SCNC: 32 MMOL/L (ref 21–32)
CO2 SERPL-SCNC: 32 MMOL/L (ref 21–32)
CO2 SERPL-SCNC: 33 MMOL/L (ref 21–32)
CO2 SERPL-SCNC: 33 MMOL/L (ref 21–32)
COLOR UR: YELLOW
CREAT SERPL-MCNC: 1.33 MG/DL (ref 0.6–1.3)
CREAT SERPL-MCNC: 1.37 MG/DL (ref 0.6–1.3)
CREAT SERPL-MCNC: 1.55 MG/DL (ref 0.6–1.3)
CREAT SERPL-MCNC: 1.69 MG/DL (ref 0.6–1.3)
CREAT UR-MCNC: 46.7 MG/DL
CREAT UR-MCNC: 49.3 MG/DL
EOSINOPHIL # BLD AUTO: 0.01 THOUSAND/ΜL (ref 0–0.61)
EOSINOPHIL # BLD AUTO: 0.03 THOUSAND/ΜL (ref 0–0.61)
EOSINOPHIL # BLD AUTO: 0.03 THOUSAND/ΜL (ref 0–0.61)
EOSINOPHIL # BLD AUTO: 0.04 THOUSAND/ΜL (ref 0–0.61)
EOSINOPHIL NFR BLD AUTO: 0 % (ref 0–6)
EOSINOPHIL NFR BLD AUTO: 1 % (ref 0–6)
ERYTHROCYTE [DISTWIDTH] IN BLOOD BY AUTOMATED COUNT: 13.9 % (ref 11.6–15.1)
ERYTHROCYTE [DISTWIDTH] IN BLOOD BY AUTOMATED COUNT: 14.4 % (ref 11.6–15.1)
ERYTHROCYTE [DISTWIDTH] IN BLOOD BY AUTOMATED COUNT: 14.6 % (ref 11.6–15.1)
ERYTHROCYTE [DISTWIDTH] IN BLOOD BY AUTOMATED COUNT: 15.1 % (ref 11.6–15.1)
GFR SERPL CREATININE-BSD FRML MDRD: 37 ML/MIN/1.73SQ M
GFR SERPL CREATININE-BSD FRML MDRD: 41 ML/MIN/1.73SQ M
GFR SERPL CREATININE-BSD FRML MDRD: 48 ML/MIN/1.73SQ M
GFR SERPL CREATININE-BSD FRML MDRD: 50 ML/MIN/1.73SQ M
GLUCOSE P FAST SERPL-MCNC: 99 MG/DL (ref 65–99)
GLUCOSE SERPL-MCNC: 104 MG/DL (ref 65–140)
GLUCOSE SERPL-MCNC: 109 MG/DL (ref 65–140)
GLUCOSE SERPL-MCNC: 127 MG/DL (ref 65–140)
GLUCOSE SERPL-MCNC: 99 MG/DL (ref 65–140)
GLUCOSE UR STRIP-MCNC: NEGATIVE MG/DL
HCT VFR BLD AUTO: 33.3 % (ref 36.5–49.3)
HCT VFR BLD AUTO: 34.2 % (ref 36.5–49.3)
HCT VFR BLD AUTO: 34.3 % (ref 36.5–49.3)
HCT VFR BLD AUTO: 34.5 % (ref 36.5–49.3)
HDLC SERPL-MCNC: 55 MG/DL
HDLC SERPL-MCNC: 58 MG/DL
HDLC SERPL-MCNC: 63 MG/DL
HGB BLD-MCNC: 10.6 G/DL (ref 12–17)
HGB BLD-MCNC: 10.6 G/DL (ref 12–17)
HGB BLD-MCNC: 10.8 G/DL (ref 12–17)
HGB BLD-MCNC: 11 G/DL (ref 12–17)
HGB UR QL STRIP.AUTO: ABNORMAL
IMM GRANULOCYTES # BLD AUTO: 0.01 THOUSAND/UL (ref 0–0.2)
IMM GRANULOCYTES # BLD AUTO: 0.01 THOUSAND/UL (ref 0–0.2)
IMM GRANULOCYTES # BLD AUTO: 0.02 THOUSAND/UL (ref 0–0.2)
IMM GRANULOCYTES # BLD AUTO: 0.02 THOUSAND/UL (ref 0–0.2)
IMM GRANULOCYTES NFR BLD AUTO: 0 % (ref 0–2)
INR PPP: 1.85 (ref 0.84–1.19)
KETONES UR STRIP-MCNC: NEGATIVE MG/DL
LDLC SERPL CALC-MCNC: 35 MG/DL (ref 0–100)
LDLC SERPL CALC-MCNC: 48 MG/DL (ref 0–100)
LDLC SERPL CALC-MCNC: 52 MG/DL (ref 0–100)
LEUKOCYTE ESTERASE UR QL STRIP: NEGATIVE
LYMPHOCYTES # BLD AUTO: 0.75 THOUSANDS/ΜL (ref 0.6–4.47)
LYMPHOCYTES # BLD AUTO: 0.75 THOUSANDS/ΜL (ref 0.6–4.47)
LYMPHOCYTES # BLD AUTO: 0.86 THOUSANDS/ΜL (ref 0.6–4.47)
LYMPHOCYTES # BLD AUTO: 0.89 THOUSANDS/ΜL (ref 0.6–4.47)
LYMPHOCYTES NFR BLD AUTO: 11 % (ref 14–44)
LYMPHOCYTES NFR BLD AUTO: 13 % (ref 14–44)
LYMPHOCYTES NFR BLD AUTO: 15 % (ref 14–44)
LYMPHOCYTES NFR BLD AUTO: 15 % (ref 14–44)
MAGNESIUM SERPL-MCNC: 2.1 MG/DL (ref 1.6–2.6)
MAGNESIUM SERPL-MCNC: 2.3 MG/DL (ref 1.6–2.6)
MAGNESIUM SERPL-MCNC: 2.6 MG/DL (ref 1.6–2.6)
MCH RBC QN AUTO: 32.8 PG (ref 26.8–34.3)
MCH RBC QN AUTO: 33.1 PG (ref 26.8–34.3)
MCH RBC QN AUTO: 33.1 PG (ref 26.8–34.3)
MCH RBC QN AUTO: 33.7 PG (ref 26.8–34.3)
MCHC RBC AUTO-ENTMCNC: 30.7 G/DL (ref 31.4–37.4)
MCHC RBC AUTO-ENTMCNC: 31.6 G/DL (ref 31.4–37.4)
MCHC RBC AUTO-ENTMCNC: 31.8 G/DL (ref 31.4–37.4)
MCHC RBC AUTO-ENTMCNC: 32.1 G/DL (ref 31.4–37.4)
MCV RBC AUTO: 103 FL (ref 82–98)
MCV RBC AUTO: 105 FL (ref 82–98)
MCV RBC AUTO: 105 FL (ref 82–98)
MCV RBC AUTO: 108 FL (ref 82–98)
MICROALBUMIN UR-MCNC: 12.7 MG/L (ref 0–20)
MICROALBUMIN UR-MCNC: 14.3 MG/L (ref 0–20)
MICROALBUMIN/CREAT 24H UR: 27 MG/G CREATININE (ref 0–30)
MICROALBUMIN/CREAT 24H UR: 29 MG/G CREATININE (ref 0–30)
MONOCYTES # BLD AUTO: 0.37 THOUSAND/ΜL (ref 0.17–1.22)
MONOCYTES # BLD AUTO: 0.4 THOUSAND/ΜL (ref 0.17–1.22)
MONOCYTES # BLD AUTO: 0.43 THOUSAND/ΜL (ref 0.17–1.22)
MONOCYTES # BLD AUTO: 0.46 THOUSAND/ΜL (ref 0.17–1.22)
MONOCYTES NFR BLD AUTO: 6 % (ref 4–12)
MONOCYTES NFR BLD AUTO: 7 % (ref 4–12)
NEUTROPHILS # BLD AUTO: 4.54 THOUSANDS/ΜL (ref 1.85–7.62)
NEUTROPHILS # BLD AUTO: 4.64 THOUSANDS/ΜL (ref 1.85–7.62)
NEUTROPHILS # BLD AUTO: 4.8 THOUSANDS/ΜL (ref 1.85–7.62)
NEUTROPHILS # BLD AUTO: 5.49 THOUSANDS/ΜL (ref 1.85–7.62)
NEUTS SEG NFR BLD AUTO: 77 % (ref 43–75)
NEUTS SEG NFR BLD AUTO: 78 % (ref 43–75)
NEUTS SEG NFR BLD AUTO: 79 % (ref 43–75)
NEUTS SEG NFR BLD AUTO: 82 % (ref 43–75)
NITRITE UR QL STRIP: NEGATIVE
NON-SQ EPI CELLS URNS QL MICRO: ABNORMAL /HPF
NONHDLC SERPL-MCNC: 42 MG/DL
NONHDLC SERPL-MCNC: 55 MG/DL
NONHDLC SERPL-MCNC: 59 MG/DL
NRBC BLD AUTO-RTO: 0 /100 WBCS
PH UR STRIP.AUTO: 6.5 [PH]
PHOSPHATE SERPL-MCNC: 3.1 MG/DL (ref 2.3–4.1)
PHOSPHATE SERPL-MCNC: 3.2 MG/DL (ref 2.3–4.1)
PLATELET # BLD AUTO: 198 THOUSANDS/UL (ref 149–390)
PLATELET # BLD AUTO: 198 THOUSANDS/UL (ref 149–390)
PLATELET # BLD AUTO: 211 THOUSANDS/UL (ref 149–390)
PLATELET # BLD AUTO: 282 THOUSANDS/UL (ref 149–390)
PMV BLD AUTO: 10 FL (ref 8.9–12.7)
PMV BLD AUTO: 8.7 FL (ref 8.9–12.7)
PMV BLD AUTO: 9.4 FL (ref 8.9–12.7)
PMV BLD AUTO: 9.9 FL (ref 8.9–12.7)
POTASSIUM SERPL-SCNC: 3.6 MMOL/L (ref 3.5–5.3)
POTASSIUM SERPL-SCNC: 3.9 MMOL/L (ref 3.5–5.3)
POTASSIUM SERPL-SCNC: 4.6 MMOL/L (ref 3.5–5.3)
POTASSIUM SERPL-SCNC: 4.6 MMOL/L (ref 3.5–5.3)
PROT SERPL-MCNC: 6.3 G/DL (ref 6.4–8.2)
PROT SERPL-MCNC: 6.7 G/DL (ref 6.4–8.2)
PROT SERPL-MCNC: 7.1 G/DL (ref 6.4–8.2)
PROT UR STRIP-MCNC: NEGATIVE MG/DL
PROTHROMBIN TIME: 20.5 SECONDS (ref 11.6–14.5)
PTH-INTACT SERPL-MCNC: 130.6 PG/ML (ref 18.4–80.1)
PTH-INTACT SERPL-MCNC: 87.5 PG/ML (ref 18.4–80.1)
PTH-INTACT SERPL-MCNC: 87.7 PG/ML (ref 18.4–80.1)
QRS AXIS: 132 DEGREES
QRS AXIS: 134 DEGREES
QRSD INTERVAL: 192 MS
QRSD INTERVAL: 200 MS
QT INTERVAL: 532 MS
QT INTERVAL: 550 MS
QTC INTERVAL: 578 MS
QTC INTERVAL: 594 MS
RBC # BLD AUTO: 3.2 MILLION/UL (ref 3.88–5.62)
RBC # BLD AUTO: 3.23 MILLION/UL (ref 3.88–5.62)
RBC # BLD AUTO: 3.26 MILLION/UL (ref 3.88–5.62)
RBC # BLD AUTO: 3.26 MILLION/UL (ref 3.88–5.62)
RBC #/AREA URNS AUTO: ABNORMAL /HPF
SODIUM SERPL-SCNC: 136 MMOL/L (ref 136–145)
SODIUM SERPL-SCNC: 138 MMOL/L (ref 136–145)
SODIUM SERPL-SCNC: 140 MMOL/L (ref 136–145)
SODIUM SERPL-SCNC: 142 MMOL/L (ref 136–145)
SP GR UR STRIP.AUTO: 1.01 (ref 1–1.03)
T WAVE AXIS: -16 DEGREES
T WAVE AXIS: -18 DEGREES
TRIGL SERPL-MCNC: 34 MG/DL
TRIGL SERPL-MCNC: 35 MG/DL
TRIGL SERPL-MCNC: 37 MG/DL
TSH SERPL DL<=0.05 MIU/L-ACNC: 0.66 UIU/ML (ref 0.45–4.5)
TSH SERPL DL<=0.05 MIU/L-ACNC: 0.77 UIU/ML (ref 0.36–3.74)
URATE SERPL-MCNC: 6.2 MG/DL (ref 4.2–8)
URATE SERPL-MCNC: 6.7 MG/DL (ref 4.2–8)
UROBILINOGEN UR QL STRIP.AUTO: 2 E.U./DL
VENTRICULAR RATE: 70 BPM
VENTRICULAR RATE: 71 BPM
WBC # BLD AUTO: 5.9 THOUSAND/UL (ref 4.31–10.16)
WBC # BLD AUTO: 5.97 THOUSAND/UL (ref 4.31–10.16)
WBC # BLD AUTO: 6.02 THOUSAND/UL (ref 4.31–10.16)
WBC # BLD AUTO: 6.74 THOUSAND/UL (ref 4.31–10.16)
WBC #/AREA URNS AUTO: ABNORMAL /HPF

## 2022-01-01 PROCEDURE — G1004 CDSM NDSC: HCPCS

## 2022-01-01 PROCEDURE — T2042 HOSPICE ROUTINE HOME CARE: HCPCS

## 2022-01-01 PROCEDURE — 99152 MOD SED SAME PHYS/QHP 5/>YRS: CPT | Performed by: RADIOLOGY

## 2022-01-01 PROCEDURE — G0299 HHS/HOSPICE OF RN EA 15 MIN: HCPCS

## 2022-01-01 PROCEDURE — G0152 HHCP-SERV OF OT,EA 15 MIN: HCPCS

## 2022-01-01 PROCEDURE — 85025 COMPLETE CBC W/AUTO DIFF WBC: CPT | Performed by: INTERNAL MEDICINE

## 2022-01-01 PROCEDURE — 76937 US GUIDE VASCULAR ACCESS: CPT

## 2022-01-01 PROCEDURE — 36415 COLL VENOUS BLD VENIPUNCTURE: CPT

## 2022-01-01 PROCEDURE — 99214 OFFICE O/P EST MOD 30 MIN: CPT | Performed by: INTERNAL MEDICINE

## 2022-01-01 PROCEDURE — 80048 BASIC METABOLIC PNL TOTAL CA: CPT | Performed by: PHYSICIAN ASSISTANT

## 2022-01-01 PROCEDURE — 10330087 HSPC SERVICE INTENSITY ADD-ON

## 2022-01-01 PROCEDURE — 84443 ASSAY THYROID STIM HORMONE: CPT | Performed by: INTERNAL MEDICINE

## 2022-01-01 PROCEDURE — 32555 ASPIRATE PLEURA W/ IMAGING: CPT | Performed by: RADIOLOGY

## 2022-01-01 PROCEDURE — 93005 ELECTROCARDIOGRAM TRACING: CPT

## 2022-01-01 PROCEDURE — 400014 VN F/U

## 2022-01-01 PROCEDURE — 32555 ASPIRATE PLEURA W/ IMAGING: CPT

## 2022-01-01 PROCEDURE — 93010 ELECTROCARDIOGRAM REPORT: CPT | Performed by: INTERNAL MEDICINE

## 2022-01-01 PROCEDURE — 93296 REM INTERROG EVL PM/IDS: CPT | Performed by: INTERNAL MEDICINE

## 2022-01-01 PROCEDURE — 81001 URINALYSIS AUTO W/SCOPE: CPT | Performed by: INTERNAL MEDICINE

## 2022-01-01 PROCEDURE — 75989 ABSCESS DRAINAGE UNDER X-RAY: CPT

## 2022-01-01 PROCEDURE — 83970 ASSAY OF PARATHORMONE: CPT | Performed by: INTERNAL MEDICINE

## 2022-01-01 PROCEDURE — 83735 ASSAY OF MAGNESIUM: CPT | Performed by: INTERNAL MEDICINE

## 2022-01-01 PROCEDURE — 99204 OFFICE O/P NEW MOD 45 MIN: CPT | Performed by: INTERNAL MEDICINE

## 2022-01-01 PROCEDURE — 10330057 MEDICATION, GENERAL

## 2022-01-01 PROCEDURE — 71250 CT THORAX DX C-: CPT

## 2022-01-01 PROCEDURE — 80061 LIPID PANEL: CPT | Performed by: INTERNAL MEDICINE

## 2022-01-01 PROCEDURE — 33264 RMVL & RPLCMT DFB GEN MLT LD: CPT | Performed by: INTERNAL MEDICINE

## 2022-01-01 PROCEDURE — 83970 ASSAY OF PARATHORMONE: CPT

## 2022-01-01 PROCEDURE — G2012 BRIEF CHECK IN BY MD/QHP: HCPCS | Performed by: PHYSICIAN ASSISTANT

## 2022-01-01 PROCEDURE — C1729 CATH, DRAINAGE: HCPCS

## 2022-01-01 PROCEDURE — RECHECK: Performed by: INTERNAL MEDICINE

## 2022-01-01 PROCEDURE — 75989 ABSCESS DRAINAGE UNDER X-RAY: CPT | Performed by: RADIOLOGY

## 2022-01-01 PROCEDURE — 10330064 WIPE, SKIN GEL PROT DRSNG (50/BX)

## 2022-01-01 PROCEDURE — 85025 COMPLETE CBC W/AUTO DIFF WBC: CPT

## 2022-01-01 PROCEDURE — 80061 LIPID PANEL: CPT

## 2022-01-01 PROCEDURE — 32552 REMOVE LUNG CATHETER: CPT | Performed by: RADIOLOGY

## 2022-01-01 PROCEDURE — 80053 COMPREHEN METABOLIC PANEL: CPT

## 2022-01-01 PROCEDURE — 84550 ASSAY OF BLOOD/URIC ACID: CPT | Performed by: INTERNAL MEDICINE

## 2022-01-01 PROCEDURE — 99153 MOD SED SAME PHYS/QHP EA: CPT

## 2022-01-01 PROCEDURE — C1882 AICD, OTHER THAN SING/DUAL: HCPCS | Performed by: INTERNAL MEDICINE

## 2022-01-01 PROCEDURE — 84100 ASSAY OF PHOSPHORUS: CPT | Performed by: INTERNAL MEDICINE

## 2022-01-01 PROCEDURE — 10330064 DRAINING KIT, BOTTLE ASEPT 1000ML (10/CS

## 2022-01-01 PROCEDURE — 32550 INSERT PLEURAL CATH: CPT | Performed by: RADIOLOGY

## 2022-01-01 PROCEDURE — 82043 UR ALBUMIN QUANTITATIVE: CPT | Performed by: INTERNAL MEDICINE

## 2022-01-01 PROCEDURE — 84100 ASSAY OF PHOSPHORUS: CPT

## 2022-01-01 PROCEDURE — 85025 COMPLETE CBC W/AUTO DIFF WBC: CPT | Performed by: PHYSICIAN ASSISTANT

## 2022-01-01 PROCEDURE — 82570 ASSAY OF URINE CREATININE: CPT | Performed by: INTERNAL MEDICINE

## 2022-01-01 PROCEDURE — 99215 OFFICE O/P EST HI 40 MIN: CPT | Performed by: RADIOLOGY

## 2022-01-01 PROCEDURE — 82043 UR ALBUMIN QUANTITATIVE: CPT

## 2022-01-01 PROCEDURE — G0155 HHCP-SVS OF CSW,EA 15 MIN: HCPCS

## 2022-01-01 PROCEDURE — 99441 PR PHYS/QHP TELEPHONE EVALUATION 5-10 MIN: CPT | Performed by: INTERNAL MEDICINE

## 2022-01-01 PROCEDURE — 80053 COMPREHEN METABOLIC PANEL: CPT | Performed by: INTERNAL MEDICINE

## 2022-01-01 PROCEDURE — 85610 PROTHROMBIN TIME: CPT | Performed by: PHYSICIAN ASSISTANT

## 2022-01-01 PROCEDURE — 99152 MOD SED SAME PHYS/QHP 5/>YRS: CPT

## 2022-01-01 PROCEDURE — 93295 DEV INTERROG REMOTE 1/2/MLT: CPT | Performed by: INTERNAL MEDICINE

## 2022-01-01 PROCEDURE — 84550 ASSAY OF BLOOD/URIC ACID: CPT

## 2022-01-01 PROCEDURE — 84443 ASSAY THYROID STIM HORMONE: CPT

## 2022-01-01 PROCEDURE — 74150 CT ABDOMEN W/O CONTRAST: CPT

## 2022-01-01 PROCEDURE — 82306 VITAMIN D 25 HYDROXY: CPT | Performed by: INTERNAL MEDICINE

## 2022-01-01 PROCEDURE — 83735 ASSAY OF MAGNESIUM: CPT

## 2022-01-01 PROCEDURE — 32555 ASPIRATE PLEURA W/ IMAGING: CPT | Performed by: STUDENT IN AN ORGANIZED HEALTH CARE EDUCATION/TRAINING PROGRAM

## 2022-01-01 PROCEDURE — 32550 INSERT PLEURAL CATH: CPT

## 2022-01-01 PROCEDURE — 82306 VITAMIN D 25 HYDROXY: CPT

## 2022-01-01 PROCEDURE — 76604 US EXAM CHEST: CPT | Performed by: RADIOLOGY

## 2022-01-01 PROCEDURE — 99024 POSTOP FOLLOW-UP VISIT: CPT | Performed by: INTERNAL MEDICINE

## 2022-01-01 PROCEDURE — 82570 ASSAY OF URINE CREATININE: CPT

## 2022-01-01 PROCEDURE — 32552 REMOVE LUNG CATHETER: CPT

## 2022-01-01 DEVICE — CRTD DTMA1D1 CLARIA MRI US DF-1
Type: IMPLANTABLE DEVICE | Site: CHEST | Status: FUNCTIONAL
Brand: CLARIA MRI™ CRT-D SURESCAN™

## 2022-01-01 DEVICE — ENVELOPE CMRM6133 ABSORB LRG MR
Type: IMPLANTABLE DEVICE | Site: CHEST | Status: FUNCTIONAL
Brand: TYRX™

## 2022-01-01 RX ORDER — OXYCODONE HYDROCHLORIDE 5 MG/1
5 TABLET ORAL EVERY 4 HOURS PRN
Status: DISCONTINUED | OUTPATIENT
Start: 2022-01-01 | End: 2022-01-01 | Stop reason: HOSPADM

## 2022-01-01 RX ORDER — FENTANYL CITRATE 50 UG/ML
INJECTION, SOLUTION INTRAMUSCULAR; INTRAVENOUS CODE/TRAUMA/SEDATION MEDICATION
Status: COMPLETED | OUTPATIENT
Start: 2022-01-01 | End: 2022-01-01

## 2022-01-01 RX ORDER — METOCLOPRAMIDE 10 MG/1
10 TABLET ORAL
Qty: 30 TABLET | Refills: 5 | Status: SHIPPED | OUTPATIENT
Start: 2022-01-01 | End: 2022-01-01

## 2022-01-01 RX ORDER — LIDOCAINE HYDROCHLORIDE 10 MG/ML
INJECTION, SOLUTION EPIDURAL; INFILTRATION; INTRACAUDAL; PERINEURAL AS NEEDED
Status: DISCONTINUED | OUTPATIENT
Start: 2022-01-01 | End: 2022-01-01 | Stop reason: HOSPADM

## 2022-01-01 RX ORDER — LIDOCAINE WITH 8.4% SOD BICARB 0.9%(10ML)
SYRINGE (ML) INJECTION CODE/TRAUMA/SEDATION MEDICATION
Status: COMPLETED | OUTPATIENT
Start: 2022-01-01 | End: 2022-01-01

## 2022-01-01 RX ORDER — GLYCOPYRROLATE 0.2 MG/ML
INJECTION INTRAMUSCULAR; INTRAVENOUS AS NEEDED
Status: DISCONTINUED | OUTPATIENT
Start: 2022-01-01 | End: 2022-01-01

## 2022-01-01 RX ORDER — ACETAMINOPHEN 325 MG/1
650 TABLET ORAL EVERY 4 HOURS PRN
Status: DISCONTINUED | OUTPATIENT
Start: 2022-01-01 | End: 2022-01-01 | Stop reason: HOSPADM

## 2022-01-01 RX ORDER — METOPROLOL SUCCINATE 25 MG/1
TABLET, EXTENDED RELEASE ORAL
Qty: 15 TABLET | Refills: 0
Start: 2022-01-01 | End: 2022-01-01

## 2022-01-01 RX ORDER — SODIUM CHLORIDE 9 MG/ML
INJECTION, SOLUTION INTRAVENOUS CONTINUOUS PRN
Status: DISCONTINUED | OUTPATIENT
Start: 2022-01-01 | End: 2022-01-01

## 2022-01-01 RX ORDER — NYSTATIN 100000 [USP'U]/G
POWDER TOPICAL
COMMUNITY
Start: 2022-01-01 | End: 2022-01-01 | Stop reason: ALTCHOICE

## 2022-01-01 RX ORDER — SODIUM CHLORIDE 9 MG/ML
30 INJECTION, SOLUTION INTRAVENOUS CONTINUOUS
Status: CANCELLED | OUTPATIENT
Start: 2022-01-01

## 2022-01-01 RX ORDER — TORSEMIDE 20 MG/1
20 TABLET ORAL 2 TIMES DAILY
Qty: 60 TABLET | Refills: 5 | Status: SHIPPED | OUTPATIENT
Start: 2022-01-01 | End: 2022-01-01

## 2022-01-01 RX ORDER — SODIUM CHLORIDE 9 MG/ML
30 INJECTION, SOLUTION INTRAVENOUS CONTINUOUS
Status: DISCONTINUED | OUTPATIENT
Start: 2022-01-01 | End: 2022-01-01 | Stop reason: HOSPADM

## 2022-01-01 RX ORDER — DICYCLOMINE HCL 20 MG
20 TABLET ORAL 4 TIMES DAILY
Qty: 120 TABLET | Refills: 2 | Status: SHIPPED | OUTPATIENT
Start: 2022-01-01 | End: 2022-01-01

## 2022-01-01 RX ORDER — LIDOCAINE HYDROCHLORIDE 10 MG/ML
INJECTION, SOLUTION EPIDURAL; INFILTRATION; INTRACAUDAL; PERINEURAL AS NEEDED
Status: DISCONTINUED | OUTPATIENT
Start: 2022-01-01 | End: 2022-01-01

## 2022-01-01 RX ORDER — LIDOCAINE 50 MG/G
1 PATCH TOPICAL DAILY
Qty: 30 PATCH | Refills: 5 | Status: SHIPPED | OUTPATIENT
Start: 2022-01-01

## 2022-01-01 RX ORDER — TRAMADOL HYDROCHLORIDE 100 MG/1
100 TABLET, EXTENDED RELEASE ORAL DAILY
Qty: 30 TABLET | Refills: 1 | Status: SHIPPED | OUTPATIENT
Start: 2022-01-01 | End: 2022-01-01

## 2022-01-01 RX ORDER — SODIUM CHLORIDE 9 MG/ML
75 INJECTION, SOLUTION INTRAVENOUS CONTINUOUS
Status: DISCONTINUED | OUTPATIENT
Start: 2022-01-01 | End: 2022-01-01 | Stop reason: HOSPADM

## 2022-01-01 RX ORDER — TRAMADOL HYDROCHLORIDE 200 MG/1
200 TABLET, EXTENDED RELEASE ORAL DAILY PRN
Qty: 30 TABLET | Refills: 0 | Status: SHIPPED | OUTPATIENT
Start: 2022-01-01 | End: 2022-01-01

## 2022-01-01 RX ORDER — GENTAMICIN SULFATE 40 MG/ML
INJECTION, SOLUTION INTRAMUSCULAR; INTRAVENOUS AS NEEDED
Status: DISCONTINUED | OUTPATIENT
Start: 2022-01-01 | End: 2022-01-01 | Stop reason: HOSPADM

## 2022-01-01 RX ORDER — TORSEMIDE 20 MG/1
20 TABLET ORAL DAILY
Start: 2022-01-01 | End: 2022-01-01 | Stop reason: SDUPTHER

## 2022-01-01 RX ORDER — VANCOMYCIN HYDROCHLORIDE 1 G/200ML
1000 INJECTION, SOLUTION INTRAVENOUS ONCE
Status: COMPLETED | OUTPATIENT
Start: 2022-01-01 | End: 2022-01-01

## 2022-01-01 RX ORDER — METOCLOPRAMIDE 5 MG/1
5 TABLET ORAL 3 TIMES DAILY
Qty: 60 TABLET | Refills: 5 | Status: SHIPPED | OUTPATIENT
Start: 2022-01-01 | End: 2022-01-01

## 2022-01-01 RX ORDER — BACLOFEN 10 MG/1
10 TABLET ORAL 3 TIMES DAILY
Qty: 12 TABLET | Refills: 0 | Status: SHIPPED | OUTPATIENT
Start: 2022-01-01 | End: 2022-01-01

## 2022-01-01 RX ORDER — MIDAZOLAM HYDROCHLORIDE 2 MG/2ML
INJECTION, SOLUTION INTRAMUSCULAR; INTRAVENOUS CODE/TRAUMA/SEDATION MEDICATION
Status: COMPLETED | OUTPATIENT
Start: 2022-01-01 | End: 2022-01-01

## 2022-01-01 RX ORDER — FAMOTIDINE 20 MG/1
20 TABLET, FILM COATED ORAL
Qty: 90 TABLET | Refills: 2 | Status: SHIPPED | OUTPATIENT
Start: 2022-01-01 | End: 2022-01-01

## 2022-01-01 RX ORDER — LIDOCAINE WITH 8.4% SOD BICARB 0.9%(10ML)
SYRINGE (ML) INJECTION CODE/TRAUMA/SEDATION MEDICATION
Status: DISCONTINUED | OUTPATIENT
Start: 2022-01-01 | End: 2022-01-01 | Stop reason: HOSPADM

## 2022-01-01 RX ORDER — TRAMADOL HYDROCHLORIDE 50 MG/1
100 TABLET ORAL ONCE
Status: DISCONTINUED | OUTPATIENT
Start: 2022-01-01 | End: 2022-01-01 | Stop reason: HOSPADM

## 2022-01-01 RX ORDER — METOCLOPRAMIDE 5 MG/1
5 TABLET ORAL 3 TIMES DAILY
Qty: 60 TABLET | Refills: 5 | Status: SHIPPED | OUTPATIENT
Start: 2022-01-01 | End: 2022-01-01 | Stop reason: SDUPTHER

## 2022-01-01 RX ORDER — METOCLOPRAMIDE 10 MG/1
10 TABLET ORAL 4 TIMES DAILY
Qty: 30 TABLET | Refills: 0 | Status: SHIPPED | OUTPATIENT
Start: 2022-01-01 | End: 2022-01-01

## 2022-01-01 RX ORDER — OXYCODONE HYDROCHLORIDE 5 MG/1
5 TABLET ORAL EVERY 4 HOURS PRN
Status: DISCONTINUED | OUTPATIENT
Start: 2022-01-01 | End: 2022-01-01

## 2022-01-01 RX ORDER — AMIODARONE HYDROCHLORIDE 200 MG/1
TABLET ORAL
Qty: 90 TABLET | Refills: 3 | Status: SHIPPED | OUTPATIENT
Start: 2022-01-01 | End: 2022-01-01

## 2022-01-01 RX ORDER — SODIUM CHLORIDE 9 MG/ML
75 INJECTION, SOLUTION INTRAVENOUS CONTINUOUS
Status: CANCELLED | OUTPATIENT
Start: 2022-01-01

## 2022-01-01 RX ORDER — LORAZEPAM 0.5 MG/1
0.5 TABLET ORAL EVERY 4 HOURS PRN
Qty: 16 TABLET | Refills: 0 | Status: SHIPPED | OUTPATIENT
Start: 2022-01-01 | End: 2022-07-11

## 2022-01-01 RX ORDER — METOCLOPRAMIDE 5 MG/1
TABLET ORAL
Qty: 60 TABLET | Refills: 5 | Status: SHIPPED | OUTPATIENT
Start: 2022-01-01 | End: 2022-01-01 | Stop reason: SDUPTHER

## 2022-01-01 RX ORDER — FENTANYL CITRATE 50 UG/ML
INJECTION, SOLUTION INTRAMUSCULAR; INTRAVENOUS AS NEEDED
Status: DISCONTINUED | OUTPATIENT
Start: 2022-01-01 | End: 2022-01-01

## 2022-01-01 RX ORDER — SODIUM CHLORIDE, SODIUM LACTATE, POTASSIUM CHLORIDE, CALCIUM CHLORIDE 600; 310; 30; 20 MG/100ML; MG/100ML; MG/100ML; MG/100ML
125 INJECTION, SOLUTION INTRAVENOUS CONTINUOUS
Status: DISCONTINUED | OUTPATIENT
Start: 2022-01-01 | End: 2022-01-01 | Stop reason: HOSPADM

## 2022-01-01 RX ORDER — OXYCODONE HYDROCHLORIDE 5 MG/1
2.5 TABLET ORAL EVERY 4 HOURS PRN
Qty: 12 TABLET | Refills: 0 | Status: SHIPPED | OUTPATIENT
Start: 2022-01-01 | End: 2022-07-11

## 2022-01-01 RX ORDER — PROPOFOL 10 MG/ML
INJECTION, EMULSION INTRAVENOUS AS NEEDED
Status: DISCONTINUED | OUTPATIENT
Start: 2022-01-01 | End: 2022-01-01

## 2022-01-01 RX ORDER — PROPOFOL 10 MG/ML
INJECTION, EMULSION INTRAVENOUS CONTINUOUS PRN
Status: DISCONTINUED | OUTPATIENT
Start: 2022-01-01 | End: 2022-01-01

## 2022-01-01 RX ADMIN — Medication 10 ML: at 10:47

## 2022-01-01 RX ADMIN — FENTANYL CITRATE 50 MCG: 50 INJECTION, SOLUTION INTRAMUSCULAR; INTRAVENOUS at 14:52

## 2022-01-01 RX ADMIN — Medication 10 ML: at 09:40

## 2022-01-01 RX ADMIN — FENTANYL CITRATE 25 MCG: 50 INJECTION, SOLUTION INTRAMUSCULAR; INTRAVENOUS at 15:24

## 2022-01-01 RX ADMIN — MIDAZOLAM HYDROCHLORIDE 1 MG: 1 INJECTION, SOLUTION INTRAMUSCULAR; INTRAVENOUS at 14:52

## 2022-01-01 RX ADMIN — MIDAZOLAM HYDROCHLORIDE 0.5 MG: 1 INJECTION, SOLUTION INTRAMUSCULAR; INTRAVENOUS at 11:05

## 2022-01-01 RX ADMIN — Medication 20 ML: at 14:23

## 2022-01-01 RX ADMIN — SODIUM CHLORIDE: 9 INJECTION, SOLUTION INTRAVENOUS at 12:32

## 2022-01-01 RX ADMIN — VANCOMYCIN HYDROCHLORIDE 1000 MG: 1 INJECTION, SOLUTION INTRAVENOUS at 10:09

## 2022-01-01 RX ADMIN — Medication 10 ML: at 10:14

## 2022-01-01 RX ADMIN — MIDAZOLAM HYDROCHLORIDE 0.5 MG: 1 INJECTION, SOLUTION INTRAMUSCULAR; INTRAVENOUS at 10:56

## 2022-01-01 RX ADMIN — LIDOCAINE HYDROCHLORIDE 50 MG: 10 INJECTION, SOLUTION EPIDURAL; INFILTRATION; INTRACAUDAL; PERINEURAL at 12:35

## 2022-01-01 RX ADMIN — Medication 10 ML: at 10:15

## 2022-01-01 RX ADMIN — Medication 20 MG: at 12:35

## 2022-01-01 RX ADMIN — Medication 5 ML: at 08:08

## 2022-01-01 RX ADMIN — MIDAZOLAM HYDROCHLORIDE 0.5 MG: 1 INJECTION, SOLUTION INTRAMUSCULAR; INTRAVENOUS at 13:52

## 2022-01-01 RX ADMIN — Medication 20 ML: at 15:24

## 2022-01-01 RX ADMIN — Medication 10 ML: at 13:09

## 2022-01-01 RX ADMIN — Medication 15 ML: at 10:59

## 2022-01-01 RX ADMIN — FENTANYL CITRATE 25 MCG: 50 INJECTION, SOLUTION INTRAMUSCULAR; INTRAVENOUS at 10:57

## 2022-01-01 RX ADMIN — GLYCOPYRROLATE 0.2 MCG: 0.2 INJECTION, SOLUTION INTRAMUSCULAR; INTRAVENOUS at 12:35

## 2022-01-01 RX ADMIN — FENTANYL CITRATE 50 MCG: 50 INJECTION, SOLUTION INTRAMUSCULAR; INTRAVENOUS at 13:52

## 2022-01-01 RX ADMIN — FENTANYL CITRATE 50 MCG: 50 INJECTION, SOLUTION INTRAMUSCULAR; INTRAVENOUS at 14:23

## 2022-01-01 RX ADMIN — Medication 6 ML: at 11:36

## 2022-01-01 RX ADMIN — Medication 5 ML: at 08:10

## 2022-01-01 RX ADMIN — Medication 3 ML: at 11:21

## 2022-01-01 RX ADMIN — PROPOFOL 20 MG: 10 INJECTION, EMULSION INTRAVENOUS at 12:37

## 2022-01-01 RX ADMIN — FENTANYL CITRATE 50 MCG: 50 INJECTION, SOLUTION INTRAMUSCULAR; INTRAVENOUS at 14:25

## 2022-01-01 RX ADMIN — Medication 10 ML: at 13:08

## 2022-01-01 RX ADMIN — MIDAZOLAM HYDROCHLORIDE 1 MG: 1 INJECTION, SOLUTION INTRAMUSCULAR; INTRAVENOUS at 14:25

## 2022-01-01 RX ADMIN — SODIUM CHLORIDE 75 ML/HR: 0.9 INJECTION, SOLUTION INTRAVENOUS at 09:22

## 2022-01-01 RX ADMIN — Medication 20 ML: at 15:09

## 2022-01-01 RX ADMIN — VANCOMYCIN HYDROCHLORIDE 1000 MG: 1 INJECTION, SOLUTION INTRAVENOUS at 12:26

## 2022-01-01 RX ADMIN — NOREPINEPHRINE BITARTRATE 8 MCG/MIN: 1 INJECTION, SOLUTION, CONCENTRATE INTRAVENOUS at 12:38

## 2022-01-01 RX ADMIN — PROPOFOL 30 MCG/KG/MIN: 10 INJECTION, EMULSION INTRAVENOUS at 12:37

## 2022-01-01 RX ADMIN — MIDAZOLAM HYDROCHLORIDE 0.5 MG: 1 INJECTION, SOLUTION INTRAMUSCULAR; INTRAVENOUS at 15:24

## 2022-01-01 RX ADMIN — MIDAZOLAM HYDROCHLORIDE 1 MG: 1 INJECTION, SOLUTION INTRAMUSCULAR; INTRAVENOUS at 14:08

## 2022-01-01 RX ADMIN — FENTANYL CITRATE 25 MCG: 0.05 INJECTION, SOLUTION INTRAMUSCULAR; INTRAVENOUS at 13:13

## 2022-01-01 RX ADMIN — Medication 10 ML: at 10:49

## 2022-01-01 RX ADMIN — FENTANYL CITRATE 50 MCG: 50 INJECTION, SOLUTION INTRAMUSCULAR; INTRAVENOUS at 14:09

## 2022-01-01 RX ADMIN — MIDAZOLAM HYDROCHLORIDE 1 MG: 1 INJECTION, SOLUTION INTRAMUSCULAR; INTRAVENOUS at 14:23

## 2022-01-01 RX ADMIN — FENTANYL CITRATE 25 MCG: 50 INJECTION, SOLUTION INTRAMUSCULAR; INTRAVENOUS at 11:05

## 2022-01-04 NOTE — TELEPHONE ENCOUNTER
Pily from [facility] Nurse called regarding patient    She states that he has +2/+3 bilateral foot edema and +1/+2 bilateral hip edema with crackles in both lungs, more so in left lower lobe  Patient has increased SOB with a dry cough  He is scheduled for thoracentesis on Thursday  He is currently taking 20 mg daily

## 2022-01-04 NOTE — TELEPHONE ENCOUNTER
Patient to increase torsemide to a total of 30 mg daily  He can take 20 mg a m  and 10 mg in the afternoon

## 2022-01-10 PROBLEM — I43 CARDIAC AMYLOIDOSIS (HCC): Status: ACTIVE | Noted: 2018-02-02

## 2022-01-10 PROBLEM — E85.4 CARDIAC AMYLOIDOSIS (HCC): Status: ACTIVE | Noted: 2018-02-02

## 2022-01-10 NOTE — PROGRESS NOTES
[facility]  [address] [place] [address]  Cardiology Office Note    Devante Segovia [de-identified] y o  male MRN: [id number]    01/10/22          Assessment/Plan:  1  Chronic HFrEF, NICM s/p BiV ICD  - s/p thoracentesis 1/6, patient reports improvement of symptoms since procedure, advised to contact office in 4 weeks to determine if patient will need repeat thoracentesis  - Creatinine elevated at 1 7, decrease torsemide to 20 mg daily  - Hold torsemide on the days of thoracentesis to reduce the chance of hypotension   - who continue metoprolol succinate 25 mg daily  - No ACEI/ARB due to CKD and hypotension   - Continue daily weights and low-sodium diet and recommended compression stockings for lower extremity edema  2  Stage 4 CKD  - creatinine 1 7, patient due for repeat BMP on 10/20/2022    3  PAF  - patient is V-paced on ECG on 11/20/2021  - continue amiodarone 200 mg daily  - patient is on Eliquis 2 5 b i d  for AC ([de-identified] y/o, creatinine 1 7)    4  History of V-tach  - status post bi V ICD, continue amiodarone and Toprol  - patient follows with device clinic regularly    5  Moderate mitral regurgitation   - continue torsemide    6  Cardiac amyloidosis  -diagnosed in 2020, continue Vyndamax     Follow up:  8 weeks or sooner as needed    Recommend aggressive risk factor modification and therapeutic lifestyle changes  Low-salt, low-calorie, low-fat, low-cholesterol diet with regular exercise and to optimize weight  Discussed concepts of atherosclerosis, including signs and symptoms of cardiac disease  Previous studies were reviewed  Safety measures were reviewed  All questions and concerns addressed  Patient was advised to report any problems requiring medical attention  1  Chronic anticoagulation     2  Chronic systolic heart failure (HCC)  torsemide (DEMADEX) 20 mg tablet   3  Chronic kidney disease, stage 4 (severe) ([address] )     4   Atrial fibrillation 5  Nonsustained ventricular tachycardia     6  Cardiac amyloidosis (HCC)         HPI: Param Wallace is a [de-identified]y o  year old male with PMH of systolic heart failure, NICM s/p BiV ICD, NICM, VT, PAF, moderate MR, stage 3 CKD, and cardiac amyloidosis who presents for 6 week follow-up  Patient has a significant cardiac history and was admitted to the hospital in November 2021 for CHF exacerbation  He underwent a thoracentesis and experienced relief of his symptoms during this admission and was discharged on 20mg torsemide daily  He followed up in the office on 12/1/21 where he appeared euvolemic at that time  Since visit, he was was steadily increasing in weight and experiencing increased shortness of breath and lower extremity edema  His torsemide was increased on 1/5 to 30mg daily and he underwent a thoracentesis on 1/6 where a total of  2 2L was drained  Since thoracentesis, the patient reports an improvement in symptoms  He denies chest pain, significant shortness of breath or increased lower extremity edema  He is accompanied by his wife at today's visit  Patient states that he does not have a great appetite but does eat a salad at each meal and drinks Ensure to increase caloric intake  Patient is wheelchair bound when outside of the home and uses the walker to ambulate at home  He reports chronic right hip pain  Patient has standing BMP  His creatinine in Dec 2021 was 1 7  Discussed with patient  Due for follow-up on 1/20  Patient follows with device clinic regularly  Last visit was 10/14/21 which showed normal device function  His next device check is tomorrow  He is on monthly checks since there are approx  6 months left of battery life  Discussed multi disciplinary approach to prevent in hospitalizations with patient and wife  Also discussed the role of palliative care if symptoms continue to progress       The patient denies chest pain, dyspnea on exertion or rest, lower extremity edema, or palpitations and was instructed to call  the office or seek medical attention if any such symptoms develop  All medications reviewed and patient is tolerating medications without side effects       Family History:   Son- HTN, Mother- CAD    Social history: Nonsmoker, Glass of wine daily    EKG- Ventricular-paced rhythm  When compared with ECG of 19-NOV-2021 15:38, (unconfirmed)  Electronic ventricular pacemaker has replaced Wide QRS rhythm          Patient Active Problem List   Diagnosis    Cardiac amyloidosis ([facility] )    Chronic systolic [facility] failure ([facility]    Atrial fibrillation    Chronic anticoagulation    Dilation of thoracic aorta ([facility] )    Hyperlipidemia    Essential hypertension    Presence of automatic cardioverter/defibrillator (AICD)    Spinal stenosis of lumbar region with neurogenic claudication    Biventricular congestive heart failure ([facility] )    Nonsustained ventricular tachycardia    Stage 3 chronic kidney disease ([facility] )    AAA (abdominal aortic aneurysm) ([facility])    Enlarged prostate    Kidney stone    Other proteinuria    Gastroparesis    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 ([facility])    Closed comminuted intertrochanteric fracture of right femur with routine healing    Ambulatory dysfunction    Constipation    Anemia    Insomnia, unspecified    Localized swelling on left hand    Hypertensive CKD (chronic kidney disease)    Secondary hyperparathyroidism of renal origin ([facility] )    Platelets decreased ([facility] )    Acute on chronic systolic CHF (congestive heart failure) ([facility])    Cellulitis of right lower extremity       Allergies   Allergen Reactions    Other Sneezing     Seasonal; pollen; reactions; congestion    Penicillin G Benzathine Rash    Penicillins Rash         Current Outpatient Medications:     amiodarone 200 mg tablet, 1 tab daily, Disp: 90 tablet, Rfl: 3    apixaban (ELIQUIS) 2 5 mg, Take 1 tablet (2 5 mg total) by mouth 2 (two) times a day, Disp: 180 tablet, Rfl: 3    diphenhydrAMINE-APAP, sleep, (TYLENOL PM EXTRA STRENGTH PO), Take by mouth as needed , Disp: , Rfl:     famotidine (PEPCID) 20 mg tablet, Take 1 tablet (20 mg total) by mouth daily at bedtime, Disp: 90 tablet, Rfl: 2    metoprolol succinate (TOPROL-XL) 25 mg 24 hr tablet, Take 0 5 tablets (12 5 mg total) by mouth daily, Disp: 15 tablet, Rfl: 0    pantoprazole (PROTONIX) 40 mg tablet, Take 1 tablet (40 mg total) by mouth daily in the early morning, Disp: 90 tablet, Rfl: 3    polyethylene glycol (MIRALAX) 17 g packet, Take 17 g by mouth daily, Disp: 30 each, Rfl: 0    Tafamidis (Vyndamax) 61 MG CAPS, Take 61 mg by mouth daily, Disp: 30 capsule, Rfl: 11    torsemide (DEMADEX) 20 mg tablet, Take 1 tablet (20 mg total) by mouth daily, Disp: , Rfl:     Past Medical History:   Diagnosis Date    Anticoagulant long-term use     Atrial fibrillation (HCC)     Cardiac defibrillator in situ     Cardiomyopathy (HCC)     Colon polyp     Congestive heart failure (CHF) (HCC)     DJD (degenerative joint disease)     HL (hearing loss)     Hyperlipidemia     Hypertension     Insomnia, unspecified 8/25/2021    Shortness of breath     Sick sinus syndrome (HCC)     Spinal stenosis        Family History   Problem Relation Age of Onset    Coronary artery disease Mother     Hypertension Son        Past Surgical History:   Procedure Laterality Date    CARDIAC DEFIBRILLATOR PLACEMENT      [facility] RETROGRADE PYELOGRAM  11/03/2020    HIP SURGERY      INSERT / REPLACE / REMOVE PACEMAKER      IR THORACENTESIS  11/24/2021    IR THORACENTESIS  1/6/2022    KNEE SURGERY Left     MENISCECTOMY      in [facility] had this    ORIF FEMUR FRACTURE Right 08/12/2021    CT COLONOSCOPY FLX DX W/COLLJ SPEC WHEN PFRMD N/A 06/05/2017    Procedure: COLONOSCOPY;  Surgeon: Ninfa Avila MD;  Location: [facility];   Service: Gastroenterology    CT CYSTO/URETERO W/LITHOTRIPSY &INDWELL STENT INSRT Left 2020    Procedure: CYSTOSCOPY URETEROSCOPY WITH LITHOTRIPSY HOLMIUM LASER, RETROGRADE PYELOGRAM AND INSERTION STENT URETERAL;  Surgeon: Jacque Bonilla MD;  Location: [facility];  Service: Urology    AL CYSTOURETHROSCOPY,URETER CATHETER Left 2020    Procedure: CYSTOSCOPY RETROGRADE PYELOGRAM WITH INSERTION STENT URETERAL;  Surgeon: Memo Ahuja MD;  [facility];  Service: Urology    TONSILLECTOMY      TRANSURETHRAL RESECTION OF PROSTATE         Social History     Socioeconomic History    Marital status: /Civil Union     Spouse name: Not on file    Number of children: Not on file    Years of education: Not on file    Highest education level: Not on file   Occupational History    Not on file   Tobacco Use    Smoking status: Former Smoker     Packs/day: 0 50     Years: 3 00     Pack years: 1 50     Types: Cigarettes, Cigars     Quit date: 1968     Years since quittin 7    Smokeless tobacco: Never Used   Vaping Use    Vaping Use: Never used   Substance and Sexual Activity    Alcohol use: Not Currently     Alcohol/week: 2 0 standard drinks     Types: 2 Glasses of wine per week     Comment: daily    Drug use: Never    Sexual activity: Not Currently   Other Topics Concern    Not on file   Social History Narrative    Active advance directive    Caffeine use     Social Determinants of Health     Financial Resource Strain: Not on file   Food Insecurity: No Food Insecurity    Worried About Running Out of Food in the Last Year: Never true    Gayle of Food in the Last Year: Never true   Transportation Needs: No Transportation Needs    Lack of Transportation (Medical): No    Lack of Transportation (Non-Medical): No   Physical Activity: Inactive    Days of Exercise per Week: 0 days    Minutes of Exercise per Session: 0 min   Stress: Stress Concern Present    Feeling of Stress :  To some extent   Social Connections: Not on file   Intimate Partner Violence: Not on file   Housing Stability: Low Risk     Unable to Pay for Housing in the Last Year: No    Number of Places Lived in the Last Year: 1    Unstable Housing in the Last Year: No       Review of symptoms:   Review of Systems   Constitutional: Positive for fatigue  Negative for chills, diaphoresis and fever  Respiratory: Positive for shortness of breath (Improving since thoracentesis)  Negative for cough and chest tightness  Cardiovascular: Positive for leg swelling ( improving)  Negative for chest pain and palpitations  Gastrointestinal: Negative for abdominal distention, blood in stool, nausea and vomiting  Genitourinary: Negative for difficulty urinating  Musculoskeletal: Positive for arthralgias ( right hip pain)  Negative for back pain  Neurological: Positive for weakness  Negative for dizziness, syncope, light-headedness and headaches  Psychiatric/Behavioral: Negative for agitation and confusion  The patient is not nervous/anxious  Vitals: /65 (BP Location: Left arm, Patient Position: Sitting, Cuff Size: Standard)   Pulse 71   Resp 16   Ht 6' 5" (1 956 m)   Wt 83 kg (183 lb)   SpO2 97%   BMI 21 70 kg/m²         Physical Exam:     Physical Exam  Vitals and nursing note reviewed  Constitutional:       Appearance: He is well-developed and underweight  He is ill-appearing  HENT:      Head: Normocephalic and atraumatic  Eyes:      Conjunctiva/sclera: Conjunctivae normal    Cardiovascular:      Rate and Rhythm: Normal rate and regular rhythm  Heart sounds: No murmur heard  Pulmonary:      Effort: Pulmonary effort is normal  No respiratory distress  Breath sounds: Normal breath sounds  Abdominal:      Palpations: Abdomen is soft  Tenderness: There is no abdominal tenderness  Musculoskeletal:      Cervical back: Neck supple  Right lower le+ Edema (Pedal edema) present        Left lower leg: 1+ Edema ( pedal edema) present  Skin:     General: Skin is warm and dry  Neurological:      Mental Status: He is alert and oriented to person, place, and time  Psychiatric:         Mood and Affect: Mood normal          Behavior: Behavior normal             Thank you for allowing me to participate in the care and evaluation of your patient  Should you have any questions, please feel free to contact me

## 2022-01-13 NOTE — PROGRESS NOTES
Results for orders placed or performed in visit on 01/13/22   Cardiac EP device report    Narrative    MDT CRT-D ([facility])  CARELINK TRANSMISSION: BATTERY VOLTAGE NEARING ADDY  WILL SCHEDULE MONTHLY BATTERY CHECKS  (5 MONTHS)   98%  ALL AVAILABLE LEAD PARAMETERS WITHIN NORMAL LIMITS  NO SIGNIFICANT HIGH RATE EPISODES  VENTRICULAR SENSE EPISODES DETECTED  OPTI-VOL WITHIN NORMAL LIMITS  NORMAL DEVICE FUNCTION  ---[place]

## 2022-01-20 NOTE — PROGRESS NOTES
[facility] HOME HEALTH PROGRAM ENROLLMENT:    This patient was enrolled in the [facility] program on 1/14/2022  Referral made by Home Nurse  Reason: Heart Failure    [facility] Home Nurses: Magaly Reyes RN    Please contact me with any concerns or questions      Herrera Winkler [phone] or TT

## 2022-01-25 NOTE — TELEPHONE ENCOUNTER
Pily from [facility] contacting office to speak with RN regarding patient retaining fluid  Call transferred to [place] to triage

## 2022-01-25 NOTE — TELEPHONE ENCOUNTER
Can you please see if this patient can be scheduled for bilateral thoracentesis this week on an urgent basis  Thank you  He had this done a few weeks ago   Thanks

## 2022-01-25 NOTE — TELEPHONE ENCOUNTER
Spoke with the pts VNA nurse  She stated the pt has gained 7 lbs since his thoracentesis on 1/6/22  Pt lungs are filling up again, Pt has crackles in his lower lobes and mid lobes  Pt is coughing a lot when he lays down he needs to sit up to sleep to minimize cough  He has edema from his feet to his hips right side is worst  Pt was 184 5 lbs on 1/6/22 he is now 191 8 lbs  I advised pt should go to the ED  Pt stated he wants advise first to see if anything can be done at home before going to the ED  He is having his labs done tomorrow from a mobile lab  Please advise

## 2022-01-28 NOTE — BRIEF OP NOTE (RAD/CATH)
INTERVENTIONAL RADIOLOGY PROCEDURE NOTE    Date: 1/28/2022    Procedure: IR THORACENTESIS    Preoperative diagnosis:   1  Bilateral pleural effusion         Postoperative diagnosis: Same  Surgeon: Yohan Key MD     Assistant: None  No qualified resident was available  Blood loss: 0 ml    Specimens: none  Findings: bilateral thoracentesis  Complications: None immediate      Anesthesia: local

## 2022-01-28 NOTE — PROGRESS NOTES
Interventional Radiology Preprocedure Note    History/Indication for procedure:   Lesvia Iraheta is a [de-identified] y o  male with a PMH of CHF who presents for therapeutic and/or diagnostic bilateral thoracentesis      BP 93/51   Pulse 74   Resp 16   SpO2 99%     Relevant past medical history:    Past Medical History:   Diagnosis Date    Anticoagulant long-term use     Atrial fibrillation (HCC)     Cardiac defibrillator in situ     Cardiomyopathy ([address] )     Colon polyp     Congestive heart failure (CHF) (HCC)     DJD (degenerative joint disease)     HL (hearing loss)     Hyperlipidemia     Hypertension     Insomnia, unspecified 8/25/2021    Shortness of breath     Sick sinus syndrome ([address] )     Spinal stenosis      Patient Active Problem List   Diagnosis    Cardiac amyloidosis (HCC)    Chronic systolic heart failure (HCC)    Atrial fibrillation    Chronic anticoagulation    Dilation of thoracic aorta (HCC)    Hyperlipidemia    Essential hypertension    Presence of automatic cardioverter/defibrillator (AICD)    Spinal stenosis of lumbar region with neurogenic claudication    Biventricular congestive heart failure (HCC)    Nonsustained ventricular tachycardia    Stage 3 chronic kidney disease ([address] )    AAA (abdominal aortic aneurysm) (HCC)    Enlarged prostate    Kidney stone    Other proteinuria    Gastroparesis    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)    Closed comminuted intertrochanteric fracture of right femur with routine healing    Ambulatory dysfunction    Constipation    Anemia    Insomnia, unspecified    Localized swelling on left hand    Hypertensive CKD (chronic kidney disease)    Secondary hyperparathyroidism of renal origin ([address] )    Platelets decreased ([address] )    Acute on chronic systolic CHF (congestive heart failure) (HCC)    Cellulitis of right lower extremity Medications:    Inpatient Medications:     Scheduled Medications:      Infusions:  No current facility-administered medications for this encounter  PRN:      Outpatient Medications:  Current Outpatient Medications on File Prior to Encounter   Medication Sig Dispense Refill    amiodarone 200 mg tablet 1 tab daily 90 tablet 3    apixaban (ELIQUIS) 2 5 mg Take 1 tablet (2 5 mg total) by mouth 2 (two) times a day 180 tablet 3    dicyclomine (BENTYL) 20 mg tablet Take 1 tablet (20 mg total) by mouth 4 (four) times a day 120 tablet 2    diphenhydrAMINE-APAP, sleep, (TYLENOL PM EXTRA STRENGTH PO) Take by mouth as needed       famotidine (PEPCID) 20 mg tablet Take 1 tablet (20 mg total) by mouth daily at bedtime 90 tablet 2    metoprolol succinate (TOPROL-XL) 25 mg 24 hr tablet Take 0 5 tablets (12 5 mg total) by mouth daily 15 tablet 0    pantoprazole (PROTONIX) 40 mg tablet Take 1 tablet (40 mg total) by mouth daily in the early morning 90 tablet 3    polyethylene glycol (MIRALAX) 17 g packet Take 17 g by mouth daily 30 each 0    Tafamidis (Vyndamax) 61 MG CAPS Take 61 mg by mouth daily 30 capsule 11    torsemide (DEMADEX) 20 mg tablet Take 1 tablet (20 mg total) by mouth daily       No current facility-administered medications on file prior to encounter         Allergies   Allergen Reactions    Other Sneezing     Seasonal; pollen; reactions; congestion    Penicillin G Benzathine Rash    Penicillins Rash       Anticoagulants: eliquis    Labs:   CBC with diff:   Lab Results   Component Value Date    WBC 5 41 12/22/2021    HGB 12 0 12/22/2021    HCT 39 4 12/22/2021     (H) 12/22/2021     12/22/2021    MCH 32 6 12/22/2021    MCHC 30 5 (L) 12/22/2021    RDW 14 9 12/22/2021    MPV 10 2 12/22/2021    NRBC 0 12/22/2021     BMP/CMP:  Lab Results   Component Value Date     01/17/2017    K 4 0 01/26/2022    K 4 5 03/16/2020     01/26/2022    CL 98 03/16/2020    CO2 29 01/26/2022 CO2 26 11/09/2020    CO2 22 03/16/2020    ANIONGAP 9 5 03/06/2015    BUN 37 (H) 01/26/2022    BUN 17 03/16/2020    CREATININE 1 39 (H) 01/26/2022    CREATININE 1 00 01/17/2017    GLUCOSE 147 (H) 11/09/2020    GLUCOSE 113 (H) 01/17/2017    CALCIUM 10 1 01/26/2022    CALCIUM 9 6 01/17/2017    AST 23 12/22/2021    AST 30 03/16/2020    ALT 21 12/22/2021    ALT 19 03/16/2020    ALKPHOS 189 (H) 12/22/2021    ALKPHOS 61 01/17/2017    PROT 6 7 01/17/2017    BILITOT 1 4 (H) 01/17/2017    EGFR 47 01/26/2022   ,     Coags:   Lab Results   Component Value Date    INR 1 41 (H) 11/09/2020   ,          Relevant imaging studies:   Reviewed  Directed physical examination:  I agree with the physical exam performed on 1/10/22 and there are no additional changes  Assessment/Plan:   Therapeutic and/or diagnostic bilateral thoracentesis  Sedation/Anesthesia plan:  Local sedation will be used as needed for procedure      Consent with alternatives to the procedure, risks and benefits have been explained and discussed with the patient/patient's family: yes

## 2022-01-28 NOTE — PROGRESS NOTES
PALS HOME NURSE VISIT: Kimmie Barba RN 1/25:  PRIMARY FOCUS OF HOME HEALTH_cardiac  chf  rle cellulitis    HF PALS as of 1 25 22  COMORBIDITIES_8 21 sp R femur fx  icd  htn  assess any pain   generalized pain   chronic back pain    assess sacral area   blanchable erythem calmazine ointment    daily weights     1 19  188lbs   1 25 191 8 pds    1 6 22 thoracentesis done 2L removed and stopped due to hypotension per wife  1 liter each side    Assess BLE edema   plus 3 pitting BL pedal area   plus 2 pitting RLE plus 1 pitting LLE   BL hips plus 2 pitting     Assess lungs sounds   fine crackles to BL LL and RML   pt and wife report increased cough over last few days and increased inability to recline back and needing to sit upright at HS     1 25 TC to Dr Migdalia Guerrero office to report all of above findings  Pop Gonzalez assess for changes     PT  Pop Gonzalez Jaylene   d/c 12 23 21  OT  Pop Adler Pickles   d/c 12 16 21    1 19 CWON follow up buttocks and rt heel  Wife managing wounds  Calmazine ointment to buttock redness but no open areas  Rt heel intact eschar and skin prep daily  Supplies are in home if wounds should open  Wife instructed on use of foam dressings  CWON will monitor and revisit if needed  UPCOMING APPOINTMENTS_  Dr Migdalia Guerrero   cardio    Dr Madeline Be   PCP    PATIENT GETS PERIODIC BLOODWORK DONE WITH [facility]  [facility]    1 11 22 Awaiting decision if patient is accepted into PALS program   1 19  Pt accepted  Pily gomez to vs on tuesday 1 26    MYCHART STATUS_pt has set up  CMS HH5 2C PATIENT STRENGTHS AND CARE PREFERENCES   AWARENESS OF DISEASE STATUS_ GOOD  MOTIVATION AND READINESS FOR CHANGE_ GOOD   VOCATIONAL INTERESTS OR HOBBIES_cook  writing   read     DISCHARGE PLAN TO MEET PATIENT GOALS AND OUTCOMES_to get stronger

## 2022-01-31 NOTE — TELEPHONE ENCOUNTER
----- Message from Hugo Guerra MD sent at 1/28/2022  7:17 PM EST -----  Regarding: Thoracentesis  Please schedule this patient for repeat bilateral thoracentesis in 3 weeks time  Dr Moises Phoenix - this patient is having recurrent pleural effusion requiring repeated thoracentesis  Is there anything else which could be done ? Pleurodesis/ Pleurex ?

## 2022-02-01 NOTE — TELEPHONE ENCOUNTER
PINKY    Spoke with pt and his wife   They both verbally understood Dr Neeraj Shelley message about pt next appointment w/ Dr Amy Queen and to discuss further w/ pulmonary regarding pt recurrent pleural effusion

## 2022-02-01 NOTE — TELEPHONE ENCOUNTER
----- Message from Fe Guhtrie MD sent at 2/1/2022 10:11 AM EST -----  Regarding: Discussed with Dr Amy Queen  Please let the patient's wife know that his next thoracentesis is scheduled for February 17th  She should discuss with Dr Jones , Pulmonary regarding if anything else could be done with recurrent pleural effusion  I also sent a message to her  Thank you

## 2022-02-10 NOTE — PROGRESS NOTES
Advanced Heart Failure Outpatient Progress Note - Gia Fabian [de-identified] y o  male MRN: [id number]    Encounter: [id number]      Assessment/Plan:    Patient Active Problem List    Diagnosis Date Noted    Cellulitis of right lower extremity 11/22/2021    Platelets decreased ([facility] ) 11/19/2021    Acute on chronic systolic CHF (congestive heart failure) ([facility] ) 11/19/2021    Hypertensive CKD (chronic kidney disease) 09/20/2021    Secondary hyperparathyroidism of renal origin ([place] [id number] ) 09/20/2021    Insomnia, unspecified 08/25/2021    Localized swelling on left hand 08/25/2021    Closed comminuted intertrochanteric fracture of right femur with routine healing 08/18/2021    Ambulatory dysfunction 08/18/2021    Constipation 08/18/2021    Anemia 08/18/2021    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 ([facility] ) 07/19/2021    Gastroparesis 06/08/2021    Other proteinuria 05/19/2021    AAA (abdominal aortic aneurysm) ([facility] ) 02/05/2021    Enlarged prostate 02/05/2021    Kidney stone 02/05/2021    Nonsustained ventricular tachycardia 11/09/2020    Stage 3 chronic kidney disease ([facility] ) 11/09/2020    Biventricular congestive heart failure ([place] [address] ) 09/29/2020    Spinal stenosis of lumbar region with neurogenic claudication 05/28/2019    Presence of automatic cardioverter/defibrillator (AICD)     Cardiac amyloidosis ([facility] ) 02/02/2018    Chronic systolic heart failure ([facility] ) 02/02/2018    Atrial fibrillation 02/02/2018    Chronic anticoagulation 02/02/2018    Hyperlipidemia 07/18/2017    D[facility] ) 06/09/2015    Essential hypertension 04/29/2014       Chronic HFrEF, LVEF 35%, LV not dilated  Etiology: [facility] with only mild nonobstructive disease   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  given positive PYP scan  Earl Sewell  Has features consistent with this diagnosis including increased biventricular wall thickness and  IVSd, lower voltage EKG, hypotension, arrhythmia/conduction disease, h/o spinal stenosis, carpal tunnel syndrome, glossal changes/dyphagia   Does have elevated Ig Kappa, Kappa/Lambda fluid ratio however without monoclonal banding  Has GI dysmotility with e/o gastroparesis on gastric emptying study   Seen by hem/onc who concurred with this diagnosis  He has started on Tafamidis 61 mg daily  Weights: 190 lbs 2/10/22    Diagnostic:    Echo 11/22/21:  LVEF: 35-40%  LVIDd: 5cm  RV: severely dilated, moderately reduced, increased wall thickness  MR: moderate  PASP: 60mmHg, moderate TR  RVOT:   Other: grade 2 diastology, severely increased wall thickness    5/18/21: Abdominal US, small volume ascites with small BL pleural effusions  [employer] 12/8/20: negative for any pathogenic variant    LHC 11/17/20: mild nonobstructive disease, 40% stenosis of mid LAD  Otherwise only minor luminal irregularities    Cardiac Amyloidosis scan 11/13/20: IMPRESSION:  1   Strongly suggestive of TTR amyloidosis  Serologies:  RF positive  SPEP- no monoclonal bands  UPEP- selective proteinuria  No monoclonal bands  Immunoglobulin free LT chains- Ig [place] elevated at 85 1, Ig Lambda normal, Kappa/Lambda fluid ratio elevated at 5 46  Hepatitis C- not detected    Iron Panel- normal     TTE 9/27/20: LVEF 35%, LVIDd 5 34 cm, moderately increased wall thickness, grade II DD, RV mildly dilated with mildly reduced function, moderate MR, mild TR, severe assymetrical hypertrophy of the septum,  no effusions, IVC not visualized, IVSd 2 8 cm    Suppression of TTR:  Antisense oligonucleotides ([employer]): RNAi (patisiran):     TTR tetramer stabilizers   Tafamidis 61 mg daily  Diflunisal (suggested 250 mg BID):   Epigallocatechin? 3?gallate (EGCG) (suggested 550 mg daily):     Decrease production of amyloid fibrils   Doxycycline (suggested 100 mg QD)  TUDCA (suggested 750 mg QD)     Neurohormonal Blockade:  --Beta-Blocker: Metoprolol succinate 12 5 mg once daily  --ACEi, ARB or ARNi: NA, Entresto stopped for hypotn, creat now 1 8  (or SVR reduction)  --Aldosterone Receptor Blocker: Not at this time  --Diuretic: Torsemide 20 mg daily alternating with 10mg daily - recently been taking 20mg daily    Sudden Cardiac Death Risk Reduction:  --ICD: NOT MRI COMPATIBLE  MDT BiV AICD  Interrogation 1/13/22: Nearing ADDY   99%  No significant high rate episodes    Electrical Resynchronization:  --BiV AICD in situ    Advanced Therapies (If appropriate): Age precludes OHT    H/o VT storm  Rx: amiodarone 200mg daily, metoprolol as above  Permanent atrial fib  On Eliquis  Rate controlled  CKD  HTN  Has been hypotensive  HLD  Spinal stenosis    Unilateral carpal tunnel syndrome    Dysphagia  Possible glossal changes r/t amyloid ? Gastroparesis  Likely r/t cardiac amyloid  Recurrent pleural effusion  S/p thoracentesis 1/6/22 and 1/28/22    Plan:  Increase torsemide torsemide to 20mg two times a day  BMP in 5-7 days  Daily weights  2g sodium salt restriction  Scheduled for repeat thoracentesis      HPI:  Inpatient HF consult 11/11/20:  "79year old with PMH as described above who was transferred here from the [facility] following 3 ICD shocks at home  Started on Amiodarone gtt along with Lidocaine  Has had no further events since  Lidocaine recently needed to be discontinued due to onset of severe tremors  This is now improving  Plan is for left heart cath tomorrow AM     Heart failure team is being asked to see patient for evaluation of his chronic systolic heart failure  He routinely follows with Dr Skinner Killer  Was recently seen by him in early October for follow up after admission for urosepsis  At that time Entresto and diuretics were kept on hold for YORDAN and hypotension  On my examination today, patient is in no distress  His hemodynamics are stable   Labs show a worsening YORDAN, elevated NT Pro BNP, with elevated T Bili  CXR showing mild vascular congestion  "    Left heart cath showed non-obstructive disease  Sent for PYP scan given concern for infiltrative process  Results strongly suggestive of cardiac amyloidosis  Serologic workup demonstrated elevated Ig Kappa but with normal kappa/lambda fluid ratio  No monoclonal banding on SPEP/UPEP  Was seen by hem/onc who concurred with a diagnosis of aTTR amyloid  In terms of his ventricular arrhythmia, he was ultimately placed on an oral Amiodarone load  His Metoprolol was increased to 50 mg BID  He had no further sustained VT events  He did require several doses of IV diuretic  His renal function improved with diuresis  Today, 11/25/20 patient presents for post hospital follow up  He is feeling well  Biggest obstacle for him is his back pain from spinal stenosis  His post discharge BMP did show a mild YORDAN with creat of 1 5  Tells me today that he is only drinking 1 2 Liters per day  He otherwise denies chest pain, SOB, palpitations, dizziness/LH, or syncope  No shocks from his device  No orthopnea or PND  He is waiting to hear back on financial assistance for his Tafamidis  His RUE near his radial cath site is ecchymotic all the way up to the elbow  He held 2 doses of his Eliquis and called Dr Debbie Bernal  Per his instructions, patient ok to resume anticoagulation now  He has had no pain, loss of sensation or movement in that area      Today, 6/22/21 patient is being evaluated via telephone as did not receive email link  Has been routinely following with Dr Debbie Bernal and Dr Ambriz  Has had no further shocks from his AICD  BP has been on the low side for which his Metoprolol was decreased to 25 mg once daily  Has also developed gastroparesis and subsequently has had significant weight loss  Abdominal US showing small volume ascites and small BL pleural effusions   Otherwise feels his SOB is at baseline and no worse than usual  Doing well with the current Torsemide regimen  Interval History:  2/10/22: Here for follow up  He has been following Dr Aldair Stevenson and recently with worsening shortness of breath  He has been feeling weak and dizzy and also with poor appetite  He has undergone thoracentesis twice with the last one on 1/28/22  Recently been taking torsemide 20mg daily increased from 20mg alternating with 10mg  He is overall concerned about his health and his worsening kidney function with increased dose of diuretic  Review of Systems   Constitutional: Positive for fatigue  Negative for chills and fever  HENT: Negative for ear pain and sore throat  Eyes: Negative for pain and visual disturbance  Respiratory: Positive for shortness of breath  Negative for cough  Cardiovascular: Negative for chest pain, palpitations and leg swelling  Gastrointestinal: Negative for abdominal distention, abdominal pain and vomiting  Genitourinary: Negative for dysuria and hematuria  Musculoskeletal: Negative for arthralgias and back pain  Skin: Negative for color change and rash  Neurological: Positive for light-headedness  Negative for seizures and syncope  All other systems reviewed and are negative  Past Medical History:   Diagnosis Date    Anticoagulant long-term use     [facility]     Cardiac defibrillator in situ     Cardiomyopathy (HCC)     Colon polyp     Congestive heart failure (CHF) (HCC)     DJD (degenerative joint disease)     HL (hearing loss)     Hyperlipidemia     Hypertension     Insomnia, unspecified 8/25/2021    Shortness of breath     Sick sinus syndrome (HCC)     Spinal stenosis          Allergies   Allergen Reactions    Other Sneezing     Seasonal; pollen; reactions; congestion    Penicillin G Benzathine Rash    Penicillins Rash           Current Outpatient Medications:     amiodarone 200 mg tablet, 1 tab daily, Disp: 90 tablet, Rfl: 3    apixaban (ELIQUIS) 2 5 mg, Take 1 tablet (2 5 mg total) by mouth 2 (two) times a day, Disp: 180 tablet, Rfl: 3    dicyclomine (BENTYL) 20 mg tablet, Take 1 tablet (20 mg total) by mouth 4 (four) times a day (Patient taking differently: Take 20 mg by mouth 3 (three) times a day  ), Disp: 120 tablet, Rfl: 2    diphenhydrAMINE-APAP, sleep, (TYLENOL PM EXTRA STRENGTH PO), Take by mouth as needed , Disp: , Rfl:     famotidine (PEPCID) 20 mg tablet, Take 1 tablet (20 mg total) by mouth daily at bedtime, Disp: 90 tablet, Rfl: 2    metoprolol succinate (TOPROL-XL) 25 mg 24 hr tablet, Take 0 5 tablets (12 5 mg total) by mouth daily (Patient taking differently: Take 12 5 mg by mouth daily Take 12 5 mg if blood pressure is >110 ), Disp: 15 tablet, Rfl: 0    nystatin (MYCOSTATIN) powder, , Disp: , Rfl:     pantoprazole (PROTONIX) 40 mg tablet, Take 1 tablet (40 mg total) by mouth daily in the early morning, Disp: 90 tablet, Rfl: 3    polyethylene glycol (MIRALAX) 17 g packet, Take 17 g by mouth daily, Disp: 30 each, Rfl: 0    Tafamidis (Vyndamax) 61 MG CAPS, Take 61 mg by mouth daily, Disp: 30 capsule, Rfl: 11    torsemide (DEMADEX) 20 mg tablet, Take 1 tablet (20 mg total) by mouth daily, Disp: , Rfl:     Social History     Socioeconomic History    Marital status: /Civil Union     Spouse name: Not on file    Number of children: Not on file    Years of education: Not on file    Highest education level: Not on file   Occupational History    Not on file   Tobacco Use    Smoking status: Former Smoker     Packs/day: 0 50     Years: 3 00     Pack years: 1 50     Types: Cigarettes, Cigars     Quit date: 1968     Years since quittin 8    Smokeless tobacco: Never Used   Vaping Use    Vaping Use: Never used   Substance and Sexual Activity    Alcohol use: Not Currently     Alcohol/week: 2 0 standard drinks     Types: 2 Glasses of wine per week     Comment: daily    Drug use: Never    Sexual activity: Not Currently   Other Topics Concern    Not on file Social History Narrative    Active advance directive    Caffeine use     Social Determinants of Health     Financial Resource Strain: Not on file   Food Insecurity: No Food Insecurity    Worried About Running Out of Food in the Last Year: Never true    Gayle of Food in the Last Year: Never true   Transportation Needs: No Transportation Needs    Lack of Transportation (Medical): No    Lack of Transportation (Non-Medical): No   Physical Activity: Inactive    Days of Exercise per Week: 0 days    Minutes of Exercise per Session: 0 min   Stress: Stress Concern Present    Feeling of Stress : To some extent   Social Connections: Not on file   Intimate Partner Violence: Not on file   Housing Stability: [address] Unable to Pay for Housing in the Last Year: No    Number of Places Lived in the Last Year: 1    Unstable Housing in the Last Year: No       Family History   Problem Relation Age of Onset    Coronary artery disease Mother     Hypertension Son        Physical Exam:    Vitals:   Vitals:    02/10/22 1402   BP: 118/72   Pulse: 74   SpO2: 93%       Physical Exam  Constitutional:       General: He is not in acute distress  Appearance: He is ill-appearing  HENT:      Head: Normocephalic and atraumatic  Mouth/Throat:      Mouth: Mucous membranes are moist    Eyes:      General: No scleral icterus  Extraocular Movements: Extraocular movements intact  Cardiovascular:      Rate and Rhythm: Normal rate and regular rhythm  Pulses: Normal pulses  Heart sounds: S1 normal and S2 normal  No murmur heard  No friction rub  No gallop  Comments: Elevated JVP  Pulmonary:      Effort: Pulmonary effort is normal       Breath sounds: Examination of the right-lower field reveals decreased breath sounds  Examination of the left-lower field reveals decreased breath sounds  Decreased breath sounds present  No wheezing, rhonchi or rales  Abdominal:      General: There is no distension  Palpations: Abdomen is soft  Tenderness: There is no abdominal tenderness  There is no guarding or rebound  Musculoskeletal:         General: Normal range of motion  Cervical back: Neck supple  Right lower leg: No edema  Left lower leg: No edema  Skin:     General: Skin is warm and dry  Capillary Refill: Capillary refill takes less than 2 seconds  Neurological:      General: No focal deficit present  Mental Status: He is alert and oriented to person, place, and time  Psychiatric:         Mood and Affect: Mood normal          Labs & Results:    Lab Results   Component Value Date    SODIUM 136 01/26/2022    K 4 0 01/26/2022     01/26/2022    CO2 29 01/26/2022    BUN 37 (H) 01/26/2022    CREATININE 1 39 (H) 01/26/2022    GLUC 97 11/25/2021    CALCIUM 10 1 01/26/2022     Lab Results   Component Value Date    WBC 5 41 12/22/2021    HGB 12 0 12/22/2021    HCT 39 4 12/22/2021     (H) 12/22/2021     12/22/2021     Lab Results   Component Value Date    NTBNP 6,272 (H) 11/19/2021      Lab Results   Component Value Date    CHOLESTEROL 115 12/22/2021    CHOLESTEROL 95 10/05/2020    CHOLESTEROL 147 08/29/2019     Lab Results   Component Value Date    HDL 63 12/22/2021    HDL 31 (L) 10/05/2020    HDL 59 08/29/2019     Lab Results   Component Value Date    TRIG 42 12/22/2021    TRIG 89 10/05/2020    TRIG 72 08/29/2019     Lab Results   Component Value Date    NONHDLC 52 12/22/2021    [place] 64 10/05/2020       EKG personally reviewed by Regla Villegas MD      Counseling / Coordination of Care  Time spent today 25 minutes  Greater than 50% of total time was spent with the patient and / or family counseling and / or coordination of care  We went over current diagnosis, most recent studies and any changes in treatment  Thank you for the opportunity to participate in the care of this patient      Laureano Billingsley MD  [facility] [id number]

## 2022-02-10 NOTE — PATIENT INSTRUCTIONS
Increase torsemide torsemide to 20mg two times a day  Obtain blood test (BMP) in 5-7 days  Daily weights  2g sodium salt restriction  Physical activities as tolerated

## 2022-02-15 NOTE — TELEPHONE ENCOUNTER
Form received ,    Dr Clarence Long signed,  Form faxed, confirmation received and scanned into chart

## 2022-02-16 NOTE — PROGRESS NOTES
Results for orders placed or performed in visit on 02/16/22   Cardiac EP device report    Narrative    MDT CRT-D ([facility])  CARELINK TRANSMISSION: BATTERY VOLTAGE NEARING ADDY  WILL SCHEDULE MONTHLY BATTERY CHECKS  (4 MONTHS)   98%  ALL AVAILABLE LEAD PARAMETERS WITHIN NORMAL LIMITS  1 NSVT EPISODE DETECTED 13 BEATS @ 360ms  NO THERAPIES GIVEN  VENTRICULAR SENSE EPISODES DETECTED  OPTI-VOL WITHIN NORMAL LIMITS  NORMAL DEVICE FUNCTION  ---[place]

## 2022-02-17 NOTE — TELEPHONE ENCOUNTER
----- Message from Hugo Junior MD sent at 2/17/2022  1:05 PM EST -----  Please schedule this patient for bilateral thoracentesis in 3 weeks time from now

## 2022-03-02 NOTE — TELEPHONE ENCOUNTER
Please call patient/wife  Unfortunately there is no specific pre treatment for this  But they can not bring up the concern to the team and the performing physician can give something intravenous did during the procedure or prior to the procedure depending on his symptoms  Also, did they get in touch with Pulmonary regarding any further evaluation for recurrent pleural effusion

## 2022-03-02 NOTE — TELEPHONE ENCOUNTER
Malgorzata Pedersen from [facility] called to confirm the monthly lab orders  Patient is no longer getting mobile lab draws and A will be doing it going forward      Malgorzata Pedersen would like to confirm that the patient has the following standing lab orders every month:    Magnesium  TSH  Lipid  CBC  CMP

## 2022-03-02 NOTE — TELEPHONE ENCOUNTER
Name of Caller: Peterson Live from [address]  Problem/Symptoms: Patient periodically goes for thoracentesis treatments  During these treatments patient becomes sick to his stomach  Is there anything the patient can be given prior to these treatments      Call back number: [phone]

## 2022-03-03 NOTE — TELEPHONE ENCOUNTER
[facility] to confirm [facility] orders  Martina Jeffries verbally understood that the pts standing monthly order is only for CBC and CMP

## 2022-03-03 NOTE — TELEPHONE ENCOUNTER
Spoke to pt and relayed Dr Gray Hobson response, Pt verbally understood  Pt has not followed up with Pulmonary  I spoke to Rut Barrios from [facility]  She stated that he will be drawing blood work for pt and wanted to confirm what orders need to be drawn     Rut Barrios from [address] [phone]

## 2022-03-03 NOTE — TELEPHONE ENCOUNTER
Received fax from [facility] stating they are missing patient portion of application  I called [employer] to clarify this fax since patient has been paying out of pocket for med given he did not meet income  States they reached out to patient in January to see if patient interested in program-- they did not have application for patient  I called Jonnie to discuss patient assistance w/ him  He is paying >1,000 monthly for Vyndamax  He is willing to try for patient assistance again  Patient has an appt w/ Dr Anson Jacbos on Monday  Coordinated w/ patient and [facility] office to have patient sign p/w and bring [phone]  They will fax p/w to me once completed

## 2022-03-07 NOTE — TELEPHONE ENCOUNTER
Completed patient assistance application faxed to [place] [id number]    Application sent to centralizing faxing to scan into chart

## 2022-03-07 NOTE — PROGRESS NOTES
[facility] [address] [address]  Cardiology Follow Up    Emiliano Sims  1940  [id number]      1  Cardiac amyloidosis ([address] )     2  Chronic systolic heart failure (HCC)  torsemide (DEMADEX) 20 mg tablet   3  Ventricular tachycardia ([address] )     4  Chronic anticoagulation     5  Atrial fibrillation         Chief Complaint   Patient presents with    Follow-up     8 week follow up       Interval History:  Patient presents for follow-up visit  Patient has history of nonischemic cardiomyopathy, cardiac amyloidosis and chronic systolic heart failure  Patient also has history of chronic atrial fibrillation  He has history of Medtronic CRT D device which is close to ADDY  Patient unfortunately has recurrent pleural effusions status post bilateral thoracentesis  He gets very sick during paracenteses  He was also evaluated by Heart failure Cardiology  Patient's torsemide was increased  Patient has soft blood pressures as well as chronic kidney disease  His activities pretty limited  He also has chronic back pain  He ambulates with a scooter      Patient Active Problem List   Diagnosis    Cardiac amyloidosis (HCC)    Chronic systolic heart failure (HCC)    Atrial fibrillation    Chronic anticoagulation    Dilation of thoracic aorta (HCC)    Hyperlipidemia    Essential hypertension    Presence of automatic cardioverter/defibrillator (AICD)    Spinal stenosis of lumbar region with neurogenic claudication    Biventricular congestive heart failure ([address] )    Nonsustained ventricular tachycardia    Stage 3 chronic kidney disease ([address] )    AAA (abdominal aortic aneurysm) ([facility]    Enlarged prostate    Kidney stone    Other proteinuria    Gastroparesis    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)    Closed comminuted intertrochanteric fracture of right femur with routine healing    Ambulatory dysfunction    Constipation    Anemia    Insomnia, unspecified    Localized swelling on left hand    Hypertensive CKD (chronic kidney disease)    Secondary hyperparathyroidism of renal origin [facility]    Platelets decreased ([facility]    Acute on chronic systolic CHF (congestive heart failure) ([facility])    Cellulitis of right lower extremity     Past Medical History:   Diagnosis Date    Anticoagulant long-term use     Atrial fibrillation ([facility] )     Cardiac defibrillator in situ     Cardiomyopathy ([facility] )     Colon polyp     Congestive heart failure (CHF) ([facility])     DJD (degenerative joint disease)     HL (hearing loss)     Hyperlipidemia     Hypertension     Insomnia, unspecified 2021    Shortness of breath     Sick sinus syndrome ([facility] )     Spinal stenosis      Social History     Socioeconomic History    Marital status: /Civil Union     Spouse name: Not on file    Number of children: Not on file    Years of education: Not on file    Highest education level: Not on file   Occupational History    Not on file   Tobacco Use    Smoking status: Former Smoker     Packs/day: 0 50     Years: 3 00     Pack years: 1 50     Types: Cigarettes, Cigars     Quit date: 1968     Years since quittin 9    Smokeless tobacco: Never Used   Vaping Use    Vaping Use: Never used   Substance and Sexual Activity    Alcohol use: Not Currently     Alcohol/week: 2 0 standard drinks     Types: 2 Glasses of wine per week     Comment: daily    Drug use: Never    Sexual activity: Not Currently   Other Topics Concern    Not on file   Social History Narrative    Active advance directive    Caffeine use     Social Determinants of Health     Financial Resource Strain: Not on file   Food Insecurity: No Food Insecurity    Worried About Running Out of Food in the Last Year: Never true    Gayle of Food in the Last Year: Never true   Transportation Needs: No Transportation Needs    Lack of Transportation (Medical): No    Lack of Transportation (Non-Medical): No   Physical Activity: Inactive    Days of Exercise per Week: 0 days    Minutes of Exercise per Session: 0 min   Stress: Stress Concern Present    Feeling of Stress : To some extent   Social Connections: Not on file   Intimate Partner Violence: Not on file   Housing Stability: [address] Unable to Pay for Housing in the Last Year: No    Number of Places Lived in the Last Year: 1    Unstable Housing in the Last Year: No      Family History   Problem Relation Age of Onset    Coronary artery disease Mother     Hypertension Son      Past Surgical History:   Procedure Laterality Date    CARDIAC DEFIBRILLATOR PLACEMENT      [facility] RETROGRADE PYELOGRAM  11/03/2020    HIP SURGERY      INSERT / REPLACE / REMOVE PACEMAKER      IR THORACENTESIS  11/24/2021    IR THORACENTESIS  1/6/2022    IR THORACENTESIS  1/28/2022    IR THORACENTESIS  2/17/2022    KNEE SURGERY Left     M[facility]ISCECTOMY      in [facility]    ORIF FEMUR FRACTURE Right 08/12/2021    NM COLONOSCOPY FLX DX W/COLLJ SPEC WHEN PFRMD N/A 06/05/2017    Procedure: COLONOSCOPY;  Surgeon: Isadora Hicks MD;  [facility];   Service: Gastroenterology    NM CYSTO/URETERO W/LITHOTRIPSY &INDWELL STENT INSRT Left 11/03/2020    Procedure: CYSTOSCOPY URETEROSCOPY WITH LITHOTRIPSY HOLMIUM LASER, RETROGRADE PYELOGRAM AND INSERTION STENT URETERAL;  Surgeon: Ranjeet Baker MD;  [facility];  Service: Urology    NM CYSTOURETHROSCOPY,URETER CATHETER Left 09/29/2020    Procedure: CYSTOSCOPY RETROGRADE PYELOGRAM WITH INSERTION STENT URETERAL;  Surgeon: Estefany Espinosa MD;  [facility];  Service: Urology    TONSILLECTOMY      TRANSURETHRAL RESECTION OF PROSTATE         Current Outpatient Medications:     amiodarone 200 mg tablet, 1 tab daily, Disp: 90 tablet, Rfl: 3    apixaban (ELIQUIS) 2 5 mg, Take 1 tablet (2 5 mg total) by mouth 2 (two) times a day, Disp: 180 tablet, Rfl: 3    dicyclomine (BENTYL) 20 mg tablet, Take 1 tablet (20 mg total) by mouth 4 (four) times a day (Patient taking differently: Take 20 mg by mouth 3 (three) times a day  ), Disp: 120 tablet, Rfl: 2    diphenhydrAMINE-APAP, sleep, (TYLENOL PM EXTRA STRENGTH PO), Take by mouth as needed , Disp: , Rfl:     famotidine (PEPCID) 20 mg tablet, Take 1 tablet (20 mg total) by mouth daily at bedtime, Disp: 90 tablet, Rfl: 2    metoprolol succinate (TOPROL-XL) 25 mg 24 hr tablet, Take 0 5 tablets (12 5 mg total) by mouth daily (Patient taking differently: Take 12 5 mg by mouth daily Take 12 5 mg if blood pressure is >110 ), Disp: 15 tablet, Rfl: 0    pantoprazole (PROTONIX) 40 mg tablet, Take 1 tablet (40 mg total) by mouth daily in the early morning, Disp: 90 tablet, Rfl: 3    polyethylene glycol (MIRALAX) 17 g packet, Take 17 g by mouth daily, Disp: 30 each, Rfl: 0    Tafamidis (Vyndamax) 61 MG CAPS, Take 61 mg by mouth daily, Disp: 30 capsule, Rfl: 11    torsemide (DEMADEX) 20 mg tablet, Take 1 tablet (20 mg total) by mouth 2 (two) times a day, Disp: 60 tablet, Rfl: 5    nystatin (MYCOSTATIN) powder, , Disp: , Rfl:   Allergies   Allergen Reactions    Other Sneezing     Seasonal; pollen; reactions; congestion    Penicillin G Benzathine Rash    Penicillins Rash       Labs:   Ancillary Orders on 02/11/2022   Component Date Value    Sodium 02/23/2022 140     Potassium 02/23/2022 3 9     Chloride 02/23/2022 102     CO2 02/23/2022 32     ANION GAP 02/23/2022 6     BUN 02/23/2022 29*    Creatinine 02/23/2022 1 47*    Glucose, Fasting 02/23/2022 119*    Calcium 02/23/2022 9 5     eGFR 02/23/2022 44    Appointment on 01/26/2022   Component Date Value    PTH 01/26/2022 130 6*   Ancillary Orders on 01/14/2022   Component Date Value    Sodium 01/26/2022 136     Potassium 01/26/2022 4 0     Chloride 01/26/2022 102     CO2 01/26/2022 29     ANION GAP 01/26/2022 5     BUN 01/26/2022 37*    Creatinine 01/26/2022 1 39*    Glucose, Fasting 01/26/2022 140*    Calcium 01/26/2022 10 1     eGFR 01/26/2022 47      Imaging: IR thoracentesis    Result Date: 2/17/2022  Narrative: Ultrasound-guided bilateral thoracentesis Clinical History: Recurrent effusions Technique: The patient was brought to the interventional radiology area and placed in the sitting position on the side of a stretcher  The left chest was then examined with ultrasound and the skin was marked  The skin was then prepped, and draped in usual sterile fashion  Lidocaine was administered to the skin and a small skin incision was made  Under direct ultrasound guidance, a 5 Western Sana Yueh needle was advanced percutaneously into the pleural space  1300 mL clear ryan pleural fluid was aspirated  The right chest was then examined with ultrasound and the skin was marked  The skin was then prepped, and draped in usual sterile fashion  Lidocaine was administered to the skin and a small skin incision was made  Under direct ultrasound guidance, a 5 Western Sana Yueh needle was advanced percutaneously into the pleural space  1300 mL clear yellow pleural fluid was aspirated  The patient tolerated the procedure well and suffered no complications  Impression: Impression: 1  Successful ultrasound-guided left thoracentesis yielding 1300 mL clear ryan pleural fluid  2  Successful ultrasound-guided right thoracentesis yielding 1300 mL clear ryan pleural fluid  Workstation performed: [id number]     Cardiac EP device report    Result Date: 2/16/2022  Narrative: MDT CRT-D ([address]) CARELINK TRANSMISSION: BATTERY VOLTAGE NEARING ADDY  WILL SCHEDULE MONTHLY BATTERY CHECKS  (4 MONTHS)   98%  ALL AVAILABLE LEAD PARAMETERS WITHIN NORMAL LIMITS  1 NSVT EPISODE DETECTED 13 BEATS @ 360ms  NO THERAPIES GIVEN  VENTRICULAR SENSE EPISODES DETECTED  OPTI-VOL WITHIN NORMAL LIMITS  NORMAL DEVICE FUNCTION  ---[facility]       Review of Systems:  Review of Systems   REVIEW OF SYSTEMS:  Constitutional:  Denies fever or chills   Eyes:  Denies change in visual acuity   HENT:  Denies nasal congestion or sore throat   Respiratory:  shortness of breath   Cardiovascular:  Denies chest pain or edema   GI:  Denies abdominal pain, nausea, vomiting, bloody stools or diarrhea   :  Denies dysuria, frequency, difficulty in micturition and nocturia  Musculoskeletal:  Chronic back pain  Neurologic:  Denies headache, focal weakness or sensory changes   Endocrine:  Denies polyuria or polydipsia   Lymphatic:  Denies swollen glands   Psychiatric:  Denies depression or anxiety   Physical Exam:    /74 (BP Location: Left arm, Patient Position: Sitting, Cuff Size: Standard)   Pulse 69   Ht 6' 3" (1 905 m)   Wt 82 9 kg (182 lb 11 2 oz) Comment: patient reported  SpO2 99%   BMI 22 84 kg/m²     Physical Exam   PHYSICAL EXAM:  General:  Patient is not in acute distress   Head: Normocephalic, Atraumatic  HEENT:  Both pupils normal-size atraumatic, normocephalic, nonicteric  Neck:  JVP not raised  Trachea central  No carotid bruit  Respiratory:  Decreased breath sounds at the bases   Cardiovascular:  Irregularly irregular  GI:  Abdomen soft nontender  No organomegaly  Lymphatic:  No cervical or inguinal lymphadenopathy  Neurologic:  Patient is awake alert, oriented   Grossly nonfocal  Extremities trace to 1+ edema      Discussion/Summary:   patient has multiple medical problems and has significant cardiac issues  Patient has severe cardiomyopathy and cardiac amyloidosis with chronic systolic heart failure as well as CKD  Patient unfortunately has recurrent pleural effusion requiring bilateral thoracentesis  Will need to check with Pulmonary whether he will be a candidate for consideration for PleurX catheter placement  Patient's wife  also been instructed to contact Pulmonary regarding the same  Patient has follow-up appointment with heart failure Cardiology  Overall prognosis is guarded  Patient says the Medtronic CRT device is also nearing ADDY  Continue monthly checks  Patient indicated discussion regarding device generator change   He is not sure whether he wants to be shocked in the future  He will discuss this with his wife and further discuss with the electrophysiology at the time of generator change  Continue Vyndamax for amyloidosis and followed by Heart failure Cardiology  Continue to monitor renal functions  Follow-up in 2 months or earlier as needed  Patient is agreeable with the plan of care  Discussion regarding goals of care done with patient and family  Encouraged patient and family to discuss the same with primary care physician

## 2022-03-08 NOTE — TELEPHONE ENCOUNTER
Dr Leonila Wilkins office wants this patient seen for a consult for bilateral pleural effusion  Where can we squeeze him in? Can you please call him with a appointment time  Thank you

## 2022-03-10 NOTE — PROGRESS NOTES
Assessment/Plan:     Diagnoses and all orders for this visit:    SOB (shortness of breath)    Bilateral pleural effusion  -     Ambulatory Referral to Interventional Radiology; Future    Chronic systolic heart failure ([facility] )          Plan for follow up:  Reviewed the patient's chart with multiple thoracenteses in the past 3-4 months with significant fluid on both sides, being drained each time they are going in for the thoracentesis greater than a L from 8 side  The last chest x-ray was in December of 2021 with small bilateral trace pleural effusions I did discuss with the patient with regarding getting a chest x-ray on his way today but the patient's wife declined stating he is tired of all these procedures and does not want a chest x-ray  Also there is scheduled for IR thoracentesis tomorrow so they are going to be getting a ultrasound anyway tomorrow so I told her it is okay if he does not get a chest x-ray done today if it is very difficult for them to go in and get a chest x-ray  He is also on diuretics torsemide 20 mg twice a day which was recently increased in spite of the increase he had still required a thoracentesis on 02/17 and currently clinically with significantly diminished breath sounds on bilaterally problem filling up again  Will place an IR consult for PleurX catheter on the left, seems pleural effusion worse on the left than all the documents reviewed  No follow-ups on file  All questions are answered to the patient's satisfaction and understanding  He verbalizes understanding  He is encouraged to call with any further questions or concerns  Portions of the record may have been created with voice recognition software  Occasional wrong word or "sound a like" substitutions may have occurred due to the inherent limitations of voice recognition software  Read the chart carefully and recognize, using context, where substitutions have occurred  a    Electronically Signed by [employer] Annmarie Rodrigez MD    ______________________________________________________________________    Chief Complaint: No chief complaint on file  Patient ID: Dave Chaudhari is a [de-identified] y o  y o  male has a past medical history of Anticoagulant long-term use, Atrial fibrillation ([address] ), Cardiac defibrillator in situ, Cardiomyopathy ([address] ), Colon polyp, Congestive heart failure (CHF) ([address] ), DJD (degenerative joint disease), HL (hearing loss), Hyperlipidemia, Hypertension, Insomnia, unspecified (8/25/2021), Shortness of breath, Sick sinus syndrome ([address] ), and Spinal stenosis  3/10/2022  Patient presents today for initial visit  Lisandro Gilliland is a very pleasant 36-year-old gentleman, with chronic systolic congestive heart failure bilateral pleural effusions and diuretics on torsemide 20 mg twice daily and having repeated thoracenteses for the past few months almost 4-5 times now the last thoracentesis was about 3 weeks ago on 02/17/2022  And currently being drained bilaterally each time greater than a L being drained from it side  Has been referred to see if he can be placed for a PleurX catheter  Given multiple thoracenteses being required      Occupational/Exposure history:  Currently retired  Pets/Enviroment:  None  Travel history:  No  Review of Systems   Constitutional: Positive for fatigue  HENT: Negative  Eyes: Negative  Respiratory: Positive for cough and shortness of breath  Cardiovascular: Positive for leg swelling  Gastrointestinal: Negative  Endocrine: Negative  Genitourinary: Negative  Musculoskeletal: Negative  Allergic/Immunologic: Negative  Neurological: Negative  Hematological: Negative  Psychiatric/Behavioral: Negative  Social history: He reports that he quit smoking about 53 years ago  His smoking use included cigarettes and cigars  He has a 1 50 pack-year smoking history   He has never used smokeless tobacco  He reports previous alcohol use of about 2 0 standard drinks of alcohol per week  He reports that he does not use drugs  Past surgical history:   Past Surgical History:   Procedure Laterality Date    CARDIAC DEFIBRILLATOR PLACEMENT      [place] RETROGRADE PYELOGRAM  11/03/2020    HIP SURGERY      INSERT / REPLACE / REMOVE PACEMAKER      IR THORACENTESIS  11/24/2021    IR THORACENTESIS  1/6/2022    IR THORACENTESIS  1/28/2022    IR THORACENTESIS  2/17/2022    KNEE SURGERY Left     [facility]CTOMY      in [facility] had this    ORIF FEMUR FRACTURE Right 08/12/2021    KS COLONOSCOPY FLX DX W/COLLJ SPEC WHEN PFRMD N/A 06/05/2017    Procedure: COLONOSCOPY;  Surgeon: Horacio Owens MD;  Location: [facility];   Service: Gastroenterology    KS CYSTO/URETERO W/LITHOTRIPSY &INDWELL STENT INSRT Left 11/03/2020    Procedure: CYSTOSCOPY URETEROSCOPY WITH LITHOTRIPSY HOLMIUM LASER, RETROGRADE PYELOGRAM AND INSERTION STENT URETERAL;  Surgeon: Kody Deng MD;  [facility];  Service: Urology    KS CYSTOURETHROSCOPY,URETER CATHETER Left 09/29/2020    Procedure: CYSTOSCOPY RETROGRADE PYELOGRAM WITH INSERTION STENT URETERAL;  Surgeon: Jaymie Johnson MD;  [facility];  Service: Urology    TONSILLECTOMY      TRANSURETHRAL RESECTION OF PROSTATE       Family history:   Family History   Problem Relation Age of Onset    Coronary artery disease Mother     Hypertension Son        Immunization History   Administered Date(s) Administered    COVID-19 MODERNA VACC 0 5 ML IM 01/26/2021, 02/22/2021    INFLUENZA 09/01/2013    Influenza Split High Dose Preservative Free IM 10/04/2017    Influenza, high dose seasonal 0 7 mL 10/02/2018, 09/17/2019, 11/09/2020, 11/19/2021    Influenza, seasonal, injectable 10/07/2014, 10/05/2016    Pneumococcal Conjugate 13-Valent 01/14/2020    Pneumococcal Polysaccharide PPV23 10/12/2016    Tdap 11/16/2015, 08/09/2021    Zoster 01/01/2010    Zoster Vaccine Recombinant 07/30/2019, 10/01/2019    influenza, trivalent, adjuvanted 10/02/2018, 09/17/2019, 11/09/2020     Current Outpatient Medications   Medication Sig Dispense Refill    amiodarone 200 mg tablet 1 tab daily 90 tablet 3    apixaban (ELIQUIS) 2 5 mg Take 1 tablet (2 5 mg total) by mouth 2 (two) times a day 180 tablet 3    dicyclomine (BENTYL) 20 mg tablet Take 1 tablet (20 mg total) by mouth 4 (four) times a day (Patient taking differently: Take 20 mg by mouth 3 (three) times a day  ) 120 tablet 2    diphenhydrAMINE-APAP, sleep, (TYLENOL PM EXTRA STRENGTH PO) Take by mouth as needed       famotidine (PEPCID) 20 mg tablet Take 1 tablet (20 mg total) by mouth daily at bedtime 90 tablet 2    metoprolol succinate (TOPROL-XL) 25 mg 24 hr tablet 1/2 tab daily 15 tablet 0    pantoprazole (PROTONIX) 40 mg tablet Take 1 tablet (40 mg total) by mouth daily in the early morning 90 tablet 3    polyethylene glycol (MIRALAX) 17 g packet Take 17 g by mouth daily 30 each 0    Tafamidis (Vyndamax) 61 MG CAPS Take 61 mg by mouth daily 30 capsule 11    torsemide (DEMADEX) 20 mg tablet Take 1 tablet (20 mg total) by mouth 2 (two) times a day 60 tablet 5     No current facility-administered medications for this visit  Allergies: Other, Penicillin g benzathine, and Penicillins    Objective:  Vitals:    03/10/22 1339   BP: 102/64   Pulse: 71   Temp: (!) 96 8 °F (36 °C)   SpO2: 100%   Weight: 82 6 kg (182 lb)   Height: 6' 5" (1 956 m)   Oxygen Therapy  SpO2: 100 %    Wt Readings from Last 3 Encounters:   03/10/22 82 6 kg (182 lb)   03/07/22 82 9 kg (182 lb 11 2 oz)   02/10/22 86 4 kg (190 lb 6 4 oz)     Body mass index is 21 58 kg/m²  Physical Exam  Vitals and nursing note reviewed  Constitutional:       Appearance: He is well-developed  HENT:      Head: Normocephalic and atraumatic  Eyes:      Conjunctiva/sclera: Conjunctivae normal       Pupils: Pupils are equal, round, and reactive to light  Neck:      Thyroid: No thyromegaly  Vascular: No JVD     Cardiovascular:      Rate and Rhythm: Normal rate and regular rhythm  Heart sounds: Normal heart sounds  No murmur heard  No friction rub  No gallop  Pulmonary:      Effort: Pulmonary effort is normal  No respiratory distress  Breath sounds: No wheezing or rales  Comments: Diminished breath sounds bilaterally both bases  Chest:      Chest wall: No tenderness  Musculoskeletal:         General: No tenderness or deformity  Normal range of motion  Cervical back: Normal range of motion and neck supple  Lymphadenopathy:      Cervical: No cervical adenopathy  Skin:     General: Skin is warm and dry  Neurological:      Mental Status: He is alert and oriented to person, place, and time  Diagnostics:  I have personally reviewed pertinent films in PACS    IR thoracentesis    Result Date: 2/17/2022  Narrative: Ultrasound-guided bilateral thoracentesis Clinical History: Recurrent effusions Technique: The patient was brought to the interventional radiology area and placed in the sitting position on the side of a stretcher  The left chest was then examined with ultrasound and the skin was marked  The skin was then prepped, and draped in usual sterile fashion  Lidocaine was administered to the skin and a small skin incision was made  Under direct ultrasound guidance, a 5 Western Sana Yueh needle was advanced percutaneously into the pleural space  1300 mL clear ryan pleural fluid was aspirated  The right chest was then examined with ultrasound and the skin was marked  The skin was then prepped, and draped in usual sterile fashion  Lidocaine was administered to the skin and a small skin incision was made  Under direct ultrasound guidance, a 5 Western Sana Yueh needle was advanced percutaneously into the pleural space  1300 mL clear yellow pleural fluid was aspirated  The patient tolerated the procedure well and suffered no complications  Impression: Impression: 1   Successful ultrasound-guided left thoracentesis yielding 1300 mL clear ryan pleural fluid  2  Successful ultrasound-guided right thoracentesis yielding 1300 mL clear ryan pleural fluid  Workstation performed: [id number]     Cardiac EP device report    Result Date: 2/16/2022  Narrative: MDT CRT-D ([address] [address]) CARELINK TRANSMISSION: BATTERY VOLTAGE NEARING ADDY  WILL SCHEDULE MONTHLY BATTERY CHECKS  (4 MONTHS)   98%  ALL AVAILABLE LEAD PARAMETERS WITHIN NORMAL LIMITS  1 NSVT EPISODE DETECTED 13 BEATS @ 360ms  NO THERAPIES GIVEN  VENTRICULAR SENSE EPISODES DETECTED  OPTI-VOL WITHIN NORMAL LIMITS  NORMAL DEVICE FUNCTION  ---[facility]

## 2022-03-11 NOTE — BRIEF OP NOTE (RAD/CATH)
IR THORACENTESIS Procedure Note    PATIENT NAME: Lucy Almazan  : 1940  MRN: [id number]    Pre-op Diagnosis:   1  Bilateral pleural effusion      Post-op Diagnosis:   1   Bilateral pleural effusion        Provider:   LEWIS Norman  Assistants: Dr Keith Wynn    No qualified resident was available, Resident is only observing    Estimated Blood Loss: none  Findings: 1100 ml clear ryan pleural fluid on left  1400 ml clear dark ryan pleural fluid on right    Specimens: none    Complications:  None immediate    Anesthesia: local    LEWIS Norman     Date: 3/11/2022  Time: 1:13 PM

## 2022-03-14 NOTE — TELEPHONE ENCOUNTER
Order placed for patient  Unable to place a standing order for specific order  Will place every two weeks as requested

## 2022-03-14 NOTE — TELEPHONE ENCOUNTER
Please schedule this patient for bilateral thoracentesis in 2 weeks time  And make this every 2 weeks until further instructions

## 2022-03-14 NOTE — TELEPHONE ENCOUNTER
Call from wife re: spouse Thorancentesis  States  goes every 3 weeks to hosp for this procudure  Asking if they can go every 2 weeks instead as  begins to suffer by the 3rd week  Please advise if they can change from ever 3 weeks to 2 weeks  Also wants Dr Ralph Jennings to be aware that there has been talk about placing a catheter in the lung to drain from home but they are concerned with infection

## 2022-03-21 NOTE — TELEPHONE ENCOUNTER
Received fax from [facility]  Patient approved for free Vyndamax until 12/2022  I will call patient after 9am to make him aware  Mocavo may have already called him

## 2022-03-21 NOTE — TELEPHONE ENCOUNTER
Jonnie returned my call  Advised he has been approved for patient assistance  Provided # for [employer]  He will call to set up shipment

## 2022-03-24 NOTE — TELEPHONE ENCOUNTER
Home health Nurse suggested patient request and order for Lidocaine 5% patches  Patient has spinal stenosis and in a lot of pain      Send script to [address] [place] [address]

## 2022-03-25 NOTE — TELEPHONE ENCOUNTER
PA being initiated for the patients prescription of Lidocaine patches  Patient key is: [id number] - PA Case ID: [id number] - Rx #: [id number]   Will check status in a few days

## 2022-03-31 NOTE — H&P
Interventional Radiology  History and Physical 3/31/2022     Parth Pepe   1940   [id number]    Assessment/Plan:  52-year-old male with recurrent bilateral pleural effusions returns for bilateral thoracentesis  Problem List Items Addressed This Visit     None      Visit Diagnoses     Bilateral pleural effusion        Relevant Orders    IR thoracentesis             Subjective:     Patient ID: Parth Pepe is a [de-identified] y o  male  History of Present Illness  Patient with recurrent bilateral pleural effusions returns for bilateral thoracentesis  Review of Systems      Past Medical History:   Diagnosis Date    Anticoagulant long-term use     Atrial fibrillation ([address] )     Cardiac defibrillator in situ     Cardiomyopathy (HCC)     Colon polyp     Congestive heart failure (CHF) (HCC)     DJD (degenerative joint disease)     HL (hearing loss)     Hyperlipidemia     Hypertension     Insomnia, unspecified 8/25/2021    Shortness of breath     Sick sinus syndrome ([facility] [address] [facility] )     Spinal stenosis         Past Surgical History:   Procedure Laterality Date    CARDIAC DEFIBRILLATOR PLACEMENT      FL RETROGRADE PYELOGRAM  11/03/2020    HIP SURGERY      INSERT / REPLACE / REMOVE PACEMAKER      IR THORACENTESIS  11/24/2021    IR THORACENTESIS  1/6/2022    IR THORACENTESIS  1/28/2022    IR THORACENTESIS  2/17/2022    IR THORACENTESIS  3/11/2022    KNEE SURGERY Left     M[facility]ISCECTOMY      in [facility] had this    ORIF FEMUR FRACTURE Right 08/12/2021    AL COLONOSCOPY FLX DX W/COLLJ SPEC WHEN PFRMD N/A 06/05/2017    Procedure: COLONOSCOPY;  Surgeon: Lino Daugherty MD;  [facility];   Service: Gastroenterology    AL CYSTO/URETERO W/LITHOTRIPSY &INDWELL STENT INSRT Left 11/03/2020    Procedure: CYSTOSCOPY URETEROSCOPY WITH LITHOTRIPSY HOLMIUM LASER, RETROGRADE PYELOGRAM AND INSERTION STENT URETERAL;  Surgeon: Kary Ruiz MD;  [facility];  Service: Urology    AL CYSTOURETHROSCOPY,URETER CATHETER Left 2020    Procedure: CYSTOSCOPY RETROGRADE PYELOGRAM WITH INSERTION STENT URETERAL;  Surgeon: Rach tSern MD;  [facility];  Service: Urology    TONSILLECTOMY      TRANSURETHRAL RESECTION OF PROSTATE          Social History     Tobacco Use   Smoking Status Former Smoker    Packs/day: 0 50    Years: 3 00    Pack years: 1 50    Types: Cigarettes, Cigars    Quit date: 1968    Years since quittin 0   Smokeless Tobacco Never Used        Social History     Substance and Sexual Activity   Alcohol Use Not Currently    Alcohol/week: 2 0 standard drinks    Types: 2 Glasses of wine per week    Comment: daily        Social History     Substance and Sexual Activity   Drug Use Never        Allergies   Allergen Reactions    Other Sneezing     Seasonal; pollen; reactions; congestion    Penicillin G Benzathine Rash    Penicillins Rash       Current Outpatient Medications   Medication Sig Dispense Refill    amiodarone 200 mg tablet 1 tab daily 90 tablet 3    apixaban (ELIQUIS) 2 5 mg Take 1 tablet (2 5 mg total) by mouth 2 (two) times a day 180 tablet 3    dicyclomine (BENTYL) 20 mg tablet Take 1 tablet (20 mg total) by mouth 4 (four) times a day (Patient taking differently: Take 20 mg by mouth 3 (three) times a day  ) 120 tablet 2    diphenhydrAMINE-APAP, sleep, (TYLENOL PM EXTRA STRENGTH PO) Take by mouth as needed       famotidine (PEPCID) 20 mg tablet Take 1 tablet (20 mg total) by mouth daily at bedtime 90 tablet 2    lidocaine (Lidoderm) 5 % Apply 1 patch topically daily Remove & Discard patch within 12 hours or as directed by MD 30 patch 5    metoprolol succinate (TOPROL-XL) 25 mg 24 hr tablet 1/2 tab daily 15 tablet 0    pantoprazole (PROTONIX) 40 mg tablet Take 1 tablet (40 mg total) by mouth daily in the early morning 90 tablet 3    polyethylene glycol (MIRALAX) 17 g packet Take 17 g by mouth daily 30 each 0    Tafamidis (Vyndamax) 61 MG CAPS Take 61 mg by mouth daily 30 capsule 11    torsemide (DEMADEX) 20 mg tablet Take 1 tablet (20 mg total) by mouth 2 (two) times a day 60 tablet 5     No current facility-administered medications for this encounter  Objective: There were no vitals filed for this visit  Physical Exam  Constitutional:       Appearance: Normal appearance  Pulmonary:      Effort: Pulmonary effort is normal            No results found for: BNP   Lab Results   Component Value Date    WBC 5 97 03/15/2022    HGB 11 0 (L) 03/15/2022    HCT 34 3 (L) 03/15/2022     (H) 03/15/2022     03/15/2022     Lab Results   Component Value Date    INR 1 41 (H) 11/09/2020    PROTIME 17 5 (H) 11/09/2020     No results found for: PTT      I have personally reviewed pertinent imaging and laboratory results  Code Status: Prior  Advance Directive and Living Will: Yes    Power of :    POLST:      This text is generated with voice recognition software  There may be translation, syntax,  or grammatical errors  If you have any questions, please contact the dictating provider

## 2022-03-31 NOTE — BRIEF OP NOTE (RAD/CATH)
INTERVENTIONAL RADIOLOGY PROCEDURE NOTE    Date: 3/31/2022    Procedure: IR THORACENTESIS    Preoperative diagnosis:   1  Bilateral pleural effusion         Postoperative diagnosis: Same  Surgeon: Ashley Luna MD     Assistant: None  No qualified resident was available  Blood loss:  None    Specimens:   1  1 L serosanguinous pleural effusion removed from the left  2  1 L clear ryan pleural effusion removed from the right  Findings:  Successful bilateral thoracentesis  Complications: None immediate      Anesthesia: local

## 2022-03-31 NOTE — SEDATION DOCUMENTATION
1000ml bloody fluid on the left and 1000ml ryan fluid on the right removed   Pt unable to tolerate anything else removed at this time

## 2022-04-01 NOTE — TELEPHONE ENCOUNTER
Spoke with patient and wife - please call in Reglan 5 mg po tid half hour ac, disp   60 with 5 refills for gastroparesis - black box warning explained in full and they are willing to try it and will call me to give progress

## 2022-04-01 NOTE — TELEPHONE ENCOUNTER
Heidy Finn from [facility] [address] asking for a call back regarding this pt

## 2022-04-03 NOTE — TELEPHONE ENCOUNTER
Received [employer] message that rx for reglan from earlier failed to transmit to pharmacy  Rx re-sent exactly as prescribed  Confirmed receipt of rx to pharmacy

## 2022-04-06 NOTE — TELEPHONE ENCOUNTER
Called spoke with Pt reminded Pt to get labs done prior to 04/22/22 Appt, Pt understood and is ok with it

## 2022-04-18 NOTE — TELEPHONE ENCOUNTER
Patient of Dr Fran Monk wife Linsey Mcconnell called asking if the patient appointment for Friday 4/22/2022 nephrology follow can be a virtual appointment with Dr Fran Monk, because the patient wife stated that it is very hard to get the patient out of the house in his Luis wheelchair and then into the car and then back into his [employer]  Please call patient wife Linsey Mcconnell @ [phone] to advise her if patient appointment can be turned into a virtual appointment or not  Mildred Smith Receptionist        Please advise if patient appointment from 4/22/2022 can be a virtual appointment, so when confirming I can let the patient wife know   Mildred Smith Receptionist

## 2022-04-19 NOTE — TELEPHONE ENCOUNTER
Patient seen this am by Dr Yonatan Cabrera to discuss Asept catheter placement  Education was completed by [place], [place]  Aspet Catheter order form completed and faxed to [address] and spoke to [place] to confirm they received the completed form  Per [place], form was received  Referral was made to [facility] for after care  Contacted [facility] C[facility] and spoke to Anais Arthur in [facility] and she confirmed acceptance of referral and stated that patient was open to [facility] agency  She stated that patient is scheduled for a visit on 4/22/22  Informed her that patient will be getting bilateral Asept catheters  She transferred me to Jennifer Rawls, coordinator of [facility], to discuss this with her  Informed Jennifer Rawls that patient will be receiving bilateral Aspet catheters and I have sent the order for to [place] [address]

## 2022-04-19 NOTE — LETTER
April 19, 2022     Emperatriz Ellison MD  [address] [facility] [address]    Patient: Nathalie Arndt   YOB: 1940   Date of Visit: 4/19/2022       Dear Dr Alice Clark: Thank you for referring Martin Flores to me for evaluation  Below are my notes for this consultation  If you have questions, please do not hesitate to call me  I look forward to following your patient along with you  Sincerely,        Mike Quiroz MD        CC: DO Luz Alvarez MD Jurline Ras, MD  4/19/2022  8:50 AM  Sign when Signing Visit  Virtual Regular Visit    Verification of patient location:    Patient is located in the following state in which I hold an active license [place]      Assessment/Plan:    Problem List Items Addressed This Visit     None      Visit Diagnoses     Bilateral pleural effusion                   Reason for visit is   Chief Complaint   Patient presents with    Virtual Regular Visit        Encounter provider Mike Quiroz MD    Provider located at [address] [id number] [phone]      Recent Visits  Date Type Provider Dept   04/14/22 Telephone Mike Quiroz MD [address] [place] [address] recent visits within past 7 days and meeting all other requirements  Future Appointments  No visits were found meeting these conditions  Showing future appointments within next 150 days and meeting all other requirements       The patient was identified by name and date of birth  Nathalie Arndt was informed that this is a telemedicine visit and that the visit is being conducted through [address] [place] [address] Now and patient was informed that this is a secure, HIPAA-compliant platform  He agrees to proceed     My office door was closed  No one else was in the room  He acknowledged consent and understanding of privacy and security of the video platform   The patient has agreed to participate and understands they can discontinue the visit at any time  Patient is aware this is a billable service  Subjective  Dimas Barragan is a [de-identified] y o  male with a history of chronic systolic congestive heart failure, cardiomyopathy, A fib, s/p cardiac defibrillator and recurrent bilateral L>R pleural effusions requiring frequent thoracentesis bilaterally  This is becoming more frequent and the patient wishes to have Asept catheters placed so that he does not have to come to the hospital continually for thoracentesis  I explained the risks and benefits of the procedure to the patient and he wishes to proceed  The patient has shortness of breath secondary to accumulation of pleural fluid  The patient will have an Asept catheter placed and requires Home Health for teaching and catheter management      HPI     Past Medical History:   Diagnosis Date    Anticoagulant long-term use     Atrial fibrillation (HCC)     Cardiac defibrillator in situ     Cardiomyopathy (HCC)     Colon polyp     Congestive heart failure (CHF) (HCC)     DJD (degenerative joint disease)     HL (hearing loss)     Hyperlipidemia     Hypertension     Insomnia, unspecified 8/25/2021    Shortness of breath     Sick sinus syndrome ([address] )     Spinal stenosis        Past Surgical History:   Procedure Laterality Date    CARDIAC DEFIBRILLATOR PLACEMENT      FL RETROGRADE PYELOGRAM  11/03/2020    HIP SURGERY      INSERT / REPLACE / REMOVE PACEMAKER      IR THORACENTESIS  11/24/2021    IR THORACENTESIS  1/6/2022    IR THORACENTESIS  1/28/2022    IR THORACENTESIS  2/17/2022    IR THORACENTESIS  3/11/2022    IR THORACENTESIS  3/31/2022    IR THORACENTESIS  4/14/2022    KNEE SURGERY Left     M[facility]ISCECTOMY      in [facility] had this    ORIF FEMUR FRACTURE Right 08/12/2021    PA COLONOSCOPY FLX DX W/COLLJ SPEC WHEN PFRMD N/A 06/05/2017    Procedure: COLONOSCOPY;  Surgeon: Neville Neil MD;  [facility]; Service: Gastroenterology    LA CYSTO/URETERO W/LITHOTRIPSY &INDWELL STENT INSRT Left 11/03/2020    Procedure: CYSTOSCOPY URETEROSCOPY WITH LITHOTRIPSY HOLMIUM LASER, RETROGRADE PYELOGRAM AND INSERTION STENT URETERAL;  Surgeon: Lissett Davis MD;  [facility];  Service: Urology    LA CYSTOURETHROSCOPY,URETER CATHETER Left 09/29/2020    Procedure: CYSTOSCOPY RETROGRADE PYELOGRAM WITH INSERTION STENT URETERAL;  Surgeon: Jose Palafox MD;  [facility];  Service: Urology    TONSILLECTOMY      TRANSURETHRAL RESECTION OF PROSTATE         Current Outpatient Medications   Medication Sig Dispense Refill    amiodarone 200 mg tablet 1 tab daily 90 tablet 3    apixaban (ELIQUIS) 2 5 mg Take 1 tablet (2 5 mg total) by mouth 2 (two) times a day 180 tablet 3    dicyclomine (BENTYL) 20 mg tablet Take 1 tablet (20 mg total) by mouth 4 (four) times a day (Patient taking differently: Take 20 mg by mouth 3 (three) times a day  ) 120 tablet 2    diphenhydrAMINE-APAP, sleep, (TYLENOL PM EXTRA STRENGTH PO) Take by mouth as needed       famotidine (PEPCID) 20 mg tablet Take 1 tablet (20 mg total) by mouth daily at bedtime 90 tablet 2    lidocaine (Lidoderm) 5 % Apply 1 patch topically daily Remove & Discard patch within 12 hours or as directed by MD 30 patch 5    metoclopramide (REGLAN) 5 mg tablet Take 1 tablet (5 mg total) by mouth 3 (three) times a day 60 tablet 5    metoprolol succinate (TOPROL-XL) 25 mg 24 hr tablet 1/2 tab daily 15 tablet 0    pantoprazole (PROTONIX) 40 mg tablet Take 1 tablet (40 mg total) by mouth daily in the early morning 90 tablet 3    polyethylene glycol (MIRALAX) 17 g packet Take 17 g by mouth daily 30 each 0    Tafamidis (Vyndamax) 61 MG CAPS Take 61 mg by mouth daily 30 capsule 11    torsemide (DEMADEX) 20 mg tablet Take 1 tablet (20 mg total) by mouth 2 (two) times a day 60 tablet 5     No current facility-administered medications for this visit          Allergies Allergen Reactions    Other Sneezing     Seasonal; pollen; reactions; congestion    Penicillin G Benzathine Rash    Penicillins Rash       Review of Systems    Video Exam    There were no vitals filed for this visit  Physical Exam     I spent 40 minutes with patient today in which greater than 50% of the time was spent in counseling/coordination of care regarding shortness of breath and placement of Asept catheters bilaterally  VIRTUAL VISIT [facility] verbally agrees to participate in [facility]  Pt is aware that [facility] could be limited without vital signs or the ability to perform a full hands-on physical Camilla Nguyen understands he or the provider may request at any time to terminate the video visit and request the patient to seek care or treatment in person

## 2022-04-19 NOTE — LETTER
April 19, 2022     Patient: Kym Geiger   YOB: 1940   Date of Visit: 4/19/2022       To Whom It May Concern: It is my medical opinion that José Miguel Almanza {Work release (duty restriction):94646}  If you have any questions or concerns, please don't hesitate to call           Sincerely,        Henri Sky MD    CC: No Recipients

## 2022-04-19 NOTE — PROGRESS NOTES
Virtual Regular Visit    Verification of patient location:    Patient is located in the following state in which I hold an active license [place]      Assessment/Plan:    Problem List Items Addressed This Visit     None      Visit Diagnoses     Bilateral pleural effusion                   Reason for visit is   Chief Complaint   Patient presents with    Virtual Regular Visit        Encounter provider Paul Morales MD    Provider located at [address] [place] [address]  [phone]      Recent Visits  Date Type Provider Dept   04/14/22 Telephone Paul Morales MD [address] [place] [address] recent visits within past 7 days and meeting all other requirements  Future Appointments  No visits were found meeting these conditions  Showing future appointments within next 150 days and meeting all other requirements       The patient was identified by name and date of birth  Sanjeev Zamora was informed that this is a telemedicine visit and that the visit is being conducted through [address] [place] [address] Road Now and patient was informed that this is a secure, HIPAA-compliant platform  He agrees to proceed     My office door was closed  No one else was in the room  He acknowledged consent and understanding of privacy and security of the video platform  The patient has agreed to participate and understands they can discontinue the visit at any time  Patient is aware this is a billable service  Subjective  Sanjeev Zamora is a [de-identified] y o  male with a history of chronic systolic congestive heart failure, cardiomyopathy, A fib, s/p cardiac defibrillator and recurrent bilateral L>R pleural effusions requiring frequent thoracentesis bilaterally  This is becoming more frequent and the patient wishes to have Asept catheters placed so that he does not have to come to the hospital continually for thoracentesis    I explained the risks and benefits of the procedure to the patient and he wishes to proceed  The patient has shortness of breath secondary to accumulation of pleural fluid  The patient will have an Asept catheter placed and requires Home Health for teaching and catheter management  HPI     Past Medical History:   Diagnosis Date    Anticoagulant long-term use     Atrial fibrillation (HCC)     Cardiac defibrillator in situ     Cardiomyopathy (HCC)     Colon polyp     Congestive heart failure (CHF) (HCC)     DJD (degenerative joint disease)     HL (hearing loss)     Hyperlipidemia     Hypertension     Insomnia, unspecified 8/25/2021    Shortness of breath     Sick sinus syndrome ([address] )     Spinal stenosis        Past Surgical History:   Procedure Laterality Date    CARDIAC DEFIBRILLATOR PLACEMENT      FL RETROGRADE PYELOGRAM  11/03/2020    HIP SURGERY      INSERT / REPLACE / REMOVE PACEMAKER      IR THORACENTESIS  11/24/2021    IR THORACENTESIS  1/6/2022    IR THORACENTESIS  1/28/2022    IR THORACENTESIS  2/17/2022    IR THORACENTESIS  3/11/2022    IR THORACENTESIS  3/31/2022    IR THORACENTESIS  4/14/2022    KNEE SURGERY Left     M[facility]CTOMY      in [facility] had this    ORIF FEMUR FRACTURE Right 08/12/2021    ME COLONOSCOPY FLX DX W/COLLJ SPEC WHEN PFRMD N/A 06/05/2017    Procedure: COLONOSCOPY;  Surgeon: Robbie Gaona MD;  [facility];   Service: Gastroenterology    ME CYSTO/URETERO W/LITHOTRIPSY &INDWELL STENT INSRT Left 11/03/2020    Procedure: CYSTOSCOPY URETEROSCOPY WITH LITHOTRIPSY HOLMIUM LASER, RETROGRADE PYELOGRAM AND INSERTION STENT URETERAL;  Surgeon: Kristen Vences MD;  [facility];  Service: Urology    ME CYSTOURETHROSCOPY,URETER CATHETER Left 09/29/2020    Procedure: CYSTOSCOPY RETROGRADE PYELOGRAM WITH INSERTION STENT URETERAL;  Surgeon: Karen Jacobs MD;  [facility];  Service: Urology    TONSILLECTOMY      TRANSURETHRAL RESECTION OF PROSTATE         Current Outpatient Medications   Medication Sig Dispense Refill    amiodarone 200 mg tablet 1 tab daily 90 tablet 3    apixaban (ELIQUIS) 2 5 mg Take 1 tablet (2 5 mg total) by mouth 2 (two) times a day 180 tablet 3    dicyclomine (BENTYL) 20 mg tablet Take 1 tablet (20 mg total) by mouth 4 (four) times a day (Patient taking differently: Take 20 mg by mouth 3 (three) times a day  ) 120 tablet 2    diphenhydrAMINE-APAP, sleep, (TYLENOL PM EXTRA STRENGTH PO) Take by mouth as needed       famotidine (PEPCID) 20 mg tablet Take 1 tablet (20 mg total) by mouth daily at bedtime 90 tablet 2    lidocaine (Lidoderm) 5 % Apply 1 patch topically daily Remove & Discard patch within 12 hours or as directed by MD 30 patch 5    metoclopramide (REGLAN) 5 mg tablet Take 1 tablet (5 mg total) by mouth 3 (three) times a day 60 tablet 5    metoprolol succinate (TOPROL-XL) 25 mg 24 hr tablet 1/2 tab daily 15 tablet 0    pantoprazole (PROTONIX) 40 mg tablet Take 1 tablet (40 mg total) by mouth daily in the early morning 90 tablet 3    polyethylene glycol (MIRALAX) 17 g packet Take 17 g by mouth daily 30 each 0    Tafamidis (Vyndamax) 61 MG CAPS Take 61 mg by mouth daily 30 capsule 11    torsemide (DEMADEX) 20 mg tablet Take 1 tablet (20 mg total) by mouth 2 (two) times a day 60 tablet 5     No current facility-administered medications for this visit  Allergies   Allergen Reactions    Other Sneezing     Seasonal; pollen; reactions; congestion    Penicillin G Benzathine Rash    Penicillins Rash       Review of Systems    Video Exam    There were no vitals filed for this visit  Physical Exam     I spent 40 minutes with patient today in which greater than 50% of the time was spent in counseling/coordination of care regarding shortness of breath and placement of Asept catheters bilaterally  VIRTUAL VISIT [facility],  verbally agrees to participate in [employer]   Pt is aware that Virtual Care Services could be limited without vital signs or the ability to perform a full hands-on physical Connie [place] understands he or the provider may request at any time to terminate the video visit and request the patient to seek care or treatment in person

## 2022-04-19 NOTE — TELEPHONE ENCOUNTER
Contacted patient to discuss Asept Catheter process  Informed him that [facility] will be out to see him on 4/22/22  He stated that they were already coming out to see him and he has their contact information  Informed him that the order form for the supplies for the Asept Catheters has been sent to [facility]  Provided him with their contact information  Informed him that someone needs to contact [facility] after the catheter has been placed on 4/21/22 to have them ship the supplies  Verbal understanding received

## 2022-04-20 NOTE — PRE-PROCEDURE INSTRUCTIONS
Pt given arrival time of 1200 [place] [facility] [place]  Instructed to have nothing to eat after midnight, that he can take his morning meds with a sip of water and to have a ride home after the procedure

## 2022-04-21 NOTE — BRIEF OP NOTE (RAD/CATH)
INTERVENTIONAL RADIOLOGY PROCEDURE NOTE    Date: 4/21/2022    Procedure: BILATERAL IR PLEURAL EFFUSION LONG-TERM CATHETER PLACEMENT     Preoperative diagnosis:   1  Bilateral pleural effusion       Postoperative diagnosis: Same  Surgeon: Kameron Raymundo MD     Assistant: None  No qualified resident was available  Blood loss: minimal    Specimens: none    Findings: Successful placement of bilateral Asept pleural long-term effusion catheters  The patient has shortness of breath secondary to accumulation of pleural fluid  The patient had an Asept catheter placed and requires Home Health for teaching and catheter management  Plan:  IR clinic follow-up on Monday  Complications: None immediate      Anesthesia: conscious sedation

## 2022-04-21 NOTE — DISCHARGE INSTRUCTIONS
How to Care for Your Chest or Abdominal Catheter                                    Post ASEPT Catheter Placement                 WHAT YOU NEED TO KNOW:                 A chest catheter helps remove fluid from your chest   This may decrease symptoms such as shortness of breath, pain,  One end of the catheter sits inside your  chest  The other end sits outside of your body  Your healthcare provider will show you or your caregiver how to drain fluid through the catheter  Your supplies will be shipped to your home  DISCHARGE INSTRUCTIONS:   How often you should drain fluid:   After the initial insertion drain every other day x 3 days then for comfort of SOB) Resume your normal diet  Small sips of flat soda will help with nausea  Resume taking your medications  You may have some initial  discomfort at the insertion site  You can take your normal pain medication  Magaly Cloud and Nabeel  Patients Contact Interventional Radiology at [phone]    if any of the following occur:                              Contact your healthcare provider if:   · Your symptoms, such as pain or shortness of breath, do not get better after you drain fluid  · You have redness, pain, or pus where the catheter is located  · You have a fever  · Persistent nausea or vomiting  · You cannot drain fluid  · You see a change in the color of fluid that you drain  · You see fluid leaking from around your catheter  · You drain foul-smelling fluid or bloody fluid from your catheter  · You see a hole or tear in your catheter and need to use the emergency clip  · You have questions or concerns about your condition or care  ·   How often you should drain fluid:  Your healthcare provider will tell you how often to drain fluid  He may tell you to follow a schedule, such as draining once a day or once every other day   He may instead tell you to drain fluid when you have symptoms such as SOB pain  Before you drain fluid from your catheter:   · Wash your hands  Remove all rings  Use soap and water or an alcohol-based hand rub  This will help prevent an infection  · Gather your supplies  Get a drainage kit and place it on a firm, clean surface  Drainage kits contain either a 500 mL or 1000 mL bottle and a procedure kit  Your healthcare provider will tell you which bottle to use  · Gently remove the old bandage  Do not pull on the drain when you remove the bandage  Throw the bandage away  · Wash your hands a second time  Use soap and water or an alcohol-based hand rub  · Open the drainage kit and remove the procedure kit  Remove the bandage and place it on your clean surface  Leave the bottle with drainage tubing in the package  Remove the procedure kit and place it on your clean surface with the folded side facing up  Carefully unfold the corners of the procedure kit  Do not touch anything inside of the procedure kit  Everything inside of the procedure kit is sterile  · Prepare the drainage tubing and bottle  Uncoil the drainage tubing that is connected to the bottle  Do not touch the end of the drainage tubing  Do not remove the cover from the end of the drainage tubing  Lay the drainage tubing on the procedure kit  · Put on gloves  Both gloves fit either hand   each glove up by the folded part and put them on  Do not touch any part of the outside of the gloves with your bare hand  Do not let them touch anything that is not sterile  · Open the smaller packages inside of the procedure kit  Open the package that contains the new catheter cap  Let the cap gently fall onto the procedure kit  Tear open the alcohol pads, but do not remove them from their pouches  Squeeze or roll the clamp closed on the drainage tubing    If the clamp rolls, roll it towards the emmy    · Clean the end of the catheter  Use 1 alcohol pad from the procedure kit  Wipe around the end of the catheter in circles  Do not put anything into the end of the catheter to clean it  This could damage the catheter  How to drain fluid from your catheter:   · Connect the end of your catheter to the drainage tubing  Remove the cover from the end of the drainage tubing  Hold the end of your catheter in one hand and the drainage tubing in your other hand  Insert the drainage tubing into your catheter until you hear or feel a click  Remove the lock clip on the bottle  Twist and pull gently to remove     · Open the clamp on the drainage tubing  This will start the flow of fluid  · Drain as much fluid as directed by your doctor  To make the fluid drain slower, roll the clamp toward the bottle or gently squeeze the clamp  To make the fluid drain faster, slide the clamp away from the bottle or slowly release the clamp  It is normal to feel pain or cough when fluid is drained  To decrease pain or coughing, slow down the flow of fluid  You can also close the clamp and take a break  If your symptoms do not get better when you stop draining, do not continue to drain fluid  · Change the bottle when it is full  If you need to use a second bottle, close the clamp on the drainage tubing  This will stop fluid from draining  Hold the end of your catheter  Pull out the end of the drainage tubing from your catheter  Do not let the end of your catheter touch anything  Remove the cap on the end of the new drainage tubing  Insert the drainage tubing into your catheter  Remove the support clip on the bottle  Push down on the bottle plunger  Open the clamp on the drainage tubing  Continue to remove as much fluid as directed  · Close the clamp on the drainage tubing to stop fluid from draining  Check that the clamp is completely closed  · Pull out the end of the drainage tubing from your catheter    Do not let the end of your catheter touch anything  Clean the end of your catheter with a new alcohol pad  This will help prevent infection  After you drain fluid from your catheter:   · Clean the skin around your catheter with a new alcohol pad  Start closest to where the catheter is inserted in your skin  Move the alcohol pad in circles away from your catheter  · Place the foam pad around your catheter  The foam pad should have a small cut in it  This cut lets you fit the foam pad around your catheter  · Loop the end of the catheter  Place the looped catheter on top of the foam pad  Hold it on top of the foam pad  Place the gauze from your procedure kit over the catheter  Make sure the catheter is completely covered by the gauze  · Remove your gloves  This will make it easier to handle the clear sticky bandage  · Place the sticky bandage over the gauze  There are 3 layers you need to remove from the bandage  Remove the larger white layer from the bandage  Place the bandage over your gauze so the gauze is in the center of the bandage  Hold one corner of the bandage and remove the larger clear layer of the bandage  Remove the last white layer of the bandage  Press gently on all corners of the bandage to help it stick to your skin  Write down the amount of fluid you drained  There are measurements on the side of the bottle  Also write down the color of the fluid, the date, and the time  Bring this record with you to your follow-up visits  Other important information:   · If your catheter comes out, cover the opening in your skin with sterile gauze and a bandage  Use the sterile gauze and bandage from your drainage kit  Do not take a bath or swim in hot tubs or pools  This can cause an infection  · You can shower or take a sponge bath  Make sure your bandage covers your catheter before you bathe  The edges of the bandage should make contact with your skin on all sides   This will prevent water from getting into your drain  If your bandage gets wet, remove the bandage  Clean your skin around the catheter and replace the bandage as directed  Follow up with your healthcare provider as directed:  Write down your questions so you remember to ask them during your visits  © 2017 [employer] is for End User's use only and may not be sold, redistributed or otherwise used for commercial purposes  All illustrations and images included in CareNotes® are the copyrighted property of [employer]  or Jacob Krause  The above information is an  only  It is not intended as medical advice for individual conditions or treatments  Talk to your doctor, nurse or pharmacist before following any medical regimen to see if it is safe and effective for you

## 2022-04-21 NOTE — TELEPHONE ENCOUNTER
Called spoke with Jose Daniel Montesinos Pt wife, advised as per Dr Gardiner they can do Virtual Visit via [place] this time, I also informed Jose Daniel Montesinos as per Jesus Denton that they did Virtual Visit last time as well and Jesus Denton would prefer next visit to be in person she understood, Pt wife Jose Daniel Montesinos states Pt is going into Hospital today to get catheters placed in both his Lungs to drain out fluid, she will let us know tomorrow morning if Pt will be able to keep Virtual Appt, Jose Daniel Montesinos states Pt is not doing so well I advised Jose Daniel Montesinos I will inform Dr Gardiner of Pt situation and to please call office if Pt can not do [place] understood and is ok with  It

## 2022-04-21 NOTE — TELEPHONE ENCOUNTER
IR Clinic Follow-Up:    Patient clinical visit in 4 day  Schedule visit for the first available IR Physician: No                *If no, schedule for:   IR Physician: Elayne Mcfarlane    Type of Visit: Virtual Regular Visit - Video    Additional Details: Please schedule video visit with Shawna Waller for Monday 4/25/22    S/p asept

## 2022-04-21 NOTE — TELEPHONE ENCOUNTER
Patient virtual appointment with Dr Cleopatra Krishnan for Friday 4/22/2022 was confirmed with Kaitlynn Ch (MA),

## 2022-04-22 NOTE — TELEPHONE ENCOUNTER
Patient's spouse Shaista Lilly [phone] contacting office to advise  was just in the hospital they drained fluid from his lungs  Patient's spouse is inquiring if patient should continue to take the torsemide

## 2022-04-26 NOTE — TELEPHONE ENCOUNTER
Yes tramadol could potentially help but because it is an opiate, our new opiate policy would requite him to have a visit to fill out opioid agreement

## 2022-04-27 NOTE — PROGRESS NOTES
FOLLOW UP BILATERAL ASEPT     Patient with history of CHF requiring frequent thoracentesis  Patient had bilateral asept catheters placed 4/21/22  Spoke with patient Lisa Salas and his wife  Visiting nurse Almaz Yusuf with [facility] has been seeing patient weekly prior to asept catheter placements  She has been seeing him daily since placement and draining approximately 500cc per side daily  Patient states he begins having a slight discomfort in his chest while draining  When he stands up, he had excruciating back pain, more on superior left shoulder  He also feels fatigued  This lasts a few hours, sometimes lasting until the next day  He has tried tylenol without relief  The only thing that helps is sitting upright in his recliner and not moving  He did not have pain like his when he was having frequent thoracentesis  We do normally like patients to be drained once daily for 2 weeks, then every other day for 1 week, then as needed  Discussed with Almaz Yusuf  Agree we can decrease frequency of draining to MWF for one week then as needed  Goal of asept catheters is for comfort and to hopefully decrease trips to the hospital  Will send prescription for tramadol to [facility] pharmacy  Almaz Yusuf will text me next week if Lisa Salas continues to have pain with drainage  Jonnie updated of plan      Calixto Mejía PA-C

## 2022-05-05 NOTE — TELEPHONE ENCOUNTER
Spoke with Ariana Best and wife Magda Kilgore  Please see my note from last week for history  Ariana Best has been taking the tramadol as prescribed for left shoulder pain  He does feel some relief with the tramadol  However, he becomes very fatigued and foggy  He is afraid of falling  I advised him to stop taking the tramadol  He continues to have moderate to severe left shoulder pain after drainage of his pleural fluid  He is having his fluid drained MWF  His pain increases when he stands up  Unfortunately, I believe he is having referred pain from the catheter site  I discussed options with patient including removing left asept and replacing at different location  Also discussed removing tube all together but this would increase his need to come to the hospital for thoracentesis  He would like to discuss with his wife and will get back to us  I stressed with Ariana Best that our goal is for him to be comfortable  All questions answered  Will await response back       Cierra Encinas PA-C

## 2022-05-06 NOTE — TELEPHONE ENCOUNTER
Please see my prior telephone conversations with Nicolette Ramon and Shelly New  Spoke with wife Shelly New  She states that Nicolette Ramon would like to proceed with left asept catheter removal and replacement at a different location due to severe excruciating left shoulder pain  Patient has been in pain since pleural effusion drainage this morning  He did take a tramadol due to the pain with some relief  I informed Shelly New that I would try my best to have patient scheduled for early next week  I spoke with IR scheduling  Patient is on Eliquis but we do not hold per IR guidelines      [place] PA-C

## 2022-05-09 NOTE — PROGRESS NOTES
[facility] [address] [address]  Cardiology Follow Up    Sarah Jacinto  1940  [id number]      1  Atrial fibrillation     2  Chronic systolic heart failure (HCC)     3  Cardiac amyloidosis ([facility] )     4  Dilated cardiomyopathy ([facility] )     5  Bilateral pleural effusion         Chief Complaint   Patient presents with    Follow-up     2 month follow up       Interval History:  Patient presents for follow-up visit  Patient has severe cardiomyopathy with cardiac amyloidosis and chronic systolic heart failure as well as bilateral pleural effusion  Patient had PleurX catheter placement  Patient has been having some discomfort from the catheter in the left side  Patient scheduled for removal and placement of a new catheter tomorrow  Patient's weight is down  Patient does have chronic back pain  Patient uses Lidoderm patch  Patient denies any history of ICD discharges  No history of bleeding issues  He states that he has been compliant all his present medications      Patient Active Problem List   Diagnosis    Cardiac amyloidosis (HCC)    Chronic systolic heart failure (HCC)    Atrial fibrillation    Chronic anticoagulation    Dilation of thoracic aorta (HCC)    Hyperlipidemia    Essential hypertension    Presence of automatic cardioverter/defibrillator (AICD)    Spinal stenosis of lumbar region with neurogenic claudication    Biventricular congestive heart failure ([facility] )    Nonsustained ventricular tachycardia    Stage 3 chronic kidney disease ([facility] )    AAA (abdominal aortic aneurysm) ([facility]    Enlarged prostate    Kidney stone    Other proteinuria    Gastroparesis    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 ([facility])    Closed comminuted intertrochanteric fracture of right femur with routine healing    Ambulatory dysfunction    Constipation    Anemia    Insomnia, unspecified    Localized swelling on left hand    Hypertensive CKD (chronic kidney disease)    Secondary hyperparathyroidism of renal origin ([facility] )    Platelets decreased ([facility] )    Acute on chronic systolic CHF (congestive heart failure) ([facility])    Cellulitis of right lower extremity     Past Medical History:   Diagnosis Date    Anticoagulant long-term use     Atrial fibrillation ([facility] )     Cardiac defibrillator in situ     Cardiomyopathy ([facility] )     Colon polyp     Congestive heart failure (CHF) ([facility])     DJD (degenerative joint disease)     HL (hearing loss)     Hyperlipidemia     Hypertension     Insomnia, unspecified 2021    Shortness of breath     Sick sinus syndrome ([place] [address] )     Spinal stenosis      Social History     Socioeconomic History    Marital status: /Civil Union     Spouse name: Not on file    Number of children: Not on file    Years of education: Not on file    Highest education level: Not on file   Occupational History    Not on file   Tobacco Use    Smoking status: Former Smoker     Packs/day: 0 50     Years: 3 00     Pack years: 1 50     Types: Cigarettes, Cigars     Quit date: 1968     Years since quittin 1    Smokeless tobacco: Never Used   Vaping Use    Vaping Use: Never used   Substance and Sexual Activity    Alcohol use: Not Currently     Alcohol/week: 2 0 standard drinks     Types: 2 Glasses of wine per week     Comment: daily    Drug use: Never    Sexual activity: Not Currently   Other Topics Concern    Not on file   Social History Narrative    Active advance directive    Caffeine use     Social Determinants of Health     Financial Resource Strain: Not on file   Food Insecurity: No Food Insecurity    Worried About Running Out of Food in the Last Year: Never true    Gayle of Food in the Last Year: Never true   Transportation Needs: No Transportation Needs    Lack of Transportation (Medical):  No    Lack of Transportation (Non-Medical): No   Physical Activity: Inactive    Days of Exercise per Week: 0 days    Minutes of Exercise per Session: 0 min   Stress: Stress Concern Present    Feeling of Stress : To some extent   Social Connections: Not on file   Intimate Partner Violence: Not on file   Housing Stability: [address] Unable to Pay for Housing in the Last Year: No    Number of Places Lived in the Last Year: 1    Unstable Housing in the Last Year: No      Family History   Problem Relation Age of Onset    Coronary artery disease Mother     Hypertension Son      Past Surgical History:   Procedure Laterality Date    CARDIAC DEFIBRILLATOR PLACEMENT      FL RETROGRADE PYELOGRAM  11/03/2020    HIP SURGERY      INSERT / REPLACE / REMOVE PACEMAKER      IR PLEURAL EFFUSION LONG-TERM CATHETER PLACEMENT  4/21/2022    IR THORACENTESIS  11/24/2021    IR THORACENTESIS  1/6/2022    IR THORACENTESIS  1/28/2022    IR THORACENTESIS  2/17/2022    IR THORACENTESIS  3/11/2022    IR THORACENTESIS  3/31/2022    IR THORACENTESIS  4/14/2022    KNEE SURGERY Left     MENISCECTOMY      in [facility] had this    ORIF FEMUR FRACTURE Right 08/12/2021    GA COLONOSCOPY FLX DX W/COLLJ SPEC WHEN PFRMD N/A 06/05/2017    Procedure: COLONOSCOPY;  Surgeon: Jordyn Vázquez MD;  Location: [facility];   Service: Gastroenterology    GA CYSTO/URETERO W/LITHOTRIPSY &INDWELL STENT INSRT Left 11/03/2020    Procedure: CYSTOSCOPY URETEROSCOPY WITH LITHOTRIPSY HOLMIUM LASER, RETROGRADE PYELOGRAM AND INSERTION STENT URETERAL;  Surgeon: Patricia Stark MD;  Location: [facility];  Service: Urology    GA CYSTOURETHROSCOPY,URETER CATHETER Left 09/29/2020    Procedure: CYSTOSCOPY RETROGRADE PYELOGRAM WITH INSERTION STENT URETERAL;  Surgeon: Obdulio Altamirano MD;  Location: [facility];  Service: Urology    TONSILLECTOMY      TRANSURETHRAL RESECTION OF PROSTATE         Current Outpatient Medications:     amiodarone 200 mg tablet, TAKE 1 TABLET BY MOUTH EVERY DAY, Disp: 90 tablet, Rfl: 3    apixaban (ELIQUIS) 2 5 mg, Take 1 tablet (2 5 mg total) by mouth 2 (two) times a day, Disp: 180 tablet, Rfl: 3    dicyclomine (BENTYL) 20 mg tablet, Take 1 tablet (20 mg total) by mouth 4 (four) times a day (Patient taking differently: Take 20 mg by mouth 3 (three) times a day  ), Disp: 120 tablet, Rfl: 2    diphenhydrAMINE-APAP, sleep, (TYLENOL PM EXTRA STRENGTH PO), Take by mouth as needed , Disp: , Rfl:     famotidine (PEPCID) 20 mg tablet, Take 1 tablet (20 mg total) by mouth daily at bedtime, Disp: 90 tablet, Rfl: 2    lidocaine (Lidoderm) 5 %, Apply 1 patch topically daily Remove & Discard patch within 12 hours or as directed by MD, Disp: 30 patch, Rfl: 5    metoclopramide (REGLAN) 5 mg tablet, Take 1 tablet (5 mg total) by mouth 3 (three) times a day, Disp: 60 tablet, Rfl: 5    metoprolol succinate (TOPROL-XL) 25 mg 24 hr tablet, 1/2 tab daily, Disp: 15 tablet, Rfl: 0    pantoprazole (PROTONIX) 40 mg tablet, Take 1 tablet (40 mg total) by mouth daily in the early morning, Disp: 90 tablet, Rfl: 3    polyethylene glycol (MIRALAX) 17 g packet, Take 17 g by mouth daily, Disp: 30 each, Rfl: 0    Tafamidis (Vyndamax) 61 MG CAPS, Take 61 mg by mouth daily, Disp: 30 capsule, Rfl: 11    torsemide (DEMADEX) 20 mg tablet, Take 1 tablet (20 mg total) by mouth 2 (two) times a day, Disp: 60 tablet, Rfl: 5    traMADol (ULTRAM-ER) 200 MG 24 hr tablet, Take 1 tablet (200 mg total) by mouth daily as needed for moderate pain, Disp: 30 tablet, Rfl: 0  Allergies   Allergen Reactions    Other Sneezing     Seasonal; pollen; reactions; congestion    Penicillin G Benzathine Rash    Penicillins Rash       Labs:  Appointment on 04/15/2022   Component Date Value    WBC 04/15/2022 5 90     RBC 04/15/2022 3 20*    Hemoglobin 04/15/2022 10 6*    Hematocrit 04/15/2022 34 5*    MCV 04/15/2022 108*    MCH 04/15/2022 33 1     MCHC 04/15/2022 30 7*    RDW 04/15/2022 15 1     MPV 04/15/2022 9 9     Platelets 35/67/4290 211     nRBC 04/15/2022 0     Neutrophils Relative 04/15/2022 77*    Immat GRANS % 04/15/2022 0     Lymphocytes Relative 04/15/2022 15     Monocytes Relative 04/15/2022 7     Eosinophils Relative 04/15/2022 1     Basophils Relative 04/15/2022 0     Neutrophils Absolute 04/15/2022 4 54     Immature Grans Absolute 04/15/2022 0 02     Lymphocytes Absolute 04/15/2022 0 86     Monocytes Absolute 04/15/2022 0 43     Eosinophils Absolute 04/15/2022 0 03     Basophils Absolute 04/15/2022 0 02     Sodium 04/15/2022 140     Potassium 04/15/2022 4 6     Chloride 04/15/2022 104     CO2 04/15/2022 33*    ANION GAP 04/15/2022 3*    BUN 04/15/2022 38*    Creatinine 04/15/2022 1 55*    Glucose 04/15/2022 104     Calcium 04/15/2022 9 4     Corrected Calcium 04/15/2022 10 3*    AST 04/15/2022 20     ALT 04/15/2022 16     Alkaline Phosphatase 04/15/2022 188*    Total Protein 04/15/2022 6 7     Albumin 04/15/2022 2 9*    Total Bilirubin 04/15/2022 2 31*    eGFR 04/15/2022 41     Cholesterol 04/15/2022 97     Triglycerides 04/15/2022 34     HDL, Direct 04/15/2022 55     LDL Calculated 04/15/2022 35     Non-HDL-Chol (CHOL-HDL) 04/15/2022 42     Magnesium 04/15/2022 2 6     Uric Acid 04/15/2022 6 7     PTH 04/15/2022 87 5*    Phosphorus 04/15/2022 3 1     TSH 3RD GENERATON 04/15/2022 0 659     Vit D, 25-Hydroxy 04/15/2022 57 3    Transcribe Orders on 04/15/2022   Component Date Value    Color, UA 04/15/2022 Yellow     Clarity, UA 04/15/2022 Clear     Specific Gravity, UA 04/15/2022 1 014     pH, UA 04/15/2022 6 5     Leukocytes, UA 04/15/2022 Negative     Nitrite, UA 04/15/2022 Negative     Protein, UA 04/15/2022 Negative     Glucose, UA 04/15/2022 Negative     Ketones, UA 04/15/2022 Negative     Urobilinogen, UA 04/15/2022 4 0*    Bilirubin, UA 04/15/2022 Negative     Blood, UA 04/15/2022 Negative    Orders Only on 04/06/2022   Component Date Value    Creatinine, Ur 04/15/2022 49 3     Microalbum  ,U,Random 04/15/2022 14 3     Microalb Creat Ratio 04/15/2022 29    Lab Requisition on 03/15/2022   Component Date Value    WBC 03/15/2022 5 97     RBC 03/15/2022 3 26*    Hemoglobin 03/15/2022 11 0*    Hematocrit 03/15/2022 34 3*    MCV 03/15/2022 105*    MCH 03/15/2022 33 7     MCHC 03/15/2022 32 1     RDW 03/15/2022 14 6     MPV 03/15/2022 10 0     Platelets 36/24/7700 198     nRBC 03/15/2022 0     Neutrophils Relative 03/15/2022 78*    Immat GRANS % 03/15/2022 0     Lymphocytes Relative 03/15/2022 15     Monocytes Relative 03/15/2022 6     Eosinophils Relative 03/15/2022 1     Basophils Relative 03/15/2022 0     Neutrophils Absolute 03/15/2022 4 64     Immature Grans Absolute 03/15/2022 0 01     Lymphocytes Absolute 03/15/2022 0 89     Monocytes Absolute 03/15/2022 0 37     Eosinophils Absolute 03/15/2022 0 04     Basophils Absolute 03/15/2022 0 02     Sodium 03/15/2022 142     Potassium 03/15/2022 3 6     Chloride 03/15/2022 103     CO2 03/15/2022 33*    ANION GAP 03/15/2022 6     BUN 03/15/2022 33*    Creatinine 03/15/2022 1 33*    Glucose 03/15/2022 109     Calcium 03/15/2022 9 2     Corrected Calcium 03/15/2022 10 0     AST 03/15/2022 19     ALT 03/15/2022 17     Alkaline Phosphatase 03/15/2022 196*    Total Protein 03/15/2022 7 1     Albumin 03/15/2022 3 0*    Total Bilirubin 03/15/2022 2 29*    eGFR 03/15/2022 50     Cholesterol 03/15/2022 113     Triglycerides 03/15/2022 35     HDL, Direct 03/15/2022 58     LDL Calculated 03/15/2022 48     Non-HDL-Chol (CHOL-HDL) 03/15/2022 55     Magnesium 03/15/2022 2 3     TSH 3RD GENERATON 03/15/2022 0 766     Vit D, 25-Hydroxy 03/15/2022 51 6     Creatinine, Ur 03/15/2022 46 7     Microalbum  ,U,Random 03/15/2022 12 7     Microalb Creat Ratio 03/15/2022 27     Color, UA 03/15/2022 Yellow     Clarity, UA 03/15/2022 Clear     Specific Gravity, UA 03/15/2022 1 010     pH, UA 03/15/2022 6 5     Leukocytes, UA 03/15/2022 Negative     Nitrite, UA 03/15/2022 Negative     Protein, UA 03/15/2022 Negative     Glucose, UA 03/15/2022 Negative     Ketones, UA 03/15/2022 Negative     Urobilinogen, UA 03/15/2022 2 0*    Bilirubin, UA 03/15/2022 Negative     Blood, UA 03/15/2022 Trace-Intact*    Phosphorus 03/15/2022 3 2     Uric Acid 03/15/2022 6 2     PTH 03/15/2022 87 7*    RBC, UA 03/15/2022 0-1     WBC, UA 03/15/2022 1-2     Epithelial Cells 03/15/2022 Occasional     Bacteria, UA 03/15/2022 None Seen     Ca Oxalate Odalys, UA 03/15/2022 Moderate*     Imaging: IR thoracentesis    Result Date: 4/14/2022  Narrative: Ultrasound-guided bilateral thoracentesis Clinical History: Recurrent effusions Technique: The patient was brought to the interventional radiology area and placed in the sitting position on the side of a stretcher  The left chest was then examined with ultrasound and the skin was marked  The skin was then prepped, and draped in usual sterile fashion  Lidocaine was administered to the skin and a small skin incision was made  Under direct ultrasound guidance, a 5 Western Sana Yueh needle was advanced percutaneously into the pleural space  1350 mL clear ryan pleural fluid was aspirated  The right chest was then examined with ultrasound and the skin was marked  The skin was then prepped, and draped in usual sterile fashion  Lidocaine was administered to the skin and a small skin incision was made  Under direct ultrasound guidance, a 5 Western Sana Yueh needle was advanced percutaneously into the pleural space  1450 mL clear yellow pleural fluid was aspirated  The patient tolerated the procedure well and suffered no complications  Impression: Impression: 1  Successful ultrasound-guided left thoracentesis yielding 1350 mL of serosanguineous pleural fluid  2  Successful ultrasound-guided right thoracentesis yielding 1450 mL clear yellow pleural fluid   Workstation performed: [id number]     IR pleural effusion long-term catheter placement    Result Date: 4/21/2022  Narrative: Bilateral Tunneled Pleural Effusion Long-term catheter placement with bilateral thoracentesis  Clinical History:  Recurrent pleural effusion  J90: Pleural effusion, not elsewhere classified Conscious sedation time: 1hr Number of Images: 5 right lumbar and Radiation Dose: 13 44 mGy Technique: The patient was brought to the interventional radiology suite and identified verbally and by wristband  The patient was placed supine and after a brief ultrasound examination was performed of the bilateral chest, the skin was prepped and draped in the usual sterile fashion  All elements of maximal sterile barrier technique were followed (cap, mask, sterile gown, sterile gloves, large sterile sheet, hand hygiene, and 2% chlorhexidine for cutaneous antisepsis)  Lidocaine was administered to the skin, and a small skin incision was made  Under direct ultrasound guidance, utilizing sterile ultrasound technique with sterile gel and a sterile probe cover, a 19-gauge needle was advanced into the bilateral pleural fluid  An 0 035 wire was advanced, over which a 16 Cameroonian peel-away sheath was inserted  The Asept catheter was then tunneled under the skin of the bilateral lateral chest, and advanced through the peel-away, and the peel-away removed  The external portion of the catheter was sutured to the skin at the tunnel insertion site  The counter incision was closed with 4-0 monocryl and exofin skin adhesifve  A sterile dressing was applied  A thoracentesis was then performed yielding 1000 cc's of left and 1000 cc of right pleural fluid  The patient tolerated the procedure well and suffered no complications  Impression: Impression: 1  Successful placement of bilateral 15 5 Cameroonian tunneled Asept catheters into the bilateral pleural cavities   2  Successful thorocentesis of 1000 cc's of right and 1000 cc of left pleural fluid  3  Virtual IR clinic visit in 4 days  Workstation performed: [id number]       Review of Systems:  Review of Systems   REVIEW OF SYSTEMS:  Constitutional:  Denies fever or chills   Eyes:  Denies change in visual acuity   HENT:  Denies nasal congestion or sore throat   Respiratory:   shortness of breath   Cardiovascular:  Denies chest pain or edema   GI:  Denies abdominal pain, nausea, vomiting, bloody stools or diarrhea   :  Denies dysuria, frequency, difficulty in micturition and nocturia  Musculoskeletal:  Chronic back pain  Neurologic:  Denies headache, focal weakness or sensory changes   Endocrine:  Denies polyuria or polydipsia   Lymphatic:  Denies swollen glands   Psychiatric:  Denies depression or anxiety     Physical Exam:    /70 (BP Location: Left arm, Patient Position: Sitting, Cuff Size: Standard)   Pulse 71   Wt 81 6 kg (180 lb) Comment: patient reported  SpO2 98%   BMI 21 34 kg/m²     Physical Exam   PHYSICAL EXAM:  General:  Patient is not in acute distress   Head: Normocephalic, Atraumatic  HEENT:  Both pupils normal-size atraumatic, normocephalic, nonicteric  Neck:  JVP not raised  Trachea central  No carotid bruit  Respiratory:  Decreased breath sounds bilateral with absent breath sounds in the bases  PleurX catheter in place both sides  Cardiovascular:  Irregularly irregular  GI:  Abdomen soft nontender  No organomegaly  Lymphatic:  No cervical or inguinal lymphadenopathy  Neurologic:  Patient is awake alert, oriented   Grossly nonfocal  Extremities no edema    Discussion/Summary:  Patient with multiple medical problems who seems to be doing reasonably well from cardiac standpoint  Previous studies reviewed with patient  Medications reviewed and possible side effects discussed  concepts of cardiovascular disease , signs and symptoms of heart disease  Dietary and risk factor modification reinforced  All questions answered  Safety measures reviewed   Patient advised to report any problems prompting medical attention  Will continue optimize medical therapy for his extensive and complex cardiac condition  Patient is scheduled for removal left-sided PleurX catheter and placement of new 1 because of discomfort he is experiencing  Patient had ICD interrogation today  All parameters are normal   Device is close to ADDY  Continue monthly checks  Patient has home monitoring is not working at this time  Risks and benefits  and alternativesof anticoagulation to prevent thromboembolic risk from atrial fibrillation discussed at length  Patient to report any bleeding issues  Overall prognosis extremely guarded in this patient with severe cardiomyopathy and chronic systolic heart failure  Goals of care discussed with patient and family  Patient has advanced living will and wife is aware

## 2022-05-10 NOTE — PROGRESS NOTES
MDT CRT-D ([facility] 70)   DEVICE INTERROGATED IN THE [facility] OFFICE BY [employer] REP:  NO TESTING   BATTERY VOLTAGE NEARING ADDY (2 MONTHS, <1 - 4 MONTHS)   WILL CONTINUE MONTHLY BATTERY CHECKS   BP 97 9% VSRP 2 1%    ALL AVAILABLE LEAD PARAMETERS WITHIN NORMAL LIMITS   1 VT-NS EPISODE WITH  BPM (NOT PRINTED)   NO PROGRAMMING CHANGES MADE TO DEVICE PARAMETERS   OPTI-VOL WITHIN NORMAL LIMITS   NORMAL DEVICE FUNCTION   RG

## 2022-05-10 NOTE — BRIEF OP NOTE (RAD/CATH)
INTERVENTIONAL RADIOLOGY PROCEDURE NOTE    Date: 5/10/2022    Procedure: IR PLEURAL EFFUSION LONG-TERM CATHETER CHECK/CHANGE/REPOSITION    Preoperative diagnosis:   1  Bilateral pleural effusion         Postoperative diagnosis: Same  Surgeon: Maria Del Rosario Doll MD     Assistant: None  No qualified resident was available  Blood loss:  Minimal    Specimens:  None     Findings:   Successful removal of a left tunneled pleural catheter  Complications: None immediate      Anesthesia: conscious sedation

## 2022-05-12 NOTE — TELEPHONE ENCOUNTER
Patient is on a a  Recall list  Spoke with patient to see if he was ready to schedule a f/u appointment with Dr Claudean Bears  Patient stated he will call back   Removed patient from recall list

## 2022-05-13 NOTE — TELEPHONE ENCOUNTER
Spoke with patient's wife Aundrea Callejas  Patient had left Asept catheter removed earlier this week  Patient continued to have left shoulder pain since removal   Yesterday was the 1st day that he was feeling slightly better  At this current time, wife believes patient would not like to have left aseptic catheter replaced  She believes  would like to have thoracentesis in approximately 2 weeks  She will speak to [facility] for final decision  Regarding bandage from left Asept removal, there is no discharge on bandage  Informed Julia Mcfadden that she could remove gauze pad and replace with regular Band-Aid  Spoke with IR scheduling to schedule thoracentesis in approximately 2 weeks  IR scheduling will call patient on Monday morning to discuss thoracentesis timing  Discussed if patient were to become severely short of breath, patient should report to ED      Mc Luna PA-C

## 2022-05-17 NOTE — TELEPHONE ENCOUNTER
Spoke with the pt he understood he would be getting a call from EP  Pt will call us back if he doesn't receive a call by the end of the week

## 2022-05-17 NOTE — TELEPHONE ENCOUNTER
----- Message from Florinda Ruiz MD sent at 5/16/2022  9:01 PM EDT -----  Regarding: FW: ADDY/dependent  Please let the patient's wife know that patient should be getting a call in the next few days regarding scheduling an appointment with electrophysiology regarding ICD generator change  To let us know if she has not heard anything by the end of the week   ----- Message -----  From: Ric[address] 121: 5/16/2022  11:25 AM EDT  To: Florinda Ruiz MD, Verena Cornelius, #  Subject: ADDY/dependent                                    Pt started beeping most likely due to battery; we have him noted as dependent on device  He currently does not have medtronic monitor due to back order  Wife states he is stable & feeling okay  Needs appt for H&P along w/device chk to turn off beeping

## 2022-05-19 NOTE — TELEPHONE ENCOUNTER
----- Message from Nahomy Boyd sent at 5/18/2022  8:15 AM EDT -----  Regarding: FW: ADDY/dependent  This patient needs an appointment for an H&P  ----- Message -----  From: Isela Alexander  Sent: 5/16/2022  11:25 AM EDT  To: Erika Nicole MD, Ed Gonzalez, #  Subject: ADDY/dependent                                    Pt started beeping most likely due to battery; we have him noted as dependent on device. He currently does not have medtronic monitor due to back order. Wife states he is stable & feeling okay.     Needs appt for H&P along w/device chk to turn off beeping.

## 2022-05-20 NOTE — SEDATION DOCUMENTATION
dsd applied to left pleural catheter insertion site - procedure ended and pt returned to apu and report to RN      Rt pleural catheter drained and re dressed prior to discharge

## 2022-05-20 NOTE — BRIEF OP NOTE (RAD/CATH)
INTERVENTIONAL RADIOLOGY PROCEDURE NOTE    Date: 5/20/2022    Procedure: IR PLEURAL EFFUSION LONG-TERM CATHETER PLACEMENT    Preoperative diagnosis:   1  Chronic systolic heart failure (HCC)         Postoperative diagnosis: Same  Surgeon: Marty Franklin MD     Assistant: None  No qualified resident was available  Blood loss:  Minimal    Specimens:  None     Findings:  Successful left tunneled pleural catheter placement  Complications: None immediate      Anesthesia: conscious sedation

## 2022-05-21 NOTE — CASE COMMUNICATION
Received tc from patients wife regarding patients nutritional status  She is concerned as patient without an appetite and has been losing weight on a weekly basis  She reports baseline weight was around 230lbs and he currently weights 163lbs  Patient and wife interested in having a nutritionist get involved in care

## 2022-05-24 NOTE — H&P (VIEW-ONLY)
EPS Consultation/New Patient Virtual Evaluation - Dimas Barragan 81 y o  male MRN: [id number]    The patient was identified by name and date of birth  He was informed that this is a telemedicine visit and that the visit is being conducted through Telephone  My office door was closed  No one else was in the room  He acknowledged consent and understanding of privacy and security of the video platform  The patient has agreed to participate and understands they can discontinue the visit at any time  Patient is aware this is a billable service  It was my intent to perform this visit via video technology but the patient was not able to do a video connection so the visit was completed via audio telephone only  Referring:    CC/HPI:   It was a pleasure to see Dimas Barragan in our arrhythmia clinic at [place] [address]  As you know he is a 81 y o  man with hypertension, hyperlipidemia, chronic systolic congestive heart failure status post biventricular ICD, atrial fibrillation, arthritis, who is evaluated for generator change  On recent interrogation patient was noted to have device reaching ADDY  On the 17th of May he had beeping sound which is likely attributed to ADDY status  Patient recently had PleurX catheter implanted due to chronic pleural effusion from congestive heart failure despite taking diuretics torsemide 20 mg twice daily  He has had multiple thoracentesis as well  He denies being on dialysis  He currently denies any lower extremity swelling, chest pain  He had generator change in 2015 and did not have any complication with the procedure  He does report having difficulty laying flat due to his arthritis in the spine  He denies receiving any ICD therapy from the device      Past Medical History:  Past Medical History:   Diagnosis Date    Anticoagulant long-term use     Atrial fibrillation ([address] )     Cardiac defibrillator in situ     Cardiomyopathy ([facility] )  Chronic kidney disease     Colon polyp     Congestive heart failure (CHF) (HCC)     DJD (degenerative joint disease)     Gastroparesis     HL (hearing loss)     Hyperlipidemia     Hypertension     Insomnia, unspecified 8/25/2021    Shortness of breath     Sick sinus syndrome (HCC)     Spinal stenosis        Medications:      Current Outpatient Medications:     amiodarone 200 mg tablet, TAKE 1 TABLET BY MOUTH EVERY DAY, Disp: 90 tablet, Rfl: 3    apixaban (ELIQUIS) 2 5 mg, Take 1 tablet (2 5 mg total) by mouth 2 (two) times a day, Disp: 180 tablet, Rfl: 3    dicyclomine (BENTYL) 20 mg tablet, Take 1 tablet (20 mg total) by mouth 4 (four) times a day (Patient taking differently: Take 20 mg by mouth in the morning and 20 mg in the evening and 20 mg before bedtime ), Disp: 120 tablet, Rfl: 2    diphenhydrAMINE-APAP, sleep, (TYLENOL PM EXTRA STRENGTH PO), Take by mouth as needed , Disp: , Rfl:     famotidine (PEPCID) 20 mg tablet, Take 1 tablet (20 mg total) by mouth daily at bedtime, Disp: 90 tablet, Rfl: 2    lidocaine (Lidoderm) 5 %, Apply 1 patch topically daily Remove & Discard patch within 12 hours or as directed by MD, Disp: 30 patch, Rfl: 5    metoclopramide (REGLAN) 5 mg tablet, Take 1 tablet (5 mg total) by mouth 3 (three) times a day, Disp: 60 tablet, Rfl: 5    metoprolol succinate (TOPROL-XL) 25 mg 24 hr tablet, 1/2 tab daily, Disp: 15 tablet, Rfl: 0    pantoprazole (PROTONIX) 40 mg tablet, Take 1 tablet (40 mg total) by mouth daily in the early morning, Disp: 90 tablet, Rfl: 3    polyethylene glycol (MIRALAX) 17 g packet, Take 17 g by mouth daily, Disp: 30 each, Rfl: 0    Tafamidis (Vyndamax) 61 MG CAPS, Take 61 mg by mouth daily, Disp: 30 capsule, Rfl: 11    torsemide (DEMADEX) 20 mg tablet, Take 1 tablet (20 mg total) by mouth 2 (two) times a day, Disp: 60 tablet, Rfl: 5    traMADol (ULTRAM-ER) 200 MG 24 hr tablet, Take 1 tablet (200 mg total) by mouth daily as needed for moderate pain, Disp: 30 tablet, Rfl: 0     Family History   Problem Relation Age of Onset    Coronary artery disease Mother     Hypertension Son      Social History     Socioeconomic History    Marital status: /Civil Union     Spouse name: Not on file    Number of children: Not on file    Years of education: Not on file    Highest education level: Not on file   Occupational History    Not on file   Tobacco Use    Smoking status: Former Smoker     Packs/day: 0 50     Years: 3 00     Pack years: 1 50     Types: Cigarettes, Cigars     Quit date: 1968     Years since quittin 1    Smokeless tobacco: Never Used   Vaping Use    Vaping Use: Never used   Substance and Sexual Activity    Alcohol use: Not Currently     Alcohol/week: 2 0 standard drinks     Types: 2 Glasses of wine per week     Comment: daily    Drug use: Never    Sexual activity: Not Currently   Other Topics Concern    Not on file   Social History Narrative    Active advance directive    Caffeine use     Social Determinants of Health     Financial Resource Strain: Not on file   Food Insecurity: No Food Insecurity    Worried About Running Out of Food in the Last Year: Never true    Gayle of Food in the Last Year: Never true   Transportation Needs: No Transportation Needs    Lack of Transportation (Medical): No    Lack of Transportation (Non-Medical): No   Physical Activity: Inactive    Days of Exercise per Week: 0 days    Minutes of Exercise per Session: 0 min   Stress: Stress Concern Present    Feeling of Stress :  To some extent   Social Connections: Not on file   Intimate Partner Violence: Not on file   Housing Stability: [address] Unable to Pay for Housing in the Last Year: No    Number of [place] in the Last Year: 1    Unstable Housing in the Last Year: No     Social History     Tobacco Use   Smoking Status Former Smoker    Packs/day: 0 50    Years: 3 00    Pack years: 1 50    Types: Cigarettes, Cigars  Quit date: 1968    Years since quittin 1   Smokeless Tobacco Never Used     Social History     Substance and Sexual Activity   Alcohol Use Not Currently    Alcohol/week: 2 0 standard drinks    Types: 2 Glasses of wine per week    Comment: daily       Review of Systems   Constitutional: Positive for malaise/fatigue  Negative for chills and fever  HENT: Negative  Eyes: Negative for blurred vision and double vision  Cardiovascular: Positive for chest pain (At the site of chest tube) and dyspnea on exertion  Negative for leg swelling, near-syncope, orthopnea, palpitations, paroxysmal nocturnal dyspnea and syncope  Respiratory: Negative for cough and sputum production  Endocrine: Negative  Skin: Negative  Negative for rash  Musculoskeletal: Positive for arthritis  Negative for joint pain  Gastrointestinal: Negative for abdominal pain, nausea and vomiting  Genitourinary: Negative  Neurological: Positive for dizziness  Psychiatric/Behavioral: Negative  The patient is not nervous/anxious  Objective:     Vitals: Blood pressure 104/64, pulse 70, height 6' 4" (1 93 m), weight 73 9 kg (163 lb)  , Body mass index is 19 84 kg/m²  ,        Physical Exam:  Unable to examine as visit was over the phone    Labs & Results:  Below is the patient's most recent value for Albumin, ALT, AST, BUN, Calcium, Chloride, Cholesterol, CO2, Creatinine, GFR, Glucose, HDL, Hematocrit, Hemoglobin, Hemoglobin A1C, LDL, Magnesium, Phosphorus, Platelets, Potassium, PSA, Sodium, Triglycerides, and WBC     Lab Results   Component Value Date    ALT 16 2022    AST 15 2022    BUN 26 (H) 2022    CALCIUM 8 9 2022    CL 97 (L) 2022    CHOL 162 2017    CO2 32 2022    CREATININE 1 69 (H) 2022    HDL 63 2022    HCT 34 2 (L) 2022    HGB 10 8 (L) 2022    MG 2 1 2022    PHOS 3 1 04/15/2022     2022    K 4 6 2022    PSA 5 7 (H) 10/05/2020     2017    TRIG 37 2022    WBC 6 02 2022     Note: for a comprehensive list of the patient's lab results, access the Results Review activity  Cardiac testing:         Echocardiograms:  Results for orders placed during the hospital encounter of 20    Echo complete with contrast if indicated    Narrative  [address] [place] [id number]  [phone]    Transthoracic Echocardiogram  2D, M-mode, Doppler, and Color Doppler    Study date:  27-Sep-2020    Patient: Cassie Westbrook  MR number: [id number]  Account number: [de-identified]  : 1940  Age: 79 years  Gender: Male  Status: Inpatient  Location: Bedside  Height: 73 in  Weight: 228 6 lb  BP: 96/ 57 mmHg    Indications: Shortness of Breath    Diagnoses: R06 02 - Shortness of breath    Sonographer:  [facility]  Interpreting Physician:  Michelle Bernal MD  Primary Physician:  Mega Rosa DO  Referring Physician:  Alberta Cervantes PA-C  Group:  [facility]    SUMMARY    LEFT VENTRICLE:  Systolic function was moderately to markedly reduced  LVEF around 35%  Wall thickness was moderately increased  There was severe assymetrical hypertrophy of the septum  Apical hypertrophyic cannot be ruled out completely  Features were consistent with a pseudonormal left ventricular filling pattern, with concomitant abnormal relaxation and increased filling pressure (grade 2 diastolic dysfunction)  RIGHT VENTRICLE:  The ventricle was mildly dilated  Systolic function was mildly reduced  LEFT ATRIUM:  The atrium was moderately dilated  RIGHT ATRIUM:  The atrium was moderately dilated  MITRAL VALVE:  There was moderate annular calcification  There was moderate regurgitation  AORTIC VALVE:  There was trace regurgitation  TRICUSPID VALVE:  There was mild regurgitation  PULMONIC VALVE:  There was trace regurgitation      AORTA:  The root exhibited mild dilatation  4 2 cm(pt known to have aortic root dilation on previous echo)    HISTORY: PRIOR HISTORY: Afib, CM, ICD, CHF, Hyperlipidemia, SOB, SSS    PROCEDURE: The procedure was performed at the bedside  This was a routine study  The transthoracic approach was used  The study included complete 2D imaging, M-mode, complete spectral Doppler, and color Doppler  The heart rate was 70 bpm,  at the start of the study  Images were obtained from the parasternal, apical, subcostal, and suprasternal notch acoustic windows  Image quality was adequate  LEFT VENTRICLE: Size was normal  Systolic function was moderately to markedly reduced  LVEF around 35% There was hypokinesis with regional variations  Wall thickness was moderately increased  There was severe assymetrical hypertrophy of  the septum  Apical hypertrophyic cannot be ruled out completely DOPPLER: Features were consistent with a pseudonormal left ventricular filling pattern, with concomitant abnormal relaxation and increased filling pressure (grade 2 diastolic  dysfunction)  RIGHT VENTRICLE: The ventricle was mildly dilated  Systolic function was mildly reduced  LEFT ATRIUM: The atrium was moderately dilated  RIGHT ATRIUM: The atrium was moderately dilated  MITRAL VALVE: There was moderate annular calcification  DOPPLER: There was no evidence for stenosis  There was moderate regurgitation  AORTIC VALVE: The valve was trileaflet  Leaflets exhibited mild calcification  DOPPLER: There was no evidence for stenosis  There was trace regurgitation  TRICUSPID VALVE: The valve structure was normal  There was normal leaflet separation  DOPPLER: The transtricuspid velocity was within the normal range  There was no evidence for stenosis  There was mild regurgitation  Pulmonary artery  systolic pressure was mildly increased  PULMONIC VALVE: Not well visualized  DOPPLER: There was trace regurgitation      PERICARDIUM: There was no pericardial effusion  AORTA: The root exhibited mild dilatation  4 2 cm(pt known to have aortic root dilation on previous echo)    SYSTEMIC VEINS: IVC: The inferior vena cava was not well visualized  SYSTEM MEASUREMENT TABLES    2D mode  AoR Diam (2D): 42 mm  AoR Diam; Mean (2D): 42 mm  Asc Aorta Diam (2D): 37 mm  Asc Aorta Diam; Mean (2D): 37 mm  LA Dimension (2D): 44 mm  LA Dimension; Mean (2D): 44 mm  LA/Ao (2D): 1 1  EDV (2D-Cubed): 994199 mm3  EDV (BP): 998970 mm3  EF (2D-Cubed): 32 9 %  ESV (2D-Cubed): 914582 mm3  ESV (BP): 92802 mm3  FS (2D-Cubed): 12 5 %  FS (2D-Teich): 12 5 %  IVS/LVPW (2D): 1 4  IVSd (2D): 20 8 mm  IVSd; Mean chosen (2D): 20 8 mm  LVIDd (2D): 53 4 mm  LVIDd; Mean (2D): 53 4 mm  LVIDs (2D): 46 7 mm  LVIDs; Mean (2D): 46 7 mm  LVOT Area (2D): 314 mm2  LVPWd (2D): 15 3 mm  LVPWd; Mean (2D): 15 3 mm  Left Ventricular Ejection Fraction; Method of Disks, Biplane; 2D mode;: 40 8 %  Left Ventricular Ejection Fraction; Teichholz; 2D mode;: 26 8 %  Left Ventricular End Diastolic Volume; Teichholz; 2D mode;: 058856 mm3  Left Ventricular End Systolic Volume; Teichholz; 2D mode;: 080853 mm3  SI (2D-Cubed): 21 9 ml/m2  SI (BP): 29 7 ml/m2  SV (2D-Cubed): 42593 mm3  SV (BP): 44039 mm3  Stroke Index; Teichholz; 2D mode;: 16 2 ml/m2  Stroke Volume; Teichholz; 2D mode;: 92113 mm3    Apical four chamber  Left Atrium MOD Diam; Most recent value chosen; End Systole; Apical four chamber;: 37 4 mm  Left Atrium MOD Diam; Most recent value chosen; End Systole; Apical four chamber;: 35 mm  Left Atrium MOD Diam; Most recent value chosen; End Systole; Apical four chamber;: 29 4 mm  Left Atrium MOD Diam; Most recent value chosen; End Systole; Apical four chamber;: 17 5 mm  Left Atrium MOD Diam; Most recent value chosen; End Systole; Apical four chamber;: 27 6 mm  Left Atrium MOD Diam; Most recent value chosen; End Systole; Apical four chamber;: 36 mm  Left Atrium MOD Diam; Most recent value chosen;  End Systole; Apical four chamber;: 41 3 mm  Left Atrium MOD Diam; Most recent value chosen; End Systole; Apical four chamber;: 45 5 mm  Left Atrium MOD Diam; Most recent value chosen; End Systole; Apical four chamber;: 48 7 mm  Left Atrium MOD Diam; Most recent value chosen; End Systole; Apical four chamber;: 51 4 mm  Left Atrium MOD Diam; Most recent value chosen; End Systole; Apical four chamber;: 52 2 mm  Left Atrium MOD Diam; Most recent value chosen; End Systole; Apical four chamber;: 52 5 mm  Left Atrium MOD Diam; Most recent value chosen; End Systole; Apical four chamber;: 53 2 mm  Left Atrium MOD Diam; Most recent value chosen; End Systole; Apical four chamber;: 52 5 mm  Left Atrium MOD Diam; Most recent value chosen; End Systole; Apical four chamber;: 50 8 mm  Left Atrium MOD Diam; Most recent value chosen; End Systole; Apical four chamber;: 49 7 mm  Left Atrium MOD Diam; Most recent value chosen; End Systole; Apical four chamber;: 47 3 mm  Left Atrium MOD Diam; Most recent value chosen; End Systole; Apical four chamber;: 45 8 mm  Left Atrium MOD Diam; Most recent value chosen; End Systole; Apical four chamber;: 43 1 mm  Left Atrium MOD Diam; Most recent value chosen; End Systole; Apical four chamber;: 39 9 mm  Left Atrium Systolic Area; Most recent value chosen; Method of Disks, Single Plane; 2D mode; Apical four chamber;: 3120 mm2  Left Atrium Systolic Volume Index; Method of Disks, Single Plane; 2D mode; Apical four chamber;: 47 8 ml/m2  Left Atrium Systolic Volume; Most recent value chosen; Method of Disks, Single Plane; 2D mode; Apical four chamber;: 463525 mm3  Left Atrium systolic major axis; Most recent value chosen; Method of Disks, Single Plane; 2D mode; Apical four chamber;: 72 1 mm  EF (A4C): 37 5 %  LV MOD Diam; Recent value; End Diastole (A4C): 62 8 mm  LV MOD Diam; Recent value; End Diastole (A4C): 64 3 mm  LV MOD Diam; Recent value; End Diastole (A4C): 32 5 mm  LV MOD Diam; Recent value;  End Diastole (A4C): 20 2 mm  LV MOD Diam; Recent value; End Diastole (A4C): 29 3 mm  LV MOD Diam; Recent value; End Diastole (A4C): 35 3 mm  LV MOD Diam; Recent value; End Diastole (A4C): 39 2 mm  LV MOD Diam; Recent value; End Diastole (A4C): 42 4 mm  LV MOD Diam; Recent value; End Diastole (A4C): 45 2 mm  LV MOD Diam; Recent value; End Diastole (A4C): 46 6 mm  LV MOD Diam; Recent value; End Diastole (A4C): 46 6 mm  LV MOD Diam; Recent value; End Diastole (A4C): 48 mm  LV MOD Diam; Recent value; End Diastole (A4C): 50 1 mm  LV MOD Diam; Recent value; End Diastole (A4C): 52 6 mm  LV MOD Diam; Recent value; End Diastole (A4C): 56 1 mm  LV MOD Diam; Recent value; End Diastole (A4C): 58 9 mm  LV MOD Diam; Recent value; End Diastole (A4C): 61 4 mm  LV MOD Diam; Recent value; End Diastole (A4C): 63 2 mm  LV MOD Diam; Recent value; End Diastole (A4C): 63 9 mm  LV MOD Diam; Recent value; End Diastole (A4C): 49 8 mm  LV MOD Diam; Recent value; End Systole (A4C): 29 mm  LV MOD Diam; Recent value; End Systole (A4C): 54 6 mm  LV MOD Diam; Recent value; End Systole (A4C): 52 8 mm  LV MOD Diam; Recent value; End Systole (A4C): 17 mm  LV MOD Diam; Recent value; End Systole (A4C): 23 4 mm  LV MOD Diam; Recent value; End Systole (A4C): 28 7 mm  LV MOD Diam; Recent value; End Systole (A4C): 33 3 mm  LV MOD Diam; Recent value; End Systole (A4C): 36 5 mm  LV MOD Diam; Recent value; End Systole (A4C): 39 mm  LV MOD Diam; Recent value; End Systole (A4C): 41 1 mm  LV MOD Diam; Recent value; End Systole (A4C): 42 5 mm  LV MOD Diam; Recent value; End Systole (A4C): 43 2 mm  LV MOD Diam; Recent value; End Systole (A4C): 44 6 mm  LV MOD Diam; Recent value; End Systole (A4C): 46 1 mm  LV MOD Diam; Recent value; End Systole (A4C): 47 4 mm  LV MOD Diam; Recent value; End Systole (A4C): 48 5 mm  LV MOD Diam; Recent value; End Systole (A4C): 50 6 mm  LV MOD Diam; Recent value; End Systole (A4C): 53 2 mm  LV MOD Diam; Recent value;  End Systole (A4C): 54 5 mm  LV MOD Diam; Recent value; End Systole (A4C): 55 3 mm  Left Ventricle diastolic major axis; Most recent value chosen; Method of Disks, Single Plane; 2D mode; Apical four chamber;: 102 mm  Left Ventricle systolic major axis; Most recent value chosen; Method of Disks, Single Plane; 2D mode; Apical four chamber;: 84 7 mm  Left Ventricular Diastolic Area; Most recent value chosen; Method of Disks, Single Plane; 2D mode; Apical four chamber;: 5030 mm2  Left Ventricular End Diastolic Volume; Most recent value chosen; Method of Disks, Single Plane; 2D mode; Apical four chamber;: 163166 mm3  Left Ventricular End Systolic Volume; Most recent value chosen; Method of Disks, Single Plane; 2D mode; Apical four chamber;: 601087 mm3  Left Ventricular Systolic Area; Most recent value chosen; Method of Disks, Single Plane; 2D mode; Apical four chamber;: 3570 mm2  SI (A4C): 32 9 ml/m2  SV (A4C): 72230 mm3  Right Atrium MOD Diam; Most recent value chosen; Method of Disks, Single Plane; End Systole; 2D mode; Apical four chamber;: 16 9 mm  Right Atrium MOD Diam; Most recent value chosen; Method of Disks, Single Plane; End Systole; 2D mode; Apical four chamber;: 27 9 mm  Right Atrium MOD Diam; Most recent value chosen; Method of Disks, Single Plane; End Systole; 2D mode; Apical four chamber;: 55 3 mm  Right Atrium MOD Diam; Most recent value chosen; Method of Disks, Single Plane; End Systole; 2D mode; Apical four chamber;: 58 3 mm  Right Atrium MOD Diam; Most recent value chosen; Method of Disks, Single Plane; End Systole; 2D mode; Apical four chamber;: 58 3 mm  Right Atrium MOD Diam; Most recent value chosen; Method of Disks, Single Plane; End Systole; 2D mode; Apical four chamber;: 26 4 mm  Right Atrium MOD Diam; Most recent value chosen; Method of Disks, Single Plane; End Systole; 2D mode; Apical four chamber;: 31 8 mm  Right Atrium MOD Diam; Most recent value chosen; Method of Disks, Single Plane; End Systole; 2D mode;  Apical four chamber;: 37 7 mm  Right Atrium MOD Diam; Most recent value chosen; Method of Disks, Single Plane; End Systole; 2D mode; Apical four chamber;: 42 2 mm  Right Atrium MOD Diam; Most recent value chosen; Method of Disks, Single Plane; End Systole; 2D mode; Apical four chamber;: 48 4 mm  Right Atrium MOD Diam; Most recent value chosen; Method of Disks, Single Plane; End Systole; 2D mode; Apical four chamber;: 51 7 mm  Right Atrium MOD Diam; Most recent value chosen; Method of Disks, Single Plane; End Systole; 2D mode; Apical four chamber;: 53 9 mm  Right Atrium MOD Diam; Most recent value chosen; Method of Disks, Single Plane; End Systole; 2D mode; Apical four chamber;: 54 2 mm  Right Atrium MOD Diam; Most recent value chosen; Method of Disks, Single Plane; End Systole; 2D mode; Apical four chamber;: 54 2 mm  Right Atrium MOD Diam; Most recent value chosen; Method of Disks, Single Plane; End Systole; 2D mode; Apical four chamber;: 54 6 mm  Right Atrium MOD Diam; Most recent value chosen; Method of Disks, Single Plane; End Systole; 2D mode; Apical four chamber;: 56 1 mm  Right Atrium MOD Diam; Most recent value chosen; Method of Disks, Single Plane; End Systole; 2D mode; Apical four chamber;: 57 8 mm  Right Atrium MOD Diam; Most recent value chosen; Method of Disks, Single Plane; End Systole; 2D mode; Apical four chamber;: 58 9 mm  Right Atrium MOD Diam; Most recent value chosen; Method of Disks, Single Plane; End Systole; 2D mode; Apical four chamber;: 59 7 mm  Right Atrium MOD Diam; Most recent value chosen; Method of Disks, Single Plane; End Systole; 2D mode; Apical four chamber;: 59 1 mm  Right Atrium Systolic Area; Most recent value chosen; Method of Disks, Single Plane; End Systole; 2D mode; Apical four chamber;: 3690 mm2  Right Atrium Systolic Major Axis; Most recent value chosen; Method of Disks, Single Plane; End Systole; 2D mode;  Apical four chamber;: 76 6 mm  Right Atrium Systolic Volume Index; Method of Disks, Single Plane; End Systole; 2D mode; Apical four chamber;: 65 4 ml/m2  Right Atrium Systolic Volume; Most recent value chosen; Method of Disks, Single Plane; End Systole; 2D mode; Apical four chamber;: 064936 mm3  Right Ventricle Basal Diameter; 2D mode; Apical four chamber;: 53 7 mm  Right Ventricle Basal Diameter; Mean; Mean value chosen; 2D mode; Apical four chamber;: 53 7 mm    Apical two chamber  EF (A2C): 33 6 %  LV MOD Diam; Recent value; End Diastole (A2C): 47 7 mm  LV MOD Diam; Recent value; End Diastole (A2C): 28 mm  LV MOD Diam; Recent value; End Diastole (A2C): 53 6 mm  LV MOD Diam; Recent value; End Diastole (A2C): 53 6 mm  LV MOD Diam; Recent value; End Diastole (A2C): 53 6 mm  LV MOD Diam; Recent value; End Diastole (A2C): 53 6 mm  LV MOD Diam; Recent value; End Diastole (A2C): 52 9 mm  LV MOD Diam; Recent value; End Diastole (A2C): 50 1 mm  LV MOD Diam; Recent value; End Diastole (A2C): 45 9 mm  LV MOD Diam; Recent value; End Diastole (A2C): 43 1 mm  LV MOD Diam; Recent value; End Diastole (A2C): 40 3 mm  LV MOD Diam; Recent value; End Diastole (A2C): 38 2 mm  LV MOD Diam; Recent value; End Diastole (A2C): 36 8 mm  LV MOD Diam; Recent value; End Diastole (A2C): 36 4 mm  LV MOD Diam; Recent value; End Diastole (A2C): 35 4 mm  LV MOD Diam; Recent value; End Diastole (A2C): 34 mm  LV MOD Diam; Recent value; End Diastole (A2C): 31 2 mm  LV MOD Diam; Recent value; End Diastole (A2C): 27 mm  LV MOD Diam; Recent value; End Diastole (A2C): 22 1 mm  LV MOD Diam; Recent value; End Diastole (A2C): 15 1 mm  LV MOD Diam; Recent value; End Systole (A2C): 26 1 mm  LV MOD Diam; Recent value; End Systole (A2C): 47 mm  LV MOD Diam; Recent value; End Systole (A2C): 9 2 mm  LV MOD Diam; Recent value; End Systole (A2C): 49 4 mm  LV MOD Diam; Recent value; End Systole (A2C): 50 1 mm  LV MOD Diam; Recent value; End Systole (A2C): 49 8 mm  LV MOD Diam; Recent value;  End Systole (A2C): 48 3 mm  LV MOD Diam; Recent value; End Systole (A2C): 45 9 mm  LV MOD Diam; Recent value; End Systole (A2C): 41 6 mm  LV MOD Diam; Recent value; End Systole (A2C): 36 8 mm  LV MOD Diam; Recent value; End Systole (A2C): 33 2 mm  LV MOD Diam; Recent value; End Systole (A2C): 30 8 mm  LV MOD Diam; Recent value; End Systole (A2C): 28 2 mm  LV MOD Diam; Recent value; End Systole (A2C): 26 8 mm  LV MOD Diam; Recent value; End Systole (A2C): 24 7 mm  LV MOD Diam; Recent value; End Systole (A2C): 22 9 mm  LV MOD Diam; Recent value; End Systole (A2C): 20 4 mm  LV MOD Diam; Recent value; End Systole (A2C): 18 4 mm  LV MOD Diam; Recent value; End Systole (A2C): 16 3 mm  LV MOD Diam; Recent value; End Systole (A2C): 13 5 mm  Left Ventricle diastolic major axis; Most recent value chosen; Method of Disks, Single Plane; 2D mode; Apical two chamber;: 84 1 mm  Left Ventricle systolic major axis; Most recent value chosen; Method of Disks, Single Plane; 2D mode; Apical two chamber;: 80 9 mm  Left Ventricular Diastolic Area; Most recent value chosen; Method of Disks, Single Plane; 2D mode; Apical two chamber;: 3390 mm2  Left Ventricular End Diastolic Volume; Most recent value chosen; Method of Disks, Single Plane; 2D mode; Apical two chamber;: 894645 mm3  Left Ventricular End Systolic Volume; Most recent value chosen; Method of Disks, Single Plane; 2D mode; Apical two chamber;: 80786 mm3  Left Ventricular Systolic Area; Most recent value chosen; Method of Disks, Single Plane; 2D mode; Apical two chamber;: 2600 mm2  SI (A2C): 16 8 ml/m2  SV (A2C): 80241 mm3    Tissue Doppler Imaging  LV Peak Early Isabel Tissue Jean-Pierre; Mean; Medial MA (TDI): 35 3 mm/s  LV Peak Early Isabel Tissue Jean-Pierre; Medial MA (TDI): 35 3 mm/s    Unspecified Scan Mode  LVOT CV Orifice Diam; Mean: 20 mm  LVOT Diam: 20 mm  MV Peak Jean-Pierre/LV Peak Tissue Jean-Pierre E-Wave; Medial MA: 22 8  DT; Antegrade Flow: 271 ms  DT; Mean; Antegrade Flow: 271 ms  Dec [place]; Antegrade Flow: 2970 mm/s2  Dec [place]; Mean;  Antegrade Flow: 2970 mm/s2  MV E/A: 3 2  MV Peak A Jean-Pierre: 252 mm/s  [Narrative TRUNCATED]    No results found for this or any previous visit  Catheterizations:   No results found for this or any previous visit  Stress Tests:  No results found for this or any previous visit  Holter monitor -   No results found for this or any previous visit  No results found for this or any previous visit  ASSESSMENT/PLAN:  1  Chronic systolic congestive heart failure  Patient is having multiple thoracentesis due to pleural effusion from congestive heart failure  Recently underwent PleurX catheter bilaterally  Patient currently reports being stable  Status post biventricular ICD which is likely reached ADDY  Will have device team recheck it to confirm  Discussed generator change procedure in detail including risk and benefits  Answered all the questions  Office will be in touch with him to schedule the procedure    2  Chronic pleural effusion  Status post PleurX catheter bilaterally  Reports having losing at the site due to being on Eliquis    3  Atrial fibrillation  Chronic atrial fibrillation  Maintained on amiodarone, Eliquis and metoprolol    4  GERD  Maintained on Protonix    5  Cardiac amyloidosis  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

## 2022-05-24 NOTE — TELEPHONE ENCOUNTER
The standard recommendation for Eliquis hold is typically 2 days  Maybe an extra day which is 3 days  Longer interruptions will put the patient at risk for embolic events including stroke  The procedural physician can contact our office to further discuss if necessary

## 2022-05-24 NOTE — PROGRESS NOTES
EPS Consultation/New Patient Virtual Evaluation - Ozzy Siddiqui 81 y o  male MRN: [id number]    The patient was identified by name and date of birth  He was informed that this is a telemedicine visit and that the visit is being conducted through Telephone  My office door was closed  No one else was in the room  He acknowledged consent and understanding of privacy and security of the video platform  The patient has agreed to participate and understands they can discontinue the visit at any time  Patient is aware this is a billable service  It was my intent to perform this visit via video technology but the patient was not able to do a video connection so the visit was completed via audio telephone only  Referring:    CC/HPI:   It was a pleasure to see Ozzy Siddiqui in our arrhythmia clinic at [place] [address]  As you know he is a 81 y o  man with hypertension, hyperlipidemia, chronic systolic congestive heart failure status post biventricular ICD, atrial fibrillation, arthritis, who is evaluated for generator change  On recent interrogation patient was noted to have device reaching ADDY  On the 17th of May he had beeping sound which is likely attributed to ADDY status  Patient recently had PleurX catheter implanted due to chronic pleural effusion from congestive heart failure despite taking diuretics torsemide 20 mg twice daily  He has had multiple thoracentesis as well  He denies being on dialysis  He currently denies any lower extremity swelling, chest pain  He had generator change in 2015 and did not have any complication with the procedure  He does report having difficulty laying flat due to his arthritis in the spine  He denies receiving any ICD therapy from the device      Past Medical History:  Past Medical History:   Diagnosis Date    Anticoagulant long-term use     Atrial fibrillation ([address] )     Cardiac defibrillator in situ     Cardiomyopathy ([address] )  Chronic kidney disease     Colon polyp     Congestive heart failure (CHF) (HCC)     DJD (degenerative joint disease)     Gastroparesis     HL (hearing loss)     Hyperlipidemia     Hypertension     Insomnia, unspecified 8/25/2021    Shortness of breath     Sick sinus syndrome (HCC)     Spinal stenosis        Medications:      Current Outpatient Medications:     amiodarone 200 mg tablet, TAKE 1 TABLET BY MOUTH EVERY DAY, Disp: 90 tablet, Rfl: 3    apixaban (ELIQUIS) 2 5 mg, Take 1 tablet (2 5 mg total) by mouth 2 (two) times a day, Disp: 180 tablet, Rfl: 3    dicyclomine (BENTYL) 20 mg tablet, Take 1 tablet (20 mg total) by mouth 4 (four) times a day (Patient taking differently: Take 20 mg by mouth in the morning and 20 mg in the evening and 20 mg before bedtime ), Disp: 120 tablet, Rfl: 2    diphenhydrAMINE-APAP, sleep, (TYLENOL PM EXTRA STRENGTH PO), Take by mouth as needed , Disp: , Rfl:     famotidine (PEPCID) 20 mg tablet, Take 1 tablet (20 mg total) by mouth daily at bedtime, Disp: 90 tablet, Rfl: 2    lidocaine (Lidoderm) 5 %, Apply 1 patch topically daily Remove & Discard patch within 12 hours or as directed by MD, Disp: 30 patch, Rfl: 5    metoclopramide (REGLAN) 5 mg tablet, Take 1 tablet (5 mg total) by mouth 3 (three) times a day, Disp: 60 tablet, Rfl: 5    metoprolol succinate (TOPROL-XL) 25 mg 24 hr tablet, 1/2 tab daily, Disp: 15 tablet, Rfl: 0    pantoprazole (PROTONIX) 40 mg tablet, Take 1 tablet (40 mg total) by mouth daily in the early morning, Disp: 90 tablet, Rfl: 3    polyethylene glycol (MIRALAX) 17 g packet, Take 17 g by mouth daily, Disp: 30 each, Rfl: 0    Tafamidis (Vyndamax) 61 MG CAPS, Take 61 mg by mouth daily, Disp: 30 capsule, Rfl: 11    torsemide (DEMADEX) 20 mg tablet, Take 1 tablet (20 mg total) by mouth 2 (two) times a day, Disp: 60 tablet, Rfl: 5    traMADol (ULTRAM-ER) 200 MG 24 hr tablet, Take 1 tablet (200 mg total) by mouth daily as needed for moderate pain, Disp: 30 tablet, Rfl: 0     Family History   Problem Relation Age of Onset    Coronary artery disease Mother     Hypertension Son      Social History     Socioeconomic History    Marital status: /Civil Union     Spouse name: Not on file    Number of children: Not on file    Years of education: Not on file    Highest education level: Not on file   Occupational History    Not on file   Tobacco Use    Smoking status: Former Smoker     Packs/day: 0 50     Years: 3 00     Pack years: 1 50     Types: Cigarettes, Cigars     Quit date: 1968     Years since quittin 1    Smokeless tobacco: Never Used   Vaping Use    Vaping Use: Never used   Substance and Sexual Activity    Alcohol use: Not Currently     Alcohol/week: 2 0 standard drinks     Types: 2 Glasses of wine per week     Comment: daily    Drug use: Never    Sexual activity: Not Currently   Other Topics Concern    Not on file   Social History Narrative    Active advance directive    Caffeine use     Social Determinants of Health     Financial Resource Strain: Not on file   Food Insecurity: No Food Insecurity    Worried About Running Out of Food in the Last Year: Never true    Gayle of Food in the Last Year: Never true   Transportation Needs: No Transportation Needs    Lack of Transportation (Medical): No    Lack of Transportation (Non-Medical): No   Physical Activity: Inactive    Days of Exercise per Week: 0 days    Minutes of Exercise per Session: 0 min   Stress: Stress Concern Present    Feeling of Stress :  To some extent   Social Connections: Not on file   Intimate Partner Violence: Not on file   Housing Stability: [address] Unable to Pay for Housing in the Last Year: No    Number of [place] in the Last Year: 1    Unstable Housing in the Last Year: No     Social History     Tobacco Use   Smoking Status Former Smoker    Packs/day: 0 50    Years: 3 00    Pack years: 1 50    Types: Cigarettes, Cigars  Quit date: 1968    Years since quittin 1   Smokeless Tobacco Never Used     Social History     Substance and Sexual Activity   Alcohol Use Not Currently    Alcohol/week: 2 0 standard drinks    Types: 2 Glasses of wine per week    Comment: daily       Review of Systems   Constitutional: Positive for malaise/fatigue  Negative for chills and fever  HENT: Negative  Eyes: Negative for blurred vision and double vision  Cardiovascular: Positive for chest pain (At the site of chest tube) and dyspnea on exertion  Negative for leg swelling, near-syncope, orthopnea, palpitations, paroxysmal nocturnal dyspnea and syncope  Respiratory: Negative for cough and sputum production  Endocrine: Negative  Skin: Negative  Negative for rash  Musculoskeletal: Positive for arthritis  Negative for joint pain  Gastrointestinal: Negative for abdominal pain, nausea and vomiting  Genitourinary: Negative  Neurological: Positive for dizziness  Psychiatric/Behavioral: Negative  The patient is not nervous/anxious  Objective:     Vitals: Blood pressure 104/64, pulse 70, height 6' 4" (1 93 m), weight 73 9 kg (163 lb)  , Body mass index is 19 84 kg/m²  ,        Physical Exam:  Unable to examine as visit was over the phone    Labs & Results:  Below is the patient's most recent value for Albumin, ALT, AST, BUN, Calcium, Chloride, Cholesterol, CO2, Creatinine, GFR, Glucose, HDL, Hematocrit, Hemoglobin, Hemoglobin A1C, LDL, Magnesium, Phosphorus, Platelets, Potassium, PSA, Sodium, Triglycerides, and WBC     Lab Results   Component Value Date    ALT 16 2022    AST 15 2022    BUN 26 (H) 2022    CALCIUM 8 9 2022    CL 97 (L) 2022    CHOL 162 2017    CO2 32 2022    CREATININE 1 69 (H) 2022    HDL 63 2022    HCT 34 2 (L) 2022    HGB 10 8 (L) 2022    MG 2 1 2022    PHOS 3 1 04/15/2022     2022    K 4 6 2022    PSA 5 7 (H) 10/05/2020     2017    TRIG 37 2022    WBC 6 02 2022     Note: for a comprehensive list of the patient's lab results, access the Results Review activity  Cardiac testing:         Echocardiograms:  Results for orders placed during the hospital encounter of 20    Echo complete with contrast if indicated    Narrative  [address] [place] [id number] [phone]    Transthoracic Echocardiogram  2D, M-mode, Doppler, and Color Doppler    Study date:  27-Sep-2020    Patient: Flor Ng  MR number: [id number]  Account number: [de-identified]  : 1940  Age: 79 years  Gender: Male  Status: Inpatient  Location: Bedside  Height: 73 in  Weight: 228 6 lb  BP: 96/ 57 mmHg    Indications: Shortness of Breath    Diagnoses: R06 02 - Shortness of breath    Sonographer:  Teuqila Mckeon RDCS  Interpreting Physician:  Clarisse Rogers MD  Primary Physician:  Martin Singer DO  Referring Physician:  Jhoan James PA-C  Group:  [facility]    SUMMARY    LEFT VENTRICLE:  Systolic function was moderately to markedly reduced  LVEF around 35%  Wall thickness was moderately increased  There was severe assymetrical hypertrophy of the septum  Apical hypertrophyic cannot be ruled out completely  Features were consistent with a pseudonormal left ventricular filling pattern, with concomitant abnormal relaxation and increased filling pressure (grade 2 diastolic dysfunction)  RIGHT VENTRICLE:  The ventricle was mildly dilated  Systolic function was mildly reduced  LEFT ATRIUM:  The atrium was moderately dilated  RIGHT ATRIUM:  The atrium was moderately dilated  MITRAL VALVE:  There was moderate annular calcification  There was moderate regurgitation  AORTIC VALVE:  There was trace regurgitation  TRICUSPID VALVE:  There was mild regurgitation  PULMONIC VALVE:  There was trace regurgitation      AORTA:  The root exhibited mild dilatation  4 2 cm(pt known to have aortic root dilation on previous echo)    HISTORY: PRIOR HISTORY: Afib, CM, ICD, CHF, Hyperlipidemia, SOB, SSS    PROCEDURE: The procedure was performed at the bedside  This was a routine study  The transthoracic approach was used  The study included complete 2D imaging, M-mode, complete spectral Doppler, and color Doppler  The heart rate was 70 bpm,  at the start of the study  Images were obtained from the parasternal, apical, subcostal, and suprasternal notch acoustic windows  Image quality was adequate  LEFT VENTRICLE: Size was normal  Systolic function was moderately to markedly reduced  LVEF around 35% There was hypokinesis with regional variations  Wall thickness was moderately increased  There was severe assymetrical hypertrophy of  the septum  Apical hypertrophyic cannot be ruled out completely DOPPLER: Features were consistent with a pseudonormal left ventricular filling pattern, with concomitant abnormal relaxation and increased filling pressure (grade 2 diastolic  dysfunction)  RIGHT VENTRICLE: The ventricle was mildly dilated  Systolic function was mildly reduced  LEFT ATRIUM: The atrium was moderately dilated  RIGHT ATRIUM: The atrium was moderately dilated  MITRAL VALVE: There was moderate annular calcification  DOPPLER: There was no evidence for stenosis  There was moderate regurgitation  AORTIC VALVE: The valve was trileaflet  Leaflets exhibited mild calcification  DOPPLER: There was no evidence for stenosis  There was trace regurgitation  TRICUSPID VALVE: The valve structure was normal  There was normal leaflet separation  DOPPLER: The transtricuspid velocity was within the normal range  There was no evidence for stenosis  There was mild regurgitation  Pulmonary artery  systolic pressure was mildly increased  PULMONIC VALVE: Not well visualized  DOPPLER: There was trace regurgitation      PERICARDIUM: There was no pericardial effusion  AORTA: The root exhibited mild dilatation  4 2 cm(pt known to have aortic root dilation on previous echo)    SYSTEMIC VEINS: IVC: The inferior vena cava was not well visualized  SYSTEM MEASUREMENT TABLES    2D mode  AoR Diam (2D): 42 mm  AoR Diam; Mean (2D): 42 mm  Asc Aorta Diam (2D): 37 mm  Asc Aorta Diam; Mean (2D): 37 mm  LA Dimension (2D): 44 mm  LA Dimension; Mean (2D): 44 mm  LA/Ao (2D): 1 1  EDV (2D-Cubed): 089402 mm3  EDV (BP): 086398 mm3  EF (2D-Cubed): 32 9 %  ESV (2D-Cubed): 831348 mm3  ESV (BP): 13559 mm3  FS (2D-Cubed): 12 5 %  FS (2D-Teich): 12 5 %  IVS/LVPW (2D): 1 4  IVSd (2D): 20 8 mm  IVSd; Mean chosen (2D): 20 8 mm  LVIDd (2D): 53 4 mm  LVIDd; Mean (2D): 53 4 mm  LVIDs (2D): 46 7 mm  LVIDs; Mean (2D): 46 7 mm  LVOT Area (2D): 314 mm2  LVPWd (2D): 15 3 mm  LVPWd; Mean (2D): 15 3 mm  Left Ventricular Ejection Fraction; Method of Disks, Biplane; 2D mode;: 40 8 %  Left Ventricular Ejection Fraction; Teichholz; 2D mode;: 26 8 %  Left Ventricular End Diastolic Volume; Teichholz; 2D mode;: 353042 mm3  Left Ventricular End Systolic Volume; Teichholz; 2D mode;: 659143 mm3  SI (2D-Cubed): 21 9 ml/m2  SI (BP): 29 7 ml/m2  SV (2D-Cubed): 36830 mm3  SV (BP): 26558 mm3  Stroke Index; Teichholz; 2D mode;: 16 2 ml/m2  Stroke Volume; Teichholz; 2D mode;: 56768 mm3    Apical four chamber  Left Atrium MOD Diam; Most recent value chosen; End Systole; Apical four chamber;: 37 4 mm  Left Atrium MOD Diam; Most recent value chosen; End Systole; Apical four chamber;: 35 mm  Left Atrium MOD Diam; Most recent value chosen; End Systole; Apical four chamber;: 29 4 mm  Left Atrium MOD Diam; Most recent value chosen; End Systole; Apical four chamber;: 17 5 mm  Left Atrium MOD Diam; Most recent value chosen; End Systole; Apical four chamber;: 27 6 mm  Left Atrium MOD Diam; Most recent value chosen; End Systole; Apical four chamber;: 36 mm  Left Atrium MOD Diam; Most recent value chosen;  End Systole; Apical four chamber;: 41 3 mm  Left Atrium MOD Diam; Most recent value chosen; End Systole; Apical four chamber;: 45 5 mm  Left Atrium MOD Diam; Most recent value chosen; End Systole; Apical four chamber;: 48 7 mm  Left Atrium MOD Diam; Most recent value chosen; End Systole; Apical four chamber;: 51 4 mm  Left Atrium MOD Diam; Most recent value chosen; End Systole; Apical four chamber;: 52 2 mm  Left Atrium MOD Diam; Most recent value chosen; End Systole; Apical four chamber;: 52 5 mm  Left Atrium MOD Diam; Most recent value chosen; End Systole; Apical four chamber;: 53 2 mm  Left Atrium MOD Diam; Most recent value chosen; End Systole; Apical four chamber;: 52 5 mm  Left Atrium MOD Diam; Most recent value chosen; End Systole; Apical four chamber;: 50 8 mm  Left Atrium MOD Diam; Most recent value chosen; End Systole; Apical four chamber;: 49 7 mm  Left Atrium MOD Diam; Most recent value chosen; End Systole; Apical four chamber;: 47 3 mm  Left Atrium MOD Diam; Most recent value chosen; End Systole; Apical four chamber;: 45 8 mm  Left Atrium MOD Diam; Most recent value chosen; End Systole; Apical four chamber;: 43 1 mm  Left Atrium MOD Diam; Most recent value chosen; End Systole; Apical four chamber;: 39 9 mm  Left Atrium Systolic Area; Most recent value chosen; Method of Disks, Single Plane; 2D mode; Apical four chamber;: 3120 mm2  Left Atrium Systolic Volume Index; Method of Disks, Single Plane; 2D mode; Apical four chamber;: 47 8 ml/m2  Left Atrium Systolic Volume; Most recent value chosen; Method of Disks, Single Plane; 2D mode; Apical four chamber;: 794844 mm3  Left Atrium systolic major axis; Most recent value chosen; Method of Disks, Single Plane; 2D mode; Apical four chamber;: 72 1 mm  EF (A4C): 37 5 %  LV MOD Diam; Recent value; End Diastole (A4C): 62 8 mm  LV MOD Diam; Recent value; End Diastole (A4C): 64 3 mm  LV MOD Diam; Recent value; End Diastole (A4C): 32 5 mm  LV MOD Diam; Recent value;  End Diastole (A4C): 20 2 mm  LV MOD Diam; Recent value; End Diastole (A4C): 29 3 mm  LV MOD Diam; Recent value; End Diastole (A4C): 35 3 mm  LV MOD Diam; Recent value; End Diastole (A4C): 39 2 mm  LV MOD Diam; Recent value; End Diastole (A4C): 42 4 mm  LV MOD Diam; Recent value; End Diastole (A4C): 45 2 mm  LV MOD Diam; Recent value; End Diastole (A4C): 46 6 mm  LV MOD Diam; Recent value; End Diastole (A4C): 46 6 mm  LV MOD Diam; Recent value; End Diastole (A4C): 48 mm  LV MOD Diam; Recent value; End Diastole (A4C): 50 1 mm  LV MOD Diam; Recent value; End Diastole (A4C): 52 6 mm  LV MOD Diam; Recent value; End Diastole (A4C): 56 1 mm  LV MOD Diam; Recent value; End Diastole (A4C): 58 9 mm  LV MOD Diam; Recent value; End Diastole (A4C): 61 4 mm  LV MOD Diam; Recent value; End Diastole (A4C): 63 2 mm  LV MOD Diam; Recent value; End Diastole (A4C): 63 9 mm  LV MOD Diam; Recent value; End Diastole (A4C): 49 8 mm  LV MOD Diam; Recent value; End Systole (A4C): 29 mm  LV MOD Diam; Recent value; End Systole (A4C): 54 6 mm  LV MOD Diam; Recent value; End Systole (A4C): 52 8 mm  LV MOD Diam; Recent value; End Systole (A4C): 17 mm  LV MOD Diam; Recent value; End Systole (A4C): 23 4 mm  LV MOD Diam; Recent value; End Systole (A4C): 28 7 mm  LV MOD Diam; Recent value; End Systole (A4C): 33 3 mm  LV MOD Diam; Recent value; End Systole (A4C): 36 5 mm  LV MOD Diam; Recent value; End Systole (A4C): 39 mm  LV MOD Diam; Recent value; End Systole (A4C): 41 1 mm  LV MOD Diam; Recent value; End Systole (A4C): 42 5 mm  LV MOD Diam; Recent value; End Systole (A4C): 43 2 mm  LV MOD Diam; Recent value; End Systole (A4C): 44 6 mm  LV MOD Diam; Recent value; End Systole (A4C): 46 1 mm  LV MOD Diam; Recent value; End Systole (A4C): 47 4 mm  LV MOD Diam; Recent value; End Systole (A4C): 48 5 mm  LV MOD Diam; Recent value; End Systole (A4C): 50 6 mm  LV MOD Diam; Recent value; End Systole (A4C): 53 2 mm  LV MOD Diam; Recent value;  End Systole (A4C): 54 5 mm  LV MOD Diam; Recent value; End Systole (A4C): 55 3 mm  Left Ventricle diastolic major axis; Most recent value chosen; Method of Disks, Single Plane; 2D mode; Apical four chamber;: 102 mm  Left Ventricle systolic major axis; Most recent value chosen; Method of Disks, Single Plane; 2D mode; Apical four chamber;: 84 7 mm  Left Ventricular Diastolic Area; Most recent value chosen; Method of Disks, Single Plane; 2D mode; Apical four chamber;: 5030 mm2  Left Ventricular End Diastolic Volume; Most recent value chosen; Method of Disks, Single Plane; 2D mode; Apical four chamber;: 715741 mm3  Left Ventricular End Systolic Volume; Most recent value chosen; Method of Disks, Single Plane; 2D mode; Apical four chamber;: 789081 mm3  Left Ventricular Systolic Area; Most recent value chosen; Method of Disks, Single Plane; 2D mode; Apical four chamber;: 3570 mm2  SI (A4C): 32 9 ml/m2  SV (A4C): 49510 mm3  Right Atrium MOD Diam; Most recent value chosen; Method of Disks, Single Plane; End Systole; 2D mode; Apical four chamber;: 16 9 mm  Right Atrium MOD Diam; Most recent value chosen; Method of Disks, Single Plane; End Systole; 2D mode; Apical four chamber;: 27 9 mm  Right Atrium MOD Diam; Most recent value chosen; Method of Disks, Single Plane; End Systole; 2D mode; Apical four chamber;: 55 3 mm  Right Atrium MOD Diam; Most recent value chosen; Method of Disks, Single Plane; End Systole; 2D mode; Apical four chamber;: 58 3 mm  Right Atrium MOD Diam; Most recent value chosen; Method of Disks, Single Plane; End Systole; 2D mode; Apical four chamber;: 58 3 mm  Right Atrium MOD Diam; Most recent value chosen; Method of Disks, Single Plane; End Systole; 2D mode; Apical four chamber;: 26 4 mm  Right Atrium MOD Diam; Most recent value chosen; Method of Disks, Single Plane; End Systole; 2D mode; Apical four chamber;: 31 8 mm  Right Atrium MOD Diam; Most recent value chosen; Method of Disks, Single Plane; End Systole; 2D mode;  Apical four chamber;: 37 7 mm  Right Atrium MOD Diam; Most recent value chosen; Method of Disks, Single Plane; End Systole; 2D mode; Apical four chamber;: 42 2 mm  Right Atrium MOD Diam; Most recent value chosen; Method of Disks, Single Plane; End Systole; 2D mode; Apical four chamber;: 48 4 mm  Right Atrium MOD Diam; Most recent value chosen; Method of Disks, Single Plane; End Systole; 2D mode; Apical four chamber;: 51 7 mm  Right Atrium MOD Diam; Most recent value chosen; Method of Disks, Single Plane; End Systole; 2D mode; Apical four chamber;: 53 9 mm  Right Atrium MOD Diam; Most recent value chosen; Method of Disks, Single Plane; End Systole; 2D mode; Apical four chamber;: 54 2 mm  Right Atrium MOD Diam; Most recent value chosen; Method of Disks, Single Plane; End Systole; 2D mode; Apical four chamber;: 54 2 mm  Right Atrium MOD Diam; Most recent value chosen; Method of Disks, Single Plane; End Systole; 2D mode; Apical four chamber;: 54 6 mm  Right Atrium MOD Diam; Most recent value chosen; Method of Disks, Single Plane; End Systole; 2D mode; Apical four chamber;: 56 1 mm  Right Atrium MOD Diam; Most recent value chosen; Method of Disks, Single Plane; End Systole; 2D mode; Apical four chamber;: 57 8 mm  Right Atrium MOD Diam; Most recent value chosen; Method of Disks, Single Plane; End Systole; 2D mode; Apical four chamber;: 58 9 mm  Right Atrium MOD Diam; Most recent value chosen; Method of Disks, Single Plane; End Systole; 2D mode; Apical four chamber;: 59 7 mm  Right Atrium MOD Diam; Most recent value chosen; Method of Disks, Single Plane; End Systole; 2D mode; Apical four chamber;: 59 1 mm  Right Atrium Systolic Area; Most recent value chosen; Method of Disks, Single Plane; End Systole; 2D mode; Apical four chamber;: 3690 mm2  Right Atrium Systolic Major Axis; Most recent value chosen; Method of Disks, Single Plane; End Systole; 2D mode;  Apical four chamber;: 76 6 mm  Right Atrium Systolic Volume Index; Method of Disks, Single Plane; End Systole; 2D mode; Apical four chamber;: 65 4 ml/m2  Right Atrium Systolic Volume; Most recent value chosen; Method of Disks, Single Plane; End Systole; 2D mode; Apical four chamber;: 820164 mm3  Right Ventricle Basal Diameter; 2D mode; Apical four chamber;: 53 7 mm  Right Ventricle Basal Diameter; Mean; Mean value chosen; 2D mode; Apical four chamber;: 53 7 mm    Apical two chamber  EF (A2C): 33 6 %  LV MOD Diam; Recent value; End Diastole (A2C): 47 7 mm  LV MOD Diam; Recent value; End Diastole (A2C): 28 mm  LV MOD Diam; Recent value; End Diastole (A2C): 53 6 mm  LV MOD Diam; Recent value; End Diastole (A2C): 53 6 mm  LV MOD Diam; Recent value; End Diastole (A2C): 53 6 mm  LV MOD Diam; Recent value; End Diastole (A2C): 53 6 mm  LV MOD Diam; Recent value; End Diastole (A2C): 52 9 mm  LV MOD Diam; Recent value; End Diastole (A2C): 50 1 mm  LV MOD Diam; Recent value; End Diastole (A2C): 45 9 mm  LV MOD Diam; Recent value; End Diastole (A2C): 43 1 mm  LV MOD Diam; Recent value; End Diastole (A2C): 40 3 mm  LV MOD Diam; Recent value; End Diastole (A2C): 38 2 mm  LV MOD Diam; Recent value; End Diastole (A2C): 36 8 mm  LV MOD Diam; Recent value; End Diastole (A2C): 36 4 mm  LV MOD Diam; Recent value; End Diastole (A2C): 35 4 mm  LV MOD Diam; Recent value; End Diastole (A2C): 34 mm  LV MOD Diam; Recent value; End Diastole (A2C): 31 2 mm  LV MOD Diam; Recent value; End Diastole (A2C): 27 mm  LV MOD Diam; Recent value; End Diastole (A2C): 22 1 mm  LV MOD Diam; Recent value; End Diastole (A2C): 15 1 mm  LV MOD Diam; Recent value; End Systole (A2C): 26 1 mm  LV MOD Diam; Recent value; End Systole (A2C): 47 mm  LV MOD Diam; Recent value; End Systole (A2C): 9 2 mm  LV MOD Diam; Recent value; End Systole (A2C): 49 4 mm  LV MOD Diam; Recent value; End Systole (A2C): 50 1 mm  LV MOD Diam; Recent value; End Systole (A2C): 49 8 mm  LV MOD Diam; Recent value;  End Systole (A2C): 48 3 mm  LV MOD Diam; Recent value; End Systole (A2C): 45 9 mm  LV MOD Diam; Recent value; End Systole (A2C): 41 6 mm  LV MOD Diam; Recent value; End Systole (A2C): 36 8 mm  LV MOD Diam; Recent value; End Systole (A2C): 33 2 mm  LV MOD Diam; Recent value; End Systole (A2C): 30 8 mm  LV MOD Diam; Recent value; End Systole (A2C): 28 2 mm  LV MOD Diam; Recent value; End Systole (A2C): 26 8 mm  LV MOD Diam; Recent value; End Systole (A2C): 24 7 mm  LV MOD Diam; Recent value; End Systole (A2C): 22 9 mm  LV MOD Diam; Recent value; End Systole (A2C): 20 4 mm  LV MOD Diam; Recent value; End Systole (A2C): 18 4 mm  LV MOD Diam; Recent value; End Systole (A2C): 16 3 mm  LV MOD Diam; Recent value; End Systole (A2C): 13 5 mm  Left Ventricle diastolic major axis; Most recent value chosen; Method of Disks, Single Plane; 2D mode; Apical two chamber;: 84 1 mm  Left Ventricle systolic major axis; Most recent value chosen; Method of Disks, Single Plane; 2D mode; Apical two chamber;: 80 9 mm  Left Ventricular Diastolic Area; Most recent value chosen; Method of Disks, Single Plane; 2D mode; Apical two chamber;: 3390 mm2  Left Ventricular End Diastolic Volume; Most recent value chosen; Method of Disks, Single Plane; 2D mode; Apical two chamber;: 498091 mm3  Left Ventricular End Systolic Volume; Most recent value chosen; Method of Disks, Single Plane; 2D mode; Apical two chamber;: 40810 mm3  Left Ventricular Systolic Area; Most recent value chosen; Method of Disks, Single Plane; 2D mode; Apical two chamber;: 2600 mm2  SI (A2C): 16 8 ml/m2  SV (A2C): 05648 mm3    Tissue Doppler Imaging  LV Peak Early Isabel Tissue Jean-Pierre; Mean; Medial MA (TDI): 35 3 mm/s  LV Peak Early Isabel Tissue Jean-Pierre; Medial MA (TDI): 35 3 mm/s    Unspecified Scan Mode  LVOT CV Orifice Diam; Mean: 20 mm  LVOT Diam: 20 mm  MV Peak Jean-Pierre/LV Peak Tissue Jean-Pierre E-Wave; Medial MA: 22 8  DT; Antegrade Flow: 271 ms  DT; Mean; Antegrade Flow: 271 ms  Dec [place]; Antegrade Flow: 2970 mm/s2  Dec [place]; Mean;  Antegrade Flow: 2970 mm/s2  MV E/A: 3 2  MV Peak A Jean-Pierre: 252 mm/s  [Narrative TRUNCATED]    No results found for this or any previous visit  Catheterizations:   No results found for this or any previous visit  Stress Tests:  No results found for this or any previous visit  Holter monitor -   No results found for this or any previous visit  No results found for this or any previous visit  ASSESSMENT/PLAN:  1  Chronic systolic congestive heart failure  Patient is having multiple thoracentesis due to pleural effusion from congestive heart failure  Recently underwent PleurX catheter bilaterally  Patient currently reports being stable  Status post biventricular ICD which is likely reached ADDY  Will have device team recheck it to confirm  Discussed generator change procedure in detail including risk and benefits  Answered all the questions  Office will be in touch with him to schedule the procedure    2  Chronic pleural effusion  Status post PleurX catheter bilaterally  Reports having losing at the site due to being on Eliquis    3  Atrial fibrillation  Chronic atrial fibrillation  Maintained on amiodarone, Eliquis and metoprolol    4  GERD  Maintained on Protonix    5  Cardiac amyloidosis  Likely cause of patient's cardiomyopathy  Maintained on Tefamidis     VIRTUAL VISIT DISCLAIMER     Darrin Dakin acknowledges that He has consented to an online visit or consultation  He understands that the online visit is based solely on information provided by him, and that, in the absence of a face-to-face physical evaluation by the physician, the diagnosis He receives is both limited and provisional in terms of accuracy and completeness  This is not intended to replace a full medical face-to-face evaluation by the physician  Darrin Dakin understands and accepts these terms

## 2022-05-24 NOTE — TELEPHONE ENCOUNTER
Patient's wife states he has catheters in his lung area he is having a procedure done on one of the catheters which may result in minor surgery  They want him to stop his eliquis from today on  The surgery is this Friday  Patient's wife wants to know if this would be okay  He was off of it for five doses up until Friday May 20th due to the catheters being placed  Then he went back on it Friday night and he took two doses on Saturday and one dose on Sunday          [phone] or [phone]

## 2022-05-24 NOTE — TELEPHONE ENCOUNTER
Spoke with pt  Pt verbally understood that he should hold eliquis for 2 days, maybe 3 and the risk factors for holding it longer  Also, that the procedural physician can contact our office if needed

## 2022-05-27 NOTE — SEDATION DOCUMENTATION
Pt states that the oozing has stopped at the site  Current issue is scant drainage into bottle from left side  Dr Avtar Cee imaged the left pleural space with US

## 2022-05-27 NOTE — SEDATION DOCUMENTATION
Tube to remain in at this time  Try to drain L side weekly instead (next Friday)  Return in 3 weeks for CT scan and possible removal pending fluid status

## 2022-05-31 NOTE — TELEPHONE ENCOUNTER
Patient's wife left message that patient has a concern about his upcoming generator change  He has an ICD  About a year ago he was shocked by his device and he is very concerned that this could happen while he is having his generator replaced  He is worried about this and wants to know if device can be disabled before actually changing it out  I do not see a date yet for this to occur, not in Epic yet

## 2022-05-31 NOTE — TELEPHONE ENCOUNTER
Patient has not been eating  Getting very weak  Wife would like a medication sent to the pharmacy for increasing the appetite

## 2022-06-01 NOTE — TELEPHONE ENCOUNTER
Patient scheduled for BIV ICD gen change at [facility] on 06/13/22 with Dr Luis Rodriguez  Patient wife aware of general instructions and meds ( Torsemide: Am of) and labs required  patient cover under medicare  Can we please have the case

## 2022-06-01 NOTE — TELEPHONE ENCOUNTER
----- Message from Nirmala Tovar MD sent at 5/26/2022  2:28 PM EDT -----  Thanks  It should work  Procedure should be schedule after this check  ----- Message -----  From: Christina Dominguez  Sent: 5/26/2022   1:22 PM EDT  To: Nirmala Tovar MD    He is schduled for an in clinic check 6-9  Would you like something sooner?  ----- Message -----  From: Nirmala Tovar MD  Sent: 5/24/2022  10:37 AM EDT  To: Cardiology Cardiac Device Reports    Hello guys,    Can you please check his device to confirm it has reached ADDY?     Thank you

## 2022-06-05 NOTE — HOME HEALTH
Patient will soon be going for Chest CT to determine if Left Asept drain can be removed as there is barely any fluid drainage

## 2022-06-09 NOTE — CASE COMMUNICATION
Ship to  Pt  Home   Insurance Medicare A B  Wound 1 1     Full         All items are ordered by the each unless otherwise noted  PLEASE DO NOT ORDER BY THE BOX  Request specific quantity needed  For Private Insurances order should be for a 2 week period    Adapt skin barrier no sting wipes 50 per box  [id number]    1 box

## 2022-06-10 NOTE — TELEPHONE ENCOUNTER
Pt's wife Brenda called & lvm on office phone stating that pt has been urinating very frequently. Brenda would like to know does pt need to continue to take his 2nd dosage of Furosamide.       Brenda would like a call back at [phone]

## 2022-06-13 NOTE — INTERVAL H&P NOTE
H&P reviewed  After examining the patient I find no changes in the patients condition since the H&P had been written  Patient presents today to undergo elective generator change for [employer] BiV ICD  Patient is agreeable to continue with ICD therapy      Vitals:    06/13/22 1120   BP: 105/67   Pulse: 69   Resp: 18   Temp: 97 9 °F (36 6 °C)   SpO2: 98%

## 2022-06-13 NOTE — Clinical Note
Defib pad site: right upper scapula  Defib pad site: Left lower apex  Defib pad site assessment: skin integrity intact

## 2022-06-13 NOTE — DISCHARGE INSTRUCTIONS
Please refer to post device implantation discharge instructions and restrictions below and your device booklet/temporary card  Keep incision dry for one week  Do not use lotions, powders, ointments, or creams on the incision  Leave outer bandage in place for one week  The bandage is water proof  You may shower with it as long as it is fully adhered to your skin  If the bandage appears to be coming off or if there is an open gap in the bandage to the area of your incision, then please keep the whole area dry for the remaining week  After one week, please remove the bandage by gently pulling all edges away from the center and slowly remove it from your skin  Do not quickly rip off the bandage  Please call the office if you notice redness, swelling, bleeding, or drainage from incision or if you develop fevers  Implantable Cardioverter Defibrillator      If you have any questions, please call [phone] to speak with a nurse (8:30am-4pm, or [phone] after hours)  For appointments, please call [phone]  WHAT YOU SHOULD KNOW:    An implantable cardioverter defibrillator (ICD) is a small device that monitors your heart rate and rhythm  It is commonly placed inside your upper chest region  It may be used if you have a ventricular arrhythmia, which is an irregular, dangerous rhythm from the bottom chamber of your heart  Some arrhythmias may cause your heart to suddenly stop beating  An ICD can give a shock to your heart to make it start beating again, or it can give pacing therapy (also known as pain-free therapy) to return your heart to normal rhythm  It is also a pacemaker, so it will pace your heart if needed to prevent it from beating too slowly  AFTER YOU LEAVE:      Follow up with your primary healthcare provider or cardiologist as directed: You will need to follow-up to have your ICD checked and make sure you are not having problems   Write down your questions so you remember to ask them during your visits  Self-care:   Ask about activity: Ask if you need to avoid moving your shoulder or arm, and for how long  Ask if you should avoid lifting heavy objects  Do not play any contact sports, such as football or wrestling, until your primary healthcare provider [facility] or cardiologist tells you it is okay  You may only be able to drive for a certain amount of time per day, or during certain hours  Ask when you can return to work  Care for your skin over the ICD: see above instructions for further details     When you get a shock from your ICD: A shock may feel like someone has hit you, or you may feel a thump in the chest  If someone is touching you when you get a shock, they may feel a tingling feeling  The first time you feel a shock, it may scare you  Sit or lie down and stay calm  Ask someone to stay with you if possible  Please either call your cardiologist or report to an emergency room  Driving: you are ok to drive 48 hours after generator is changed     Safety instructions when you have an ICD:   Carry an ID card for the ICD: This card has important information about your ICD  Stay away from magnets or machines with electric fields: This includes MRI machines  Avoid leaning into a car engine or doing welding  These things can interfere with how your ICD works  Tell airport security you have an ICD: You may need to be searched by hand when you go through a security gate  The security gate or handheld wand could harm your ICD  Keep an ICD diary: Record when you get a shock and what you were doing before you got the shock  Keep track of how you felt before and after the shock, as well as how many shocks you received  Write down the day and time of each shock  Bring the diary with you when you see your PHP or cardiologist    Sofia Arana medical alert identification: Wear medical alert jewelry or carry a card that says you have an ICD   Ask your PHP or cardiologist where to get these items  Contact your primary healthcare provider or cardiologist if:   You have a fever  You feel 1 or more shocks from your ICD and feel fine afterwards  Your feet or ankles swell  The area around your ICD is painful or tender after surgery  The skin around your stitches or staples is red, swollen, or draining pus or fluid  You have chills, a cough, and feel weak or achy  You are sad or anxious and find it hard to do your usual activities  You have questions or concerns about your condition or care  Seek care immediately or call 911 if:   Your stitches or staples come apart  Blood soaks through your bandage  You feel more than 3 shocks in a row from your ICD  You become weak, dizzy, or faint  You feel your heart skip beats or beat very fast or slow, but you do not feel a shock from your ICD  You have chest pain that does not go away with rest or medicine  © 2014 [address] [employer] is for End User's use only and may not be sold, redistributed or otherwise used for commercial purposes  All illustrations and images included in CareNotes® are the copyrighted property of [employer] , [employer]  or Jacob Krause  The above information is an  only  It is not intended as medical advice for individual conditions or treatments  Talk to your doctor, nurse or pharmacist before following any medical regimen to see if it is safe and effective for you

## 2022-06-13 NOTE — NURSING NOTE
I mentioned we could bladder scan patient after palpating bladder but patient stated he wanted to get his clothes on and get the hell out of here!   Instructed family that if he feels full in the bladder and cant pee should go to er

## 2022-06-13 NOTE — ANESTHESIA POSTPROCEDURE EVALUATION
Post-Op Assessment Note    CV Status:  Stable  Pain Score: 0    Pain management: adequate  Multimodal analgesia used between 6 hours prior to anesthesia start to PACU discharge    Mental Status:  Alert   Hydration Status:  Euvolemic   PONV Controlled:  None   Airway Patency:  Patent      Post Op Vitals Reviewed: Yes      Staff: CRNA         No complications documented      /61 (06/13/22 1357)    Temp      Pulse 70 (06/13/22 1357)   Resp 16 (06/13/22 1357)    SpO2 97 % (06/13/22 1357)

## 2022-06-13 NOTE — Clinical Note
Prepped: left chest  Prepped with: ChloraPrep  The site was clipped  The patient was draped   Bialteral wrist wraps applied for patient safety

## 2022-06-13 NOTE — PROGRESS NOTES
Pt transported to [facility] s/p L sided BiVICD generator change  Transfer of care to RN at bedside  No c/o pain at this time  Dressing noted to be clean, dry and intact

## 2022-06-13 NOTE — ANESTHESIA PREPROCEDURE EVALUATION
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)   (+) Kidney stone   (+) Stage 3 chronic kidney disease (HCC)      HEMATOLOGY   (+) Anemia   (+) Platelets decreased (HCC)      Cardiovascular and Mediastinum   (+) Acute on chronic systolic CHF (congestive heart failure) (HCC)      Other   (+) Presence of automatic cardioverter/defibrillator (AICD)       TTE SUMMARY 11/2021:     Left Ventricle: Left ventricular cavity size is normal  The left ventricular ejection fraction is 35-40%  Systolic function is moderately reduced  There is moderate global hypokinesis with regional variation  Diastolic function is moderately abnormal, consistent with grade II (pseudonormal) relaxation  Wall thickness is severely increased  There is severe concentric hypertrophy  Patient with known cardiac amyloidosis    Right Ventricle: Right ventricular cavity size is severely dilated  Systolic function is moderately reduced  Wall thickness is increased    Left Atrium: The atrium is moderately dilated    Right Atrium: The atrium is severely dilated    Mitral Valve: There is moderate regurgitation    Tricuspid Valve: There is at least moderate regurgitation    Aorta: The aortic root is mildly dilated  (4 3cm) The ascending aorta is mildly dilated  (4 2 cm)    Pericardium: There is a trivial pericardial effusion  There is a large left pleural effusion    Pulmonary Artery:  The estimated pulmonary artery systolic pressure is 80 8 mmHg  The pulmonary artery systolic pressure is severely increased  Physical Exam    Airway    Mallampati score: I         Dental   Comment: Very poor dentition,     Cardiovascular  Rhythm: regular, Rate: normal,     Pulmonary  Breath sounds clear to auscultation,     Other Findings        Anesthesia Plan  ASA Score- 4     Anesthesia Type- IV sedation with anesthesia with ASA Monitors  Additional Monitors:   Airway Plan:           Plan Factors-Exercise tolerance (METS): <4 METS  Chart reviewed  Obstructive sleep apnea risk education given perioperatively  Induction- intravenous  Postoperative Plan- Plan for postoperative opioid use  Planned trial extubation    Informed Consent- Anesthetic plan and risks discussed with patient  I personally reviewed this patient with the CRNA  Discussed and agreed on the Anesthesia Plan with the CRNA  Kamila Fernández

## 2022-06-13 NOTE — NURSING NOTE
Patient d/tita and tried to urinate had 100 out palpated bladder no discomfort family was concerned discussed with family that pateint needs to drink fluid  Urine was dark and patient dehydrated from being npo

## 2022-06-13 NOTE — Clinical Note
Site (pad location): lateral thigh  Laterality: left  Grounding pad site assessment: skin integrity intact

## 2022-06-13 NOTE — Clinical Note
The Icd Generator [place] [employer] Mri Quad Crt-d Is1/df1 device was inserted  The leads were placed into the connector and visually verified to be in correct position  Injury current obtained

## 2022-06-13 NOTE — PROGRESS NOTES
multiple allevyn applied for further pressure injury prevention, multiple stage 1 along spine  Dated per protocol  Cath lab made aware

## 2022-06-24 NOTE — PROGRESS NOTES
MDT CRT-D (VVIR 70)   DEVICE INTERROGATED IN THE [place] OFFICE:  BATTERY VOLTAGE ADEQUATE (6 8 YR )   BP 95 4% VSRP 4 5%    ALL AVAILABLE LEAD PARAMETERS WITHIN NORMAL LIMITS   NO SIGNIFICANT HIGH RATE EPISODES   NO PROGRAMMING CHANGES MADE TO DEVICE PARAMETERS   WOUND CHECK: INCISION CLEAN AND DRY WITH EDGES APPROXIMATED; HEMATOMA RESOLVING; WOUND CARE AND RESTRICTIONS REVIEWED WITH PATIENT   NORMAL DEVICE FUNCTION  Jaiden Stack

## 2022-06-25 NOTE — CASE COMMUNICATION
Patient left asept drained with 125ml tea colored draiange and right asept drained with 475ml tea colored drainage  Patient and wife still feel that patient should keep both drains intact so long as drainage is present  Please cancel removal procedure for 6 27 22 per patient request   SN called and left a message for Mp Rogers the PA as requested  It was understood that SN call her after SN drained left asept to notify of amount and brooke cteristics  SN left message on [place]'s voicemail, awaiting response

## 2022-06-27 NOTE — TELEPHONE ENCOUNTER
Hi Rut,    Please see the attached message from the patient's visiting nurse  She would like to have someone come out to his home to turn his ICD off  You spoke with his wife on 5/31/22 regarding this  Is this something you can help them with?     Thanks,  Austin Oneil

## 2022-06-27 NOTE — TELEPHONE ENCOUNTER
Actually, he just wanted to make sure the old device was turned off so he didn't get shocked while doing the device replacement which was already done  So this request to turn off his new ICD is new? I'm a little confused  His device was just changed out on 6/13/22

## 2022-06-27 NOTE — TELEPHONE ENCOUNTER
Amy Odell from the [place] had called & said that she had called prior to today & req for an order to get pt's ICD turned off and for us to make an appt for the rep to go out to pt's home to turn it off   Amy Odell is aware that Dr Bri Stroud is out of the office the next 2 days but she is hoping all of this will be taken care of this week

## 2022-06-28 NOTE — CASE COMMUNICATION
Spoke to the patient regarding the delay in initiating OT within 7 days  Anticipated date to begin OT is 7/5/22 or sooner  Explained to the patient that the OT will call to schedule a visit for Thursday 6/30/22 and patient is agreeable  Faxed Dr Osborn Boast

## 2022-06-29 NOTE — TELEPHONE ENCOUNTER
Spoke to Dayanna--BRENDAN  she says she understands  Here is issue--- Patient is on palliative care- he does not walk- he lays down most of time and gets SOB  She is asking you put in order  She wants this more for comfort when he cant breathe  Just very SOB  She knows medicare might not pay but will cross that bridge if it comes to it

## 2022-06-29 NOTE — TELEPHONE ENCOUNTER
Rut Barrios Visiting Nurse from [facility] is asking Dr Flower Noland to consider sending in an order for oxygen for Jonnie  He continues to have SOB and could benefit from oxygen on an as needed basis      Any questions call Rut Barrios at [phone]

## 2022-06-29 NOTE — TELEPHONE ENCOUNTER
Medicare would require measurements of his oxygen at rest and with exertion  And then what his oxygen levels are when placed on 2L oxygen

## 2022-06-29 NOTE — TELEPHONE ENCOUNTER
No  At rest it seems his oxygen is fine  He needs formal oxygen testing for insurance to cover   Otherwise pay out of pocket

## 2022-06-30 NOTE — CASE COMMUNICATION
Pt to be seen by ot 2w 3 for transfer training to power wheelchair and for safe power wheelchair mobility

## 2022-07-05 NOTE — TELEPHONE ENCOUNTER
Pt is requesting a referral ASAP for Hospice Care  Is in a lot of pain  Emily Blazing from [facility] requests as well since she cannot help as much with pts pain as a hospice nurse can       Emily Blazing # [phone]

## 2022-07-05 NOTE — TELEPHONE ENCOUNTER
Patient will go back on Reglan but at 10 mg twice a day and he will increase his Protonix to b i d  He will stop the dicyclomine since this is not working  They will call me in a couple of days with his progress  He will be on boost until then

## 2022-07-05 NOTE — TELEPHONE ENCOUNTER
Emmie Galdamez from [phone] contacting office today to advise both Dr Bishnu Newsome and Dr Brooklyn Oliver patient will be going on hospice and is requesting the AICD be shut down

## 2022-07-05 NOTE — TELEPHONE ENCOUNTER
Pt wife let VM pt is to weak to come to appt today was wondering if you would be able to give a call please

## 2022-07-05 NOTE — CASE COMMUNICATION
Pt called therapist and cancelled ot visit today due to MD visit  Pt will out of scheduled ordered frequency

## 2022-07-06 NOTE — TELEPHONE ENCOUNTER
Patient's wife is concerned his medication Dr Donnell Fernandes order is not working  Patient's wife states he has a 7/10 abdominal pain that continues  Per wife "He has abdominal pain, shortness of breath, and he is extremely weak  The medication is not helping  This has been going on Dr Donnell Fernandes is aware  He has shortness of breath for years "     Patient's wife denies any change in the patient's shortness of breath over the past few days  Per wife it is normal for him

## 2022-07-06 NOTE — TELEPHONE ENCOUNTER
Spoke with patient and wife - he has had almost complete resolution of pain with tramadol - told him this is not likely gi pain, and to keep me informed

## 2022-07-06 NOTE — TELEPHONE ENCOUNTER
Spoke to pt and wife she states he has taken the medication for two day and still no relief pain seems to be getting worse  Please advise

## 2022-07-06 NOTE — TELEPHONE ENCOUNTER
Spoke to pt and BRENDAN Ravi Comfort, pt has hospice coming to pt home tomorrow and a plan will be put in place  Pt still having abdominal pain and nothing is helping  He cant go to ER for scans   The hospice nurses will reach out to you with any questions

## 2022-07-07 NOTE — PROGRESS NOTES
7/7/2022 12:32 PM  [facility] home patient requests emergency fill until [facility] order arrives and [facility] medications  Filled electronically via Epic as per [place] State Law  Requested Prescriptions     Signed Prescriptions Disp Refills    metoclopramide (Reglan) 10 mg tablet 30 tablet 0     Sig: Take 1 tablet (10 mg total) by mouth 4 (four) times a day    oxyCODONE (Roxicodone) 5 immediate release tablet 12 tablet 0     Sig: Take 0 5 tablets (2 5 mg total) by mouth every 4 (four) hours as needed (pain or shortness of breath) for up to 4 days Max Daily Amount: 15 mg    LORazepam (Ativan) 0 5 mg tablet 16 tablet 0     Sig: Take 1 tablet (0 5 mg total) by mouth every 4 (four) hours as needed for anxiety (or shortness of breath) for up to 4 days    baclofen 10 mg tablet 12 tablet 0     Sig: Take 1 tablet (10 mg total) by mouth 3 (three) times a day for 4 days       Harley [address]ón 78 Answering Service: [phone]  You can find me on [employer]!

## 2022-07-17 VITALS
RESPIRATION RATE: 18 BRPM | SYSTOLIC BLOOD PRESSURE: 120 MMHG | TEMPERATURE: 96.7 F | HEART RATE: 70 BPM | DIASTOLIC BLOOD PRESSURE: 66 MMHG

## 2023-01-23 NOTE — TELEPHONE ENCOUNTER
Patient wife stated her  needs a refill prescription of dicyclomine 20 mg    This medication is not listed in patients med list  [Negative] : Heme/Lymph [Dizziness] : no dizziness [Fainting] : no fainting

## 2023-07-10 NOTE — INTERVAL H&P NOTE
H&P reviewed  After examining the patient I find no changes in the patients condition since the H&P had been written      Vitals:    09/16/21 1100   BP: 119/69   Pulse: 72   Resp: 17   Temp: 97 6 °F (36 4 °C)   SpO2: 98% Referral placed per patient request.

## 2023-07-25 NOTE — TELEPHONE ENCOUNTER
Chief Complaint   Patient presents with    Hospital F/U     ED follow up 7/13. cta done. right lower extremity and aortoiliac disease  Follow up Ultrasound results        Vitals:    07/25/23 1436   BP: 131/78   BP Location: Right arm   Patient Position: Sitting   Cuff Size: Adult Regular   Pulse: 61   Resp: 18   SpO2: 93%   Weight: 94.3 kg (208 lb)     Wt Readings from Last 1 Encounters:   07/25/23 94.3 kg (208 lb)   Adeline Gutierrez MA       Patient wife dropped off a list of patients BP readings:  She stated that she is concerned that his BP is getting to low  (scanned under media)  Please review and advise, thanks

## 2023-08-09 NOTE — TELEPHONE ENCOUNTER
S/w wife and verbally understood  BMP placed  Clindamycin Counseling: I counseled the patient regarding use of clindamycin as an antibiotic for prophylactic and/or therapeutic purposes. Clindamycin is active against numerous classes of bacteria, including skin bacteria. Side effects may include nausea, diarrhea, gastrointestinal upset, rash, hives, yeast infections, and in rare cases, colitis.

## 2023-10-18 NOTE — TELEPHONE ENCOUNTER
Spoke to patient's wife and informed her that Dr Homer Ramirez ordered a pulmonary function test for the patient and I will be putting a copy of the order in the mail to her today  Spouse verbally understood  color consistent with ethnicity/race

## 2025-01-28 NOTE — TELEPHONE ENCOUNTER
If you have any questions or concerns about your discharge instructions, please call the unit at  and ask to speak with the charge nurse. Thank you for choosing [facility]. It was our pleasure to care for you.   Please advise, wife concerned

## 2025-04-16 NOTE — TELEPHONE ENCOUNTER
Left voicemail message for patient to call back 
Mine Jimenez called back  She states that the patient is very weak and going downhill  He is having severe epigastric pain and is scheduled for a gastric emptying test on 6/5  He is not eating well and the pain is worse at night  Patient is refusing to go to the ER  Spouse states he is in so much pain from spinal stenosis that its hard for him to get on a stretcher  Patient's wife would like for someone to go to the house to check him  She states they had home health care going ashile ago but it stopped and she would like someone to go again  She is very worried about the patient 
Please see if we  can make a referral to [facility]   Thanks
Pt spouse called requesting advise regarding home care/ home visit from doctors because pt is not feeling well, very weak and not himself  Spouse states she wants to take him to the ER but pt is refusing 
Referral pending    Fermín Zamora filled out as much of the referral to [facility] as he was able to 
S/w wife and was given number to contact [facility] 
Sariah Pinedo from [facility] called & stated that they are not currently accepting new referrals for home health care so they would have to find someone else 
Spoke with patients wife  Advised that [facility] is no longer taking new patients  I asked her if there was another agency she would like us to try? She stated NO  They will hold off at this time 
No

## (undated) DEVICE — UROCATCH BAG

## (undated) DEVICE — INVIEW CLEAR LEGGINGS: Brand: CONVERTORS

## (undated) DEVICE — GLOVE SRG BIOGEL 7

## (undated) DEVICE — GUIDEWIRE STRGHT TIP 0.038 IN SOLO PLUS

## (undated) DEVICE — 3M™ TEGADERM™ TRANSPARENT FILM DRESSING FRAME STYLE, 1624W, 2-3/8 IN X 2-3/4 IN (6 CM X 7 CM), 100/CT 4CT/CASE: Brand: 3M™ TEGADERM™

## (undated) DEVICE — GLOVE SRG BIOGEL 8

## (undated) DEVICE — PACK TUR

## (undated) DEVICE — SCD SEQUENTIAL COMPRESSION COMFORT SLEEVE MEDIUM KNEE LENGTH: Brand: KENDALL SCD

## (undated) DEVICE — CATH URETERAL 5FR X 70 CM FLEX TIP POLYUR BARD

## (undated) DEVICE — SPECIMEN CONTAINER STERILE PEEL PACK

## (undated) DEVICE — TUBING SUCTION 5MM X 12 FT

## (undated) DEVICE — BASKET STONE RTRVL 4 WIRE HELICAL

## (undated) DEVICE — GUIDEWIRE STRGHT TIP 0.035 IN  SOLO PLUS

## (undated) DEVICE — PLASMABLADE PS200-040 4.0: Brand: PLASMABLADE™

## (undated) DEVICE — FIBER HOLMIUM MP 365 MICRON

## (undated) DEVICE — CHLORHEXIDINE 4PCT 4 OZ

## (undated) DEVICE — 3000CC GUARDIAN II: Brand: GUARDIAN

## (undated) DEVICE — CATH URET .038 10FR 50CM DUAL LUMEN

## (undated) DEVICE — SPONGE 4 X 4 XRAY 16 PLY STRL LF RFD

## (undated) DEVICE — STERILE SURGICAL LUBRICANT,  TUBE: Brand: SURGILUBE